# Patient Record
Sex: MALE | Race: WHITE | Employment: FULL TIME | URBAN - METROPOLITAN AREA
[De-identification: names, ages, dates, MRNs, and addresses within clinical notes are randomized per-mention and may not be internally consistent; named-entity substitution may affect disease eponyms.]

---

## 2017-01-19 ENCOUNTER — TELEPHONE (OUTPATIENT)
Dept: ENDOCRINOLOGY | Facility: CLINIC | Age: 52
End: 2017-01-19

## 2017-01-19 DIAGNOSIS — E11.9 TYPE 2 DIABETES MELLITUS WITHOUT COMPLICATION, WITH LONG-TERM CURRENT USE OF INSULIN (H): Primary | ICD-10-CM

## 2017-01-19 DIAGNOSIS — E66.09 NON MORBID OBESITY DUE TO EXCESS CALORIES: ICD-10-CM

## 2017-01-19 DIAGNOSIS — Z79.4 TYPE 2 DIABETES MELLITUS WITHOUT COMPLICATION, WITH LONG-TERM CURRENT USE OF INSULIN (H): Primary | ICD-10-CM

## 2017-01-19 NOTE — TELEPHONE ENCOUNTER
[facility] Call Center    Phone Message    Name of Caller: [facility]    Phone Number: Other phone number:  [phone]    Best time to return call: soon    May a detailed message be left on voicemail: no    Reason for Call: Medication Question or concern regarding medication   Prescription Clarification: yes  Name of Medication: liraglutide (VICTOZA PEN) 18 MG/3ML soln  Prescribing Provider: Dr. Potter    [facility]   What on the order needs clarification? Rep states he needed clarification     Is patient symptomatic? No. Describe question or concern: n/a       Action Taken: Message routed to:  Adult Clinics: Endocrinology p [phone]

## 2017-01-20 NOTE — TELEPHONE ENCOUNTER
Spoke to pharmacy who says they can't dispense Victoza 3.0 mg; dose only goes up to 1.8mg daily.   Reference #[id number]    Maddie Weems RN, BSN

## 2017-01-20 NOTE — TELEPHONE ENCOUNTER
[facility]    Will forward to Maddie Weems RN.    Charlotte Barnes LPN  Adult Endocrinology  [facility]

## 2017-01-24 RX ORDER — LIRAGLUTIDE 6 MG/ML
1.8 INJECTION SUBCUTANEOUS DAILY
Qty: 9 ML | Refills: 2 | Status: SHIPPED | OUTPATIENT
Start: 2017-01-24 | End: 2017-03-27

## 2017-01-24 NOTE — TELEPHONE ENCOUNTER
OK per Dr. Sherwood to keep pt at Victoza 1.8 mg daily.  Patient notified. New Rx sent for Victoza 1.8 mg daily per [facility] nurse protocol.  Writer will contact the insurance company to see if there is anything else that can be done for pt to get Victoza 3.0 mg.  Maddie Weems RN, BSN

## 2017-02-11 ENCOUNTER — TELEPHONE (OUTPATIENT)
Dept: FAMILY MEDICINE | Facility: CLINIC | Age: 52
End: 2017-02-11

## 2017-02-11 ENCOUNTER — TRANSFERRED RECORDS (OUTPATIENT)
Dept: HEALTH INFORMATION MANAGEMENT | Facility: CLINIC | Age: 52
End: 2017-02-11

## 2017-02-11 DIAGNOSIS — E11.9 TYPE 2 DIABETES MELLITUS WITHOUT COMPLICATION, UNSPECIFIED LONG TERM INSULIN USE STATUS: Primary | ICD-10-CM

## 2017-02-11 NOTE — TELEPHONE ENCOUNTER
aspirin 81 MG EC tablet      Last Written Prescription Date: 3/28/16  Last Quantity: 90, # refills: 2  Last Office Visit with G, P or [facility] prescribing provider: 9/27/16       CREATININE   Date Value Ref Range Status   08/12/2016 0.77 0.66 - 1.25 mg/dL Final     AST        8   11/13/2015  ALT       35   11/13/2015  BP Readings from Last 3 Encounters:   10/19/16 137/88   09/27/16 138/82   08/23/16 130/80

## 2017-02-14 ENCOUNTER — TRANSFERRED RECORDS (OUTPATIENT)
Dept: HEALTH INFORMATION MANAGEMENT | Facility: CLINIC | Age: 52
End: 2017-02-14

## 2017-02-14 RX ORDER — LIRAGLUTIDE 6 MG/ML
1.2 INJECTION SUBCUTANEOUS DAILY
Qty: 6 ML | Refills: 3 | Status: SHIPPED | OUTPATIENT
Start: 2017-02-14 | End: 2017-03-27

## 2017-02-14 NOTE — TELEPHONE ENCOUNTER
Prescription approved per [facility] Refill Protocol.  Anabel Crystal RN

## 2017-02-14 NOTE — TELEPHONE ENCOUNTER
CRISTÓBAL Sherwood to add Victoza 1.2 mg daily to Victoza 1.8 mg daily for 3.0 mg total.  Patient agrees with plan. He will contact the clinic if unable to get second prescription.  Maddie Weems RN, BSN

## 2017-02-23 DIAGNOSIS — E11.9 TYPE 2 DIABETES MELLITUS WITHOUT COMPLICATION (H): ICD-10-CM

## 2017-02-23 NOTE — TELEPHONE ENCOUNTER
B-D U/F insulin pen needle      Last Written Prescription Date: 09/07/16  Last Fill Quantity: 400,  # refills: 1   Last Office Visit with G, [facility] or [facility] prescribing provider: 09/27/16

## 2017-02-27 RX ORDER — PEN NEEDLE, DIABETIC 31 GX5/16"
NEEDLE, DISPOSABLE MISCELLANEOUS
Qty: 300 EACH | Refills: 0 | Status: SHIPPED | OUTPATIENT
Start: 2017-02-27 | End: 2017-08-13

## 2017-02-27 NOTE — TELEPHONE ENCOUNTER
Prescription(s) approved per [facility] Refill Protocol.    Jeff Patel RN

## 2017-02-28 DIAGNOSIS — E11.9 TYPE 2 DIABETES MELLITUS WITHOUT COMPLICATION (H): ICD-10-CM

## 2017-02-28 NOTE — TELEPHONE ENCOUNTER
insulin lispro (HUMALOG KWIKPEN) 100 UNIT/ML injection         Last Written Prescription Date: 12/30/16  Last Fill Quantity: 45ml, # refills: 1  Last Office Visit with G, P or [facility] prescribing provider:  09/27/16        BP Readings from Last 3 Encounters:   10/19/16 137/88   09/27/16 138/82   08/23/16 130/80     Lab Results   Component Value Date    MICROL 31 11/13/2015     No results found for: MICROALBUMIN  Creatinine   Date Value Ref Range Status   08/12/2016 0.77 0.66 - 1.25 mg/dL Final   ]  GFR Estimate   Date Value Ref Range Status   08/12/2016 >90  Non  GFR Calc   >60 mL/min/1.7m2 Final   11/13/2015 >90  Non  GFR Calc   >60 mL/min/1.7m2 Final   02/02/2015 >90  Non  GFR Calc   >60 mL/min/1.7m2 Final     GFR Estimate If Black   Date Value Ref Range Status   08/12/2016 >90   GFR Calc   >60 mL/min/1.7m2 Final   11/13/2015 >90   GFR Calc   >60 mL/min/1.7m2 Final   02/02/2015 >90   GFR Calc   >60 mL/min/1.7m2 Final     Lab Results   Component Value Date    CHOL 210 08/12/2016     Lab Results   Component Value Date    HDL 38 08/12/2016     Lab Results   Component Value Date     08/12/2016     02/02/2015     Lab Results   Component Value Date    TRIG 354 08/12/2016     Lab Results   Component Value Date    CHOLHDLRATIO 4.9 11/13/2015     Lab Results   Component Value Date    AST 8 11/13/2015     Lab Results   Component Value Date    ALT 35 11/13/2015     Lab Results   Component Value Date    A1C 7.3 08/12/2016    A1C 7.9 11/13/2015    A1C 6.6 02/02/2015    A1C 6.9 08/04/2014    A1C 6.6 02/06/2014     Potassium   Date Value Ref Range Status   08/12/2016 4.2 3.4 - 5.3 mmol/L Final         [facility]

## 2017-03-03 RX ORDER — INSULIN LISPRO 100 [IU]/ML
INJECTION, SOLUTION INTRAVENOUS; SUBCUTANEOUS
Qty: 3 ML | Refills: 0 | Status: SHIPPED | OUTPATIENT
Start: 2017-03-03 | End: 2017-03-27 | Stop reason: ALTCHOICE

## 2017-03-03 NOTE — TELEPHONE ENCOUNTER
Medication is being filled for 1 time refill only due to:  Patient needs to be seen because due for office visit.     Anabel Crystal RN

## 2017-03-14 ENCOUNTER — TELEPHONE (OUTPATIENT)
Dept: FAMILY MEDICINE | Facility: CLINIC | Age: 52
End: 2017-03-14

## 2017-03-17 NOTE — TELEPHONE ENCOUNTER
Parish Langston contacted Amadou on 03/17/17 and left a message. If patient calls back please schedule appointment as soon as possible for diabetes check/ refill.  Parish Langston CMA

## 2017-03-17 NOTE — TELEPHONE ENCOUNTER
Humalog         Last Written Prescription Date: 03/03/17  Last Fill Quantity: 3 mL, # refills: 0  Last Office Visit with [facility], [facility] or [facility] prescribing provider:  09/27/16        BP Readings from Last 3 Encounters:   10/19/16 137/88   09/27/16 138/82   08/23/16 130/80     Lab Results   Component Value Date    MICROL 31 11/13/2015     No results found for: MICROALBUMIN  Creatinine   Date Value Ref Range Status   08/12/2016 0.77 0.66 - 1.25 mg/dL Final   ]  GFR Estimate   Date Value Ref Range Status   08/12/2016 >90  Non  GFR Calc   >60 mL/min/1.7m2 Final   11/13/2015 >90  Non  GFR Calc   >60 mL/min/1.7m2 Final   02/02/2015 >90  Non  GFR Calc   >60 mL/min/1.7m2 Final     GFR Estimate If Black   Date Value Ref Range Status   08/12/2016 >90   GFR Calc   >60 mL/min/1.7m2 Final   11/13/2015 >90   GFR Calc   >60 mL/min/1.7m2 Final   02/02/2015 >90   GFR Calc   >60 mL/min/1.7m2 Final     Lab Results   Component Value Date    CHOL 210 08/12/2016     Lab Results   Component Value Date    HDL 38 08/12/2016     Lab Results   Component Value Date     08/12/2016     02/02/2015     Lab Results   Component Value Date    TRIG 354 08/12/2016     Lab Results   Component Value Date    CHOLHDLRATIO 4.9 11/13/2015     Lab Results   Component Value Date    AST 8 11/13/2015     Lab Results   Component Value Date    ALT 35 11/13/2015     Lab Results   Component Value Date    A1C 7.3 08/12/2016    A1C 7.9 11/13/2015    A1C 6.6 02/02/2015    A1C 6.9 08/04/2014    A1C 6.6 02/06/2014     Potassium   Date Value Ref Range Status   08/12/2016 4.2 3.4 - 5.3 mmol/L Final     Patient is due for office visit.  Please call patient and schedule an office visit.  Then route message to provider.  Anabel Crystal RN

## 2017-03-20 NOTE — TELEPHONE ENCOUNTER
Manish Quiros contacted Amadou on 03/20/17 and left a message. If patient calls back please schedule appointment as soon as possible for a diabetes check and refills, one appt is make route the message back to the care team.  Manish Quiros,  [facility]

## 2017-03-21 RX ORDER — INSULIN LISPRO 100 [IU]/ML
INJECTION, SOLUTION INTRAVENOUS; SUBCUTANEOUS
Refills: 0 | OUTPATIENT
Start: 2017-03-21

## 2017-03-21 NOTE — TELEPHONE ENCOUNTER
Called spoke with patient informed message below. Patient informed me he is out of town now, but once he get back he will call to schedule appointment. Patient informed me that he still has a couple pens left.    JAMES Tarango MA

## 2017-03-24 DIAGNOSIS — N52.9 ERECTILE DYSFUNCTION, UNSPECIFIED ERECTILE DYSFUNCTION TYPE: ICD-10-CM

## 2017-03-24 DIAGNOSIS — F41.1 GENERALIZED ANXIETY DISORDER: ICD-10-CM

## 2017-03-24 RX ORDER — SILDENAFIL 100 MG/1
TABLET, FILM COATED ORAL
Qty: 12 TABLET | Refills: 3 | Status: SHIPPED | OUTPATIENT
Start: 2017-03-24 | End: 2017-09-15

## 2017-03-24 RX ORDER — LORAZEPAM 0.5 MG/1
0.5 TABLET ORAL EVERY 8 HOURS PRN
Qty: 45 TABLET | Refills: 0 | Status: SHIPPED | OUTPATIENT
Start: 2017-03-24 | End: 2017-05-25

## 2017-03-24 NOTE — TELEPHONE ENCOUNTER
LORazepam (ATIVAN) 0.5 MG tablet      Last Written Prescription Date:  12/23/16  Last Fill Quantity: 45,   # refills: 0  Last Office Visit with [facility], P or  Health prescribing provider: 10/19/16  Future Office visit:    Next 5 appointments (look out 90 days)     Mar 27, 2017 11:30 AM CDT   Office Visit with Makenzie Villarreal MD   [facility]    [address] [facility] [address]   [phone]                   Routing refill request to provider for review/approval because:  Drug not on the [facility], P or  Health refill protocol or controlled substance          VIAGRA 100 MG tablet      Last Written Prescription Date: 10/24/16  Last Fill Quantity: 12,  # refills: 3   Last Office Visit with [facility], [facility] or  Health prescribing provider: 10/19/16                                         Next 5 appointments (look out 90 days)     Mar 27, 2017 11:30 AM CDT   Office Visit with Makenzie Villarreal MD   [facility]    [address] [facility] [address]   [phone]

## 2017-03-27 ENCOUNTER — OFFICE VISIT (OUTPATIENT)
Dept: INTERNAL MEDICINE | Facility: CLINIC | Age: 52
End: 2017-03-27
Payer: COMMERCIAL

## 2017-03-27 VITALS
TEMPERATURE: 98.8 F | BODY MASS INDEX: 32.76 KG/M2 | OXYGEN SATURATION: 98 % | DIASTOLIC BLOOD PRESSURE: 75 MMHG | HEIGHT: 65 IN | HEART RATE: 63 BPM | SYSTOLIC BLOOD PRESSURE: 150 MMHG | WEIGHT: 196.6 LBS

## 2017-03-27 DIAGNOSIS — E66.09 NON MORBID OBESITY DUE TO EXCESS CALORIES: ICD-10-CM

## 2017-03-27 DIAGNOSIS — C43.59 MALIGNANT MELANOMA OF SKIN OF TRUNK (H): ICD-10-CM

## 2017-03-27 DIAGNOSIS — I10 HYPERTENSION GOAL BP (BLOOD PRESSURE) < 140/90: ICD-10-CM

## 2017-03-27 DIAGNOSIS — Z79.4 TYPE 2 DIABETES MELLITUS WITHOUT COMPLICATION, WITH LONG-TERM CURRENT USE OF INSULIN (H): Primary | ICD-10-CM

## 2017-03-27 DIAGNOSIS — E11.9 TYPE 2 DIABETES MELLITUS WITHOUT COMPLICATION, WITH LONG-TERM CURRENT USE OF INSULIN (H): Primary | ICD-10-CM

## 2017-03-27 DIAGNOSIS — E11.9 TYPE 2 DIABETES MELLITUS WITHOUT COMPLICATION, UNSPECIFIED LONG TERM INSULIN USE STATUS: ICD-10-CM

## 2017-03-27 DIAGNOSIS — E78.5 HYPERLIPIDEMIA LDL GOAL <100: ICD-10-CM

## 2017-03-27 LAB
ANION GAP SERPL CALCULATED.3IONS-SCNC: 7 MMOL/L (ref 3–14)
BUN SERPL-MCNC: 16 MG/DL (ref 7–30)
CALCIUM SERPL-MCNC: 9.5 MG/DL (ref 8.5–10.1)
CHLORIDE SERPL-SCNC: 105 MMOL/L (ref 94–109)
CO2 SERPL-SCNC: 26 MMOL/L (ref 20–32)
CREAT SERPL-MCNC: 0.69 MG/DL (ref 0.66–1.25)
CREAT UR-MCNC: 188 MG/DL
GFR SERPL CREATININE-BSD FRML MDRD: ABNORMAL ML/MIN/1.7M2
GLUCOSE SERPL-MCNC: 203 MG/DL (ref 70–99)
HBA1C MFR BLD: 8.2 % (ref 4.3–6)
MICROALBUMIN UR-MCNC: 65 MG/L
MICROALBUMIN/CREAT UR: 34.79 MG/G CR (ref 0–17)
POTASSIUM SERPL-SCNC: 3.5 MMOL/L (ref 3.4–5.3)
SODIUM SERPL-SCNC: 138 MMOL/L (ref 133–144)

## 2017-03-27 PROCEDURE — 82043 UR ALBUMIN QUANTITATIVE: CPT | Performed by: INTERNAL MEDICINE

## 2017-03-27 PROCEDURE — 80048 BASIC METABOLIC PNL TOTAL CA: CPT | Performed by: INTERNAL MEDICINE

## 2017-03-27 PROCEDURE — 99214 OFFICE O/P EST MOD 30 MIN: CPT | Performed by: INTERNAL MEDICINE

## 2017-03-27 PROCEDURE — 83036 HEMOGLOBIN GLYCOSYLATED A1C: CPT | Performed by: INTERNAL MEDICINE

## 2017-03-27 PROCEDURE — 36415 COLL VENOUS BLD VENIPUNCTURE: CPT | Performed by: INTERNAL MEDICINE

## 2017-03-27 RX ORDER — LISINOPRIL 20 MG/1
20 TABLET ORAL 2 TIMES DAILY
Qty: 180 TABLET | Refills: 1 | Status: SHIPPED | OUTPATIENT
Start: 2017-03-27 | End: 2017-10-09

## 2017-03-27 RX ORDER — METFORMIN HCL 500 MG
TABLET, EXTENDED RELEASE 24 HR ORAL
Qty: 360 TABLET | Refills: 1 | Status: SHIPPED | OUTPATIENT
Start: 2017-03-27 | End: 2017-09-14

## 2017-03-27 RX ORDER — LIRAGLUTIDE 6 MG/ML
1.8 INJECTION SUBCUTANEOUS DAILY
Qty: 27 ML | Refills: 1 | Status: SHIPPED | OUTPATIENT
Start: 2017-03-27 | End: 2017-10-09

## 2017-03-27 RX ORDER — SILDENAFIL CITRATE 100 MG
TABLET ORAL
Qty: 12 TABLET | Refills: 3 | OUTPATIENT
Start: 2017-03-27

## 2017-03-27 RX ORDER — LIRAGLUTIDE 6 MG/ML
1.2 INJECTION SUBCUTANEOUS DAILY
Qty: 18 ML | Refills: 1 | Status: SHIPPED | OUTPATIENT
Start: 2017-03-27 | End: 2017-09-14

## 2017-03-27 RX ORDER — LORAZEPAM 0.5 MG/1
TABLET ORAL
Qty: 45 TABLET | Refills: 0 | OUTPATIENT
Start: 2017-03-27

## 2017-03-27 RX ORDER — METOPROLOL TARTRATE 25 MG/1
12.5 TABLET, FILM COATED ORAL 2 TIMES DAILY
Qty: 90 TABLET | Refills: 1 | Status: SHIPPED | OUTPATIENT
Start: 2017-03-27 | End: 2017-09-27

## 2017-03-27 RX ORDER — ROSUVASTATIN CALCIUM 40 MG/1
40 TABLET, COATED ORAL DAILY
Qty: 90 TABLET | Refills: 1 | Status: SHIPPED | OUTPATIENT
Start: 2017-03-27 | End: 2017-12-31

## 2017-03-27 NOTE — PATIENT INSTRUCTIONS
For your blood pressure:  Please start Metoprolol 12.5mg twice a day.  Continue your Lisinopril.    For your diabetes:  Please continue your Victoza, Metformin and insulin: continue the lantus 50 units daily and Humalog, three times a day as follows:  (Humalog, three times a day):  Inject 10-12 units before each meal + correction (see below)  Pre-meal correction:    140-165 mg/dL: 1 unit  166-190: 2 units  191-215: 3 units  216-240: 4 units  241-265: 5 units  266-290: 6 units  > 291: 7 units     Bedtime:    165-190 mg/dL: 1 units  191-215: 2 units  216-240: 3 units  241-265: 4 units  266-290: 5 units  > 291: 6 units    Please return to clinic in 6 months (we may advise a visit sooner, based on today's lab/test results), with your usual PCP.    Please complete your colonoscopy soon.

## 2017-03-27 NOTE — MR AVS SNAPSHOT
After Visit Summary   3/27/2017    Amadou Bach    MRN: [id number]           Patient Information     Date Of Birth          1965        Visit Information        Provider Department      3/27/2017 2:50 PM Makenzie Villarreal MD [facility]        Today's Diagnoses     Type 2 diabetes mellitus without complication, with long-term current use of insulin (H)        Non morbid obesity due to excess calories        Hypertension goal BP (blood pressure) < 140/90        Type 2 diabetes mellitus without complication, unspecified long term insulin use status (H)        Hyperlipidemia LDL goal <100          Care Instructions    For your blood pressure:  Please start Metoprolol 12.5mg twice a day.  Continue your Lisinopril.    For your diabetes:  Please continue your Victoza, Metformin and insulin: continue the lantus 50 units daily and Humalog, three times a day as follows:  (Humalog, three times a day):  Inject 10-12 units before each meal + correction (see below)  Pre-meal correction:    140-165 mg/dL: 1 unit  166-190: 2 units  191-215: 3 units  216-240: 4 units  241-265: 5 units  266-290: 6 units  > 291: 7 units     Bedtime:    165-190 mg/dL: 1 units  191-215: 2 units  216-240: 3 units  241-265: 4 units  266-290: 5 units  > 291: 6 units    Please return to clinic in 6 months (we may advise a visit sooner, based on today's lab/test results), with your usual PCP.    Please complete your colonoscopy soon.          Follow-ups after your visit        Who to contact     If you have questions or need follow up information about today's clinic visit or your schedule please contact [facility] directly at [phone].  Normal or non-critical lab and imaging results will be communicated to you by MyChart, letter or phone within 4 business days after the clinic has received the results. If you do not hear from us within 7 days, please contact the clinic through Repp[employer]ert or  "phone. If you have a critical or abnormal lab result, we will notify you by phone as soon as possible.  Submit refill requests through [employer]ome 1699 or call your pharmacy and they will forward the refill request to us. Please allow 3 business days for your refill to be completed.          Additional Information About Your Visit        [employer] Information     vendome 1699 gives you secure access to your electronic health record. If you see a primary care provider, you can also send messages to your care team and make appointments. If you have questions, please call your primary care clinic.  If you do not have a primary care provider, please call [phone] and they will assist you.        Care EveryWhere ID     This is your Care EveryWhere ID. This could be used by other organizations to access your [facility] medical records  [id number]        Your Vitals Were     Pulse Temperature Height Pulse Oximetry BMI (Body Mass Index)       63 98.8  F (37.1  C) (Oral) 5' 5\" (1.651 m) 98% 32.72 kg/m2        Blood Pressure from Last 3 Encounters:   03/27/17 150/75   10/19/16 137/88   09/27/16 138/82    Weight from Last 3 Encounters:   03/27/17 196 lb 9.6 oz (89.2 kg)   12/02/16 205 lb 9.6 oz (93.3 kg)   10/19/16 202 lb 9.6 oz (91.9 kg)              We Performed the Following     Albumin Random Urine Quantitative     Basic metabolic panel     Hemoglobin A1c          Today's Medication Changes          These changes are accurate as of: 3/27/17  3:54 PM.  If you have any questions, ask your nurse or doctor.               Start taking these medicines.        Dose/Directions    metoprolol 25 MG tablet   Commonly known as:  LOPRESSOR   Used for:  Hypertension goal BP (blood pressure) < 140/90   Started by:  Makenzie Villarreal MD        Dose:  12.5 mg   Take 0.5 tablets (12.5 mg) by mouth 2 times daily   Quantity:  90 tablet   Refills:  1         These medicines have changed or have updated prescriptions.        Dose/Directions    " aspirin 81 MG EC tablet   This may have changed:  See the new instructions.   Used for:  Type 2 diabetes mellitus without complication, unspecified long term insulin use status (H)   Changed by:  Makenzie Villarreal MD        Dose:  81 mg   Take 1 tablet (81 mg) by mouth daily   Quantity:  90 tablet   Refills:  3       insulin glargine 100 UNIT/ML injection   Commonly known as:  LANTUS SOLOSTAR   This may have changed:  additional instructions   Used for:  Type 2 diabetes mellitus without complication, unspecified long term insulin use status (H)   Changed by:  Makenzie Villarreal MD        INJECT 50 UNITS SUBCUTANEOUSLY AT BEDTIME   Quantity:  45 mL   Refills:  1       insulin lispro 100 UNIT/ML injection   Commonly known as:  HumaLOG KWIKpen   This may have changed:    - additional instructions  - Another medication with the same name was removed. Continue taking this medication, and follow the directions you see here.   Used for:  Type 2 diabetes mellitus without complication, unspecified long term insulin use status (H)   Changed by:  Makenzie Villarreal MD        Inject 10-12u before each meal plus correction: Pre-meal and bedtime correction, as per previous sliding scale   Quantity:  54 mL   Refills:  1       lisinopril 20 MG tablet   Commonly known as:  PRINIVIL/ZESTRIL   This may have changed:  See the new instructions.   Used for:  Hypertension goal BP (blood pressure) < 140/90   Changed by:  Makenzie Villarreal MD        Dose:  20 mg   Take 1 tablet (20 mg) by mouth 2 times daily   Quantity:  180 tablet   Refills:  1            Where to get your medicines      These medications were sent to [facility]/pharmacy #[id number] - [place], [place] - [address] [place] [address] [address] [place] [id number] [place] [address], [place] [address]     Phone:  [phone]     aspirin 81 MG EC tablet    insulin glargine 100 UNIT/ML injection    insulin lispro 100 UNIT/ML injection    liraglutide 18  MG/3ML soln    liraglutide 18 MG/3ML soln    lisinopril 20 MG tablet    metFORMIN 500 MG 24 hr tablet    metoprolol 25 MG tablet    rosuvastatin 40 MG tablet                Primary Care Provider Office Phone # Fax #    Arielle Hair PA-C [phone]       [facility] [address]  [facility] [address]        Thank you!     Thank you for choosing [facility]  for your care. Our goal is always to provide you with excellent care. Hearing back from our patients is one way we can continue to improve our services. Please take a few minutes to complete the written survey that you may receive in the mail after your visit with us. Thank you!             Your Updated Medication List - Protect others around you: Learn how to safely use, store and throw away your medicines at www.disposemymeds.org.          This list is accurate as of: 3/27/17  3:54 PM.  Always use your most recent med list.                   Brand Name Dispense Instructions for use    ACCU-CHEK MARICRUZ PLUS test strip   Generic drug:  blood glucose monitoring     750 strip    TEST BLOOD SUGARS 8 TIMES DAILY AS DIRECTED       aspirin 81 MG EC tablet     90 tablet    Take 1 tablet (81 mg) by mouth daily       CO Q 10 PO      Take  by mouth. 2 per day       FISH OIL CONCENTRATE 1000 MG Caps      2 tablets daily       insulin glargine 100 UNIT/ML injection    LANTUS SOLOSTAR    45 mL    INJECT 50 UNITS SUBCUTANEOUSLY AT BEDTIME       insulin lispro 100 UNIT/ML injection    HumaLOG KWIKpen    54 mL    Inject 10-12u before each meal plus correction: Pre-meal and bedtime correction, as per previous sliding scale       * insulin pen needle 31G X 5 MM    B-D U/F    300 each    Use 4 times daily as directed.       * B-D U/F 31G X 8 MM   Generic drug:  insulin pen needle     300 each    USE 4 TIMES DAILY AS DIRECTED.       * liraglutide 18 MG/3ML soln    VICTOZA PEN    27 mL    Inject 1.8 mg Subcutaneous daily       *  liraglutide 18 MG/3ML soln    VICTOZA PEN    18 mL    Inject 1.2 mg Subcutaneous daily       lisinopril 20 MG tablet    PRINIVIL/ZESTRIL    180 tablet    Take 1 tablet (20 mg) by mouth 2 times daily       LORazepam 0.5 MG tablet    ATIVAN    45 tablet    Take 1 tablet (0.5 mg) by mouth every 8 hours as needed for anxiety       metFORMIN 500 MG 24 hr tablet    GLUCOPHAGE-XR    360 tablet    TAKE 2 TABLETS (1,000 MG) BY MOUTH 2 TIMES DAILY       metoprolol 25 MG tablet    LOPRESSOR    90 tablet    Take 0.5 tablets (12.5 mg) by mouth 2 times daily       MULTIPLE VITAMINS PO      1000mg daily       order for DME     3 Month    1 Device by * route 8 times daily TEST STRIPS  ACCU-CHEK MARICRUZ plus test strips       * order for DME     1 Device    Equipment being ordered: glucometer brand per insurance       * order for DME     400 each    LANCETS 4 x DAILY AND PRN       rosuvastatin 40 MG tablet    CRESTOR    90 tablet    Take 1 tablet (40 mg) by mouth daily Take 40 mg by mouth daily       sildenafil 100 MG cap/tab    VIAGRA    12 tablet    TAKE 1 TABLET BY MOUTH 30 MINS BEFORE INTERCOURSE       VITAMIN B-12 PO      Take  by mouth daily.       vitamin D 1000 UNITS capsule      Take 2 capsules by mouth daily.       * Notice:  This list has 6 medication(s) that are the same as other medications prescribed for you. Read the directions carefully, and ask your doctor or other care provider to review them with you.

## 2017-03-27 NOTE — TELEPHONE ENCOUNTER
Written rx for Lorazepam completed by Dr. Brothers is faxed to [facility] at fax # [phone].  Manish Quiros,  [facility]    Provider pls address the other pended medication.  Manish Quiros,  [facility]

## 2017-03-27 NOTE — NURSING NOTE
"Chief Complaint   Patient presents with     Diabetes       Initial /80 (BP Location: Right arm, Patient Position: Chair, Cuff Size: Adult Regular)  Pulse 63  Temp 98.8  F (37.1  C) (Oral)  Ht 5' 5\" (1.651 m)  Wt 196 lb 9.6 oz (89.2 kg)  SpO2 98%  BMI 32.72 kg/m2 Estimated body mass index is 32.72 kg/(m^2) as calculated from the following:    Height as of this encounter: 5' 5\" (1.651 m).    Weight as of this encounter: 196 lb 9.6 oz (89.2 kg).  Medication Reconciliation: complete    Radha Meza CMA  "

## 2017-03-27 NOTE — PROGRESS NOTES
"  SUBJECTIVE:                                                    Amadou Bach is a 51 year old male who presents to clinic today for the following health issues:        Diabetes Follow-up    Patient is checking blood sugars: up to 8 times daily.  120-140. Patient feels that his blood sugars may have been higher recently.  Blood sugar testing frequency justification: Uncontrolled diabetes    Diabetic concerns: none     Symptoms of hypoglycemia (low blood sugar): none     Paresthesias (numbness or burning in feet) or sores: No     Date of last diabetic eye exam: January 2017     Needs refills on all DM medications d/t an upcoming change in his insurance covering medications.  Note: patient is on  3mg of Victoza SQ daily for weight loss purposes and follows with Endocrinology.    Wt Readings from Last 4 Encounters:   03/27/17 196 lb 9.6 oz (89.2 kg)   12/02/16 205 lb 9.6 oz (93.3 kg)   10/19/16 202 lb 9.6 oz (91.9 kg)   09/27/16 202 lb (91.6 kg)     Hyperlipidemia Follow-Up      Rate your low fat/cholesterol diet?: not monitoring fat    Taking statin?  Yes, no muscle aches from statin    Other lipid medications/supplements?:  none     Hypertension Follow-up      Outpatient blood pressures are not being checked.    Low Salt Diet: not monitoring salt     BPs high.  Patient denies chest pain, chest pressure, shortness of breath, palpitations, headaches, visual changes, or any noted lower extremity swelling.     History of Melanoma:  Per the 1/2016 Derm note: \"History of melanoma: Left shoulder. Breslow depth 0.18 mm. s/p excision 01/22/15.\" he appears to be out of the 2 year frequent-visit window now.        Amount of exercise or physical activity: 2-3 days/week for an average of 45-60 minutes    Problems taking medications regularly: No    Medication side effects: none    Diet: regular (no restrictions) and carbohydrate counting           Problem list and histories reviewed & adjusted, as indicated.  Additional " history: as documented    Patient Active Problem List   Diagnosis     Erectile dysfunction     TYPE 2 DIABETES, HBA1C GOAL < 7%     Hyperlipidemia LDL goal <100     Obesity     Generalized anxiety disorder     ACP (advance care planning)     Hx of colonic polyp     Hypertension goal BP (blood pressure) < 140/90     Malignant melanoma of skin of trunk (H)     History of melanoma     Type 2 diabetes mellitus without complication (H)     AK (actinic keratosis)     Multiple benign nevi     Cherry hemangioma     Lentigines     Skin cancer screening     Erectile dysfunction, unspecified erectile dysfunction type     Past Surgical History:   Procedure Laterality Date     BIOPSY OF SKIN LESION       COLONOSCOPY  3/11/2014    Procedure: COMBINED COLONOSCOPY, SINGLE BIOPSY/POLYPECTOMY BY BIOPSY;;  Surgeon: Stephen Amin MD;  Location: [facility] OR     COLONOSCOPY WITH CO2 INSUFFLATION N/A 8/19/2014    Procedure: COLONOSCOPY WITH CO2 INSUFFLATION;  Surgeon: Stephen Amin MD;  Location: [facility] OR     HC TOOTH EXTRACTION W/FORCEP      wisdom teeth x4       Social History   Substance Use Topics     Smoking status: Never Smoker     Smokeless tobacco: Never Used     Alcohol use Yes      Comment: occ     Family History   Problem Relation Age of Onset     DIABETES Mother      Hypertension Mother      DIABETES Father      CANCER Father      lung     CANCER Other      aunt - skin cancer     Skin Cancer Other      Melanoma No family hx of          Current Outpatient Prescriptions   Medication Sig Dispense Refill     liraglutide (VICTOZA PEN) 18 MG/3ML soln Inject 1.8 mg Subcutaneous daily 27 mL 1     liraglutide (VICTOZA PEN) 18 MG/3ML soln Inject 1.2 mg Subcutaneous daily 18 mL 1     lisinopril (PRINIVIL/ZESTRIL) 20 MG tablet Take 1 tablet (20 mg) by mouth 2 times daily 180 tablet 1     metoprolol (LOPRESSOR) 25 MG tablet Take 0.5 tablets (12.5 mg) by mouth 2 times daily 90 tablet 1     metFORMIN (GLUCOPHAGE-XR) 500 MG 24 hr  tablet TAKE 2 TABLETS (1,000 MG) BY MOUTH 2 TIMES DAILY 360 tablet 1     aspirin 81 MG EC tablet Take 1 tablet (81 mg) by mouth daily 90 tablet 3     rosuvastatin (CRESTOR) 40 MG tablet Take 1 tablet (40 mg) by mouth daily Take 40 mg by mouth daily 90 tablet 1     insulin glargine (LANTUS SOLOSTAR) 100 UNIT/ML injection INJECT 50 UNITS SUBCUTANEOUSLY AT BEDTIME 45 mL 1     insulin lispro (HUMALOG KWIKPEN) 100 UNIT/ML injection Inject 10-12u before each meal plus correction: Pre-meal and bedtime correction, as per previous sliding scale 54 mL 1     LORazepam (ATIVAN) 0.5 MG tablet Take 1 tablet (0.5 mg) by mouth every 8 hours as needed for anxiety 45 tablet 0     sildenafil (VIAGRA) 100 MG cap/tab TAKE 1 TABLET BY MOUTH 30 MINS BEFORE INTERCOURSE 12 tablet 3     B-D U/F 31G X 8 MM insulin pen needle USE 4 TIMES DAILY AS DIRECTED. 300 each 0     ORDER FOR DME Equipment being ordered: glucometer brand per insurance 1 Device 0     ORDER FOR DME LANCETS 4 x DAILY AND  each 4     ACCU-CHEK MARICRUZ PLUS test strip TEST BLOOD SUGARS 8 TIMES DAILY AS DIRECTED 750 strip 1     insulin pen needle (B-D U/F) 31G X 5 MM Use 4 times daily as directed. 300 each 3     ORDER FOR DME 1 Device by * route 8 times daily TEST STRIPS   ACCU-CHEK MARICRUZ plus test strips 3 Month 0     Cyanocobalamin (VITAMIN B-12 PO) Take  by mouth daily.       Coenzyme Q10 (CO Q 10 PO) Take  by mouth. 2 per day       FISH OIL CONCENTRATE 1000 MG OR CAPS 2 tablets daily       VITAMIN D 1000 UNIT OR CAPS Take 2 capsules by mouth daily.       MULTIPLE VITAMINS OR 1000mg daily         Reviewed and updated as needed this visit by clinical staff  Allergies  Meds       Reviewed and updated as needed this visit by Provider         ==============================================================  ROS:  Constitutional, HEENT, cardiovascular, pulmonary, GI, , musculoskeletal, neuro, skin, endocrine and psych systems are negative, except as otherwise noted.    "    OBJECTIVE:                                                    /75  Pulse 63  Temp 98.8  F (37.1  C) (Oral)  Ht 5' 5\" (1.651 m)  Wt 196 lb 9.6 oz (89.2 kg)  SpO2 98%  BMI 32.72 kg/m2  Body mass index is 32.72 kg/(m^2).     Vitals:    03/27/17 1500 03/27/17 1526   BP: 163/80 150/75   BP Location: Right arm    Patient Position: Chair    Cuff Size: Adult Regular    Pulse: 63    Temp: 98.8  F (37.1  C)    TempSrc: Oral    SpO2: 98%    Weight: 196 lb 9.6 oz (89.2 kg)    Height: 5' 5\" (1.651 m)        GENERAL APPEARANCE: healthy, alert and in no distress  EYES: Eyes grossly normal to inspection, and conjunctivae and sclerae normal  HENT: head normocephalic and atraumatic and mouth without ulcers or lesions, oropharynx clear and oral mucous membranes moist  NECK: no noticeable adenopathy, no asymmetry, masses, or scars   RESP: lungs clear to auscultation - no rales, rhonchi or wheezes  CV: regular rate and rhythm, normal S1 S2, no S3 or S4, no murmur, click or rub, no peripheral edema and peripheral pulses strong  ABDOMEN: soft, nontender, no hepatosplenomegaly, no masses and bowel sounds normal  MS: no musculoskeletal defects are noted and gait is age appropriate without ataxia  SKIN: no suspicious lesions or rashes  NEURO: mentation intact and speech normal  PSYCH: mentation appears normal and affect normal/bright.     Results for orders placed or performed in visit on 03/27/17   Hemoglobin A1c   Result Value Ref Range    Hemoglobin A1C 8.2 (H) 4.3 - 6.0 %   Albumin Random Urine Quantitative   Result Value Ref Range    Creatinine Urine 188 mg/dL    Albumin Urine mg/L 65 mg/L    Albumin Urine mg/g Cr 34.79 (H) 0 - 17 mg/g Cr   Basic metabolic panel   Result Value Ref Range    Sodium 138 133 - 144 mmol/L    Potassium 3.5 3.4 - 5.3 mmol/L    Chloride 105 94 - 109 mmol/L    Carbon Dioxide 26 20 - 32 mmol/L    Anion Gap 7 3 - 14 mmol/L    Glucose 203 (H) 70 - 99 mg/dL    Urea Nitrogen 16 7 - 30 mg/dL    " Creatinine 0.69 0.66 - 1.25 mg/dL    GFR Estimate >90  Non  GFR Calc   >60 mL/min/1.7m2    GFR Estimate If Black >90   GFR Calc   >60 mL/min/1.7m2    Calcium 9.5 8.5 - 10.1 mg/dL      Lab Results   Component Value Date    A1C 8.2 03/27/2017    A1C 7.3 08/12/2016    A1C 7.9 11/13/2015    A1C 6.6 02/02/2015    A1C 6.9 08/04/2014     ASSESSMENT/PLAN:                                                        ICD-10-CM    1. Type 2 diabetes mellitus without complication, with long-term current use of insulin (H) E11.9 liraglutide (VICTOZA PEN) 18 MG/3ML soln    Z79.4 liraglutide (VICTOZA PEN) 18 MG/3ML soln     Hemoglobin A1c     Albumin Random Urine Quantitative   2. Malignant melanoma of skin of trunk (H) C43.59    3. Non morbid obesity due to excess calories E66.09 liraglutide (VICTOZA PEN) 18 MG/3ML soln   4. Hypertension goal BP (blood pressure) < 140/90 I10 lisinopril (PRINIVIL/ZESTRIL) 20 MG tablet     metoprolol (LOPRESSOR) 25 MG tablet     Basic metabolic panel   5. Type 2 diabetes mellitus without complication, unspecified long term insulin use status (H) E11.9 metFORMIN (GLUCOPHAGE-XR) 500 MG 24 hr tablet     aspirin 81 MG EC tablet     insulin glargine (LANTUS SOLOSTAR) 100 UNIT/ML injection     insulin lispro (HUMALOG KWIKPEN) 100 UNIT/ML injection   6. Hyperlipidemia LDL goal <100 E78.5 rosuvastatin (CRESTOR) 40 MG tablet     (E11.9,  Z79.4) Type 2 diabetes mellitus without complication, with long-term current use of insulin (H)  (primary encounter diagnosis)  Lab Results   Component Value Date    A1C 8.2 03/27/2017    A1C 7.3 08/12/2016    A1C 7.9 11/13/2015    A1C 6.6 02/02/2015    A1C 6.9 08/04/2014     .  LDL Cholesterol Calculated   Date Value Ref Range Status   08/12/2016 101 (H) <100 mg/dL Final     Comment:     Above desirable:  100-129 mg/dl   Borderline High:  130-159 mg/dL   High:             160-189 mg/dL   Very high:       >189 mg/dl     ]   Comment: worsening  diabetic control  Plan:   As per orders above and patient instructions below.   See results message  liraglutide (VICTOZA PEN) 18 MG/3ML soln,         liraglutide (VICTOZA PEN) 18 MG/3ML soln,         Hemoglobin A1c, Albumin Random Urine         Quantitative            (C43.59) Malignant melanoma of skin of trunk (H)  Comment: history as per HPI  Plan: See results message    (E66.09) Non morbid obesity due to excess calories  Comment: on high dose victoza with current improvement in his weight  Plan: continue liraglutide (VICTOZA PEN) 18 MG/3ML soln     (I10) Hypertension goal BP (blood pressure) < 140/90  Comment: UNcontrolled asympt  Plan:  As per orders above and patient instructions below.    lisinopril (PRINIVIL/ZESTRIL) 20 MG tablet,         metoprolol (LOPRESSOR) 25 MG tablet, Basic         metabolic panel        (E78.5) Hyperlipidemia LDL goal <100  Comment: due for a refill:  Plan: rosuvastatin (CRESTOR) 40 MG tablet              Patient Instructions   For your blood pressure:  Please start Metoprolol 12.5mg twice a day.  Continue your Lisinopril.    For your diabetes:  Please continue your Victoza, Metformin and insulin: continue the lantus 50 units daily and Humalog, three times a day as follows:  (Humalog, three times a day):  Inject 10-12 units before each meal + correction (see below)  Pre-meal correction:    140-165 mg/dL: 1 unit  166-190: 2 units  191-215: 3 units  216-240: 4 units  241-265: 5 units  266-290: 6 units  > 291: 7 units     Bedtime:    165-190 mg/dL: 1 units  191-215: 2 units  216-240: 3 units  241-265: 4 units  266-290: 5 units  > 291: 6 units    Please return to clinic in 6 months (we may advise a visit sooner, based on today's lab/test results), with your usual PCP.    Please complete your colonoscopy soon.                     Makenzie Villarreal MD  [facility]

## 2017-03-28 NOTE — TELEPHONE ENCOUNTER
Chart review shows Dr. Brothers approved both medications on 3/24/17.  Denied pended as duplicate requests from pharmacy.    Jeff Patel RN

## 2017-03-29 NOTE — PROGRESS NOTES
Dear Amadou,     Your test results are attached.    Your hemoglobin A1C has slightly worsened and your diabetes is considered uncontrolled (due to the A1C being over 8%).  The extra protein in the urine may also be due to worsening diabetic control.  Please make sure to keep a stringent diabetic diet (see below) and I would advise you to schedule an appointment with either your Endocrinologist or PCP in the next 1-2 months, to help further optimize your diabetic control.    Please make sure to see your Dermatology at the frequency advised by them, for your history of melanoma.     Please read below regarding useful tips for keeping a diabetic diet:    Diet: Diabetes  Food is an important tool that you can use to control diabetes and stay healthy. Eating well-balanced meals in the correct amounts will help you control your blood glucose levels and prevent low blood sugar reactions. It will also help you reduce the health risks of diabetes. A registered dietitian (ARIAS) will explain the diabetes diet and help you plan meals and snacks that are healthy to eat. If you have any questions, do not hesitate to call the dietitian for advice.    Guidelines For Success:  Consult with your doctor before starting a diabetes diet or weight loss program. If you have not yet consulted a dietitian, ask your doctor for a referral.  Select foods from the six food groups. Your dietitian will advise you on food choices within each group, serving sizes and how many servings you can have at each meal.  Grains, beans and starchy vegetables  Vegetables  Fruit  Milk or yogurt  Meats  Fats, sweets and alcohol (only a small amount from this group)  Monitor your blood sugar levels as requested by your doctor. Take any medicine as prescribed by your doctor.  Learn to read nutrition labels and select appropriate portion sizes.  Eat only the amount of food in your meal plan. Eat about the same amount of food at regular times each day. Do not skip  meals. Eat meals 4 to 5 hours apart, with snacks in between.  Limit alcohol. It raises blood sugar levels. Drink water or calorie-free diet drinks that use safe sweeteners.  Eat less fat to help lower your risk of heart disease. Use non-fat or low-fat dairy products and lean meats. Avoid fried foods. Use cooking oils that are unsaturated.  Talk to your nutritionist about safe sugar substitutes.  Avoid added salt. It can contribute to high blood pressure, which can cause heart disease. People with diabetes already have a risk of high blood pressure and heart disease.  Maintain a healthy weight. If you need to lose weight, cut down on your portion sizes. But do not skip meals. Exercise is an important part of any weight management program. Talk to your doctor about an exercise program that is right for you.  For more information about the best diet plan for you, talk with a registered dietitian (RD). To obtain a referral to an RD in your area, contact:  Academy of Nutrition and Dietetics www.eatright.org  The American Diabetes Association [phone] www.diabetes.org    2256-6894 [employer] [address] [place] [address]. All rights reserved. This information is not intended as a substitute for professional medical care. Always follow your healthcare professional's instructions.    Diabetes: Meal Planning  You can help keep your blood sugar level in your target range by eating healthy foods. Your health care team can help you create a low-fat, nutritious meal plan. Take an active role in your diabetes management by following your meal plan and working with your health care team.  Make Your Meal Plan  A meal plan gives guidelines for the types and amounts of food you should eat. The goal is to balance food and insulin (or other diabetes medications). Your dietitian will help you make a flexible meal plan that includes many foods that you like.  Watch Serving Sizes  Your meal plan will group  foods by servings. To learn how much a serving is, start by measuring food portions at each meal. Soon you ll know what a serving looks like on your plate. Ask your health care provider about how to balance servings of different foods.  Eat from All the Food Groups  The basis of a healthy meal plan is variety (eating lots of different foods). Choose lean meats, fresh fruits and vegetables, whole grains, and low-fat or nonfat dairy products. Eating a wide variety of foods provides the nutrients your body needs. It can also keep you from getting bored with your meal plan.  Learn About Carbohydrates, Fats, and Protein  Carbohydrates are starches and sugars. They are found in many foods, including fruit, bread, pasta, milk, and sweets. Of all the foods you eat, carbohydrates have the most effect on your blood sugar. Your dietitian may teach you about carb counting, a way to figure out the amount of carbohydrates in a meal.  Fats have the most calories. They also have the most effect on your weight and your risk of heart disease. When you have diabetes, it s important to control your weight and protect your heart. Foods that are high in fat include whole milk, cheese, snack foods, and desserts.  Protein is important for building and repairing muscles and bones. Choose low-fat protein sources, such as fish, egg whites, and skinless chicken.  Reduce Liquid Sugars  Extra calories from sodas, sports drinks, and fruit drinks make it hard to keep blood sugar in range. Cut as many liquid sugars from your meal plan as you can.  This includes most fruit juices, which are often high in natural or added sugar. Instead, drink plenty of water and other sugar-free beverages.  Eat Less Fat  If you need to lose weight, try to reduce the amount of fat in your diet. This can also help lower your cholesterol level to keep blood vessels healthier. Cut fat by using only small amounts of liquid oil for cooking. Read food labels carefully to  avoid foods with unhealthy trans fats.    Timing Your Meals  When it comes to blood sugar control, when you eat is as important as what you eat. You may need to eat several small meals spaced evenly throughout the day to stay in your target range. So don t skip breakfast or wait until late in the day to get most of your calories. Doing so can cause your blood sugar to rise too high or fall too low.       2859-5241 [employer]. [address] [place] [address], [place] [address]. All rights reserved. This information is not intended as a substitute for professional medical care. Always follow your healthcare professional's instructions.        Healthy Meals for Diabetes  Ask your health care team to help you make a meal plan that fits your needs. Your meal plan tells you when to eat your meals and snacks, what kinds of foods to eat, and how much of each food to eat. You don t have to give up all the foods you like. But you do need to follow some guidelines.  Eat Foods Rich in Fiber  Fiber is a carbohydrate that breaks down slowly. Fiber is also healthy for your heart. Fiber-rich foods include:    Whole-grain breads and cereals  Bulgur wheat  Brown rice Whole-wheat pasta  Fruits and vegetables  Dry beans, and peas       Choose Healthy Protein Foods  Eating protein that is low in fat can help you control your weight. It also helps keep your heart healthy. Low-fat protein foods include:  Fish  Plant proteins, such as dry beans and peas, nuts, and soy products like tofu and soymilk  Lean meat with all visible fat removed  Poultry with the skin removed  Low-fat or nonfat milk, cheese, and yogurt  Limit Unhealthy Fats and Sugar  Saturated and trans fats are unhealthy for your heart. They raise LDL (bad) cholesterol. Fat is also high in calories, so it can make you gain weight. To cut down on unhealthy fats and sugar, limit these foods:    Butter or margarine  Palm and palm kernel oils and coconut  oil  Cream  Cheese  Espinal  Lunch meats Ice cream  Sweet bakery goods such as pies, muffins, and donuts  Jams and jellies  Candy bars  Regular sodas     How Much to Eat  The amount of food you eat affects your blood sugar. It also affects your weight. Your health care team will tell you how much of each type of food you should eat.  Use measuring cups and spoons and a food scale to measure serving sizes.  Learn what a correct serving size looks like on your plate. This will help when you are away from home and can t measure your servings.  Eat only the number of servings given on your meal plan for each food. Don t take seconds.  When to Eat  Your meal plan will likely include breakfast, lunch, dinner, and some snacks.  Try to eat your meals and snacks at about the same times each day.  Eat all your meals and snacks. Skipping a meal or snack can make your blood sugar drop too low. It can also cause you to eat too much at the next meal or snack. Then your blood sugar could get too high.    0255-1677 [employer]. [address] [place] [address], [place] [address]. All rights reserved. This information is not intended as a substitute for professional medical care. Always follow your healthcare professional's instructions.        Notify me via [facility] or contact the clinic at [phone] if you have any questions.    Makenzie Villarreal MD

## 2017-03-30 DIAGNOSIS — E11.9 TYPE 2 DIABETES MELLITUS WITHOUT COMPLICATION (H): ICD-10-CM

## 2017-03-30 RX ORDER — PEN NEEDLE, DIABETIC 31 GX5/16"
NEEDLE, DISPOSABLE MISCELLANEOUS
Qty: 300 EACH | Refills: 1 | Status: SHIPPED | OUTPATIENT
Start: 2017-03-30 | End: 2017-09-15

## 2017-03-30 NOTE — TELEPHONE ENCOUNTER
B-D U/F 31G X 8 MM insulin pen needle      Last Written Prescription Date: 2/27/17  Last Fill Quantity: 300,  # refills: 0   Last Office Visit with [facility], [facility] or [facility] prescribing provider: 3/27/17          Mitchell Faarax  [facility]

## 2017-04-09 DIAGNOSIS — E11.9 TYPE 2 DIABETES MELLITUS WITHOUT COMPLICATION, WITH LONG-TERM CURRENT USE OF INSULIN (H): Primary | ICD-10-CM

## 2017-04-09 DIAGNOSIS — Z79.4 TYPE 2 DIABETES MELLITUS WITHOUT COMPLICATION, WITH LONG-TERM CURRENT USE OF INSULIN (H): Primary | ICD-10-CM

## 2017-04-09 NOTE — TELEPHONE ENCOUNTER
ACCU-CHEK MARICRUZ PLUS test strip         Last Written Prescription Date: 3/18/15  Last Fill Quantity: 750, # refills: 1  Last Office Visit with G, P or [facility] prescribing provider:  3/27/17        BP Readings from Last 3 Encounters:   03/27/17 150/75   10/19/16 137/88   09/27/16 138/82     Lab Results   Component Value Date    MICROL 65 03/27/2017     Lab Results   Component Value Date    UMALCR 34.79 03/27/2017     Creatinine   Date Value Ref Range Status   03/27/2017 0.69 0.66 - 1.25 mg/dL Final   ]  GFR Estimate   Date Value Ref Range Status   03/27/2017 >90  Non  GFR Calc   >60 mL/min/1.7m2 Final   08/12/2016 >90  Non  GFR Calc   >60 mL/min/1.7m2 Final   11/13/2015 >90  Non  GFR Calc   >60 mL/min/1.7m2 Final     GFR Estimate If Black   Date Value Ref Range Status   03/27/2017 >90   GFR Calc   >60 mL/min/1.7m2 Final   08/12/2016 >90   GFR Calc   >60 mL/min/1.7m2 Final   11/13/2015 >90   GFR Calc   >60 mL/min/1.7m2 Final     Lab Results   Component Value Date    CHOL 210 08/12/2016     Lab Results   Component Value Date    HDL 38 08/12/2016     Lab Results   Component Value Date     08/12/2016     Lab Results   Component Value Date    TRIG 354 08/12/2016     Lab Results   Component Value Date    CHOLHDLRATIO 4.9 11/13/2015     Lab Results   Component Value Date    AST 8 11/13/2015     Lab Results   Component Value Date    ALT 35 11/13/2015     Lab Results   Component Value Date    A1C 8.2 03/27/2017    A1C 7.3 08/12/2016    A1C 7.9 11/13/2015    A1C 6.6 02/02/2015    A1C 6.9 08/04/2014     Potassium   Date Value Ref Range Status   03/27/2017 3.5 3.4 - 5.3 mmol/L Final

## 2017-04-11 RX ORDER — BLOOD SUGAR DIAGNOSTIC
STRIP MISCELLANEOUS
Qty: 400 STRIP | Refills: 2 | Status: SHIPPED | OUTPATIENT
Start: 2017-04-11 | End: 2017-12-06

## 2017-04-11 NOTE — TELEPHONE ENCOUNTER
Routing refill request to provider for review/approval because:  A break in medication  Anabel Crystal RN

## 2017-05-08 ENCOUNTER — TELEPHONE (OUTPATIENT)
Dept: INTERNAL MEDICINE | Facility: CLINIC | Age: 52
End: 2017-05-08

## 2017-05-08 NOTE — TELEPHONE ENCOUNTER
Panel Management Review      Patient has the following on his problem list:     Depression / Dysthymia review  No flowsheet data found.   Patient is due for:  None    Diabetes    ASA: Passed    Last A1C  Lab Results   Component Value Date    A1C 8.2 03/27/2017    A1C 7.3 08/12/2016    A1C 7.9 11/13/2015    A1C 6.6 02/02/2015    A1C 6.9 08/04/2014     A1C tested: FAILED    Last LDL:    Lab Results   Component Value Date    CHOL 210 08/12/2016     Lab Results   Component Value Date    HDL 38 08/12/2016     Lab Results   Component Value Date     08/12/2016     Lab Results   Component Value Date    TRIG 354 08/12/2016     Lab Results   Component Value Date    CHOLHDLRATIO 4.9 11/13/2015     Lab Results   Component Value Date    NHDL 172 08/12/2016       Is the patient on a Statin? YES             Is the patient on Aspirin? YES    Medications     HMG CoA Reductase Inhibitors    rosuvastatin (CRESTOR) 40 MG tablet    Salicylates    aspirin 81 MG EC tablet          Last three blood pressure readings:  BP Readings from Last 3 Encounters:   03/27/17 150/75   10/19/16 137/88   09/27/16 138/82       Date of last diabetes office visit: 3/27/2017     Tobacco History:     History   Smoking Status     Never Smoker   Smokeless Tobacco     Never Used         Hypertension   Last three blood pressure readings:  BP Readings from Last 3 Encounters:   03/27/17 150/75   10/19/16 137/88   09/27/16 138/82     Blood pressure: FAILED    HTN Guidelines:  Age 18-59 BP range:  Less than 140/90  Age 60-85 with Diabetes:  Less than 140/90  Age 60-85 without Diabetes:  less than 150/90      Composite cancer screening  Chart review shows that this patient is due/due soon for the following Colonoscopy  Summary:    Patient is due/failing the following:   A1C, BP CHECK and COLONOSCOPY    Action needed:   Patient needs office visit for HTN with Primary .    Type of outreach:    Sent letter.    Questions for provider review:    None                                                                                                                                     Radha Meza, [facility]       Chart routed to close .

## 2017-05-08 NOTE — LETTER
[facility]  [address] [facility] [address] [phone]          May 8, 2017    Amadou Bach  [address] [address] [facility] [address]          Our records indicate that you are due for a appointment with  your primary provider to recheck your blood pressure,   Monitoring and managing your preventative and chronic health conditions are very important to us.     If you have received your health care elsewhere, please provide us with that information so it can be documented in your chart.    Please call [phone] or message us through your [employer] account to schedule an appointment or provide information for your chart.     I look forward to seeing you and working with you on your health care needs.         Sincerely,       MAXX Garcia   on behalf of   Makenzie Villarreal MD

## 2017-05-22 ENCOUNTER — TELEPHONE (OUTPATIENT)
Dept: INTERNAL MEDICINE | Facility: CLINIC | Age: 52
End: 2017-05-22

## 2017-05-22 NOTE — TELEPHONE ENCOUNTER
As patient will be due for a follow-up visit (with a PCP or his Endocrinologist) soon, please call his [facility] pharmacy and cancel the refills on all of his diabetes medications (ie, the 2 prescriptions of Victoza, and the Metformin, Lantus and Humalog). This way, he should still have 3 months' worth of each of these (from his last visit 7 weeks ago), but should be seen before further refills, due to his last A1C being uncontrolled.    Thank you,  Makenzie Villarreal MD

## 2017-05-25 DIAGNOSIS — F41.1 GENERALIZED ANXIETY DISORDER: ICD-10-CM

## 2017-05-25 RX ORDER — LORAZEPAM 0.5 MG/1
TABLET ORAL
Qty: 45 TABLET | Refills: 1 | Status: SHIPPED | OUTPATIENT
Start: 2017-05-25 | End: 2017-10-04

## 2017-05-25 NOTE — TELEPHONE ENCOUNTER
Routing refill request to provider for review/approval because:  Drug not on the [facility] refill protocol   Jessica Reis RN

## 2017-05-25 NOTE — TELEPHONE ENCOUNTER
LORazepam (ATIVAN) 0.5 MG tablet      Last Written Prescription Date: 3/24/17  Last Fill Quantity:  45,  # refills: 0   Last Office Visit with [facility], [facility] or [facility] prescribing provider: 9/27/16      [facility]

## 2017-06-05 ENCOUNTER — OFFICE VISIT (OUTPATIENT)
Dept: URGENT CARE | Facility: URGENT CARE | Age: 52
End: 2017-06-05
Payer: COMMERCIAL

## 2017-06-05 ENCOUNTER — TRANSFERRED RECORDS (OUTPATIENT)
Dept: HEALTH INFORMATION MANAGEMENT | Facility: CLINIC | Age: 52
End: 2017-06-05

## 2017-06-05 VITALS
SYSTOLIC BLOOD PRESSURE: 126 MMHG | OXYGEN SATURATION: 98 % | HEART RATE: 86 BPM | DIASTOLIC BLOOD PRESSURE: 81 MMHG | TEMPERATURE: 98.6 F

## 2017-06-05 DIAGNOSIS — S39.92XA INJURY OF LOW BACK, INITIAL ENCOUNTER: Primary | ICD-10-CM

## 2017-06-05 PROCEDURE — 99214 OFFICE O/P EST MOD 30 MIN: CPT | Performed by: PHYSICIAN ASSISTANT

## 2017-06-05 RX ORDER — CYCLOBENZAPRINE HCL 10 MG
10 TABLET ORAL 3 TIMES DAILY PRN
COMMUNITY
Start: 2017-06-05 | End: 2017-06-15

## 2017-06-05 NOTE — MR AVS SNAPSHOT
After Visit Summary   6/5/2017    Amadou Bach    MRN: [id number]           Patient Information     Date Of Birth          1965        Visit Information        Provider Department      6/5/2017 7:20 PM Claudia Harrison PA-C [facility]        Today's Diagnoses     Injury of low back, initial encounter    -  1       Follow-ups after your visit        Follow-up notes from your care team     Return if symptoms worsen or fail to improve.      Who to contact     If you have questions or need follow up information about today's clinic visit or your schedule please contact [facility] directly at [phone].  Normal or non-critical lab and imaging results will be communicated to you by [employer], letter or phone within 4 business days after the clinic has received the results. If you do not hear from us within 7 days, please contact the clinic through [employer] or phone. If you have a critical or abnormal lab result, we will notify you by phone as soon as possible.  Submit refill requests through [employer] or call your pharmacy and they will forward the refill request to us. Please allow 3 business days for your refill to be completed.          Additional Information About Your Visit        MyChart Information     [employer] gives you secure access to your electronic health record. If you see a primary care provider, you can also send messages to your care team and make appointments. If you have questions, please call your primary care clinic.  If you do not have a primary care provider, please call [phone] and they will assist you.        Care EveryWhere ID     This is your Care EveryWhere ID. This could be used by other organizations to access your [place] medical records  [id number]        Your Vitals Were     Pulse Temperature Pulse Oximetry             86 98.6  F (37  C) (Tympanic) 98%          Blood Pressure from Last 3 Encounters:   06/05/17 126/81   03/27/17 150/75    10/19/16 137/88    Weight from Last 3 Encounters:   03/27/17 196 lb 9.6 oz (89.2 kg)   12/02/16 205 lb 9.6 oz (93.3 kg)   10/19/16 202 lb 9.6 oz (91.9 kg)              Today, you had the following     No orders found for display       Primary Care Provider Office Phone # Fax #    Arielle Hair PA-C [phone]       [facility] [address] [facility] [address]        Thank you!     Thank you for choosing [facility]  for your care. Our goal is always to provide you with excellent care. Hearing back from our patients is one way we can continue to improve our services. Please take a few minutes to complete the written survey that you may receive in the mail after your visit with us. Thank you!             Your Updated Medication List - Protect others around you: Learn how to safely use, store and throw away your medicines at www.disposemymeds.org.          This list is accurate as of: 6/5/17  8:14 PM.  Always use your most recent med list.                   Brand Name Dispense Instructions for use    * ACCU-CHEK MARICRUZ PLUS test strip   Generic drug:  blood glucose monitoring     750 strip    TEST BLOOD SUGARS 8 TIMES DAILY AS DIRECTED       * ONE TOUCH VERIO IQ test strip   Generic drug:  blood glucose monitoring     400 strip    TEST BLOOD SUGARS UP TO 4 TIMES DAILY OR AS DIRECTED       aspirin 81 MG EC tablet     90 tablet    Take 1 tablet (81 mg) by mouth daily       CO Q 10 PO      Take  by mouth. 2 per day       cyclobenzaprine 10 MG tablet    FLEXERIL     Take 10 mg by mouth 3 times daily as needed       FISH OIL CONCENTRATE 1000 MG Caps      2 tablets daily       insulin glargine 100 UNIT/ML injection    LANTUS SOLOSTAR    45 mL    INJECT 50 UNITS SUBCUTANEOUSLY AT BEDTIME       insulin lispro 100 UNIT/ML injection    HumaLOG KWIKpen    54 mL    Inject 10-12u before each meal plus correction: Pre-meal and bedtime correction, as per previous sliding scale        * insulin pen needle 31G X 5 MM    B-D U/F    300 each    Use 4 times daily as directed.       * B-D U/F 31G X 8 MM   Generic drug:  insulin pen needle     300 each    USE 4 TIMES DAILY AS DIRECTED.       * B-D U/F 31G X 8 MM   Generic drug:  insulin pen needle     300 each    USE 4 TIMES DAILY AS DIRECTED.       * liraglutide 18 MG/3ML soln    VICTOZA PEN    27 mL    Inject 1.8 mg Subcutaneous daily       * liraglutide 18 MG/3ML soln    VICTOZA PEN    18 mL    Inject 1.2 mg Subcutaneous daily       lisinopril 20 MG tablet    PRINIVIL/ZESTRIL    180 tablet    Take 1 tablet (20 mg) by mouth 2 times daily       LORazepam 0.5 MG tablet    ATIVAN    45 tablet    TAKE 1 TABLET BY MOUTH EVERY 8 HOURS AS NEEDED       metFORMIN 500 MG 24 hr tablet    GLUCOPHAGE-XR    360 tablet    TAKE 2 TABLETS (1,000 MG) BY MOUTH 2 TIMES DAILY       metoprolol 25 MG tablet    LOPRESSOR    90 tablet    Take 0.5 tablets (12.5 mg) by mouth 2 times daily       MULTIPLE VITAMINS PO      1000mg daily       order for DME     3 Month    1 Device by * route 8 times daily TEST STRIPS  ACCU-CHEK MARICRUZ plus test strips       * order for DME     1 Device    Equipment being ordered: glucometer brand per insurance       * order for DME     400 each    LANCETS 4 x DAILY AND PRN       rosuvastatin 40 MG tablet    CRESTOR    90 tablet    Take 1 tablet (40 mg) by mouth daily Take 40 mg by mouth daily       sildenafil 100 MG cap/tab    VIAGRA    12 tablet    TAKE 1 TABLET BY MOUTH 30 MINS BEFORE INTERCOURSE       VITAMIN B-12 PO      Take  by mouth daily.       vitamin D 1000 UNITS capsule      Take 2 capsules by mouth daily.       * Notice:  This list has 9 medication(s) that are the same as other medications prescribed for you. Read the directions carefully, and ask your doctor or other care provider to review them with you.

## 2017-06-06 ENCOUNTER — RADIANT APPOINTMENT (OUTPATIENT)
Dept: GENERAL RADIOLOGY | Facility: CLINIC | Age: 52
End: 2017-06-06
Attending: FAMILY MEDICINE
Payer: COMMERCIAL

## 2017-06-06 ENCOUNTER — OFFICE VISIT (OUTPATIENT)
Dept: FAMILY MEDICINE | Facility: CLINIC | Age: 52
End: 2017-06-06
Payer: COMMERCIAL

## 2017-06-06 VITALS
DIASTOLIC BLOOD PRESSURE: 88 MMHG | BODY MASS INDEX: 33.28 KG/M2 | HEART RATE: 102 BPM | TEMPERATURE: 98.1 F | SYSTOLIC BLOOD PRESSURE: 155 MMHG | OXYGEN SATURATION: 100 % | WEIGHT: 200 LBS

## 2017-06-06 DIAGNOSIS — S39.012D BACK STRAIN, SUBSEQUENT ENCOUNTER: Primary | ICD-10-CM

## 2017-06-06 DIAGNOSIS — S39.012D BACK STRAIN, SUBSEQUENT ENCOUNTER: ICD-10-CM

## 2017-06-06 PROCEDURE — 72100 X-RAY EXAM L-S SPINE 2/3 VWS: CPT

## 2017-06-06 PROCEDURE — 99214 OFFICE O/P EST MOD 30 MIN: CPT | Performed by: FAMILY MEDICINE

## 2017-06-06 RX ORDER — HYDROMORPHONE HYDROCHLORIDE 2 MG/1
2 TABLET ORAL EVERY 4 HOURS PRN
COMMUNITY
End: 2017-09-01

## 2017-06-06 RX ORDER — HYDROXYZINE PAMOATE 25 MG/1
25 CAPSULE ORAL 3 TIMES DAILY PRN
Qty: 40 CAPSULE | Refills: 1 | Status: SHIPPED | OUTPATIENT
Start: 2017-06-06 | End: 2017-09-01

## 2017-06-06 RX ORDER — KETOROLAC TROMETHAMINE 30 MG/ML
60 INJECTION, SOLUTION INTRAMUSCULAR; INTRAVENOUS ONCE
Qty: 2 ML | Refills: 0 | OUTPATIENT
Start: 2017-06-06 | End: 2017-06-06

## 2017-06-06 RX ORDER — KETOROLAC TROMETHAMINE 10 MG/1
10 TABLET, FILM COATED ORAL EVERY 6 HOURS PRN
Qty: 12 TABLET | Refills: 0 | Status: SHIPPED | OUTPATIENT
Start: 2017-06-06 | End: 2017-09-14

## 2017-06-06 NOTE — PROGRESS NOTES
SUBJECTIVE:                                                      HPI  Amadou Bach is a 51 year old male who presents today with worsening LBP for the past 24hours.  Also accompanied by his wife today as well.  Sustained a low back injury yesterday at approximately 3pm after slipping on wet floor and twisting his back while cleaning his garage.  He was able to catch himself and therefore, did not actually fall onto the floor. Was initially seen at [facility] this morning in which he was dxed with a lumbar strain and discharged on tylenol and flexeril without any relief.  Has noticed worsening LBP to the point where he is immobile and unable to function.  No radicular pain, numbness, tingling or weakness.  No bladder or bowel dysfunction.  No swelling, redness or fevers.  Pain is worse with movement and use and is relieved with remaining still.  Has also tried ice and heat.  Pain 10/10.      Reviewed PMH.  Patient Active Problem List   Diagnosis     Erectile dysfunction     TYPE 2 DIABETES, HBA1C GOAL < 7%     Hyperlipidemia LDL goal <100     Obesity     Generalized anxiety disorder     ACP (advance care planning)     Hx of colonic polyp     Hypertension goal BP (blood pressure) < 140/90     Malignant melanoma of skin of trunk (H)     History of melanoma     Type 2 diabetes mellitus without complication (H)     AK (actinic keratosis)     Multiple benign nevi     Cherry hemangioma     Lentigines     Skin cancer screening     Erectile dysfunction, unspecified erectile dysfunction type     Current Outpatient Prescriptions   Medication Sig Dispense Refill     cyclobenzaprine (FLEXERIL) 10 MG tablet Take 10 mg by mouth 3 times daily as needed       LORazepam (ATIVAN) 0.5 MG tablet TAKE 1 TABLET BY MOUTH EVERY 8 HOURS AS NEEDED 45 tablet 1     ONE TOUCH VERIO IQ test strip TEST BLOOD SUGARS UP TO 4 TIMES DAILY OR AS DIRECTED 400 strip 2     B-D U/F 31G X 8 MM insulin pen needle USE 4 TIMES DAILY AS DIRECTED. 300  each 1     liraglutide (VICTOZA PEN) 18 MG/3ML soln Inject 1.8 mg Subcutaneous daily 27 mL 1     liraglutide (VICTOZA PEN) 18 MG/3ML soln Inject 1.2 mg Subcutaneous daily 18 mL 1     lisinopril (PRINIVIL/ZESTRIL) 20 MG tablet Take 1 tablet (20 mg) by mouth 2 times daily 180 tablet 1     metoprolol (LOPRESSOR) 25 MG tablet Take 0.5 tablets (12.5 mg) by mouth 2 times daily 90 tablet 1     metFORMIN (GLUCOPHAGE-XR) 500 MG 24 hr tablet TAKE 2 TABLETS (1,000 MG) BY MOUTH 2 TIMES DAILY 360 tablet 1     aspirin 81 MG EC tablet Take 1 tablet (81 mg) by mouth daily 90 tablet 3     rosuvastatin (CRESTOR) 40 MG tablet Take 1 tablet (40 mg) by mouth daily Take 40 mg by mouth daily 90 tablet 1     insulin glargine (LANTUS SOLOSTAR) 100 UNIT/ML injection INJECT 50 UNITS SUBCUTANEOUSLY AT BEDTIME 45 mL 1     insulin lispro (HUMALOG KWIKPEN) 100 UNIT/ML injection Inject 10-12u before each meal plus correction: Pre-meal and bedtime correction, as per previous sliding scale 54 mL 1     sildenafil (VIAGRA) 100 MG cap/tab TAKE 1 TABLET BY MOUTH 30 MINS BEFORE INTERCOURSE 12 tablet 3     B-D U/F 31G X 8 MM insulin pen needle USE 4 TIMES DAILY AS DIRECTED. 300 each 0     ORDER FOR DME Equipment being ordered: glucometer brand per insurance 1 Device 0     ORDER FOR DME LANCETS 4 x DAILY AND  each 4     ACCU-CHEK MARICRUZ PLUS test strip TEST BLOOD SUGARS 8 TIMES DAILY AS DIRECTED 750 strip 1     insulin pen needle (B-D U/F) 31G X 5 MM Use 4 times daily as directed. 300 each 3     ORDER FOR DME 1 Device by * route 8 times daily TEST STRIPS   ACCU-CHEK MARICRUZ plus test strips 3 Month 0     Cyanocobalamin (VITAMIN B-12 PO) Take  by mouth daily.       Coenzyme Q10 (CO Q 10 PO) Take  by mouth. 2 per day       FISH OIL CONCENTRATE 1000 MG OR CAPS 2 tablets daily       VITAMIN D 1000 UNIT OR CAPS Take 2 capsules by mouth daily.       MULTIPLE VITAMINS OR 1000mg daily       Allergies   Allergen Reactions     Nkda [No Known Drug Allergies]           ROS  All other pertinent ROS are negative.    Physical Exam   Constitutional: He is oriented to person, place, and time. He appears distressed.   Wheelchair bound   Musculoskeletal:   Unable to adequately assess his back due to severe pain with movement and standing.   Neurological: He is alert and oriented to person, place, and time. He has normal sensation, normal strength and normal reflexes. Gait abnormal.   Skin: Skin is warm and intact. He is diaphoretic.   Distal pulses are 2+ and symmetric.  No peripheral edema.   Nursing note and vitals reviewed.        Assessment/Plan:  Injury of low back, initial encounter:  Worsening LBP and now immobile and wheelchair bound.  ?disk herniation vs fracture vs nerve root impingement vs lumbar strain.  No impact injury or red flags.  Recommend further evaluation and management in the ER for pain control and for possible imaging.  Patient has declined transportation via ambulance and will have wife drive him.  Malia has been notified. F/u after his ER visit.       Claudia Harrison PA-C

## 2017-06-06 NOTE — MR AVS SNAPSHOT
After Visit Summary   6/6/2017    Amadou Bach    MRN: [id number]           Patient Information     Date Of Birth          1965        Visit Information        Provider Department      6/6/2017 10:30 AM Hima Medina MD [facility]        Today's Diagnoses     Back strain, subsequent encounter    -  1       Follow-ups after your visit        Additional Services     ORTHOPEDICS ADULT REFERRAL       Your provider has referred you to: [facility]: [facility] [phone]   [email or web address]    Please be aware that coverage of these services is subject to the terms and limitations of your health insurance plan.  Call member services at your health plan with any benefit or coverage questions.      Please bring the following to your appointment:    >>   Any x-rays, CTs or MRIs which have been performed.  Contact the facility where they were done to arrange for  prior to your scheduled appointment.    >>   List of current medications   >>   This referral request   >>   Any documents/labs given to you for this referral                  Who to contact     If you have questions or need follow up information about today's clinic visit or your schedule please contact [facility] directly at [phone].  Normal or non-critical lab and imaging results will be communicated to you by MyChart, letter or phone within 4 business days after the clinic has received the results. If you do not hear from us within 7 days, please contact the clinic through MyChart or phone. If you have a critical or abnormal lab result, we will notify you by phone as soon as possible.  Submit refill requests through [employer] or call your pharmacy and they will forward the refill request to us. Please allow 3 business days for your refill to be completed.          Additional Information About Your Visit        MyChart Information      [employer] gives you secure access to your electronic health record. If you see a primary care provider, you can also send messages to your care team and make appointments. If you have questions, please call your primary care clinic.  If you do not have a primary care provider, please call [phone] and they will assist you.        Care EveryWhere ID     This is your Care EveryWhere ID. This could be used by other organizations to access your [place] medical records  [id number]        Your Vitals Were     Pulse Temperature Pulse Oximetry BMI (Body Mass Index)          102 98.1  F (36.7  C) (Oral) 100% 33.28 kg/m2         Blood Pressure from Last 3 Encounters:   06/06/17 155/88   06/05/17 126/81   03/27/17 150/75    Weight from Last 3 Encounters:   06/06/17 200 lb (90.7 kg)   03/27/17 196 lb 9.6 oz (89.2 kg)   12/02/16 205 lb 9.6 oz (93.3 kg)              We Performed the Following     ORTHOPEDICS ADULT REFERRAL          Today's Medication Changes          These changes are accurate as of: 6/6/17 11:32 AM.  If you have any questions, ask your nurse or doctor.               Start taking these medicines.        Dose/Directions    hydrOXYzine 25 MG capsule   Commonly known as:  VISTARIL   Used for:  Back strain, subsequent encounter   Started by:  Hima Medina MD        Dose:  25 mg   Take 1 capsule (25 mg) by mouth 3 times daily as needed (pain/muscle relaxor may double dosage if needed)   Quantity:  40 capsule   Refills:  1       * ketorolac 60 MG/2ML Soln injection   Commonly known as:  TORADOL   Used for:  Back strain, subsequent encounter   Started by:  Hima Medina MD        Dose:  60 mg   Inject 2 mLs (60 mg) into the muscle once for 1 dose   Quantity:  2 mL   Refills:  0       * ketorolac 10 MG tablet   Commonly known as:  TORADOL   Used for:  Back strain, subsequent encounter   Started by:  Hima Medina MD        Dose:  10 mg   Take 1 tablet (10 mg) by mouth every 6 hours as needed for  moderate pain   Quantity:  12 tablet   Refills:  0       * Notice:  This list has 2 medication(s) that are the same as other medications prescribed for you. Read the directions carefully, and ask your doctor or other care provider to review them with you.         Where to get your medicines      These medications were sent to [id number] - [place] - [address]  [address], [place] [address]     Phone:  [phone]     hydrOXYzine 25 MG capsule    ketorolac 10 MG tablet         Some of these will need a paper prescription and others can be bought over the counter.  Ask your nurse if you have questions.     You don't need a prescription for these medications     ketorolac 60 MG/2ML Soln injection                Primary Care Provider Office Phone # Fax #    Arielle Hair PA-C [phone]       [facility] [address]  [place] [address]        Thank you!     Thank you for choosing [facility]  for your care. Our goal is always to provide you with excellent care. Hearing back from our patients is one way we can continue to improve our services. Please take a few minutes to complete the written survey that you may receive in the mail after your visit with us. Thank you!             Your Updated Medication List - Protect others around you: Learn how to safely use, store and throw away your medicines at www.disposemymeds.org.          This list is accurate as of: 6/6/17 11:32 AM.  Always use your most recent med list.                   Brand Name Dispense Instructions for use    * ACCU-CHEK MARICRUZ PLUS test strip   Generic drug:  blood glucose monitoring     750 strip    TEST BLOOD SUGARS 8 TIMES DAILY AS DIRECTED       * ONE TOUCH VERIO IQ test strip   Generic drug:  blood glucose monitoring     400 strip    TEST BLOOD SUGARS UP TO 4 TIMES DAILY OR AS DIRECTED       aspirin 81 MG EC tablet     90 tablet     Take 1 tablet (81 mg) by mouth daily       CO Q 10 PO      Take  by mouth. 2 per day       cyclobenzaprine 10 MG tablet    FLEXERIL     Take 10 mg by mouth 3 times daily as needed       FISH OIL CONCENTRATE 1000 MG Caps      2 tablets daily       HYDROmorphone 2 MG tablet    DILAUDID     Take 2 mg by mouth every 4 hours as needed for moderate to severe pain       hydrOXYzine 25 MG capsule    VISTARIL    40 capsule    Take 1 capsule (25 mg) by mouth 3 times daily as needed (pain/muscle relaxor may double dosage if needed)       insulin glargine 100 UNIT/ML injection    LANTUS SOLOSTAR    45 mL    INJECT 50 UNITS SUBCUTANEOUSLY AT BEDTIME       insulin lispro 100 UNIT/ML injection    HumaLOG KWIKpen    54 mL    Inject 10-12u before each meal plus correction: Pre-meal and bedtime correction, as per previous sliding scale       * insulin pen needle 31G X 5 MM    B-D U/F    300 each    Use 4 times daily as directed.       * B-D U/F 31G X 8 MM   Generic drug:  insulin pen needle     300 each    USE 4 TIMES DAILY AS DIRECTED.       * B-D U/F 31G X 8 MM   Generic drug:  insulin pen needle     300 each    USE 4 TIMES DAILY AS DIRECTED.       * ketorolac 60 MG/2ML Soln injection    TORADOL    2 mL    Inject 2 mLs (60 mg) into the muscle once for 1 dose       * ketorolac 10 MG tablet    TORADOL    12 tablet    Take 1 tablet (10 mg) by mouth every 6 hours as needed for moderate pain       * liraglutide 18 MG/3ML soln    VICTOZA PEN    27 mL    Inject 1.8 mg Subcutaneous daily       * liraglutide 18 MG/3ML soln    VICTOZA PEN    18 mL    Inject 1.2 mg Subcutaneous daily       lisinopril 20 MG tablet    PRINIVIL/ZESTRIL    180 tablet    Take 1 tablet (20 mg) by mouth 2 times daily       LORazepam 0.5 MG tablet    ATIVAN    45 tablet    TAKE 1 TABLET BY MOUTH EVERY 8 HOURS AS NEEDED       metFORMIN 500 MG 24 hr tablet    GLUCOPHAGE-XR    360 tablet    TAKE 2 TABLETS (1,000 MG) BY MOUTH 2 TIMES DAILY       metoprolol 25 MG  tablet    LOPRESSOR    90 tablet    Take 0.5 tablets (12.5 mg) by mouth 2 times daily       MULTIPLE VITAMINS PO      1000mg daily       order for DME     3 Month    1 Device by * route 8 times daily TEST STRIPS  ACCU-CHEK MARICRUZ plus test strips       * order for DME     1 Device    Equipment being ordered: glucometer brand per insurance       * order for DME     400 each    LANCETS 4 x DAILY AND PRN       rosuvastatin 40 MG tablet    CRESTOR    90 tablet    Take 1 tablet (40 mg) by mouth daily Take 40 mg by mouth daily       sildenafil 100 MG cap/tab    VIAGRA    12 tablet    TAKE 1 TABLET BY MOUTH 30 MINS BEFORE INTERCOURSE       VITAMIN B-12 PO      Take  by mouth daily.       vitamin D 1000 UNITS capsule      Take 2 capsules by mouth daily.       * Notice:  This list has 11 medication(s) that are the same as other medications prescribed for you. Read the directions carefully, and ask your doctor or other care provider to review them with you.

## 2017-06-06 NOTE — NURSING NOTE
"Chief Complaint   Patient presents with     Back Pain     Fall       Initial /88  Pulse 102  Temp 98.1  F (36.7  C) (Oral)  Wt 200 lb (90.7 kg)  SpO2 100%  BMI 33.28 kg/m2 Estimated body mass index is 33.28 kg/(m^2) as calculated from the following:    Height as of 3/27/17: 5' 5\" (1.651 m).    Weight as of this encounter: 200 lb (90.7 kg).  Medication Reconciliation: complete   Jayde Butcher CMA    "

## 2017-06-06 NOTE — NURSING NOTE
"Chief Complaint   Patient presents with     Back Pain       Initial /81  Pulse 86  Temp 98.6  F (37  C) (Tympanic)  SpO2 98% Estimated body mass index is 32.72 kg/(m^2) as calculated from the following:    Height as of 3/27/17: 5' 5\" (1.651 m).    Weight as of 3/27/17: 196 lb 9.6 oz (89.2 kg).  Medication Reconciliation: complete       KIM Waggoner      "

## 2017-06-06 NOTE — PROGRESS NOTES
SUBJECTIVE:                                                    Amadou Bach is a 51 year old male who presents to clinic today for the following health issues:    Back pain. Slipped. History dm insulin dependent and anxiety. Patient was given shots of diluadid and toradol in ER. Patient does have a history of herniated disc in neck.   Flexeril given - not helpful. ED not did MRI. MRI in past safe. History neck fracture in past - wrestling at time/dropped on head. No herniated discs in back in past. No rashes. No fevers or chills. Some constipation from pain meds. Heat better then ice. No imaging done so far.   Seen chiropractor this AM - electrostimulation. No xray yet. Not a lot of back issues prior.   Taking dilaudid. No ativan in past month. No radiations into legs. No bm/bladder changes.   Per urgent care note from yesterday:  Also accompanied by his wife today as well.  Sustained a low back injury yesterday at approximately 3pm after slipping on wet floor and twisting his back while cleaning his garage.  He was able to catch himself and therefore, did not actually fall onto the floor. Was initially seen at [facility] ER this morning in which he was dxed with a lumbar strain and discharged on tylenol and flexeril without any relief.  Has noticed worsening LBP to the point where he is immobile and unable to function.  No radicular pain, numbness, tingling or weakness.  No bladder or bowel dysfunction.  No swelling, redness or fevers.  Pain is worse with movement and use and is relieved with remaining still.  Has also tried ice and heat.  Pain 10/10  ED/UC Followup:    Facility:  [facility]  Date of visit: 06-  Reason for visit: back pain  Current Status: slipped on Sunday        PROBLEMS TO ADD ON...    Problem list and histories reviewed & adjusted, as indicated.  Additional history: as documented    Patient Active Problem List   Diagnosis     Erectile dysfunction     TYPE 2 DIABETES, HBA1C  GOAL < 7%     Hyperlipidemia LDL goal <100     Obesity     Generalized anxiety disorder     ACP (advance care planning)     Hx of colonic polyp     Hypertension goal BP (blood pressure) < 140/90     Malignant melanoma of skin of trunk (H)     History of melanoma     Type 2 diabetes mellitus without complication (H)     AK (actinic keratosis)     Multiple benign nevi     Cherry hemangioma     Lentigines     Skin cancer screening     Erectile dysfunction, unspecified erectile dysfunction type     Past Surgical History:   Procedure Laterality Date     BIOPSY OF SKIN LESION       COLONOSCOPY  3/11/2014    Procedure: COMBINED COLONOSCOPY, SINGLE BIOPSY/POLYPECTOMY BY BIOPSY;;  Surgeon: Stephen Amin MD;  Location: [facility] OR     COLONOSCOPY WITH CO2 INSUFFLATION N/A 8/19/2014    Procedure: COLONOSCOPY WITH CO2 INSUFFLATION;  Surgeon: Stephen Amin MD;  Location: [facility] OR     HC TOOTH EXTRACTION W/FORCEP      wisdom teeth x4       Social History   Substance Use Topics     Smoking status: Never Smoker     Smokeless tobacco: Never Used     Alcohol use Yes      Comment: occ     Family History   Problem Relation Age of Onset     DIABETES Mother      Hypertension Mother      DIABETES Father      CANCER Father      lung     CANCER Other      aunt - skin cancer     Skin Cancer Other      Melanoma No family hx of            Reviewed and updated as needed this visit by clinical staff  Tobacco  Allergies       Reviewed and updated as needed this visit by Provider         ROS:      OBJECTIVE:                                                    /88  Pulse 102  Temp 98.1  F (36.7  C) (Oral)  Wt 200 lb (90.7 kg)  SpO2 100%  BMI 33.28 kg/m2  Body mass index is 33.28 kg/(m^2).  GENERAL: healthy, alert and no distress  NECK: no adenopathy, no asymmetry, masses, or scars and thyroid normal to palpation  RESP: lungs clear to auscultation - no rales, rhonchi or wheezes  CV: regular rate and rhythm, normal S1 S2, no S3 or  S4, no murmur, click or rub, no peripheral edema and peripheral pulses strong  ABDOMEN: soft, nontender, no hepatosplenomegaly, no masses and bowel sounds normal  MS: no gross musculoskeletal defects noted, no edema  SKIN: no suspicious lesions or rashes  NEURO: Normal strength and tone, mentation intact and speech normal  BACK: no CVA tenderness, + paralumbar tenderness  PSYCH: mentation appears normal, affect normal/bright  PSYCH: anxious         ASSESSMENT/PLAN:                                                    ASSESSMENT / PLAN:  (S33.857D) Back strain, subsequent encounter  (primary encounter diagnosis)  Comment: no signs of disc herniation. No leg weakness. Anxiety likely making spasms worse. toradol helpful.   Plan: XR Lumbar Spine 2/3 Views, ketorolac (TORADOL)         60 MG/2ML SOLN injection, hydrOXYzine         (VISTARIL) 25 MG capsule, ORTHOPEDICS ADULT         REFERRAL, ketorolac (TORADOL) 10 MG tablet        Reveiwed risks and side effects of medication  Can't take narcotics and ativan. Heat and deep breathing. Supportive wife here. Increase water intake. Can see back specialist if not improving. To ER if worsening pain or new symptoms like leg weakness/ fevers or chills/ rashes/etc.   miralax prn constipation but don't strain. Expected course and warning signs reviewed. Call/email with questions/concerns. Await rad report.     Hima Medina MD  [facility]

## 2017-06-07 ENCOUNTER — TELEPHONE (OUTPATIENT)
Dept: FAMILY MEDICINE | Facility: CLINIC | Age: 52
End: 2017-06-07

## 2017-06-07 ENCOUNTER — TRANSFERRED RECORDS (OUTPATIENT)
Dept: HEALTH INFORMATION MANAGEMENT | Facility: CLINIC | Age: 52
End: 2017-06-07

## 2017-06-07 NOTE — TELEPHONE ENCOUNTER
Called and wife answered. She said they were at the [facility] ER because the pain got a lot worse. They are going to do an MRI. She said they tried to call to set up an appointment with the back specialist but there was no referral so they would not make an appointment for him. They will call and let us know if they need that.Sarahi Osorio MA/GREGORY

## 2017-06-07 NOTE — TELEPHONE ENCOUNTER
Can back specialist at [facility] get in sooner. I don't really have anything stronger the dilauded but can refill tomorrow if needed.

## 2017-06-07 NOTE — TELEPHONE ENCOUNTER
Patient reports that he did  the Toradol and the Vistaril prescribed yesterday. He has not seen much improvement.   He was under the impression that he would be getting a prescription for Dilaudid as well. He did have some from his ER visit.   Patient wondering if this can be filled or if there is a different pain medication that he can try.   He does report that the pain is ok when he is not moving, but any movement increases the pain. He also reports that the pain is now going down his right leg. This is new since the visit yesterday. He denies any leg weakness. Is is also wondering if he should have an MRI?     Routing to provider to advise on medication requested and also need for MRI?     Haydee Ramirez RN   [facility]

## 2017-06-07 NOTE — TELEPHONE ENCOUNTER
Patient is calling to speak with provider about the script HYDROmorphone (DILAUDID) 2 MG tablet stated patient was under the impression that this was being sent to pharmacy but never received. Is this a medication that needs a paper script.  Please call to discuss patient stated that the pain has not any better still in a lot of pain  Thank you

## 2017-06-15 DIAGNOSIS — M62.830 BACK MUSCLE SPASM: Primary | ICD-10-CM

## 2017-06-15 RX ORDER — CYCLOBENZAPRINE HCL 10 MG
10 TABLET ORAL 3 TIMES DAILY PRN
Qty: 42 TABLET | Refills: 1 | Status: SHIPPED | OUTPATIENT
Start: 2017-06-15 | End: 2017-07-18

## 2017-06-15 NOTE — TELEPHONE ENCOUNTER
Cyclobenzaprine      Last Written Prescription Date:  06/09/17  Last Fill Quantity: 30,   # refills: 0  Last Office Visit with [facility], [facility] or  Health prescribing provider: 09/27/16  Future Office visit:       Routing refill request to provider for review/approval because:  Drug not on the [facility], [facility] or  [employer] refill protocol or controlled substance

## 2017-06-16 ENCOUNTER — OFFICE VISIT (OUTPATIENT)
Dept: FAMILY MEDICINE | Facility: CLINIC | Age: 52
End: 2017-06-16
Payer: COMMERCIAL

## 2017-06-16 VITALS
BODY MASS INDEX: 32.65 KG/M2 | OXYGEN SATURATION: 98 % | HEIGHT: 65 IN | HEART RATE: 104 BPM | DIASTOLIC BLOOD PRESSURE: 79 MMHG | WEIGHT: 196 LBS | SYSTOLIC BLOOD PRESSURE: 117 MMHG | TEMPERATURE: 97 F

## 2017-06-16 DIAGNOSIS — Z86.0101 HISTORY OF ADENOMATOUS POLYP OF COLON: ICD-10-CM

## 2017-06-16 DIAGNOSIS — M54.50 ACUTE MIDLINE LOW BACK PAIN WITHOUT SCIATICA: Primary | ICD-10-CM

## 2017-06-16 PROCEDURE — 99213 OFFICE O/P EST LOW 20 MIN: CPT | Performed by: FAMILY MEDICINE

## 2017-06-16 RX ORDER — OXYCODONE AND ACETAMINOPHEN 7.5; 325 MG/1; MG/1
1 TABLET ORAL EVERY 8 HOURS PRN
Qty: 18 TABLET | Refills: 0 | Status: SHIPPED | OUTPATIENT
Start: 2017-06-16 | End: 2017-06-23 | Stop reason: ALTCHOICE

## 2017-06-16 RX ORDER — DIAZEPAM 5 MG
2.5-5 TABLET ORAL 2 TIMES DAILY PRN
Qty: 40 TABLET | Refills: 0 | Status: SHIPPED | OUTPATIENT
Start: 2017-06-16 | End: 2017-09-14

## 2017-06-16 NOTE — PATIENT INSTRUCTIONS
This summary includes the important diagnoses, test, medications and other important parts of your medical history.  Below are a few good we sites you can use to learn more about these.     Www.[facility].org : Up to date and easily searchable information on multiple topics.  Www.[facility].org/Pharmacy/c_539084.asp : [facility] $4.99 medications  Www.medlineplus.gov : medication info, interactive tutorials, watch real surgeries online  Www.familydoctor.org : good info from the Academy of Family Physicians  Www.[facility].com : good info from the [facility]  Www.cdc.gov : public health info, travel advisories, epidemics (H1N1)  Www.aap.org : children's health info, normal development, vaccinations  Www.health.[facility].[place].us : [place] dept of heat, public health issues in [place], N1N1    Based on your medical history and these are the current health maintenance or preventive care services that you are due for (some may have been done at this visit:)  Health Maintenance Due   Topic Date Due     COLONOSCOPY Q2 YR  08/19/2016     =================================================================================  Normal Values   Blood pressure  <140/90 for most adults    <130/80 for some chronic diseases (ask your care team about yours)    BMI (body mass index)  18.5-25 kg/m2 (based on height and weight)     Thank you for visiting [facility]    Normal or non-critical lab and imaging results will be communicated to you by MyChart, letter or phone within 7 days.  If you do not hear from us within 10 days, please call the clinic. If you have a critical or abnormal lab result, we will notify you by phone as soon as possible.     If you have any questions regarding your visit please contact:     Team Comfort:   Clinic Hours Telephone Number   Dr. Jeremy Christianson   7am-5pm  Monday - Friday [phone]  Jeff [facility]   Pharmacy 8am-8pm  Monday-Thursday      8am-6pm Friday  9am-5pm Saturday-Sunday [phone]   Urgent Care 11am-8pm Monday-Friday        9am-5pm Saturday-Sunday [phone]     After hours, weekend or if you need to make an appointment with your primary provider please call [phone].   After Hours nurse advise: call [facility]: [phone]    Medication Refills:  Call your pharmacy and they will forward the refill to us. Please allow 3 business days for your refills to be completed.    Use combionic (secure email communication and access to your chart) to send your primary care provider a message or make an appointment. Ask someone on your Team how to sign up for combionic. To log on to DIY or for more information in [employer] please visit the website at [email or web address].  As of October 8, 2013, all password changes, disabled accounts, or ID changes in combionic/MyHealth will be done by our Access Services Department.   If you need help with your account or password, call: [phone]. Clinic staff no longer has the ability to change passwords.

## 2017-06-16 NOTE — PROGRESS NOTES
SUBJECTIVE:                                                    Amadou Bach is a 51 year old male who presents to clinic today for the following health issues:  He is accompanied by his wife.       Hospital Follow-up Visit:    Hospital/Nursing Home/IP Rehab Facility: [facility]  Date of Admission: 6-7-17  Date of Discharge: 6-9-17  Reason(s) for Admission: fell/ back injury. The patient slipped while mopping the floor 6/4/17, seen in [facility] and  on 6/5/17, saw Dr. Medina on 6/6/17, and was admitted to [facility] 6/7/17            Problems taking medications regularly:  None       Medication changes since discharge: None       Problems adhering to non-medication therapy:  None    Summary of hospitalization:  CareEverywhere information obtained and reviewed  Diagnostic Tests/Treatments reviewed.  Follow up needed: none  Other Healthcare Providers Involved in Patient s Care:         None  Update since discharge:  He notes that he feels about the same since his discharge. He will have some flares of pain, and he needs to stay on top of his medication otherwise it is difficult for him to perform his ADLs. He has seen acupuncture and chiropractic. His wife adds that they have been doing massage at home and has gone to [employer]. She adds that she can feel a knot of some sort as well in his low back that she assumes is muscular.   Meds: the patient notes that the valium does help his symptoms and he has been taking 2.5 mg. He knows not to take his lorazepam at the same time. He did try the hydromorphone once, but hasn't been using it. He has Toradol at home. He is done with his methylprednisolone dose lazaro.    Post Discharge Medication Reconciliation: discharge medications reconciled and changed, per note/orders (see AVS).  Plan of care communicated with patient and family     Coding guidelines for this visit:  Type of Medical   Decision Making Face-to-Face Visit       within 7 Days of  "discharge Face-to-Face Visit        within 14 days of discharge   Moderate Complexity [id number]   High Complexity [id number]          Blood sugars: he feels that his BG have been better because his appetite has been lower.     Past medical, family, and social histories, medications, and allergies are reviewed and updated in Epic.     ROS:  C: NEGATIVE for fever, chills, change in weight  E/M: NEGATIVE for ear, mouth and throat problems  R: NEGATIVE for significant cough or SOB  CV: NEGATIVE for chest pain, palpitations or peripheral edema  MUSCULOSKELETAL: POSITIVE For low back pain  ROS otherwise negative    Any history above obtained by the Medical Assistant was reviewed by Dr. Jeremy Mobley MD, and edited when necessary.    This document serves as a record of the services and decisions personally performed and made by Dr. Mobley. It was created on his behalf by Arielle Choi, a trained medical scribe. The creation of this document is based the provider's statements to the medical scribe.  Arielle Choi June 16, 2017 10:01 AM     OBJECTIVE:                                                    /79 (BP Location: Left arm, Patient Position: Chair, Cuff Size: Adult Regular)  Pulse 104  Temp 97  F (36.1  C) (Oral)  Ht 5' 5\" (1.651 m)  Wt 196 lb (88.9 kg)  SpO2 98%  BMI 32.62 kg/m2   Body mass index is 32.62 kg/(m^2).   GENERAL: healthy, alert and no distress  EYES: Eyes grossly normal to inspection, PERRL, EOMI, sclerae white and conjunctivae normal  MS: no gross musculoskeletal defects noted, no edema  SKIN: no suspicious lesions or rashes  NEURO: Normal strength and tone, sensory exam grossly normal, mentation intact, oriented times 3 and cranial nerves 2-12 intact  PSYCH: mentation appears normal, affect normal/bright      ASSESSMENT/PLAN:                                                      (M54.5) Acute midline low back pain without sciatica  (primary encounter diagnosis)  Comment: MRI shows no " evidence for significant disc disease, but his facet arthropathy is probably causing muscle spasm. Patient voices his understanding that this is a 1 time fill of oxycodone.   Plan: [facility], [facility] CHIROPRACTIC REFERRAL, diazepam (VALIUM) 5 MG         tablet, oxyCODONE-acetaminophen (PERCOCET)         7.5-325 MG per tablet        He can choose diazepam or cyclobenzaprine as his muscle relaxant.     (Z86.010) History of adenomatous polyp of colon  Comment:   Plan: GASTROENTEROLOGY ADULT REF PROCEDURE ONLY            The information in this document, created by the medical scribe for me, accurately reflects the services I personally performed and the decisions made by me. I have reviewed and approved this document for accuracy prior to leaving the patient care area. June 16, 2017 10:01 AM     Jeremy Mobley MD

## 2017-06-16 NOTE — NURSING NOTE
"Chief Complaint   Patient presents with     Hospital F/U     Establish Care       Initial /79 (BP Location: Left arm, Patient Position: Chair, Cuff Size: Adult Regular)  Pulse 104  Temp 97  F (36.1  C) (Oral)  Ht 5' 5\" (1.651 m)  Wt 196 lb (88.9 kg)  SpO2 98%  BMI 32.62 kg/m2 Estimated body mass index is 32.62 kg/(m^2) as calculated from the following:    Height as of this encounter: 5' 5\" (1.651 m).    Weight as of this encounter: 196 lb (88.9 kg).  Medication Reconciliation: complete     Hi Lugo MA      "

## 2017-06-16 NOTE — MR AVS SNAPSHOT
After Visit Summary   6/16/2017    Amadou Bach    MRN: [id number]           Patient Information     Date Of Birth          1965        Visit Information        Provider Department      6/16/2017 9:20 AM Jeremy Mobley MD [facility]        Today's Diagnoses     Acute midline low back pain without sciatica    -  1    History of adenomatous polyp of colon          Care Instructions    This summary includes the important diagnoses, test, medications and other important parts of your medical history.  Below are a few good we sites you can use to learn more about these.     Www.Plumzi.org : Up to date and easily searchable information on multiple topics.  Www.[facility]rabolt/Pharmacy/c_539084.asp : [facility] $4.99 medications  Www.medlineSpogo Inc..gov : medication info, interactive tutorials, watch real surgeries online  Www.familydoctor.org : good info from the Academy of Family Physicians  Www.[facility].Fresvii : good info from the [facility]  Www.cdc.gov : public health info, travel advisories, epidemics (H1N1)  Www.aap.org : children's health info, normal development, vaccinations  Www.health.state.[place].us : [place] dept of heatlh, public health issues in [place], N1N1    Based on your medical history and these are the current health maintenance or preventive care services that you are due for (some may have been done at this visit:)  Health Maintenance Due   Topic Date Due     COLONOSCOPY Q2 YR  08/19/2016     =================================================================================  Normal Values   Blood pressure  <140/90 for most adults    <130/80 for some chronic diseases (ask your care team about yours)    BMI (body mass index)  18.5-25 kg/m2 (based on height and weight)     Thank you for visiting [facility]    Normal or non-critical lab and imaging results will be communicated to you by MyChart, letter or phone within 7 days.  If you do not  hear from us within 10 days, please call the clinic. If you have a critical or abnormal lab result, we will notify you by phone as soon as possible.     If you have any questions regarding your visit please contact:     Team Comfort:   Clinic Hours Telephone Number   Dr. Jeremy Hair  Kiersten Christianson   7am-5pm  Monday - Friday [phone]  [facility]   Pharmacy 8am-8pm Monday-Thursday      8am-6pm Friday  9am-5pm Saturday-Sunday [phone]   Urgent Care 11am-8pm Monday-Friday        9am-5pm Saturday-Sunday [phone]     After hours, weekend or if you need to make an appointment with your primary provider please call [phone].   After Hours nurse advise: call [facility]: [phone]    Medication Refills:  Call your pharmacy and they will forward the refill to us. Please allow 3 business days for your refills to be completed.    Use Solio (secure email communication and access to your chart) to send your primary care provider a message or make an appointment. Ask someone on your Team how to sign up for Solio. To log on to Gigwell or for more information in [employer] please visit the website at [email or web address].  As of October 8, 2013, all password changes, disabled accounts, or ID changes in Solio/MyHealth will be done by our Access Services Department.   If you need help with your account or password, call: [phone]. Clinic staff no longer has the ability to change passwords.             Follow-ups after your visit        Additional Services     GASTROENTEROLOGY ADULT REF PROCEDURE ONLY       Last Lab Result: Creatinine (mg/dL)       Date                     Value                 03/27/2017               0.69             ----------  Body mass index is 32.62 kg/(m^2).     Needed:  No  Language:  English    Patient will be contacted to schedule procedure.     Please be aware that coverage of these  services is subject to the terms and limitations of your health insurance plan.  Call member services at your health plan with any benefit or coverage questions.  Any procedures must be performed at a [facility] facility OR coordinated by your clinic's referral office.    Please bring the following with you to your appointment:    (1) Any X-Rays, CTs or MRIs which have been performed.  Contact the facility where they were done to arrange for  prior to your scheduled appointment.    (2) List of current medications   (3) This referral request   (4) Any documents/labs given to you for this referral            [facility] PT, HAND, AND CHIROPRACTIC REFERRAL       **This order will print in the [facility] Scheduling Office**    Physical Therapy, Hand Therapy and Chiropractic Care are available through:    *[facility]  *[facility]  *[facility]    Call one number to schedule at any of the above locations: [phone].    Your provider has referred you to: Physical Therapy at [facility] or [facility]    Indication/Reason for Referral: Low Back Pain  Onset of Illness: 6/4/17  Therapy Orders: Evaluate and Treat  Special Programs: None  Special Request: Equipment: As Indicated  Modalities: As Indicated    Chinmay Moy      Additional Comments for the Therapist or Chiropractor: seen in [facility] 6/7/17    Please be aware that coverage of these services is subject to the terms and limitations of your health insurance plan.  Call member services at your health plan with any benefit or coverage questions.      Please bring the following to your appointment:    *Your personal calendar for scheduling future appointments  *Comfortable clothing            ORTHO  REFERRAL       [facility] is referring you to the Orthopedic  Services at [facility].       The  Representative will assist you in the coordination of your  Orthopedic and Musculoskeletal Care as prescribed by your physician.    The  Representative will call you within 1 business day to help schedule your appointment, or you may contact the  Representative at:    All areas ~ [phone]     Type of Referral : Spine: Lumbar  **Choose Medical Spine Specialist (unless patient was seen by a Medical Spine Specialist within the past 6 months).**  Surgical Evaluation is advised if the patient presents with one or more of the following red flags: Evidence of Spinal Tumor, Infection or Fracture, Cauda Equina Syndrome, Sudden or Progressive Weakness, Loss of Bowel or Bladder Control, or any other documented emergent neurological condition resulting from a Lumbar Spinal Condition. Medical Spine Specialist        Timeframe requested: 3 - 5 days    Coverage of these services is subject to the terms and limitations of your health insurance plan.  Please call member services at your health plan with any benefit or coverage questions.      If X-rays, CT or MRI's have been performed, please contact the facility where they were done to arrange for , prior to your scheduled appointment.  Please bring this referral request to your appointment and present it to your specialist.                  Your next 10 appointments already scheduled     Jun 23, 2017 11:00 AM CDT   [facility]    [id number] [place] [id number] [phone]              Who to contact     If you have questions or need follow up information about today's clinic visit or your schedule please contact [facility] directly at [phone].  Normal or non-critical lab and imaging results will be communicated to you by MyChart, letter or phone within 4 business days after the clinic has received the results. If you do not hear from us within 7 days, please contact the clinic  "through Skaffl or phone. If you have a critical or abnormal lab result, we will notify you by phone as soon as possible.  Submit refill requests through Skaffl or call your pharmacy and they will forward the refill request to us. Please allow 3 business days for your refill to be completed.          Additional Information About Your Visit        Location Based Technologies[employer]Wormhole Information     Skaffl gives you secure access to your electronic health record. If you see a primary care provider, you can also send messages to your care team and make appointments. If you have questions, please call your primary care clinic.  If you do not have a primary care provider, please call [phone] and they will assist you.        Care EveryWhere ID     This is your Care EveryWhere ID. This could be used by other organizations to access your [facility] medical records  [id number]        Your Vitals Were     Pulse Temperature Height Pulse Oximetry BMI (Body Mass Index)       104 97  F (36.1  C) (Oral) 5' 5\" (1.651 m) 98% 32.62 kg/m2        Blood Pressure from Last 3 Encounters:   06/16/17 117/79   06/06/17 155/88   06/05/17 126/81    Weight from Last 3 Encounters:   06/16/17 196 lb (88.9 kg)   06/06/17 200 lb (90.7 kg)   03/27/17 196 lb 9.6 oz (89.2 kg)              We Performed the Following     GASTROENTEROLOGY ADULT REF PROCEDURE ONLY     [facility] PT, HAND, AND CHIROPRACTIC REFERRAL     ORTHO  REFERRAL          Today's Medication Changes          These changes are accurate as of: 6/16/17 10:20 AM.  If you have any questions, ask your nurse or doctor.               Start taking these medicines.        Dose/Directions    diazepam 5 MG tablet   Commonly known as:  VALIUM   Used for:  Acute midline low back pain without sciatica   Started by:  Jeremy Mobley MD        Dose:  2.5-5 mg   Take 0.5-1 tablets (2.5-5 mg) by mouth 2 times daily as needed for muscle spasms   Quantity:  40 tablet   Refills:  0       oxyCODONE-acetaminophen 7.5-325 MG " per tablet   Commonly known as:  PERCOCET   Used for:  Acute midline low back pain without sciatica   Started by:  Jeremy Mobley MD        Dose:  1 tablet   Take 1 tablet by mouth every 8 hours as needed for moderate to severe pain maximum 3 tablet(s) per day   Quantity:  18 tablet   Refills:  0            Where to get your medicines      Some of these will need a paper prescription and others can be bought over the counter.  Ask your nurse if you have questions.     Bring a paper prescription for each of these medications     diazepam 5 MG tablet    oxyCODONE-acetaminophen 7.5-325 MG per tablet                Primary Care Provider Office Phone # Fax #    Aysha Saida Betancourt [phone]       [facility] [address] [place]  [facility] [address]        Thank you!     Thank you for choosing [facility]  for your care. Our goal is always to provide you with excellent care. Hearing back from our patients is one way we can continue to improve our services. Please take a few minutes to complete the written survey that you may receive in the mail after your visit with us. Thank you!             Your Updated Medication List - Protect others around you: Learn how to safely use, store and throw away your medicines at www.disposemymeds.org.          This list is accurate as of: 6/16/17 10:20 AM.  Always use your most recent med list.                   Brand Name Dispense Instructions for use    * ACCU-CHEK MARICRUZ PLUS test strip   Generic drug:  blood glucose monitoring     750 strip    TEST BLOOD SUGARS 8 TIMES DAILY AS DIRECTED       * ONE TOUCH VERIO IQ test strip   Generic drug:  blood glucose monitoring     400 strip    TEST BLOOD SUGARS UP TO 4 TIMES DAILY OR AS DIRECTED       aspirin 81 MG EC tablet     90 tablet    Take 1 tablet (81 mg) by mouth daily       CO Q 10 PO      Take  by mouth. 2 per day       cyclobenzaprine 10 MG tablet    FLEXERIL    42 tablet    Take 1  tablet (10 mg) by mouth 3 times daily as needed       diazepam 5 MG tablet    VALIUM    40 tablet    Take 0.5-1 tablets (2.5-5 mg) by mouth 2 times daily as needed for muscle spasms       FISH OIL CONCENTRATE 1000 MG Caps      2 tablets daily       HYDROmorphone 2 MG tablet    DILAUDID     Take 2 mg by mouth every 4 hours as needed for moderate to severe pain       hydrOXYzine 25 MG capsule    VISTARIL    40 capsule    Take 1 capsule (25 mg) by mouth 3 times daily as needed (pain/muscle relaxor may double dosage if needed)       insulin glargine 100 UNIT/ML injection    LANTUS SOLOSTAR    45 mL    INJECT 50 UNITS SUBCUTANEOUSLY AT BEDTIME       insulin lispro 100 UNIT/ML injection    HumaLOG KWIKpen    54 mL    Inject 10-12u before each meal plus correction: Pre-meal and bedtime correction, as per previous sliding scale       * insulin pen needle 31G X 5 MM    B-D U/F    300 each    Use 4 times daily as directed.       * B-D U/F 31G X 8 MM   Generic drug:  insulin pen needle     300 each    USE 4 TIMES DAILY AS DIRECTED.       * B-D U/F 31G X 8 MM   Generic drug:  insulin pen needle     300 each    USE 4 TIMES DAILY AS DIRECTED.       ketorolac 10 MG tablet    TORADOL    12 tablet    Take 1 tablet (10 mg) by mouth every 6 hours as needed for moderate pain       * liraglutide 18 MG/3ML soln    VICTOZA PEN    27 mL    Inject 1.8 mg Subcutaneous daily       * liraglutide 18 MG/3ML soln    VICTOZA PEN    18 mL    Inject 1.2 mg Subcutaneous daily       lisinopril 20 MG tablet    PRINIVIL/ZESTRIL    180 tablet    Take 1 tablet (20 mg) by mouth 2 times daily       LORazepam 0.5 MG tablet    ATIVAN    45 tablet    TAKE 1 TABLET BY MOUTH EVERY 8 HOURS AS NEEDED       metFORMIN 500 MG 24 hr tablet    GLUCOPHAGE-XR    360 tablet    TAKE 2 TABLETS (1,000 MG) BY MOUTH 2 TIMES DAILY       metoprolol 25 MG tablet    LOPRESSOR    90 tablet    Take 0.5 tablets (12.5 mg) by mouth 2 times daily       MULTIPLE VITAMINS PO      1000mg  daily       order for DME     3 Month    1 Device by * route 8 times daily TEST STRIPS  ACCU-CHEK MARICRUZ plus test strips       * order for DME     1 Device    Equipment being ordered: glucometer brand per insurance       * order for DME     400 each    LANCETS 4 x DAILY AND PRN       oxyCODONE-acetaminophen 7.5-325 MG per tablet    PERCOCET    18 tablet    Take 1 tablet by mouth every 8 hours as needed for moderate to severe pain maximum 3 tablet(s) per day       rosuvastatin 40 MG tablet    CRESTOR    90 tablet    Take 1 tablet (40 mg) by mouth daily Take 40 mg by mouth daily       sildenafil 100 MG cap/tab    VIAGRA    12 tablet    TAKE 1 TABLET BY MOUTH 30 MINS BEFORE INTERCOURSE       VITAMIN B-12 PO      Take  by mouth daily.       vitamin D 1000 UNITS capsule      Take 2 capsules by mouth daily.       * Notice:  This list has 9 medication(s) that are the same as other medications prescribed for you. Read the directions carefully, and ask your doctor or other care provider to review them with you.

## 2017-06-20 ENCOUNTER — THERAPY VISIT (OUTPATIENT)
Dept: PHYSICAL THERAPY | Facility: CLINIC | Age: 52
End: 2017-06-20
Payer: COMMERCIAL

## 2017-06-20 DIAGNOSIS — M54.50 ACUTE BILATERAL LOW BACK PAIN WITHOUT SCIATICA: Primary | ICD-10-CM

## 2017-06-20 PROCEDURE — 97110 THERAPEUTIC EXERCISES: CPT | Mod: GP | Performed by: PHYSICAL THERAPIST

## 2017-06-20 PROCEDURE — 97112 NEUROMUSCULAR REEDUCATION: CPT | Mod: GP | Performed by: PHYSICAL THERAPIST

## 2017-06-20 PROCEDURE — 97161 PT EVAL LOW COMPLEX 20 MIN: CPT | Mod: GP | Performed by: PHYSICAL THERAPIST

## 2017-06-20 NOTE — MR AVS SNAPSHOT
After Visit Summary   6/20/2017    Amadou Bach    MRN: [id number]           Patient Information     Date Of Birth          1965        Visit Information        Provider Department      6/20/2017 12:00 PM Geo Carlos, PT [facility] Physical Therapy        Today's Diagnoses     Acute bilateral low back pain without sciatica    -  1       Follow-ups after your visit        Your next 10 appointments already scheduled     Jun 23, 2017 11:00 AM CDT   MEDSPINE NEW with Amadou Henderson DO   [facility]    [id number] [place] [id number] [phone]            Jun 23, 2017  2:10 PM CDT   [facility] Spine with Geo Carlos PT   [facility] Physical Therapy ([facility]  )    [address]  [facility] [id number] [phone]              Who to contact     If you have questions or need follow up information about today's clinic visit or your schedule please contact [facility] PHYSICAL THERAPY directly at [phone].  Normal or non-critical lab and imaging results will be communicated to you by MyChart, letter or phone within 4 business days after the clinic has received the results. If you do not hear from us within 7 days, please contact the clinic through Modest Inchart or phone. If you have a critical or abnormal lab result, we will notify you by phone as soon as possible.  Submit refill requests through [employer] or call your pharmacy and they will forward the refill request to us. Please allow 3 business days for your refill to be completed.          Additional Information About Your Visit        Modest Inchart Information     [employer] gives you secure access to your electronic health record. If you see a primary care provider, you can also send messages to your care team and make appointments. If you have  questions, please call your primary care clinic.  If you do not have a primary care provider, please call [phone] and they will assist you.        Care EveryWhere ID     This is your Care EveryWhere ID. This could be used by other organizations to access your [facility] medical records  [id number]         Blood Pressure from Last 3 Encounters:   06/16/17 117/79   06/06/17 155/88   06/05/17 126/81    Weight from Last 3 Encounters:   06/16/17 88.9 kg (196 lb)   06/06/17 90.7 kg (200 lb)   03/27/17 89.2 kg (196 lb 9.6 oz)              We Performed the Following     HC PT EVAL, LOW COMPLEXITY     [facility] INITIAL EVAL REPORT     NEUROMUSCULAR RE-EDUCATION     THERAPEUTIC EXERCISES        Primary Care Provider Office Phone # Fax #    Aysha Saida Betancourt [phone]       [facility] [address] [facility] [address]        Thank you!     Thank you for choosing [facility] PHYSICAL THERAP[facility]  for your care. Our goal is always to provide you with excellent care. Hearing back from our patients is one way we can continue to improve our services. Please take a few minutes to complete the written survey that you may receive in the mail after your visit with us. Thank you!             Your Updated Medication List - Protect others around you: Learn how to safely use, store and throw away your medicines at www.disposemymeds.org.          This list is accurate as of: 6/20/17  5:40 PM.  Always use your most recent med list.                   Brand Name Dispense Instructions for use    * ACCU-CHEK MARICRUZ PLUS test strip   Generic drug:  blood glucose monitoring     750 strip    TEST BLOOD SUGARS 8 TIMES DAILY AS DIRECTED       * ONE TOUCH VERIO IQ test strip   Generic drug:  blood glucose monitoring     400 strip    TEST BLOOD SUGARS UP TO 4 TIMES DAILY OR AS DIRECTED       aspirin 81 MG EC tablet     90 tablet    Take 1 tablet (81 mg) by mouth daily       CO Q 10  PO      Take  by mouth. 2 per day       cyclobenzaprine 10 MG tablet    FLEXERIL    42 tablet    Take 1 tablet (10 mg) by mouth 3 times daily as needed       diazepam 5 MG tablet    VALIUM    40 tablet    Take 0.5-1 tablets (2.5-5 mg) by mouth 2 times daily as needed for muscle spasms       FISH OIL CONCENTRATE 1000 MG Caps      2 tablets daily       HYDROmorphone 2 MG tablet    DILAUDID     Take 2 mg by mouth every 4 hours as needed for moderate to severe pain       hydrOXYzine 25 MG capsule    VISTARIL    40 capsule    Take 1 capsule (25 mg) by mouth 3 times daily as needed (pain/muscle relaxor may double dosage if needed)       insulin glargine 100 UNIT/ML injection    LANTUS SOLOSTAR    45 mL    INJECT 50 UNITS SUBCUTANEOUSLY AT BEDTIME       insulin lispro 100 UNIT/ML injection    HumaLOG KWIKpen    54 mL    Inject 10-12u before each meal plus correction: Pre-meal and bedtime correction, as per previous sliding scale       * insulin pen needle 31G X 5 MM    B-D U/F    300 each    Use 4 times daily as directed.       * B-D U/F 31G X 8 MM   Generic drug:  insulin pen needle     300 each    USE 4 TIMES DAILY AS DIRECTED.       * B-D U/F 31G X 8 MM   Generic drug:  insulin pen needle     300 each    USE 4 TIMES DAILY AS DIRECTED.       ketorolac 10 MG tablet    TORADOL    12 tablet    Take 1 tablet (10 mg) by mouth every 6 hours as needed for moderate pain       * liraglutide 18 MG/3ML soln    VICTOZA PEN    27 mL    Inject 1.8 mg Subcutaneous daily       * liraglutide 18 MG/3ML soln    VICTOZA PEN    18 mL    Inject 1.2 mg Subcutaneous daily       lisinopril 20 MG tablet    PRINIVIL/ZESTRIL    180 tablet    Take 1 tablet (20 mg) by mouth 2 times daily       LORazepam 0.5 MG tablet    ATIVAN    45 tablet    TAKE 1 TABLET BY MOUTH EVERY 8 HOURS AS NEEDED       metFORMIN 500 MG 24 hr tablet    GLUCOPHAGE-XR    360 tablet    TAKE 2 TABLETS (1,000 MG) BY MOUTH 2 TIMES DAILY       metoprolol 25 MG tablet    LOPRESSOR     90 tablet    Take 0.5 tablets (12.5 mg) by mouth 2 times daily       MULTIPLE VITAMINS PO      1000mg daily       order for DME     3 Month    1 Device by * route 8 times daily TEST STRIPS  ACCU-CHEK MARICRUZ plus test strips       * order for DME     1 Device    Equipment being ordered: glucometer brand per insurance       * order for DME     400 each    LANCETS 4 x DAILY AND PRN       oxyCODONE-acetaminophen 7.5-325 MG per tablet    PERCOCET    18 tablet    Take 1 tablet by mouth every 8 hours as needed for moderate to severe pain maximum 3 tablet(s) per day       rosuvastatin 40 MG tablet    CRESTOR    90 tablet    Take 1 tablet (40 mg) by mouth daily Take 40 mg by mouth daily       sildenafil 100 MG cap/tab    VIAGRA    12 tablet    TAKE 1 TABLET BY MOUTH 30 MINS BEFORE INTERCOURSE       VITAMIN B-12 PO      Take  by mouth daily.       vitamin D 1000 UNITS capsule      Take 2 capsules by mouth daily.       * Notice:  This list has 9 medication(s) that are the same as other medications prescribed for you. Read the directions carefully, and ask your doctor or other care provider to review them with you.

## 2017-06-20 NOTE — LETTER
[facility]TH[facility]IC [facility] PHYSICAL THERAPY  [address]  [facility] [address]  [phone]    2017    Re: Amadou Bach   :   1965  MRN:  [id number]   REFERRING PHYSICIAN:   Jeremy Mobley    [facility] PHYSICAL THERAPY    Date of Initial Evaluation:  2017  Visits:  Rxs Used: 1  Reason for Referral:  Acute bilateral low back pain without sciatica    EVALUATION SUMMARY    Subjective:    Patient is a 51 year old male presenting with rehab back hpi.   Amadou Bach is a 51 year old male with a lumbar condition.  Condition occurred with:  A fall/slip.  Condition occurred: at home.  This is a new condition  Pt presents to PT with complaints of bilateral low back pain. He reports injuring his back .  He was mopping and slipped on a wet floor.  He notes that he did not fall but twisted his back.  He began having significant pain later that evening and went to the ED.  He sought further care due to persistent pain through the ED and UC.  He was admitted to [facility] for 2 night for pain control.  Pt reports that he was prescribed pain medication, anti-inflammatories and muscle relaxers.  He notes that these are helpful and he is attempting to reduce his dosage to limit side effects.  He also reports that heat and massage are helpful.  He also has attempted acupuncture and chiropractic care but did not see much change.  He denies any previous low back injuries in the past.  .    Patient reports pain:  Lumbar spine right and lumbar spine left.  Radiates to:  Gluteals right and gluteals left.  Pain is described as aching and other (throbbing) and is constant and reported as 6/10.  Associated symptoms:  Loss of motion/stiffness and loss of strength. Pain is the same all the time.  Symptoms are exacerbated by sitting and bending and relieved by heat, muscle relaxants, analgesics and NSAID's.   Since onset symptoms are gradually improving.  Special tests:  X-ray and MRI.  Previous treatment includes chiropractic.  There was no improvement following previous treatment.  General health as reported by patient is good.  Pertinent medical history includes:  High blood pressure, diabetes, overweight and sleep disorder/apnea.  Medical allergies: no.  Other surgeries include:  Other (oral surgery).  Current medications:  Pain medication, anti-inflammatory and muscle relaxants.  Current occupation is Sales.      Barriers include:  None as reported by the patient.  Red flags:  None as reported by the patient.                        Lumbar/SI Evaluation  ROM:    AROM Lumbar:   Flexion:          25% (+)  Ext:                    50% (+)   Side Bend:        Left:  50% (+L)    Right:  50% (+L)  Rotation:           Left:     Right:   Side Glide:        Left:     Right:   Lumbar Myotomes:  normal  Lumbar DTR's:  not assessed  Cord Signs:  not assessed  Lumbar Dermtomes:  normal  Lumbar Palpation:    Tenderness present at Left:    Erector Spinae and PSIS  Tenderness present at Right: Erector Spinae and PSIS  Spinal Segmental Conclusions: Pt reported decreased pain during prone lying and prone on elbows.  No significant change in ROM following exercises.    Assessment/Plan:    Patient is a 51 year old male with lumbar complaints.    Patient has the following significant findings with corresponding treatment plan.                Diagnosis 1:  Mechanical low back pain  Pain -  hot/cold therapy, electric stimulation, manual therapy, self management, education, directional preference exercise and home program  Decreased ROM/flexibility - manual therapy and therapeutic exercise  Decreased joint mobility - manual therapy and therapeutic exercise  Decreased strength - therapeutic exercise and therapeutic activities  Decreased function - therapeutic activities  Therapy Evaluation Codes:   1) History comprised of:   Personal factors  that impact the plan of care:      None.    Comorbidity factors that impact the plan of care are:      None.     Medications impacting care: None.  2) Examination of Body Systems comprised of:   Body structures and functions that impact the plan of care:      Lumbar spine.   Activity limitations that impact the plan of care are:      Bathing, Bending, Dressing, Lifting, Sitting, Standing, Walking and Sleeping.  3) Clinical presentation characteristics are:   Stable/Uncomplicated.  4) Decision-Making    Moderate complexity using standardized patient assessment instrument and/or measureable assessment of functional outcome.  Cumulative Therapy Evaluation is: Low complexity.    Previous and current functional limitations:  (See Goal Flow Sheet for this information)    Short term and Long term goals: (See Goal Flow Sheet for this information)     Communication ability:  Patient appears to be able to clearly communicate and understand verbal and written communication and follow directions correctly.  Treatment Explanation - The following has been discussed with the patient:   RX ordered/plan of care  Anticipated outcomes  Possible risks and side effects  This patient would benefit from PT intervention to resume normal activities.   Rehab potential is good.    Frequency:  1 X week, once daily  Duration:  for 6 weeks  Discharge Plan:  Achieve all LTG.  Independent in home treatment program.  Reach maximal therapeutic benefit.            Thank you for your referral.    INQUIRIES  Therapist: Geo Carlos, [facility]  [facility] PHYSICAL THERAPY  [address]  [facility] [place] [address]  Phone: [phone]  Fax: [phone]

## 2017-06-20 NOTE — PROGRESS NOTES
Subjective:    Patient is a 51 year old male presenting with rehab back hpi.   Amadou Bach is a 51 year old male with a lumbar condition.  Condition occurred with:  A fall/slip.  Condition occurred: at home.  This is a new condition  Pt presents to PT with complaints of bilateral low back pain. He reports injuring his back 6-4-207.  He was mopping and slipped on a wet floor.  He notes that he did not fall but twisted his back.  He began having significant pain later that evening and went to the ED.  He sought further care due to persistent pain through the ED and UC.  He was admitted to [facility] for 2 night for pain control.  Pt reports that he was prescribed pain medication, anti-inflammatories and muscle relaxers.  He notes that these are helpful and he is attempting to reduce his dosage to limit side effects.  He also reports that heat and massage are helpful.  He also has attempted acupuncture and chiropractic care but did not see much change.  He denies any previous low back injuries in the past.  .    Patient reports pain:  Lumbar spine right and lumbar spine left.  Radiates to:  Gluteals right and gluteals left.  Pain is described as aching and other (throbbing) and is constant and reported as 6/10.  Associated symptoms:  Loss of motion/stiffness and loss of strength. Pain is the same all the time.  Symptoms are exacerbated by sitting and bending and relieved by heat, muscle relaxants, analgesics and NSAID's.  Since onset symptoms are gradually improving.  Special tests:  X-ray and MRI.  Previous treatment includes chiropractic.  There was no improvement following previous treatment.  General health as reported by patient is good.  Pertinent medical history includes:  High blood pressure, diabetes, overweight and sleep disorder/apnea.  Medical allergies: no.  Other surgeries include:  Other (oral surgery).  Current medications:  Pain medication, anti-inflammatory and muscle relaxants.   Current occupation is Sales.        Barriers include:  None as reported by the patient.    Red flags:  None as reported by the patient.                        Objective:    System         Lumbar/SI Evaluation  ROM:    AROM Lumbar:   Flexion:          25% (+)  Ext:                    50% (+)   Side Bend:        Left:  50% (+L)    Right:  50% (+L)  Rotation:           Left:     Right:   Side Glide:        Left:     Right:           Lumbar Myotomes:  normal            Lumbar DTR's:  not assessed      Cord Signs:  not assessed    Lumbar Dermtomes:  normal                  Lumbar Palpation:    Tenderness present at Left:    Erector Spinae and PSIS  Tenderness present at Right: Erector Spinae and PSIS      Spinal Segmental Conclusions: Pt reported decreased pain during prone lying and prone on elbows.  No significant change in ROM following exercises.                                                       General     ROS    Assessment/Plan:      Patient is a 51 year old male with lumbar complaints.    Patient has the following significant findings with corresponding treatment plan.                Diagnosis 1:  Mechanical low back pain    Pain -  hot/cold therapy, electric stimulation, manual therapy, self management, education, directional preference exercise and home program  Decreased ROM/flexibility - manual therapy and therapeutic exercise  Decreased joint mobility - manual therapy and therapeutic exercise  Decreased strength - therapeutic exercise and therapeutic activities  Decreased function - therapeutic activities    Therapy Evaluation Codes:   1) History comprised of:   Personal factors that impact the plan of care:      None.    Comorbidity factors that impact the plan of care are:      None.     Medications impacting care: None.  2) Examination of Body Systems comprised of:   Body structures and functions that impact the plan of care:      Lumbar spine.   Activity limitations that impact the plan of care are:       Bathing, Bending, Dressing, Lifting, Sitting, Standing, Walking and Sleeping.  3) Clinical presentation characteristics are:   Stable/Uncomplicated.  4) Decision-Making    Moderate complexity using standardized patient assessment instrument and/or measureable assessment of functional outcome.  Cumulative Therapy Evaluation is: Low complexity.    Previous and current functional limitations:  (See Goal Flow Sheet for this information)    Short term and Long term goals: (See Goal Flow Sheet for this information)     Communication ability:  Patient appears to be able to clearly communicate and understand verbal and written communication and follow directions correctly.  Treatment Explanation - The following has been discussed with the patient:   RX ordered/plan of care  Anticipated outcomes  Possible risks and side effects  This patient would benefit from PT intervention to resume normal activities.   Rehab potential is good.    Frequency:  1 X week, once daily  Duration:  for 6 weeks  Discharge Plan:  Achieve all LTG.  Independent in home treatment program.  Reach maximal therapeutic benefit.    Please refer to the daily flowsheet for treatment today, total treatment time and time spent performing 1:1 timed codes.

## 2017-06-23 ENCOUNTER — THERAPY VISIT (OUTPATIENT)
Dept: PHYSICAL THERAPY | Facility: CLINIC | Age: 52
End: 2017-06-23
Payer: COMMERCIAL

## 2017-06-23 ENCOUNTER — OFFICE VISIT (OUTPATIENT)
Dept: ORTHOPEDICS | Facility: CLINIC | Age: 52
End: 2017-06-23
Attending: FAMILY MEDICINE
Payer: COMMERCIAL

## 2017-06-23 ENCOUNTER — OFFICE VISIT (OUTPATIENT)
Dept: FAMILY MEDICINE | Facility: CLINIC | Age: 52
End: 2017-06-23
Payer: COMMERCIAL

## 2017-06-23 VITALS — OXYGEN SATURATION: 98 % | HEART RATE: 90 BPM | SYSTOLIC BLOOD PRESSURE: 144 MMHG | DIASTOLIC BLOOD PRESSURE: 80 MMHG

## 2017-06-23 VITALS
DIASTOLIC BLOOD PRESSURE: 84 MMHG | TEMPERATURE: 98.4 F | BODY MASS INDEX: 32.12 KG/M2 | OXYGEN SATURATION: 98 % | WEIGHT: 193 LBS | SYSTOLIC BLOOD PRESSURE: 140 MMHG | HEART RATE: 110 BPM

## 2017-06-23 DIAGNOSIS — M54.50 ACUTE MIDLINE LOW BACK PAIN WITHOUT SCIATICA: ICD-10-CM

## 2017-06-23 DIAGNOSIS — M54.50 ACUTE BILATERAL LOW BACK PAIN WITHOUT SCIATICA: ICD-10-CM

## 2017-06-23 DIAGNOSIS — M54.16 LUMBAR RADICULOPATHY: Primary | ICD-10-CM

## 2017-06-23 PROCEDURE — 97112 NEUROMUSCULAR REEDUCATION: CPT | Mod: GP | Performed by: PHYSICAL THERAPIST

## 2017-06-23 PROCEDURE — 99213 OFFICE O/P EST LOW 20 MIN: CPT | Mod: 25 | Performed by: FAMILY MEDICINE

## 2017-06-23 PROCEDURE — 97010 HOT OR COLD PACKS THERAPY: CPT | Mod: GP | Performed by: PHYSICAL THERAPIST

## 2017-06-23 PROCEDURE — 97110 THERAPEUTIC EXERCISES: CPT | Mod: GP | Performed by: PHYSICAL THERAPIST

## 2017-06-23 PROCEDURE — 99213 OFFICE O/P EST LOW 20 MIN: CPT | Performed by: FAMILY MEDICINE

## 2017-06-23 PROCEDURE — 97014 ELECTRIC STIMULATION THERAPY: CPT | Mod: GP | Performed by: PHYSICAL THERAPIST

## 2017-06-23 RX ORDER — HYDROCODONE BITARTRATE AND ACETAMINOPHEN 7.5; 325 MG/1; MG/1
1-2 TABLET ORAL 3 TIMES DAILY PRN
Qty: 16 TABLET | Refills: 0 | Status: SHIPPED | OUTPATIENT
Start: 2017-06-23 | End: 2017-07-18

## 2017-06-23 ASSESSMENT — PAIN SCALES - GENERAL: PAINLEVEL: SEVERE PAIN (6)

## 2017-06-23 NOTE — NURSING NOTE
"Chief Complaint   Patient presents with     Back Pain     Follow up.       Initial /84 (BP Location: Left arm, Patient Position: Chair, Cuff Size: Adult Regular)  Pulse 110  Temp 98.4  F (36.9  C) (Oral)  Wt 193 lb (87.5 kg)  SpO2 98%  BMI 32.12 kg/m2 Estimated body mass index is 32.12 kg/(m^2) as calculated from the following:    Height as of 6/16/17: 5' 5\" (1.651 m).    Weight as of this encounter: 193 lb (87.5 kg).  Medication Reconciliation: complete   An,CMA (AMAA)      "

## 2017-06-23 NOTE — PATIENT INSTRUCTIONS
This summary includes the important diagnoses, test, medications and other important parts of your medical history.  Below are a few good we sites you can use to learn more about these.     Www.[email or web address] : Up to date and easily searchable information on multiple topics.  Www.[email or web address]/Pharmacy/c_539084.asp : [facility] $4.99 medications  Www.medlineplus.gov : medication info, interactive tutorials, watch real surgeries online  Www.familydoctor.org : good info from the Academy of Family Physicians  Www.[facility].com : good info from the [facility]  Www.cdc.gov : public health info, travel advisories, epidemics (H1N1)  Www.aap.org : children's health info, normal development, vaccinations  Www.health.[facility].[place].us : [place] dept of heat, public health issues in [place], N1N1    Based on your medical history and these are the current health maintenance or preventive care services that you are due for (some may have been done at this visit:)  Health Maintenance Due   Topic Date Due     COLONOSCOPY Q2 YR  08/19/2016     =================================================================================  Normal Values   Blood pressure  <140/90 for most adults    <130/80 for some chronic diseases (ask your care team about yours)    BMI (body mass index)  18.5-25 kg/m2 (based on height and weight)     Thank you for visiting [facility]    Normal or non-critical lab and imaging results will be communicated to you by MyChart, letter or phone within 7 days.  If you do not hear from us within 10 days, please call the clinic. If you have a critical or abnormal lab result, we will notify you by phone as soon as possible.     If you have any questions regarding your visit please contact:     Team Comfort:   Clinic Hours Telephone Number   Dr. Jeremy Brothers   7am-5pm  Monday - Friday [phone]     Pharmacy 8:30am-9pm Monday-Friday     9am-5pm Saturday-Sunday [phone]   Urgent Care 11am-9pm Monday-Friday        9am-5pm Saturday-Sunday [phone]     After hours, weekend or if you need to make an appointment with your primary provider please call [phone].   After Hours nurse advise: call [facility]: [phone]    Medication Refills:  Call your pharmacy and they will forward the refill to us. Please allow 3 business days for your refills to be completed.    Use [employer] (secure email communication and access to your chart) to send your primary care provider a message or make an appointment. Ask someone on your Team how to sign up for [employer]. To log on to [facility] or for more information in [employer] please visit the website at [email or web address].  As of October 8, 2013, all password changes, disabled accounts, or ID changes in [email or web address] will be done by our Access Services Department.   If you need help with your account or password, call: [phone]. Clinic staff no longer has the ability to change passwords.

## 2017-06-23 NOTE — PROGRESS NOTES
HISTORY OF PRESENT ILLNESS  Mr. Bach is a pleasant 51 year old year old male who presents to clinic today with low back pain. Amadou sustained a low back injury on June 4 after slipping on a wet floor and twisting his back while cleaning his garage. She didn't fall to the floor, although did feel an immediate pain in his back. He was seen at the [facility] emergency room the next morning and was discharged on Tylenol and Flexeril. He actually returned to urgent care later that evening after no relief of his pain.  Ultimately he did end up at [facility] and was admitted for a couple of days for pain control.  Amadou was then seen in follow-up by his primary care physician. At that visit he had an x-ray and was given a shot of Toradol. He got some relief from this temporarily.  Sky explains that his outpatient regimen is helping him somewhat. His low back is still bothering him quite a bit, getting slightly better over the past week.  Worse with prolonged sitting, better with movement but worse with too much.  Pain was radiating down his right leg, lateral leg was numb and burning.  Sky did have an MR done at [facility] last week.  He had a similar episode to this in the past and had a lumbar TALIA at [facility], maybe not quite 10 years ago.  Timing: occurs intermittently  Context: occurs while exercising and lifting  Associated signs & symptoms: none  Previous similar pain: yes  Additional history: as documented    MEDICAL HISTORY  Patient Active Problem List   Diagnosis     Erectile dysfunction     Type 2 diabetes mellitus without complication, without long-term current use of insulin (H)     Hyperlipidemia LDL goal <100     Obesity, Class I, BMI 30-34.9     Generalized anxiety disorder     ACP (advance care planning)     History of adenomatous polyp of colon     Essential hypertension with goal blood pressure less than 140/90     Malignant melanoma of skin of trunk (H)     History of melanoma     AK (actinic keratosis)     Multiple benign  nevi     Cherry hemangioma     Lentigines     Acute bilateral low back pain without sciatica       Current Outpatient Prescriptions   Medication Sig Dispense Refill     diazepam (VALIUM) 5 MG tablet Take 0.5-1 tablets (2.5-5 mg) by mouth 2 times daily as needed for muscle spasms 40 tablet 0     oxyCODONE-acetaminophen (PERCOCET) 7.5-325 MG per tablet Take 1 tablet by mouth every 8 hours as needed for moderate to severe pain maximum 3 tablet(s) per day 18 tablet 0     cyclobenzaprine (FLEXERIL) 10 MG tablet Take 1 tablet (10 mg) by mouth 3 times daily as needed 42 tablet 1     HYDROmorphone (DILAUDID) 2 MG tablet Take 2 mg by mouth every 4 hours as needed for moderate to severe pain       hydrOXYzine (VISTARIL) 25 MG capsule Take 1 capsule (25 mg) by mouth 3 times daily as needed (pain/muscle relaxor may double dosage if needed) 40 capsule 1     ketorolac (TORADOL) 10 MG tablet Take 1 tablet (10 mg) by mouth every 6 hours as needed for moderate pain 12 tablet 0     LORazepam (ATIVAN) 0.5 MG tablet TAKE 1 TABLET BY MOUTH EVERY 8 HOURS AS NEEDED 45 tablet 1     ONE TOUCH VERIO IQ test strip TEST BLOOD SUGARS UP TO 4 TIMES DAILY OR AS DIRECTED 400 strip 2     B-D U/F 31G X 8 MM insulin pen needle USE 4 TIMES DAILY AS DIRECTED. 300 each 1     liraglutide (VICTOZA PEN) 18 MG/3ML soln Inject 1.8 mg Subcutaneous daily 27 mL 1     liraglutide (VICTOZA PEN) 18 MG/3ML soln Inject 1.2 mg Subcutaneous daily 18 mL 1     lisinopril (PRINIVIL/ZESTRIL) 20 MG tablet Take 1 tablet (20 mg) by mouth 2 times daily 180 tablet 1     metoprolol (LOPRESSOR) 25 MG tablet Take 0.5 tablets (12.5 mg) by mouth 2 times daily 90 tablet 1     metFORMIN (GLUCOPHAGE-XR) 500 MG 24 hr tablet TAKE 2 TABLETS (1,000 MG) BY MOUTH 2 TIMES DAILY 360 tablet 1     aspirin 81 MG EC tablet Take 1 tablet (81 mg) by mouth daily 90 tablet 3     rosuvastatin (CRESTOR) 40 MG tablet Take 1 tablet (40 mg) by mouth daily Take 40 mg by mouth daily 90 tablet 1     insulin  glargine (LANTUS SOLOSTAR) 100 UNIT/ML injection INJECT 50 UNITS SUBCUTANEOUSLY AT BEDTIME 45 mL 1     insulin lispro (HUMALOG KWIKPEN) 100 UNIT/ML injection Inject 10-12u before each meal plus correction: Pre-meal and bedtime correction, as per previous sliding scale 54 mL 1     sildenafil (VIAGRA) 100 MG cap/tab TAKE 1 TABLET BY MOUTH 30 MINS BEFORE INTERCOURSE 12 tablet 3     B-D U/F 31G X 8 MM insulin pen needle USE 4 TIMES DAILY AS DIRECTED. 300 each 0     ORDER FOR DME Equipment being ordered: glucometer brand per insurance 1 Device 0     ORDER FOR DME LANCETS 4 x DAILY AND  each 4     ACCU-CHEK MARICRUZ PLUS test strip TEST BLOOD SUGARS 8 TIMES DAILY AS DIRECTED 750 strip 1     insulin pen needle (B-D U/F) 31G X 5 MM Use 4 times daily as directed. 300 each 3     ORDER FOR DME 1 Device by * route 8 times daily TEST STRIPS   ACCU-CHEK MARICRUZ plus test strips 3 Month 0     Cyanocobalamin (VITAMIN B-12 PO) Take  by mouth daily.       Coenzyme Q10 (CO Q 10 PO) Take  by mouth. 2 per day       FISH OIL CONCENTRATE 1000 MG OR CAPS 2 tablets daily       VITAMIN D 1000 UNIT OR CAPS Take 2 capsules by mouth daily.       MULTIPLE VITAMINS OR 1000mg daily         No Known Allergies    Family History   Problem Relation Age of Onset     DIABETES Mother      Hypertension Mother      DIABETES Father      CANCER Father      lung     CANCER Other      aunt - skin cancer     Skin Cancer Other      Melanoma No family hx of        Additional medical/Social/Surgical histories reviewed in [facility] and updated as appropriate.     REVIEW OF SYSTEMS (6/23/2017)  10 point ROS of systems including Constitutional, Eyes, Respiratory, Cardiovascular, Gastroenterology, Genitourinary, Integumentary, Musculoskeletal, Psychiatric were all negative except for pertinent positives noted in my HPI.     PHYSICAL EXAM  Vitals:    06/23/17 1100   BP: 144/80   BP Location: Right arm   Patient Position: Chair   Cuff Size: Adult Regular   Pulse: 90    SpO2: 98%     General  - normal appearance, in no obvious distress  CV  - normal peripheral perfusion  Pulm  - normal respiratory pattern, non-labored  Musculoskeletal - lumbar spine  - stance: slow to rise and sit  - inspection: normal bone and joint alignment, no obvious scoliosis  - palpation: bilateral lumbar paravertebral spasm  - ROM: pain with bilateral rotation, flexion past 30 deg, extension  - strength: lower extremities 5/5 in all planes  - special tests:  (-) slump test  Neuro  - patellar and Achilles DTRs 2+ bilaterally, no sensory or motor deficit, grossly normal coordination, normal muscle tone  Skin  - no ecchymosis, erythema, warmth, or induration, no obvious rash  Psych  - interactive, appropriate, normal mood and affect        ASSESSMENT & PLAN  Mr. Bach is a 51 year old year old male who is in the office today with acute on chronic low back pain.    I reviewed his records from his ER and urgent care visits.    Amadou does have what appears to be musculoligamentous low back pain, although he does have a 10 mm cyst at the level of L3 4.    Given his marked pain I do think a trial of an epidural steroid injection is warranted.  This is supported by the fact that he did get some relief with this in the past.  I'm referring Amadou have this done at his earliest convenience.  He is going to follow up 2 weeks after he gets this done.    It was a pleasure taking care of Sky.        Amadou Henderson DO, CAM

## 2017-06-23 NOTE — PATIENT INSTRUCTIONS
Thanks for coming today.  Ortho/Sports Medicine Clinic  [address] [place] [address]    To schedule future appointments in Ortho Clinic, you may call [phone].    To schedule ordered imaging by your provider:   Call Central Imaging [phone]    To schedule an injection ordered by your provider:  Call Central Imaging Injection scheduling line: [phone]  Vetteryhart available online at:  [email or web address]    Please call if any further questions or concerns ([phone]).  Clinic hours 8 am to 5 pm.    Return to clinic (call) if symptoms worsen or fail to improve.

## 2017-06-23 NOTE — MR AVS SNAPSHOT
After Visit Summary   2017    Amadou Bach    MRN: [id number]           Patient Information     Date Of Birth          1965        Visit Information        Provider Department      2017 11:00 AM Amadou Henderson, DO [facility]        Today's Diagnoses     Lumbar radiculopathy    -  1      Care Instructions    Thanks for coming today.  [facility]  [address] [place] [address]    To schedule future appointments in Ortho Clinic, you may call [phone].    To schedule ordered imaging by your provider:   Call Central Imaging [phone]    To schedule an injection ordered by your provider:  Call Central Imaging Injection scheduling line: [phone]  Brickflow available online at:  [email or web address]    Please call if any further questions or concerns ([phone]).  Clinic hours 8 am to 5 pm.    Return to clinic (call) if symptoms worsen or fail to improve.            Follow-ups after your visit        Who to contact     If you have questions or need follow up information about today's clinic visit or your schedule please contact [facility] directly at [phone].  Normal or non-critical lab and imaging results will be communicated to you by Real Time Tomographyhart, letter or phone within 4 business days after the clinic has received the results. If you do not hear from us within 7 days, please contact the clinic through Real Time Tomographyhart or phone. If you have a critical or abnormal lab result, we will notify you by phone as soon as possible.  Submit refill requests through Brickflow or call your pharmacy and they will forward the refill request to us. Please allow 3 business days for your refill to be completed.          Additional Information About Your Visit        MyChart Information     Brickflow gives you secure access to your electronic health record. If you see a primary care provider, you can also send messages to  your care team and make appointments. If you have questions, please call your primary care clinic.  If you do not have a primary care provider, please call [phone] and they will assist you.      Tapastreet is an electronic gateway that provides easy, online access to your medical records. With Tapastreet, you can request a clinic appointment, read your test results, renew a prescription or communicate with your care team.     To access your existing account, please contact your [facility] or call [phone] for assistance.        Care EveryWhere ID     This is your Care EveryWhere ID. This could be used by other organizations to access your [place] medical records  [id number]        Your Vitals Were     Pulse Pulse Oximetry                90 98%           Blood Pressure from Last 3 Encounters:   06/23/17 140/84   06/23/17 144/80   06/16/17 117/79    Weight from Last 3 Encounters:   06/23/17 87.5 kg (193 lb)   06/16/17 88.9 kg (196 lb)   06/06/17 90.7 kg (200 lb)                 Today's Medication Changes          These changes are accurate as of: 6/23/17 11:59 PM.  If you have any questions, ask your nurse or doctor.               Start taking these medicines.        Dose/Directions    HYDROcodone-acetaminophen 7.5-325 MG per tablet   Commonly known as:  NORCO   Used for:  Acute midline low back pain without sciatica   Started by:  Jeremy Mobley MD        Dose:  1-2 tablet   Take 1-2 tablets by mouth 3 times daily as needed for moderate to severe pain maximum 3 tablet(s) per day   Quantity:  16 tablet   Refills:  0         Stop taking these medicines if you haven't already. Please contact your care team if you have questions.     oxyCODONE-acetaminophen 7.5-325 MG per tablet   Commonly known as:  PERCOCET   Stopped by:  Jeremy Mobley MD                Where to get your medicines      Some of these will need a paper prescription and others can be bought over the counter.   Ask your nurse if you have questions.     Bring a paper prescription for each of these medications     HYDROcodone-acetaminophen 7.5-325 MG per tablet                Primary Care Provider Office Phone # Fax #    Jeremy Mobley [phone]       [facility] [address]  [facility] [address]        Equal Access to Services     [facility]: Hadii aad ku hadasho Soomaali, waaxda luqadaha, qaybta kaalmada adeegyada, waxay idiin hayaan adeeg kharash la'aan . So [facility] [phone].    ATENCIÓN: Si habla español, tiene a chairez disposición servicios gratuitos de asistencia lingüística. Llame al [phone].    We comply with applicable federal civil rights laws and [place] laws. We do not discriminate on the basis of race, color, national origin, age, disability sex, sexual orientation or gender identity.            Thank you!     Thank you for choosing [facility]  for your care. Our goal is always to provide you with excellent care. Hearing back from our patients is one way we can continue to improve our services. Please take a few minutes to complete the written survey that you may receive in the mail after your visit with us. Thank you!             Your Updated Medication List - Protect others around you: Learn how to safely use, store and throw away your medicines at www.disposemymeds.org.          This list is accurate as of: 6/23/17 11:59 PM.  Always use your most recent med list.                   Brand Name Dispense Instructions for use Diagnosis    * ACCU-CHEK MARICRUZ PLUS test strip   Generic drug:  blood glucose monitoring     750 strip    TEST BLOOD SUGARS 8 TIMES DAILY AS DIRECTED    Type 2 diabetes, HbA1c goal < 7% (H)       * ONE TOUCH VERIO IQ test strip   Generic drug:  blood glucose monitoring     400 strip    TEST BLOOD SUGARS UP TO 4 TIMES DAILY OR AS DIRECTED    Type 2 diabetes mellitus without complication, with long-term current use of insulin (H)        aspirin 81 MG EC tablet     90 tablet    Take 1 tablet (81 mg) by mouth daily    Type 2 diabetes mellitus without complication, unspecified long term insulin use status (H)       CO Q 10 PO      Take  by mouth. 2 per day    Type 2 diabetes, HbA1c goal < 7% (H)       cyclobenzaprine 10 MG tablet    FLEXERIL    42 tablet    Take 1 tablet (10 mg) by mouth 3 times daily as needed    Back muscle spasm       diazepam 5 MG tablet    VALIUM    40 tablet    Take 0.5-1 tablets (2.5-5 mg) by mouth 2 times daily as needed for muscle spasms    Acute midline low back pain without sciatica       FISH OIL CONCENTRATE 1000 MG Caps      2 tablets daily        HYDROcodone-acetaminophen 7.5-325 MG per tablet    NORCO    16 tablet    Take 1-2 tablets by mouth 3 times daily as needed for moderate to severe pain maximum 3 tablet(s) per day    Acute midline low back pain without sciatica       HYDROmorphone 2 MG tablet    DILAUDID     Take 2 mg by mouth every 4 hours as needed for moderate to severe pain        hydrOXYzine 25 MG capsule    VISTARIL    40 capsule    Take 1 capsule (25 mg) by mouth 3 times daily as needed (pain/muscle relaxor may double dosage if needed)    Back strain, subsequent encounter       insulin glargine 100 UNIT/ML injection    LANTUS SOLOSTAR    45 mL    INJECT 50 UNITS SUBCUTANEOUSLY AT BEDTIME    Type 2 diabetes mellitus without complication, unspecified long term insulin use status (H)       insulin lispro 100 UNIT/ML injection    HumaLOG KWIKpen    54 mL    Inject 10-12u before each meal plus correction: Pre-meal and bedtime correction, as per previous sliding scale    Type 2 diabetes mellitus without complication, unspecified long term insulin use status (H)       * insulin pen needle 31G X 5 MM    B-D U/F    300 each    Use 4 times daily as directed.    Type 2 diabetes, HbA1c goal < 7% (H)       * B-D U/F 31G X 8 MM   Generic drug:  insulin pen needle     300 each    USE 4 TIMES DAILY AS DIRECTED.     Type 2 diabetes mellitus without complication (H)       * B-D U/F 31G X 8 MM   Generic drug:  insulin pen needle     300 each    USE 4 TIMES DAILY AS DIRECTED.    Type 2 diabetes mellitus without complication (H)       ketorolac 10 MG tablet    TORADOL    12 tablet    Take 1 tablet (10 mg) by mouth every 6 hours as needed for moderate pain    Back strain, subsequent encounter       * liraglutide 18 MG/3ML soln    VICTOZA PEN    27 mL    Inject 1.8 mg Subcutaneous daily    Type 2 diabetes mellitus without complication, with long-term current use of insulin (H), Non morbid obesity due to excess calories       * liraglutide 18 MG/3ML soln    VICTOZA PEN    18 mL    Inject 1.2 mg Subcutaneous daily    Type 2 diabetes mellitus without complication, with long-term current use of insulin (H)       lisinopril 20 MG tablet    PRINIVIL/ZESTRIL    180 tablet    Take 1 tablet (20 mg) by mouth 2 times daily    Hypertension goal BP (blood pressure) < 140/90       LORazepam 0.5 MG tablet    ATIVAN    45 tablet    TAKE 1 TABLET BY MOUTH EVERY 8 HOURS AS NEEDED    Generalized anxiety disorder       metFORMIN 500 MG 24 hr tablet    GLUCOPHAGE-XR    360 tablet    TAKE 2 TABLETS (1,000 MG) BY MOUTH 2 TIMES DAILY    Type 2 diabetes mellitus without complication, unspecified long term insulin use status (H)       metoprolol 25 MG tablet    LOPRESSOR    90 tablet    Take 0.5 tablets (12.5 mg) by mouth 2 times daily    Hypertension goal BP (blood pressure) < 140/90       MULTIPLE VITAMINS PO      1000mg daily        order for DME     3 Month    1 Device by * route 8 times daily TEST STRIPS  ACCU-CHEK MARICRUZ plus test strips    Type 2 diabetes, HbA1c goal < 7% (H)       * order for DME     1 Device    Equipment being ordered: glucometer brand per insurance    Type 2 diabetes, HbA1c goal < 7% (H)       * order for DME     400 each    LANCETS 4 x DAILY AND PRN    Type 2 diabetes, HbA1c goal < 7% (H)       rosuvastatin 40 MG tablet    CRESTOR     90 tablet    Take 1 tablet (40 mg) by mouth daily Take 40 mg by mouth daily    Hyperlipidemia LDL goal <100       sildenafil 100 MG cap/tab    VIAGRA    12 tablet    TAKE 1 TABLET BY MOUTH 30 MINS BEFORE INTERCOURSE    Erectile dysfunction, unspecified erectile dysfunction type       VITAMIN B-12 PO      Take  by mouth daily.        vitamin D 1000 UNITS capsule      Take 2 capsules by mouth daily.        * Notice:  This list has 9 medication(s) that are the same as other medications prescribed for you. Read the directions carefully, and ask your doctor or other care provider to review them with you.

## 2017-06-23 NOTE — PROGRESS NOTES
SUBJECTIVE:                                                    Amadou Bach is a 51 year old male who presents to clinic today for the following health issues:    Back Pain       Duration: Follow up        Specific cause: none    Description:   Location of pain: low back both  Character of pain: dull ache, stabbing and constant  Pain radiation:radiates into the left leg  New numbness or weakness in legs, not attributed to pain:  no     Intensity: Currently 7/10, At its worst 10/10, severe    History:   Pain interferes with job: YES  History of back problems: recurrent self limited episodes of low back pain in the past  Any previous MRI or X-rays: None  Sees a specialist for back pain:  No  Therapies tried without relief: na    Alleviating factors:   Improved by: na      Precipitating factors:  Worsened by: Lifting, Bending, Standing, Sitting, Lying Flat, Walking and Coughing    Functional and Psychosocial Screen (Chinmay STarT Back):      Not performed today    Accompanying Signs & Symptoms:  Risk of Fracture:  None  Risk of Cauda Equina:  None  Risk of Infection:  None  Risk of Cancer:  None  Risk of Ankylosing Spondylitis:  Onset at age <35, male, AND morning back stiffness. no     Patient comes in today requesting more pain medication to last him before an upcoming injection procedure, realizing that he would not be getting a refill of the oxycodone he received at his last appointment with me. He states that the oxycodone is more effective than the others that I prescribed. He has one pill left.     Past medical, family, and social histories, medications, and allergies are reviewed and updated in Epic.     ROS:  C: NEGATIVE for fever, chills, change in weight  E/M: NEGATIVE for ear, mouth and throat problems  R: NEGATIVE for significant cough or SOB  CV: NEGATIVE for chest pain, palpitations or peripheral edema  ROS otherwise negative    Any history above obtained by the Medical Assistant was reviewed by   Jeremy Mobley MD, and edited when necessary.    This document serves as a record of the services and decisions personally performed and made by Dr. Mobley. It was created on his behalf by Louann Fernandes, a trained medical scribe. The creation of this document is based the provider's statements to the medical scribe.  Louann Fernandes June 23, 2017 5:08 PM     OBJECTIVE:                                                    /84 (BP Location: Left arm, Patient Position: Chair, Cuff Size: Adult Regular)  Pulse 110  Temp 98.4  F (36.9  C) (Oral)  Wt 87.5 kg (193 lb)  SpO2 98%  BMI 32.12 kg/m2   Body mass index is 32.12 kg/(m^2).     EXAM:  GENERAL: healthy, alert and no distress  EYES: Eyes grossly normal to inspection, PERRL, EOMI, sclerae white and conjunctivae normal  MS: no gross musculoskeletal defects noted, no edema  SKIN: no suspicious lesions or rashes  NEURO: Normal strength and tone, sensory exam grossly normal, mentation intact, oriented times 3 and cranial nerves 2-12 intact  PSYCH: mentation appears normal, affect normal/bright     Diagnostic Test Results:  none      ASSESSMENT/PLAN:                                                      (M54.5) Acute midline low back pain without sciatica  Comment:   Plan: HYDROcodone-acetaminophen (NORCO) 7.5-325 MG         per tablet        Steroid injection for CDI in [place] on 6/28/17.      The information in this document, created by the medical scribe for me, accurately reflects the services I personally performed and the decisions made by me. I have reviewed and approved this document for accuracy prior to leaving the patient care area. June 23, 2017 5:08 PM   Jeremy Mobley MD

## 2017-06-23 NOTE — MR AVS SNAPSHOT
After Visit Summary   6/23/2017    Amadou Bach    MRN: [id number]           Patient Information     Date Of Birth          1965        Visit Information        Provider Department      6/23/2017 4:40 PM Jeremy Mobley MD [facility]        Today's Diagnoses     Acute midline low back pain without sciatica          Care Instructions    This summary includes the important diagnoses, test, medications and other important parts of your medical history.  Below are a few good we sites you can use to learn more about these.     Www.Qwiki.Ring : Up to date and easily searchable information on multiple topics.  Www.[facility].com.Ring/[email or web address]/[email or web address] : [facility] $4.99 medications  Www.[employer].gov : medication info, interactive tutorials, watch real surgeries online  Www.familydoctor.org : good info from the Academy of Family Physicians  Www.[employer].GetApp : good info from the [facility]  Www.cdc.gov : public health info, travel advisories, epidemics (H1N1)  Www.aap.org : children's health info, normal development, vaccinations  Www.health.[facility].[place].us : [place] dept of heatlh, public health issues in [place], N1N1    Based on your medical history and these are the current health maintenance or preventive care services that you are due for (some may have been done at this visit:)  Health Maintenance Due   Topic Date Due     COLONOSCOPY Q2 YR  08/19/2016     =================================================================================  Normal Values   Blood pressure  <140/90 for most adults    <130/80 for some chronic diseases (ask your care team about yours)    BMI (body mass index)  18.5-25 kg/m2 (based on height and weight)     Thank you for visiting [facility]    Normal or non-critical lab and imaging results will be communicated to you by MyChart, letter or phone within 7 days.  If you do not hear from us within 10 days, please call the clinic.  If you have a critical or abnormal lab result, we will notify you by phone as soon as possible.     If you have any questions regarding your visit please contact:     Team Comfort:   Clinic Hours Telephone Number   Dr. Jeremy Brothers   7am-5pm  Monday - Friday [phone]     Pharmacy 8:30am-9pm Monday-Friday    9am-5pm Saturday-Sunday [phone]   Urgent Care 11am-9pm Monday-Friday        9am-5pm Saturday-Sunday [phone]     After hours, weekend or if you need to make an appointment with your primary provider please call [phone].   After Hours nurse advise: call [place] [facility]: [phone]    Medication Refills:  Call your pharmacy and they will forward the refill to us. Please allow 3 business days for your refills to be completed.    Use [employer] (secure email communication and access to your chart) to send your primary care provider a message or make an appointment. Ask someone on your Team how to sign up for [employer]. To log on to [employer] or for more information in [employer] please visit the website at [email or web address].  As of October 8, 2013, all password changes, disabled accounts, or ID changes in [employer]/MyHealth will be done by our Access Services Department.   If you need help with your account or password, call: [phone]. Clinic staff no longer has the ability to change passwords.             Follow-ups after your visit        Future tests that were ordered for you today     Open Future Orders        Priority Expected Expires Ordered    XR Lumbar Epidural Injection Incl Imaging Routine 6/23/2017 6/23/2018 6/23/2017            Who to contact     If you have questions or need follow up information about today's clinic visit or your schedule please contact [facility] NIDHI MIRANDA directly at [phone].  Normal or non-critical lab and imaging results will be communicated to you by MyChart, letter  or phone within 4 business days after the clinic has received the results. If you do not hear from us within 7 days, please contact the clinic through [employer] or phone. If you have a critical or abnormal lab result, we will notify you by phone as soon as possible.  Submit refill requests through [employer] or call your pharmacy and they will forward the refill request to us. Please allow 3 business days for your refill to be completed.          Additional Information About Your Visit        [employer] Information     [employer] gives you secure access to your electronic health record. If you see a primary care provider, you can also send messages to your care team and make appointments. If you have questions, please call your primary care clinic.  If you do not have a primary care provider, please call [phone] and they will assist you.        Care EveryWhere ID     This is your Care EveryWhere ID. This could be used by other organizations to access your [place] medical records  [id number]        Your Vitals Were     Pulse Temperature Pulse Oximetry BMI (Body Mass Index)          110 98.4  F (36.9  C) (Oral) 98% 32.12 kg/m2         Blood Pressure from Last 3 Encounters:   06/23/17 140/84   06/23/17 144/80   06/16/17 117/79    Weight from Last 3 Encounters:   06/23/17 193 lb (87.5 kg)   06/16/17 196 lb (88.9 kg)   06/06/17 200 lb (90.7 kg)              Today, you had the following     No orders found for display         Today's Medication Changes          These changes are accurate as of: 6/23/17  5:13 PM.  If you have any questions, ask your nurse or doctor.               Start taking these medicines.        Dose/Directions    HYDROcodone-acetaminophen 7.5-325 MG per tablet   Commonly known as:  NORCO   Used for:  Acute midline low back pain without sciatica   Started by:  Jeremy Mobley MD        Dose:  1-2 tablet   Take 1-2 tablets by mouth 3 times daily as needed for moderate to severe pain maximum 3 tablet(s)  per day   Quantity:  16 tablet   Refills:  0         Stop taking these medicines if you haven't already. Please contact your care team if you have questions.     oxyCODONE-acetaminophen 7.5-325 MG per tablet   Commonly known as:  PERCOCET   Stopped by:  Jeremy Mobley MD                Where to get your medicines      Some of these will need a paper prescription and others can be bought over the counter.  Ask your nurse if you have questions.     Bring a paper prescription for each of these medications     HYDROcodone-acetaminophen 7.5-325 MG per tablet                Primary Care Provider Office Phone # Fax #    Jeremy Mobley [phone]       [facility] [address]  [facility] [address]        Equal Access to Services     [facility]: Hadii aad ku hadasho Soomaali, waaxda luqadaha, qaybta kaalmada adeegyada, waxay idiin hayaan tremayne connolly . So [facility] [phone].    ATENCIÓN: Si habla español, tiene a chairez disposición servicios gratuitos de asistencia lingüística. [facility] [phone].    We comply with applicable federal civil rights laws and [place] laws. We do not discriminate on the basis of race, color, national origin, age, disability sex, sexual orientation or gender identity.            Thank you!     Thank you for choosing [facility]  for your care. Our goal is always to provide you with excellent care. Hearing back from our patients is one way we can continue to improve our services. Please take a few minutes to complete the written survey that you may receive in the mail after your visit with us. Thank you!             Your Updated Medication List - Protect others around you: Learn how to safely use, store and throw away your medicines at www.disposemymeds.org.          This list is accurate as of: 6/23/17  5:13 PM.  Always use your most recent med list.                   Brand Name Dispense Instructions for use Diagnosis    *  ACCU-CHEK MARICRUZ PLUS test strip   Generic drug:  blood glucose monitoring     750 strip    TEST BLOOD SUGARS 8 TIMES DAILY AS DIRECTED    Type 2 diabetes, HbA1c goal < 7% (H)       * ONE TOUCH VERIO IQ test strip   Generic drug:  blood glucose monitoring     400 strip    TEST BLOOD SUGARS UP TO 4 TIMES DAILY OR AS DIRECTED    Type 2 diabetes mellitus without complication, with long-term current use of insulin (H)       aspirin 81 MG EC tablet     90 tablet    Take 1 tablet (81 mg) by mouth daily    Type 2 diabetes mellitus without complication, unspecified long term insulin use status (H)       CO Q 10 PO      Take  by mouth. 2 per day    Type 2 diabetes, HbA1c goal < 7% (H)       cyclobenzaprine 10 MG tablet    FLEXERIL    42 tablet    Take 1 tablet (10 mg) by mouth 3 times daily as needed    Back muscle spasm       diazepam 5 MG tablet    VALIUM    40 tablet    Take 0.5-1 tablets (2.5-5 mg) by mouth 2 times daily as needed for muscle spasms    Acute midline low back pain without sciatica       FISH OIL CONCENTRATE 1000 MG Caps      2 tablets daily        HYDROcodone-acetaminophen 7.5-325 MG per tablet    NORCO    16 tablet    Take 1-2 tablets by mouth 3 times daily as needed for moderate to severe pain maximum 3 tablet(s) per day    Acute midline low back pain without sciatica       HYDROmorphone 2 MG tablet    DILAUDID     Take 2 mg by mouth every 4 hours as needed for moderate to severe pain        hydrOXYzine 25 MG capsule    VISTARIL    40 capsule    Take 1 capsule (25 mg) by mouth 3 times daily as needed (pain/muscle relaxor may double dosage if needed)    Back strain, subsequent encounter       insulin glargine 100 UNIT/ML injection    LANTUS SOLOSTAR    45 mL    INJECT 50 UNITS SUBCUTANEOUSLY AT BEDTIME    Type 2 diabetes mellitus without complication, unspecified long term insulin use status (H)       insulin lispro 100 UNIT/ML injection    HumaLOG KWIKpen    54 mL    Inject 10-12u before each meal plus  correction: Pre-meal and bedtime correction, as per previous sliding scale    Type 2 diabetes mellitus without complication, unspecified long term insulin use status (H)       * insulin pen needle 31G X 5 MM    B-D U/F    300 each    Use 4 times daily as directed.    Type 2 diabetes, HbA1c goal < 7% (H)       * B-D U/F 31G X 8 MM   Generic drug:  insulin pen needle     300 each    USE 4 TIMES DAILY AS DIRECTED.    Type 2 diabetes mellitus without complication (H)       * B-D U/F 31G X 8 MM   Generic drug:  insulin pen needle     300 each    USE 4 TIMES DAILY AS DIRECTED.    Type 2 diabetes mellitus without complication (H)       ketorolac 10 MG tablet    TORADOL    12 tablet    Take 1 tablet (10 mg) by mouth every 6 hours as needed for moderate pain    Back strain, subsequent encounter       * liraglutide 18 MG/3ML soln    VICTOZA PEN    27 mL    Inject 1.8 mg Subcutaneous daily    Type 2 diabetes mellitus without complication, with long-term current use of insulin (H), Non morbid obesity due to excess calories       * liraglutide 18 MG/3ML soln    VICTOZA PEN    18 mL    Inject 1.2 mg Subcutaneous daily    Type 2 diabetes mellitus without complication, with long-term current use of insulin (H)       lisinopril 20 MG tablet    PRINIVIL/ZESTRIL    180 tablet    Take 1 tablet (20 mg) by mouth 2 times daily    Hypertension goal BP (blood pressure) < 140/90       LORazepam 0.5 MG tablet    ATIVAN    45 tablet    TAKE 1 TABLET BY MOUTH EVERY 8 HOURS AS NEEDED    Generalized anxiety disorder       metFORMIN 500 MG 24 hr tablet    GLUCOPHAGE-XR    360 tablet    TAKE 2 TABLETS (1,000 MG) BY MOUTH 2 TIMES DAILY    Type 2 diabetes mellitus without complication, unspecified long term insulin use status (H)       metoprolol 25 MG tablet    LOPRESSOR    90 tablet    Take 0.5 tablets (12.5 mg) by mouth 2 times daily    Hypertension goal BP (blood pressure) < 140/90       MULTIPLE VITAMINS PO      1000mg daily        order for DME      3 Month    1 Device by * route 8 times daily TEST STRIPS  ACCU-CHEK MARICRUZ plus test strips    Type 2 diabetes, HbA1c goal < 7% (H)       * order for DME     1 Device    Equipment being ordered: glucometer brand per insurance    Type 2 diabetes, HbA1c goal < 7% (H)       * order for DME     400 each    LANCETS 4 x DAILY AND PRN    Type 2 diabetes, HbA1c goal < 7% (H)       rosuvastatin 40 MG tablet    CRESTOR    90 tablet    Take 1 tablet (40 mg) by mouth daily Take 40 mg by mouth daily    Hyperlipidemia LDL goal <100       sildenafil 100 MG cap/tab    VIAGRA    12 tablet    TAKE 1 TABLET BY MOUTH 30 MINS BEFORE INTERCOURSE    Erectile dysfunction, unspecified erectile dysfunction type       VITAMIN B-12 PO      Take  by mouth daily.        vitamin D 1000 UNITS capsule      Take 2 capsules by mouth daily.        * Notice:  This list has 9 medication(s) that are the same as other medications prescribed for you. Read the directions carefully, and ask your doctor or other care provider to review them with you.

## 2017-06-23 NOTE — NURSING NOTE
"Amadou Bach's goals for this visit include: Evaluate and treat lumbar pain.   He requests these members of his care team be copied on today's visit information: yes    PCP: Aysha Betancourt    Referring Provider:  Hima Medina MD  [facility]  [address] [place] [address]    Chief Complaint   Patient presents with     Consult     Lumbar/SI     Patient slipped on 06/04/2017. Didnt hit the floor but thinks he tweaked it trying to prevent himself from falling.        Initial /80 (BP Location: Right arm, Patient Position: Chair, Cuff Size: Adult Regular)  Pulse 90  SpO2 98% Estimated body mass index is 32.62 kg/(m^2) as calculated from the following:    Height as of 6/16/17: 1.651 m (5' 5\").    Weight as of 6/16/17: 88.9 kg (196 lb).  Medication Reconciliation: complete    "

## 2017-06-28 ENCOUNTER — TRANSFERRED RECORDS (OUTPATIENT)
Dept: HEALTH INFORMATION MANAGEMENT | Facility: CLINIC | Age: 52
End: 2017-06-28

## 2017-06-29 ENCOUNTER — TELEPHONE (OUTPATIENT)
Dept: INTERNAL MEDICINE | Facility: CLINIC | Age: 52
End: 2017-06-29

## 2017-06-29 NOTE — TELEPHONE ENCOUNTER
"\"Dear Amadou, we have reviewed your chart and your diabetes appears not well-controlled. In order to lower your risk of developing serious heart, kidney and vision problems, Dr Villarreal advises you to schedule an apt to see her within the month.\"     Left message to return call.    Radha Meza, CMA    "

## 2017-07-01 ENCOUNTER — HEALTH MAINTENANCE LETTER (OUTPATIENT)
Age: 52
End: 2017-07-01

## 2017-07-10 NOTE — TELEPHONE ENCOUNTER
Patient called and reminded that he needs a diabetic recheck. He states he will make a follow up appointment with the endocrinologist at the [facility] he has seen in the past.    Radha Meza, CMA

## 2017-07-14 DIAGNOSIS — N52.9 ERECTILE DYSFUNCTION, UNSPECIFIED ERECTILE DYSFUNCTION TYPE: ICD-10-CM

## 2017-07-14 NOTE — TELEPHONE ENCOUNTER
sildenafil (VIAGRA) 100 MG cap/tab      Last Written Prescription Date: 3/24/17  Last Fill Quantity: 12,  # refills: 3   Last Office Visit with [facility], [facility] or [facility] prescribing provider: 6/23/17      [facility]

## 2017-07-17 RX ORDER — SILDENAFIL CITRATE 100 MG
TABLET ORAL
Qty: 12 TABLET | Refills: 3 | Status: SHIPPED | OUTPATIENT
Start: 2017-07-17 | End: 2018-02-07

## 2017-07-17 NOTE — TELEPHONE ENCOUNTER
Prescription approved per [facility] Refill Protocol.  Anabel Crystal RN

## 2017-07-18 ENCOUNTER — OFFICE VISIT (OUTPATIENT)
Dept: ORTHOPEDICS | Facility: CLINIC | Age: 52
End: 2017-07-18
Payer: COMMERCIAL

## 2017-07-18 VITALS
DIASTOLIC BLOOD PRESSURE: 86 MMHG | BODY MASS INDEX: 30.7 KG/M2 | WEIGHT: 191 LBS | SYSTOLIC BLOOD PRESSURE: 158 MMHG | HEIGHT: 66 IN

## 2017-07-18 DIAGNOSIS — M54.41 ACUTE BILATERAL LOW BACK PAIN WITH RIGHT-SIDED SCIATICA: Primary | ICD-10-CM

## 2017-07-18 PROCEDURE — 99204 OFFICE O/P NEW MOD 45 MIN: CPT | Performed by: PEDIATRICS

## 2017-07-18 RX ORDER — HYDROCODONE BITARTRATE AND ACETAMINOPHEN 7.5; 325 MG/1; MG/1
1-2 TABLET ORAL 3 TIMES DAILY PRN
Qty: 20 TABLET | Refills: 0 | Status: SHIPPED | OUTPATIENT
Start: 2017-07-18 | End: 2017-08-19

## 2017-07-18 RX ORDER — CYCLOBENZAPRINE HCL 10 MG
10 TABLET ORAL 3 TIMES DAILY PRN
Qty: 42 TABLET | Refills: 1 | Status: SHIPPED | OUTPATIENT
Start: 2017-07-18 | End: 2017-08-19

## 2017-07-18 NOTE — NURSING NOTE
"Chief Complaint   Patient presents with     Musculoskeletal Problem     low back pain       Initial /86  Ht 5' 5.75\" (1.67 m)  Wt 191 lb (86.6 kg)  BMI 31.06 kg/m2 Estimated body mass index is 31.06 kg/(m^2) as calculated from the following:    Height as of this encounter: 5' 5.75\" (1.67 m).    Weight as of this encounter: 191 lb (86.6 kg).  Medication Reconciliation: complete  "

## 2017-07-18 NOTE — PROGRESS NOTES
Sports Medicine Clinic Visit    PCP: Jeremy Mobley Brandon Bach is a 51 year old male who is seen  as a self referral AIC presenting with low back pain.  Patient slipped on a wet floor about 6 weeks ago.  He caught himself in preventing a fall.  He had woken up in the middle of the night and was taken to the ER ([facility]).  He was unhappy with the treatment at [facility], so his next ER visit was to the [facility].  He was admitted for pain management.   Primary care had given him oral pain medication and referred him to Dr. Henderson.  Dr. Henderson referred him to [facility] for an injection.  Injection was done on 6/28/17.    He states he did have improvement following his injection initially.  He states he was given a survery to f/u with Dr. Henderson, but at that point he was having improvement so he did not f/u.  He was down stairs with primary care and asked to schedule with Dr. Henderson, but they told him they could not do this and he should just come here for the AIC since we also have pain management.  He is interested in short term pain management; i.e. pain medication, and possible repeat injection.  Numbness and tingling down right leg to his knees.    Did PT, but was told by chiropractor his pain was too great to continue with PT.    Patient is confused as to what doctor did what exactly since he has seen so many physicians in the past 6 weeks.    Timeline per chart review:  ([place]) 6/5/17, [facility] 6/5 x2, [place] ER admitted 6/7/17, Primary care ([place]) 6/16/17 [facility] MRI 6/7/17, PT began 6/20/17, Sports Med visit 6/23/17, primary care visit 6/23/17, injection 6/28/17    **  May have tweaked something when catching himself while falling.  Patient's chiropractor told him that this incident just agitated his bad back.  Uses pain medication at night for sleep.  Has been using OTC meds over past few weeks, was able to manage until last night.  Pain does radiate from low back down into  the thigh on the right side for past 3 days. Pain accompanied by numbness and tingling and weakness. Muscle Relaxant helps.  Position changes seem to help relieve pain. Patient has been receiving massage therapy as well with results depending on the therapist.     Injury: twisting injury     Location of Pain: bilateral low back and right leg   Duration of Pain: 6 week(s)  Rating of Pain at worst: 12/10  Rating of Pain Currently: 9/10  Symptoms are better with: Nothing currently.  Did have some relief from injection   Symptoms are worse with: walking, stairs, bending   Additional Features:   Positive: paresthesias, numbness and weakness   Negative: swelling, bruising, popping, grinding, catching, locking, instability, pain in other joints and systemic symptoms  Other evaluation and/or treatments so far consists of: MRI, Xray, PT, ER x2, Hopsital for pain control, Pain medication, TALIA   Prior History of related problems: denies     Social History: darshan Tobar was asked to complete the Oswestry Low Back Disability Index   screening tool.  today in the office.  Disability score: 64%.       Review of Systems  Musculoskeletal: as above  Remainder of review of systems is negative including constitutional, CV, pulmonary, GI, Skin and Neurologic except as noted in HPI or medical history.    This document serves as a record of the services and decisions personally performed and made by Efra Meeks DO, CAQ. It was created on his behalf by Jeremy Ramirez, a trained medical scribe. The creation of this document is based the provider's statements to the medical scribe.  Jeremy Ramirez July 18, 2017 11:22 AM       Past Medical History:   Diagnosis Date     gerd     occas.     Hayfever      hx of asthma     in childhood     Other and unspecified hyperlipidemia      Type II or unspecified type diabetes mellitus without mention of complication, not stated as uncontrolled      Unspecified essential hypertension       "Vitamin D deficiency      Past Surgical History:   Procedure Laterality Date     BIOPSY OF SKIN LESION       COLONOSCOPY  3/11/2014    Procedure: COMBINED COLONOSCOPY, SINGLE BIOPSY/POLYPECTOMY BY BIOPSY;;  Surgeon: Stephen Amin MD;  Location: [facility] OR     COLONOSCOPY WITH CO2 INSUFFLATION N/A 8/19/2014    Procedure: COLONOSCOPY WITH CO2 INSUFFLATION;  Surgeon: Stephen Amin MD;  Location: [facility] OR     HC TOOTH EXTRACTION W/FORCEP      wisdom teeth x4     Family History   Problem Relation Age of Onset     DIABETES Mother      Hypertension Mother      DIABETES Father      CANCER Father      lung     CANCER Other      aunt - skin cancer     Skin Cancer Other      Melanoma No family hx of      Social History     Social History     Marital status:      Spouse name: N/A     Number of children: N/A     Years of education: N/A     Occupational History     Not on file.     Social History Main Topics     Smoking status: Never Smoker     Smokeless tobacco: Never Used     Alcohol use Yes      Comment: occ     Drug use: No     Sexual activity: Yes     Partners: Female      Comment:      Other Topics Concern     Not on file     Social History Narrative       Objective  /86  Ht 5' 5.75\" (1.67 m)  Wt 191 lb (86.6 kg)  BMI 31.06 kg/m2    GENERAL APPEARANCE: healthy, alert and mild distress, overweight   GAIT: antalgic  SKIN: no suspicious lesions or rashes  NEURO: Normal strength and tone, mentation intact and speech normal  PSYCH:  mentation appears normal and affect normal/bright  HEENT: no scleral icterus  CV: no extremity edema   RESP: nonlabored breathing    Low back exam:    Inspection:       no visible deformity in the low back       normal skin       normal vascular       normal lymphatic    ROM:       limited extension due to pain, reproduces patient's pain        Limited flexion due to pain bilaterally, tightness and stretching in the low back     Tender:       LS junction        Right " Paraspinals        SI joint    Non Tender:       remainder of lumbar spine       midline    Strength:       hip flexion 3/5 on the right with increased pain 5/5 on the left         knee extension 4+/5 on the right, increased pain in the back 5/5 on the left        ankle dorsiflexion 5/5, increased pain on the right       ankle plantarflexion 5/5       Knee flexion 5/5    Reflexes:       patellar (L3, L4) symmetric diminished       achilles tendons (S1) symmetric normal    Sensation:      grossly intact throughout lower extremities    Skin:       well perfused       capillary refill brisk    Special tests:       straight leg raise left negative         straight leg raise right positive  right    **  Knee extension limited on the right,   Hip motion symmetric     Radiology:  Visualized MRI of the lumbar spine [facility], and reviewed images and report with patient.  MRI demonstrates facet arthrosis. No significant stenosis.  Report reviewed:      MRI SPINE LUMBAR W/O CON6/7/2017  [facility]  Result Impression   IMPRESSION:    1.  No acute injury identified.  2.  Type I Modic changes at L5 superior endplate.  3.  No canal stenosis or significant neural foramen narrowing seen.  4.  Multilevel moderate facet arthropathy. Multilevel early degenerative disc disease.  5.  Diffusely heterogenous marrow signal is nonspecific and can be seen with anemia or infiltrative process.   Result Narrative   EXAM: MRI SPINE LUMBAR W/O CON    EXAM DATE: 6/7/2017 4:28 PM    CLINICAL HISTORY: RLE L5 sensory deficit and weakness along EHL . Patient slipped and twisted back this past Sunday. Having severe back pain, without relief with pain medication.    COMPARISON: None    TECHNIQUE: Multiplanar multisequential MR images of the lumbar spine are done without contrast administration.     FINDINGS:     5 nonrib-bearing lumbar vertebral bodies are identified.    Lumbar spine demonstrates normal alignment. Vertebral body  heights are maintained. Bone marrow signal is diffusely heterogenous. No focal lumbar spine lesion is identified. No spinal canal hematoma or ligamentous injury is identified.    Multilevel disc desiccation is seen without significant disc height loss. There is mildly increased T2 signal along the anterior superior aspect of the L5 vertebral body with adjacent increase at this signal. Facet joint effusions are seen at all lumbar levels, most significant at the right L2-L3 level.    The conus terminates at the superior L2 vertebral body. Cord signal is normal.    L1-L2: No canal stenosis and neural foramen narrowing. Mild bilateral facet arthropathy with ligamentum flavum hypertrophy.    L2-L3: No canal stenosis or neural foramen narrowing. Moderate bilateral facet arthropathy with ligamentum flavum hypertrophy.    L3-L4:. No canal stenosis or neural foramen narrowing. Moderate bilateral facet arthropathy with ligamentum flavum hypertrophy. 10 mm posterior periarticular cyst is seen at the right L3-L4 level. This does not have mass effect on the nerve roots.    L4-L5: No canal stenosis or neural foramen narrowing. Moderate bilateral facet arthropathy with ligamentum flavum hypertrophy.    L5-S1: No canal stenosis. Mild bilateral neural foramen narrowing without mass effect on the exiting nerve root. Moderate bilateral facet arthropathy with ligamentum flavum hypertrophy.    Imaged portions of retroperitoneum demonstrate no obvious abnormality. Paraspinal muscles are within normal limits. Diffuse subcutaneous cutaneous fat edema of the lower back is noted.   Status Results Details            XR LUMBAR SPINE 2-3 VIEWS 6/6/2017 11:18 AM     HISTORY: Muscle strain.     COMPARISON: None.         IMPRESSION: No evidence of acute fracture or malalignment.     GEOVANI VELA MD      [facility] [address]  [facility] [address]  Phone: [phone]  Fax: [phone]    Referring Physician  Information:  Amadou Henderson,   [address]   [facility] [address]  Phone: [phone]  Fax: [phone]  Patient: Amadou Bach  DEVAUGHNB: 1965  Sex: Male  Phone: [phone]    HUNTER/Luis MRN: [id number]  Exam Date: 06/28/2017  EXAM: INTERLAMINAR LUMBAR EPIDUROGRAPHY WITH THERAPEUTIC EPIDURAL CORTICOSTEROID AND ANESTHETIC ADMINISTRATION  CLINICAL INFORMATION: Male, age 51 years, with 3 weeks of midline lumbar pain. No spine surgery or known injury. 3 weeks chiropractic, physical, and oral prescription medication therapy.   COMPARISON: A comparison lumbar spine MRI study is available from [facility] dated June 7, 2017 demonstrating mild multilevel disc degeneration and bulging  DIAGNOSTIC INTERLAMINAR EPIDUROGRAPHY:  TECHNICAL INFORMATION: With the patient prone and the lower back sterilely prepared, intermittent fluoroscopic guidance was used to carefully advance a 3 -inch, 22-gauge spinal needle from a left posterior paramedian approach until its tip was through the left L4-L5 interlaminar space and ligamentum flavum, close to midline. After negative aspiration, under direct live fluoroscopic observation 5 mL of 240 mg/mL iohexol were injected and spot films obtained.   INTERPRETATION: Injected contrast dispersed normally in the epidural space from L2 through S1 levels.   CONCLUSION:  1. Normal dispersion of contrast in the lower lumbar epidural space.  2. Based upon the epidurogram it appeared to be safe/reasonable to proceed with therapeutic epidural steroid injection at this site.  THERAPEUTIC INTERLAMINAR EPIDURAL STEROID INJECTION:   TECHNICAL INFORMATION: Following careful review of the epidurogram, 3 mL of 1% lidocaine and 2.0 mL of Celestone Soluspan were injected, the needle removed, and additional spot films obtained. Total fluoroscopy time for the procedure was 6 sec. Total number of fluoroscopic images: 6.  INTERPRETATION: Injected steroid dispersed contrast  normally.  CONCLUSION:  1. Successful therapeutic interlaminar epidural steroid injection performed without complication.  2. The patient reported minimal (25%) decrease in typical pain symptoms immediately after the procedure (R1).  MARINO      Electronically signed on 6/28/2017 2:58:00 PM by Sreedhar Calzada M.D.       Assessment:  1. Acute bilateral low back pain with right-sided sciatica        Plan:  Discussed the assessment with the patient.    Pertinent imaging of the area reviewed with the patient.    Options:  *Imaging - due to change in symptoms   *Rehab - Patient feels he is in too much pain currently to continue PT   *Injection    *Medication - Muscle relaxant and NORCO   **Electro Diagnostic testing (EMG) - radicular symptoms in the acute    Topical Treatments: Ice or Heat as tolerated   Over the counter medication: Patient's preferred OTC medication as directed on packaging.  Prescription Medication: cyclobenzaprine (FLEXERIL) 10 MG tablet Take 1 tablet (10 mg) by mouth 3 times daily as needed, HYDROcodone-acetaminophen (NORCO) 7.5-325 MG per tablet Take 1-2 tablets by mouth 3 times daily as needed for moderate to severe pain maximum 3 tablet(s) per day   Discussed with the patient that this route of treatment is acute fix, he indicated that he understood.    Discussed MRI of the Lumbar Spine; possible next step, call in a week with update  Activity Modification: as discussed   Continue Rehab: Home Exercise Program; next step   Follow up: 1 week, call with update;   Questions answered. The patient indicates understanding of these issues and agrees with the plan.     Efra Meeks, DO, CAQ        Disclaimer: This note consists of symbols derived from keyboarding, dictation and/or voice recognition software. As a result, there may be errors in the script that have gone undetected. Please consider this when interpreting information found in this chart.    The information in this document, created by the  medical scribe for me, accurately reflects the services I personally performed and the decisions made by me. I have reviewed and approved this document for accuracy.   Efra Meeks DO, CAQ

## 2017-07-18 NOTE — PATIENT INSTRUCTIONS
You are safe to try heat or ice, as tolerated     Begin Muscle Relaxant     Use NORCO as directed     Call with an update in 1 week

## 2017-07-18 NOTE — MR AVS SNAPSHOT
After Visit Summary   7/18/2017    Amadou Bach    MRN: [id number]           Patient Information     Date Of Birth          1965        Visit Information        Provider Department      7/18/2017 10:40 AM Efra Meeks DO [facility] [place]        Today's Diagnoses     Acute bilateral low back pain with right-sided sciatica    -  1      Care Instructions    You are safe to try heat or ice, as tolerated     Begin Muscle Relaxant     Use NORCO as directed     Call with an update in 1 week                Follow-ups after your visit        Follow-up notes from your care team     Return in about 1 week (around 7/25/2017).      Your next 10 appointments already scheduled     Aug 19, 2017  9:00 AM CDT   [facility] with DO [facility] [place] ([facility] [place])    [address]  [place] [address]   [phone]            Aug 21, 2017  2:00 PM CDT   Return Visit with DO [facility] [place] ([facility] [place])    [address]  [place] [address]   [phone]            Aug 28, 2017 10:40 AM CDT   [facility] Sp[facility] with Maren Bearden, PT   [facility] [place] [facility] ([facility] [place])    [address]  [address]  [place] [address]   [phone]            Sep 05, 2017 11:30 AM CDT   [facility] Spine with Maren Bearden PT   [facility] [place] [facility] ([facility] [place])    [address]  [address]  [place] [address]   [phone]            Sep 11, 2017   Procedure with Shan Jain MD   [facility] (--)    [address] [place] [address] [phone]              Who to contact     If you have questions or need follow up information about today's clinic visit or your schedule please contact [facility] [place] directly at [phone].  Normal or non-critical lab and imaging results will be communicated to you by MyChart, letter or phone within 4 business days after the clinic has received the results. If you do not hear from us within 7 days, please contact the clinic through [employer] or phone. If you have a critical or abnormal lab result, we will notify you by phone as soon as possible.  Submit refill requests through [employer] or call your pharmacy and they will forward the refill request to us. Please allow 3 business days for your refill to be completed.          Additional Information About Your Visit        [facility] Information     [employer] gives you secure access to your electronic health record. If you see a primary care provider, you can also send messages to your care team and make appointments. If you have questions, please call your primary care clinic.  If you do not have a primary care provider, please call [phone] and they will assist you.        Care EveryWhere ID     This is your Care EveryWhere ID. This could be used by other organizations to access your [facility] medical records  [id number]        Your Vitals Were     Height BMI (Body Mass Index)                5' 5.75\" (1.67 m) 31.06 kg/m2           Blood Pressure from Last 3 Encounters:   07/18/17 158/86   06/23/17 140/84   06/23/17 144/80    Weight from Last 3 Encounters:   07/18/17 191 lb (86.6 kg)   06/23/17 193 lb (87.5 kg)   06/16/17 196 lb (88.9 kg)              Today, you had the following     No orders found for display         Where to get your medicines      These medications were sent to [facility]pharm[facility] #[id number] - [place] - [address]  [address] [place] [address]     Phone:  [phone]     cyclobenzaprine 10 MG tablet         Some of these will need a paper prescription and others can be bought over the counter.  Ask your nurse if you have questions.     Bring a " paper prescription for each of these medications     HYDROcodone-acetaminophen 7.5-325 MG per tablet          Primary Care Provider Office Phone # Fax #    Jeremy Mobley [phone]       [address]  [place] [facility] [address]        Equal Access to Services     [facility] : Hadii aad ku hadasho Soomaali, waaxda luqadaha, qaybta kaalmada adeegyada, waxay idiin hayaan adeeg kharash lajeffyn . So [facility] [phone].    ATENCIÓN: Si habla español, tiene a chairez disposición servicios gratuitos de asistencia lingüística. [facility] [phone].    We comply with applicable federal civil rights laws and [place] laws. We do not discriminate on the basis of race, color, national origin, age, disability sex, sexual orientation or gender identity.            Thank you!     Thank you for choosing [facility]  for your care. Our goal is always to provide you with excellent care. Hearing back from our patients is one way we can continue to improve our services. Please take a few minutes to complete the written survey that you may receive in the mail after your visit with us. Thank you!             Your Updated Medication List - Protect others around you: Learn how to safely use, store and throw away your medicines at www.disposemymeds.org.          This list is accurate as of: 7/18/17 11:59 PM.  Always use your most recent med list.                   Brand Name Dispense Instructions for use Diagnosis    * ACCU-CHEK MARICRUZ PLUS test strip   Generic drug:  blood glucose monitoring     750 strip    TEST BLOOD SUGARS 8 TIMES DAILY AS DIRECTED    Type 2 diabetes, HbA1c goal < 7% (H)       * ONE TOUCH VERIO IQ test strip   Generic drug:  blood glucose monitoring     400 strip    TEST BLOOD SUGARS UP TO 4 TIMES DAILY OR AS DIRECTED    Type 2 diabetes mellitus without complication, with long-term current use of insulin (H)       aspirin 81 MG EC tablet     90 tablet    Take 1 tablet (81  mg) by mouth daily    Type 2 diabetes mellitus without complication, unspecified long term insulin use status (H)       CO Q 10 PO      Take  by mouth. 2 per day    Type 2 diabetes, HbA1c goal < 7% (H)       cyclobenzaprine 10 MG tablet    FLEXERIL    42 tablet    Take 1 tablet (10 mg) by mouth 3 times daily as needed    Acute bilateral low back pain with right-sided sciatica       diazepam 5 MG tablet    VALIUM    40 tablet    Take 0.5-1 tablets (2.5-5 mg) by mouth 2 times daily as needed for muscle spasms    Acute midline low back pain without sciatica       FISH OIL CONCENTRATE 1000 MG Caps      2 tablets daily        HYDROcodone-acetaminophen 7.5-325 MG per tablet    NORCO    20 tablet    Take 1-2 tablets by mouth 3 times daily as needed for moderate to severe pain maximum 3 tablet(s) per day    Acute bilateral low back pain with right-sided sciatica       HYDROmorphone 2 MG tablet    DILAUDID     Take 2 mg by mouth every 4 hours as needed for moderate to severe pain        hydrOXYzine 25 MG capsule    VISTARIL    40 capsule    Take 1 capsule (25 mg) by mouth 3 times daily as needed (pain/muscle relaxor may double dosage if needed)    Back strain, subsequent encounter       insulin glargine 100 UNIT/ML injection    LANTUS SOLOSTAR    45 mL    INJECT 50 UNITS SUBCUTANEOUSLY AT BEDTIME    Type 2 diabetes mellitus without complication, unspecified long term insulin use status (H)       insulin lispro 100 UNIT/ML injection    HumaLOG KWIKpen    54 mL    Inject 10-12u before each meal plus correction: Pre-meal and bedtime correction, as per previous sliding scale    Type 2 diabetes mellitus without complication, unspecified long term insulin use status (H)       * insulin pen needle 31G X 5 MM    B-D U/F    300 each    Use 4 times daily as directed.    Type 2 diabetes, HbA1c goal < 7% (H)       * B-D U/F 31G X 8 MM   Generic drug:  insulin pen needle     300 each    USE 4 TIMES DAILY AS DIRECTED.    Type 2 diabetes  mellitus without complication (H)       ketorolac 10 MG tablet    TORADOL    12 tablet    Take 1 tablet (10 mg) by mouth every 6 hours as needed for moderate pain    Back strain, subsequent encounter       * liraglutide 18 MG/3ML soln    VICTOZA PEN    27 mL    Inject 1.8 mg Subcutaneous daily    Type 2 diabetes mellitus without complication, with long-term current use of insulin (H), Non morbid obesity due to excess calories       * liraglutide 18 MG/3ML soln    VICTOZA PEN    18 mL    Inject 1.2 mg Subcutaneous daily    Type 2 diabetes mellitus without complication, with long-term current use of insulin (H)       lisinopril 20 MG tablet    PRINIVIL/ZESTRIL    180 tablet    Take 1 tablet (20 mg) by mouth 2 times daily    Hypertension goal BP (blood pressure) < 140/90       LORazepam 0.5 MG tablet    ATIVAN    45 tablet    TAKE 1 TABLET BY MOUTH EVERY 8 HOURS AS NEEDED    Generalized anxiety disorder       metFORMIN 500 MG 24 hr tablet    GLUCOPHAGE-XR    360 tablet    TAKE 2 TABLETS (1,000 MG) BY MOUTH 2 TIMES DAILY    Type 2 diabetes mellitus without complication, unspecified long term insulin use status (H)       metoprolol 25 MG tablet    LOPRESSOR    90 tablet    Take 0.5 tablets (12.5 mg) by mouth 2 times daily    Hypertension goal BP (blood pressure) < 140/90       MULTIPLE VITAMINS PO      1000mg daily        order for DME     3 Month    1 Device by * route 8 times daily TEST STRIPS  ACCU-CHEK MARICRUZ plus test strips    Type 2 diabetes, HbA1c goal < 7% (H)       * order for DME     1 Device    Equipment being ordered: glucometer brand per insurance    Type 2 diabetes, HbA1c goal < 7% (H)       * order for DME     400 each    LANCETS 4 x DAILY AND PRN    Type 2 diabetes, HbA1c goal < 7% (H)       rosuvastatin 40 MG tablet    CRESTOR    90 tablet    Take 1 tablet (40 mg) by mouth daily Take 40 mg by mouth daily    Hyperlipidemia LDL goal <100       * sildenafil 100 MG cap/tab    VIAGRA    12 tablet    TAKE 1  TABLET BY MOUTH 30 MINS BEFORE INTERCOURSE    Erectile dysfunction, unspecified erectile dysfunction type       * VIAGRA 100 MG cap/tab   Generic drug:  sildenafil     12 tablet    TAKE 1 TABLET BY MOUTH 30 MINS BEFORE INTERCOURSE    Erectile dysfunction, unspecified erectile dysfunction type       VITAMIN B-12 PO      Take  by mouth daily.        vitamin D 1000 UNITS capsule      Take 2 capsules by mouth daily.        * Notice:  This list has 10 medication(s) that are the same as other medications prescribed for you. Read the directions carefully, and ask your doctor or other care provider to review them with you.

## 2017-08-01 ENCOUNTER — TELEPHONE (OUTPATIENT)
Dept: ORTHOPEDICS | Facility: CLINIC | Age: 52
End: 2017-08-01

## 2017-08-01 NOTE — TELEPHONE ENCOUNTER
Called and spoke with patient.  He did not remember who he saw, but he needed to call back with an update of his back pain from his AIC appointment.  He was improving, but feels the pain returned this weekend and he may have over done it.  Pain is in the right hip and down the right leg.  He would be interested in a repeat TALIA, previously done thru [facility].  [facility] due to wanting this injection ASAP.    Please advise on repeat injection  Nisha Montez MS ATC

## 2017-08-02 NOTE — TELEPHONE ENCOUNTER
Order placed thru [facility].    Spoke with patient and gave him number to call and schedule with [facility].  Nisha Montez MS ATC

## 2017-08-04 ENCOUNTER — TRANSFERRED RECORDS (OUTPATIENT)
Dept: HEALTH INFORMATION MANAGEMENT | Facility: CLINIC | Age: 52
End: 2017-08-04

## 2017-08-13 DIAGNOSIS — E11.9 TYPE 2 DIABETES MELLITUS WITHOUT COMPLICATION (H): ICD-10-CM

## 2017-08-13 NOTE — TELEPHONE ENCOUNTER
B-D U/F 31G X 8 MM insulin pen needle         Last Written Prescription Date: 3/30/17  Last Fill Quantity: 300, # refills: 1  Last Office Visit with G, P or [facility] prescribing provider:  6/23/17        BP Readings from Last 3 Encounters:   07/18/17 158/86   06/23/17 140/84   06/23/17 144/80     Lab Results   Component Value Date    MICROL 65 03/27/2017     Lab Results   Component Value Date    UMALCR 34.79 03/27/2017     Creatinine   Date Value Ref Range Status   03/27/2017 0.69 0.66 - 1.25 mg/dL Final   ]  GFR Estimate   Date Value Ref Range Status   03/27/2017 >90  Non  GFR Calc   >60 mL/min/1.7m2 Final   08/12/2016 >90  Non  GFR Calc   >60 mL/min/1.7m2 Final   11/13/2015 >90  Non  GFR Calc   >60 mL/min/1.7m2 Final     GFR Estimate If Black   Date Value Ref Range Status   03/27/2017 >90   GFR Calc   >60 mL/min/1.7m2 Final   08/12/2016 >90   GFR Calc   >60 mL/min/1.7m2 Final   11/13/2015 >90   GFR Calc   >60 mL/min/1.7m2 Final     Lab Results   Component Value Date    CHOL 210 08/12/2016     Lab Results   Component Value Date    HDL 38 08/12/2016     Lab Results   Component Value Date     08/12/2016     Lab Results   Component Value Date    TRIG 354 08/12/2016     Lab Results   Component Value Date    CHOLHDLRATIO 4.9 11/13/2015     Lab Results   Component Value Date    AST 8 11/13/2015     Lab Results   Component Value Date    ALT 35 11/13/2015     Lab Results   Component Value Date    A1C 8.2 03/27/2017    A1C 7.3 08/12/2016    A1C 7.9 11/13/2015    A1C 6.6 02/02/2015    A1C 6.9 08/04/2014     Potassium   Date Value Ref Range Status   03/27/2017 3.5 3.4 - 5.3 mmol/L Final         [facility]

## 2017-08-16 RX ORDER — PEN NEEDLE, DIABETIC 31 GX5/16"
NEEDLE, DISPOSABLE MISCELLANEOUS
Qty: 300 EACH | Refills: 0 | Status: SHIPPED | OUTPATIENT
Start: 2017-08-16 | End: 2018-01-17

## 2017-08-19 ENCOUNTER — RADIANT APPOINTMENT (OUTPATIENT)
Dept: GENERAL RADIOLOGY | Facility: CLINIC | Age: 52
End: 2017-08-19
Attending: PEDIATRICS
Payer: COMMERCIAL

## 2017-08-19 ENCOUNTER — OFFICE VISIT (OUTPATIENT)
Dept: ORTHOPEDICS | Facility: CLINIC | Age: 52
End: 2017-08-19
Payer: COMMERCIAL

## 2017-08-19 VITALS
DIASTOLIC BLOOD PRESSURE: 78 MMHG | SYSTOLIC BLOOD PRESSURE: 118 MMHG | HEIGHT: 66 IN | BODY MASS INDEX: 30.7 KG/M2 | WEIGHT: 191 LBS

## 2017-08-19 DIAGNOSIS — M25.551 RIGHT HIP PAIN: ICD-10-CM

## 2017-08-19 DIAGNOSIS — M54.41 ACUTE BILATERAL LOW BACK PAIN WITH RIGHT-SIDED SCIATICA: ICD-10-CM

## 2017-08-19 DIAGNOSIS — M47.816 FACET ARTHRITIS OF LUMBAR REGION: ICD-10-CM

## 2017-08-19 DIAGNOSIS — M54.41 ACUTE BILATERAL LOW BACK PAIN WITH RIGHT-SIDED SCIATICA: Primary | ICD-10-CM

## 2017-08-19 PROCEDURE — 73522 X-RAY EXAM HIPS BI 3-4 VIEWS: CPT

## 2017-08-19 PROCEDURE — 99214 OFFICE O/P EST MOD 30 MIN: CPT | Performed by: PEDIATRICS

## 2017-08-19 RX ORDER — OXYCODONE AND ACETAMINOPHEN 5; 325 MG/1; MG/1
1-2 TABLET ORAL EVERY 8 HOURS PRN
Qty: 20 TABLET | Refills: 0 | Status: SHIPPED | OUTPATIENT
Start: 2017-08-19 | End: 2017-09-14 | Stop reason: ALTCHOICE

## 2017-08-19 RX ORDER — CYCLOBENZAPRINE HCL 10 MG
10 TABLET ORAL 3 TIMES DAILY PRN
Qty: 42 TABLET | Refills: 1 | Status: SHIPPED | OUTPATIENT
Start: 2017-08-19 | End: 2017-09-27

## 2017-08-19 NOTE — PATIENT INSTRUCTIONS
Advanced imaging is done by appointment. Some insurance require a prior authorization to be completed which may delay the time until you are able to schedule your appointment. You should be receiving a call from the scheduling department, if you have not heard from them in 24-48 hours.   Please call [place] [place] and [place]: [phone] to schedule your pelvis MRI.  Depending on your availability you can usually schedule within the next 1-2 days.    The clinic will call you with results, if you have not heard from the clinic within 3-4 days following your MRI please contact us at the number listed below.     Update pain medication rx provided today.

## 2017-08-19 NOTE — MR AVS SNAPSHOT
After Visit Summary   8/19/2017    Amadou Bach    MRN: [id number]           Patient Information     Date Of Birth          1965        Visit Information        Provider Department      8/19/2017 9:00 AM Efra Meeks DO [facility] [place]        Today's Diagnoses     Acute bilateral low back pain with right-sided sciatica    -  1    Right hip pain          Care Instructions     Advanced imaging is done by appointment. Some insurance require a prior authorization to be completed which may delay the time until you are able to schedule your appointment. You should be receiving a call from the scheduling department, if you have not heard from them in 24-48 hours.   Please call [facility] [place] [facility] [place]: [phone] to schedule your pelvis MRI.  Depending on your availability you can usually schedule within the next 1-2 days.    The clinic will call you with results, if you have not heard from the clinic within 3-4 days following your MRI please contact us at the number listed below.     Update pain medication rx provided today.                Follow-ups after your visit        Your next 10 appointments already scheduled     Aug 21, 2017  2:00 PM CDT   Return Visit with DO [facility] [place] ([facility] [place])    [address]  [place] [address]   [phone]            Aug 28, 2017 10:40 AM CDT   [facility] Spine with Maren Bearden, PT   [facility] [place] [facility] ([facility] [place])    [address]  [address]  [place] [address]   [phone]            Sep 05, 2017 11:30 AM CDT   [facility] Spine with Maren Bearden PT   [facility] [place] [facility] ([facility] [place])    [address]  [address]  [place] [address]   [phone]            Sep 11, 2017   Procedure with Shan Jain MD   [facility]  "[place] (--)    [address] [place] [id number] [phone]              Future tests that were ordered for you today     Open Future Orders        Priority Expected Expires Ordered    MR Pelvis w/o Contrast Routine  8/19/2018 8/19/2017            Who to contact     If you have questions or need follow up information about today's clinic visit or your schedule please contact [facility] [place] directly at [phone].  Normal or non-critical lab and imaging results will be communicated to you by prettysecretshart, letter or phone within 4 business days after the clinic has received the results. If you do not hear from us within 7 days, please contact the clinic through mcTEL or phone. If you have a critical or abnormal lab result, we will notify you by phone as soon as possible.  Submit refill requests through mcTEL or call your pharmacy and they will forward the refill request to us. Please allow 3 business days for your refill to be completed.          Additional Information About Your Visit        mcTEL Information     mcTEL gives you secure access to your electronic health record. If you see a primary care provider, you can also send messages to your care team and make appointments. If you have questions, please call your primary care clinic.  If you do not have a primary care provider, please call [phone] and they will assist you.        Care EveryWhere ID     This is your Care EveryWhere ID. This could be used by other organizations to access your [facility] medical records  [id number]        Your Vitals Were     Height BMI (Body Mass Index)                5' 5.75\" (1.67 m) 31.06 kg/m2           Blood Pressure from Last 3 Encounters:   08/19/17 118/78   07/18/17 158/86   06/23/17 140/84    Weight from Last 3 Encounters:   08/19/17 191 lb (86.6 kg)   07/18/17 191 lb (86.6 kg)   06/23/17 193 lb (87.5 kg)                 Today's Medication Changes          These " changes are accurate as of: 8/19/17 10:10 AM.  If you have any questions, ask your nurse or doctor.               Start taking these medicines.        Dose/Directions    oxyCODONE-acetaminophen 5-325 MG per tablet   Commonly known as:  PERCOCET   Used for:  Acute bilateral low back pain with right-sided sciatica, Right hip pain   Started by:  Efra Meeks,         Dose:  1-2 tablet   Take 1-2 tablets by mouth every 8 hours as needed for pain   Quantity:  20 tablet   Refills:  0            Where to get your medicines      These medications were sent to [facility]/pharmacy #[id number] - [place], [place] - [address]  [address], [place] [address]     Phone:  [phone]     cyclobenzaprine 10 MG tablet         Some of these will need a paper prescription and others can be bought over the counter.  Ask your nurse if you have questions.     Bring a paper prescription for each of these medications     oxyCODONE-acetaminophen 5-325 MG per tablet                Primary Care Provider Office Phone # Fax #    Jeremy Mobley [phone]       [address] [facility] [address]        Equal Access to Services     [facility] AH: Hadii aad ku hadasho Soomaali, waaxda luqadaha, qaybta kaalmada adeegyada, waxay melissa melendez. So [facility] [phone].    ATENCIÓN: Si habla español, tiene a chairez disposición servicios gratuitos de asistencia lingüística. [facility] [phone].    We comply with applicable federal civil rights laws and [place] laws. We do not discriminate on the basis of race, color, national origin, age, disability sex, sexual orientation or gender identity.            Thank you!     Thank you for choosing [facility] [place]  for your care. Our goal is always to provide you with excellent care. Hearing back from our patients is one way we can continue to improve our services. Please take a few minutes to  complete the written survey that you may receive in the mail after your visit with us. Thank you!             Your Updated Medication List - Protect others around you: Learn how to safely use, store and throw away your medicines at www.disposemymeds.org.          This list is accurate as of: 8/19/17 10:10 AM.  Always use your most recent med list.                   Brand Name Dispense Instructions for use Diagnosis    * ACCU-CHEK MARICRUZ PLUS test strip   Generic drug:  blood glucose monitoring     750 strip    TEST BLOOD SUGARS 8 TIMES DAILY AS DIRECTED    Type 2 diabetes, HbA1c goal < 7% (H)       * ONE TOUCH VERIO IQ test strip   Generic drug:  blood glucose monitoring     400 strip    TEST BLOOD SUGARS UP TO 4 TIMES DAILY OR AS DIRECTED    Type 2 diabetes mellitus without complication, with long-term current use of insulin (H)       aspirin 81 MG EC tablet     90 tablet    Take 1 tablet (81 mg) by mouth daily    Type 2 diabetes mellitus without complication, unspecified long term insulin use status (H)       CO Q 10 PO      Take  by mouth. 2 per day    Type 2 diabetes, HbA1c goal < 7% (H)       cyclobenzaprine 10 MG tablet    FLEXERIL    42 tablet    Take 1 tablet (10 mg) by mouth 3 times daily as needed    Acute bilateral low back pain with right-sided sciatica       diazepam 5 MG tablet    VALIUM    40 tablet    Take 0.5-1 tablets (2.5-5 mg) by mouth 2 times daily as needed for muscle spasms    Acute midline low back pain without sciatica       FISH OIL CONCENTRATE 1000 MG Caps      2 tablets daily        HYDROmorphone 2 MG tablet    DILAUDID     Take 2 mg by mouth every 4 hours as needed for moderate to severe pain        hydrOXYzine 25 MG capsule    VISTARIL    40 capsule    Take 1 capsule (25 mg) by mouth 3 times daily as needed (pain/muscle relaxor may double dosage if needed)    Back strain, subsequent encounter       insulin glargine 100 UNIT/ML injection    LANTUS SOLOSTAR    45 mL    INJECT 50 UNITS  SUBCUTANEOUSLY AT BEDTIME    Type 2 diabetes mellitus without complication, unspecified long term insulin use status (H)       insulin lispro 100 UNIT/ML injection    HumaLOG KWIKpen    54 mL    Inject 10-12u before each meal plus correction: Pre-meal and bedtime correction, as per previous sliding scale    Type 2 diabetes mellitus without complication, unspecified long term insulin use status (H)       * insulin pen needle 31G X 5 MM    B-D U/F    300 each    Use 4 times daily as directed.    Type 2 diabetes, HbA1c goal < 7% (H)       * B-D U/F 31G X 8 MM   Generic drug:  insulin pen needle     300 each    USE 4 TIMES DAILY AS DIRECTED.    Type 2 diabetes mellitus without complication (H)       * B-D U/F 31G X 8 MM   Generic drug:  insulin pen needle     300 each    USE 4 TIMES DAILY AS DIRECTED.    Type 2 diabetes mellitus without complication (H)       ketorolac 10 MG tablet    TORADOL    12 tablet    Take 1 tablet (10 mg) by mouth every 6 hours as needed for moderate pain    Back strain, subsequent encounter       * liraglutide 18 MG/3ML soln    VICTOZA PEN    27 mL    Inject 1.8 mg Subcutaneous daily    Type 2 diabetes mellitus without complication, with long-term current use of insulin (H), Non morbid obesity due to excess calories       * liraglutide 18 MG/3ML soln    VICTOZA PEN    18 mL    Inject 1.2 mg Subcutaneous daily    Type 2 diabetes mellitus without complication, with long-term current use of insulin (H)       lisinopril 20 MG tablet    PRINIVIL/ZESTRIL    180 tablet    Take 1 tablet (20 mg) by mouth 2 times daily    Hypertension goal BP (blood pressure) < 140/90       LORazepam 0.5 MG tablet    ATIVAN    45 tablet    TAKE 1 TABLET BY MOUTH EVERY 8 HOURS AS NEEDED    Generalized anxiety disorder       metFORMIN 500 MG 24 hr tablet    GLUCOPHAGE-XR    360 tablet    TAKE 2 TABLETS (1,000 MG) BY MOUTH 2 TIMES DAILY    Type 2 diabetes mellitus without complication, unspecified long term insulin use  status (H)       metoprolol 25 MG tablet    LOPRESSOR    90 tablet    Take 0.5 tablets (12.5 mg) by mouth 2 times daily    Hypertension goal BP (blood pressure) < 140/90       MULTIPLE VITAMINS PO      1000mg daily        order for DME     3 Month    1 Device by * route 8 times daily TEST STRIPS  ACCU-CHEK MARICRUZ plus test strips    Type 2 diabetes, HbA1c goal < 7% (H)       * order for DME     1 Device    Equipment being ordered: glucometer brand per insurance    Type 2 diabetes, HbA1c goal < 7% (H)       * order for DME     400 each    LANCETS 4 x DAILY AND PRN    Type 2 diabetes, HbA1c goal < 7% (H)       oxyCODONE-acetaminophen 5-325 MG per tablet    PERCOCET    20 tablet    Take 1-2 tablets by mouth every 8 hours as needed for pain    Acute bilateral low back pain with right-sided sciatica, Right hip pain       rosuvastatin 40 MG tablet    CRESTOR    90 tablet    Take 1 tablet (40 mg) by mouth daily Take 40 mg by mouth daily    Hyperlipidemia LDL goal <100       * sildenafil 100 MG cap/tab    VIAGRA    12 tablet    TAKE 1 TABLET BY MOUTH 30 MINS BEFORE INTERCOURSE    Erectile dysfunction, unspecified erectile dysfunction type       * VIAGRA 100 MG cap/tab   Generic drug:  sildenafil     12 tablet    TAKE 1 TABLET BY MOUTH 30 MINS BEFORE INTERCOURSE    Erectile dysfunction, unspecified erectile dysfunction type       VITAMIN B-12 PO      Take  by mouth daily.        vitamin D 1000 UNITS capsule      Take 2 capsules by mouth daily.        * Notice:  This list has 11 medication(s) that are the same as other medications prescribed for you. Read the directions carefully, and ask your doctor or other care provider to review them with you.

## 2017-08-19 NOTE — NURSING NOTE
"Chief Complaint   Patient presents with     Back Pain     f/u radiating low back pain       Initial /78  Ht 5' 5.75\" (1.67 m)  Wt 191 lb (86.6 kg)  BMI 31.06 kg/m2 Estimated body mass index is 31.06 kg/(m^2) as calculated from the following:    Height as of this encounter: 5' 5.75\" (1.67 m).    Weight as of this encounter: 191 lb (86.6 kg).  Medication Reconciliation: complete     Dheeraj Stein, ATC  "

## 2017-08-19 NOTE — PROGRESS NOTES
Sports Medicine Clinic Visit    PCP: Jeremy Mobley Brandon Bach is a 51 year old male who is seen in f/u up for Data Unavailable. Since last visit on 8/1/2017 patient has moderate-severe radiating low back pain.  Patient reports that right L4-L5 TFESI completed at [facility] on 8/4 provided moderate relief. Notes pain increased over last 1 week after completing brake job work on his truck.  Pain now radiating to bilateral deep anterior groin, right>>>left with radiation to right anterior thigh.     **  Patient is still having low back pain, but the pain in the pelvis, hips and groin, including the right testicle and bilateral lower extremities.  Pain is worse at night. Concerned for a hernia.    Injections were not beneficial.    Patient is here for pain management.    Flexeril gave some benefit.    Has questions about piriformis syndrome.    Review of Systems  All other systems reviewed and are negative unless noted above.    This document serves as a record of the services and decisions personally performed and made by Efra Meeks DO, CAQ. It was created on his behalf by Jeremy Ramirez, a trained medical scribe. The creation of this document is based the provider's statements to the medical scribe.  Jeremy Ramirez August 19, 2017 9:00 AM       Past Medical History:   Diagnosis Date     gerd     occas.     Hayfever      hx of asthma     in childhood     Other and unspecified hyperlipidemia      Type II or unspecified type diabetes mellitus without mention of complication, not stated as uncontrolled      Unspecified essential hypertension      Vitamin D deficiency      Past Surgical History:   Procedure Laterality Date     BIOPSY OF SKIN LESION       COLONOSCOPY  3/11/2014    Procedure: COMBINED COLONOSCOPY, SINGLE BIOPSY/POLYPECTOMY BY BIOPSY;;  Surgeon: Stepehn Amin MD;  Location: [facility] OR     COLONOSCOPY WITH CO2 INSUFFLATION N/A 8/19/2014    Procedure: COLONOSCOPY WITH CO2 INSUFFLATION;   "Surgeon: Stephen Amin MD;  Location: [facility] OR     HC TOOTH EXTRACTION W/FORCEP      wisdom teeth x4       Objective  /78  Ht 5' 5.75\" (1.67 m)  Wt 191 lb (86.6 kg)  BMI 31.06 kg/m2    GENERAL APPEARANCE: healthy, alert, no distress and over weight   GAIT: NORMAL  SKIN: no suspicious lesions or rashes  NEURO: Normal strength and tone, mentation intact and speech normal  PSYCH:  mentation appears normal and affect normal/bright  HEENT: no scleral icterus  CV: no extremity edema   RESP: nonlabored breathing        Exam  Bilateral hip exam    Inspection:      no edema or ecchymosis in hip area    ROM:      Flexion limited due to pain  RIGHT      internal rotation ~10 bilaterally pain RIGHT      external rotation 60-70 bilaterally     Sensation:      grossly intact in hip and thigh    Special Tests:      neg (-) JAMES, but tightness in the low back at the waistline RIGHT       neg (-) FADIR on the right       Low back exam:    Inspection:     no visible deformity in the low back       normal skin       normal vascular       normal lymphatic    ROM:       Limited flexion due to tightness and pain        full extension, discomfort, tightness ; pain reproduced       Lateral flexion tightness , mild pain right lateral bending    Strength:     hip flexion 4+/5 RIGHT; full left; no change in pain but tightness RIGHT       knee extension 5/5       ankle dorsiflexion 5/5       ankle plantarflexion 5/5       dorsiflexion of the great toe 5/5       Knee flexion 5/5, pain in the back    Reflexes:     patellar (L3, L4) symmetric diminished       achilles tendons (S1) symmetric diminished    Sensation:    grossly intact throughout lower extremities    Special tests:     straight leg raise left pain in the low back, but no radicular pain        straight leg raise right no radicular pain, but pain in the low back        Log roll on the right mild pain        Radiology  Visualized radiographs of pelvis and bilateral hip " obtained today, and reviewed the images with the patient.  Impression: no acute findings. Maybe minimal right hip joint space narrowing.  Report reviewed:   XR Pelvis and Hip Bilateral 1 View    Narrative    PELVIS AND HIP BILATERAL ONE VIEW   8/19/2017 9:18 AM     HISTORY: Lumbago with sciatica, right side    COMPARISON: None.      Impression    IMPRESSION: Two views of the pelvis with the hips in neutral and in  external rotation show subtle narrowing of the superior margin of the  right hip joint compared with the left that may indicate early  osteoarthritis.    SHERRI ESCOBEDO MD       Reviewed prior MRI of the Lumbar Spine 7/7/17, and reviewed images and report with patient.    MRI SPINE LUMBAR W/O CON6/7/2017  [facility]  Result Impression   IMPRESSION:    1.  No acute injury identified.  2.  Type I Modic changes at L5 superior endplate.  3.  No canal stenosis or significant neural foramen narrowing seen.  4.  Multilevel moderate facet arthropathy. Multilevel early degenerative disc disease.  5.  Diffusely heterogenous marrow signal is nonspecific and can be seen with anemia or infiltrative process.   Result Narrative   EXAM: MRI SPINE LUMBAR W/O CON    EXAM DATE: 6/7/2017 4:28 PM    CLINICAL HISTORY: RLE L5 sensory deficit and weakness along EHL . Patient slipped and twisted back this past Sunday. Having severe back pain, without relief with pain medication.    COMPARISON: None    TECHNIQUE: Multiplanar multisequential MR images of the lumbar spine are done without contrast administration.     FINDINGS:     5 nonrib-bearing lumbar vertebral bodies are identified.    Lumbar spine demonstrates normal alignment. Vertebral body heights are maintained. Bone marrow signal is diffusely heterogenous. No focal lumbar spine lesion is identified. No spinal canal hematoma or ligamentous injury is identified.    Multilevel disc desiccation is seen without significant disc height loss. There is mildly  increased T2 signal along the anterior superior aspect of the L5 vertebral body with adjacent increase at this signal. Facet joint effusions are seen at all lumbar levels, most significant at the right L2-L3 level.    The conus terminates at the superior L2 vertebral body. Cord signal is normal.    L1-L2: No canal stenosis and neural foramen narrowing. Mild bilateral facet arthropathy with ligamentum flavum hypertrophy.    L2-L3: No canal stenosis or neural foramen narrowing. Moderate bilateral facet arthropathy with ligamentum flavum hypertrophy.    L3-L4:. No canal stenosis or neural foramen narrowing. Moderate bilateral facet arthropathy with ligamentum flavum hypertrophy. 10 mm posterior periarticular cyst is seen at the right L3-L4 level. This does not have mass effect on the nerve roots.    L4-L5: No canal stenosis or neural foramen narrowing. Moderate bilateral facet arthropathy with ligamentum flavum hypertrophy.    L5-S1: No canal stenosis. Mild bilateral neural foramen narrowing without mass effect on the exiting nerve root. Moderate bilateral facet arthropathy with ligamentum flavum hypertrophy.    Imaged portions of retroperitoneum demonstrate no obvious abnormality. Paraspinal muscles are within normal limits. Diffuse subcutaneous cutaneous fat edema of the lower back is noted.   Status Results Details            Assessment:  1. Acute bilateral low back pain with right-sided sciatica    2. Right hip pain    3. Facet arthritis of lumbar region (H)    may have some right hip degenerative change as well. Now with more focal low back pain, suspect related to facet arthrosis.    Plan:  Discussed the assessment with the patient. Less likely radicular given 2nd TALIA no benefit.  Not clearly piriformis syndrome as his pain is focal in low back, above piriformis.    Pertinent imaging of the area reviewed with the patient.    Options:  *Rehab  *Injection - Target Facet joints   *Imaging - MRI of the pelvis vs CT  of Abdomen and pelvis (he had questions about abdominal pathology causing pain)  *Electrodiagnostic testing --related to right LE reported numbness    I think this is more musculoskeletal, rather than related to organ pathology.  Topical Treatments: Ice, Heat or Topical Analgesics prn   Over the counter medication: Patient's preferred OTC medication as directed on packaging.  Prescription Medication: cyclobenzaprine (FLEXERIL) 10 MG Take 1 tablet (10 mg) by mouth 3 times daily as needed,    oxyCODONE-acetaminophen (PERCOCET) 5-325 MG per tablet Take 1-2 tablets by mouth every 8 hours as needed for pain   Provided one additional pain medication rx. OTC medication is first line. Do not anticipate additional pain medication ongoing. He desired additional muscle relaxant as well.   Pertinent potential adverse effect profile of prescribed medication(s) was discussed with the patient, who expressed understanding.  MRI of the Pelvis next. He is concerned about pelvis pain. Will obtain imaging.  If no clear result/abnormality from MRI, consider injection of the facet joints or EMG as next step.  Activity Modification: as discussed   Rehab: Physical Therapy: [facility] - [phone], plan to resume (had started, then stopped due to pain)  Follow up: call with results of MRI . Suspect continue PT and possibly facet injection(s) next step.  Questions answered. The patient indicates understanding of these issues and agrees with the plan.     Efra Meeks, , CAQ      Note: Time spent in one-on-one evaluation and discussion with patient regarding nature of problem, course, prior treatments, and therapeutic options, along with review of medical record, at least 50% of which was spent in counseling and coordination of care: over 25 minutes.        Disclaimer: This note consists of symbols derived from keyboarding, dictation and/or voice recognition software. As a result, there may be errors in the  script that have gone undetected. Please consider this when interpreting information found in this chart.    The information in this document, created by the medical scribe for me, accurately reflects the services I personally performed and the decisions made by me. I have reviewed and approved this document for accuracy.   Efra Meeks DO, CAQ

## 2017-08-19 NOTE — Clinical Note
Amadou RAZO was seen in [facility] clinic for back and hip issues, ongoing.  Please see copy of the chart note for additional details. Thanks.

## 2017-08-21 ENCOUNTER — TRANSFERRED RECORDS (OUTPATIENT)
Dept: HEALTH INFORMATION MANAGEMENT | Facility: CLINIC | Age: 52
End: 2017-08-21

## 2017-08-21 ENCOUNTER — TELEPHONE (OUTPATIENT)
Dept: ORTHOPEDICS | Facility: CLINIC | Age: 52
End: 2017-08-21

## 2017-08-21 ENCOUNTER — RADIANT APPOINTMENT (OUTPATIENT)
Dept: MRI IMAGING | Facility: CLINIC | Age: 52
End: 2017-08-21
Attending: PEDIATRICS
Payer: COMMERCIAL

## 2017-08-21 DIAGNOSIS — M54.41 ACUTE BILATERAL LOW BACK PAIN WITH RIGHT-SIDED SCIATICA: ICD-10-CM

## 2017-08-21 DIAGNOSIS — M25.551 RIGHT HIP PAIN: ICD-10-CM

## 2017-08-21 PROCEDURE — 72195 MRI PELVIS W/O DYE: CPT | Mod: TC

## 2017-08-21 NOTE — TELEPHONE ENCOUNTER
Patient LVM. He purchased a [employer] Inversion Table. Wondering if it is ok for him to be using this. Would like a call back.

## 2017-08-21 NOTE — TELEPHONE ENCOUNTER
Spoke with patient.  Ok to use cautiously.  If any issues he would need to stop.  If increase in BP, dizziness or lightheadedness he would need to d/c and speak with primary care.      He is wanting a handicapped pass, please advise on filling out handicapped pass  Nisha Montez MS ATC

## 2017-08-22 ENCOUNTER — TELEPHONE (OUTPATIENT)
Dept: ORTHOPEDICS | Facility: CLINIC | Age: 52
End: 2017-08-22

## 2017-08-22 DIAGNOSIS — M46.46 DISCITIS OF LUMBAR REGION: Primary | ICD-10-CM

## 2017-08-22 DIAGNOSIS — M54.5 BILATERAL LOW BACK PAIN, UNSPECIFIED CHRONICITY, WITH SCIATICA PRESENCE UNSPECIFIED: ICD-10-CM

## 2017-08-22 LAB — RADIOLOGIST FLAGS: ABNORMAL

## 2017-08-22 NOTE — TELEPHONE ENCOUNTER
Per Dr. Meeks, abnormal finding, not seen in original MRI.  Inflammatory process, possible discitis as read by radiologist.  Recommend referral to spine surgeon.      Called and spoke with patient.  He is agreeable to referral.  Referral placed.  He did ask about handicapped parking sticker.  Deferred to spine surgeon.  Nihsa Montez MS ATC

## 2017-08-22 NOTE — TELEPHONE ENCOUNTER
Results for orders placed or performed in visit on 08/21/17   MR Pelvis w/o Contrast     Value    Radiologist flags Possible L4-L5 discitis. (Urgent)    Narrative    MR PELVIS WITHOUT CONTRAST August 21, 2017 8:03 PM    HISTORY: Pain in right hip. Lumbago with sciatica, right side.    TECHNIQUE: Coronal T1 and inversion recovery, sagittal T1, and  transverse proton density and fat suppressed T2 weighted images.    COMPARISON: 6/7/2017.    FINDINGS:   Osseous and Cartilaginous Structures: There is a prominent abnormality  in the lower lumbar spine on the edge of the scan. There is marked  ill-defined marrow edema filling the L4 and L5 vertebral bodies with  central endplate irregularity/erosion about the L4-L5 disc. There is  also irregularity of the anterosuperior corner of the L5 vertebral  body. Adjacent ill-defined paraspinous soft tissue edema is noted. No  discrete paraspinous soft tissue fluid collection is visible. There is  no indentation upon the thecal sac on the sagittal images. Marrow  signal within the sacrum and remainder of the osseous pelvis/proximal  femurs appears within normal limits.    Acetabular Labrum: No juxta-acetabular cyst. No obvious labral tear is  appreciated, allowing for the large FOV technique. If indicated  clinically, MR arthrography would be considered the study of choice in  this regard.    Common Hamstring Tendon: Intact.    Gluteal Tendon Greater Trochanteric Attachments: The gluteus medius  and minimus tendons appear within normal limits and symmetrical.    Trochanteric and Iliopsoas Bursae: No fluid collection in the  trochanteric and iliopsoas bursae. However, there is a lentiform 5.0 x  5.5 x 1.0 cm fluid collection with a low signal thick wall within the  deep subcutaneous fat along the lateral margin of the right gluteus  aaron muscle, lateral to the greater trochanter. No adjacent soft  tissue edema is demonstrated.    Joint space: No joint effusion.       Impression     IMPRESSION:   1. Suspicion of L4-L5 discitis. There is marked signal abnormality  about the L4-L5 disc with central endplate irregularity/erosion,  marked ill-defined marrow edema filling the L4 and L5 vertebral  bodies, and adjacent paraspinous soft tissue edema. Although the disc  height is relatively well preserved, this has progressed dramatically  since 6/17/2017. Infectious discitis cannot be excluded.  2. Lentiform low signal/thick-walled 5.5 x 5.0 x 1.0 cm fluid signal  intensity within the deep subcutaneous fat abutting the lateral margin  of the right gluteus aaron muscle. This is nonspecific but most  likely represents a resolving subcutaneous hematoma. This is likely  chronic. No adjacent soft tissue edema is noted.    [Access Center: Possible L4-L5 discitis.]    This report will be copied to the [facility] to ensure a  provider acknowledges the finding. [facility] Center is available Monday  through Friday 8am-3:30 pm.     ARUNA CASTILLO MD

## 2017-08-22 NOTE — TELEPHONE ENCOUNTER
Patient LVM returning call regarding the abnormal findings of his MRI. He states he is feeling feverish and nauseated and is worried that it has to do with an injection. Would like a call back.

## 2017-08-22 NOTE — TELEPHONE ENCOUNTER
Spoke with patient.  He is feeling ill.  Unsure if it is an illness or if it is anxiety from knowing something is wrong on his MRI.  Recommended he contact his primary care, which he states he does not have.  Recommended being seen in the ER due to possible discitis.  He was agreeable and did want to be seen in a [place] ER due to access to the MRI.  Nisha Montez MS ATC

## 2017-08-23 ENCOUNTER — TRANSFERRED RECORDS (OUTPATIENT)
Dept: HEALTH INFORMATION MANAGEMENT | Facility: CLINIC | Age: 52
End: 2017-08-23

## 2017-08-23 NOTE — TELEPHONE ENCOUNTER
LVM for patient.  If he has any concerns he should be seen in the ER.  Treatment plan started as he has been referred to a spine surgeon  Nisha Montez MS ATC

## 2017-08-23 NOTE — TELEPHONE ENCOUNTER
Patient LVM requesting a call back. He is concerned about his MRI findings with the possibility of an infection. He would like a call back with the treatment plan so that it can be started ASAP.

## 2017-08-29 ENCOUNTER — TELEPHONE (OUTPATIENT)
Dept: ORTHOPEDICS | Facility: CLINIC | Age: 52
End: 2017-08-29

## 2017-08-29 NOTE — TELEPHONE ENCOUNTER
Called and spoke with patient.  He is scheduled to see Dr. Cruz and associates at [place].  Explained that he does not need to f/u with Dr. Meeks.  Also explained to patient that handicapped parking sticker should be discussed at that appointment.  He verbalized understanding.  Nisha Montez MS ATC

## 2017-08-29 NOTE — TELEPHONE ENCOUNTER
Please advise if you need patient to follow up on 9/5/17.  Had previously advised patient that he should get parking permit from spine surgeon  Nisha Montez MS ATC

## 2017-08-29 NOTE — TELEPHONE ENCOUNTER
Patient LVM requesting the status of his temporary handicap parking pass. He states he was in the hospital for 5 days due to the spinal infection and is currently on IV antibiotics. He states he will be seeing a spine specialist on 8/31 and a f/u with Dr Meeks on 9/5. Would like a call back.

## 2017-08-31 ENCOUNTER — OFFICE VISIT (OUTPATIENT)
Dept: NEUROSURGERY | Facility: OTHER | Age: 52
End: 2017-08-31
Payer: COMMERCIAL

## 2017-08-31 ENCOUNTER — TELEPHONE (OUTPATIENT)
Dept: INTERNAL MEDICINE | Facility: CLINIC | Age: 52
End: 2017-08-31

## 2017-08-31 VITALS — WEIGHT: 180 LBS | HEIGHT: 66 IN | TEMPERATURE: 98.4 F | BODY MASS INDEX: 28.93 KG/M2

## 2017-08-31 DIAGNOSIS — M46.46 DISCITIS OF LUMBAR REGION: Primary | ICD-10-CM

## 2017-08-31 PROCEDURE — 99203 OFFICE O/P NEW LOW 30 MIN: CPT | Performed by: PHYSICIAN ASSISTANT

## 2017-08-31 NOTE — TELEPHONE ENCOUNTER
[facility] pharmacy [address] [place] [phone]  [facility] rejected Victoza 3 pk 18 mg/ 3ml Pen 30 day supply quanity 9  reason product not covered. Preferred are bydureon, trulicity and byetta.  BIN [id number]  [facility] [id number]  Card pope ID [id number]  Group [id number]  Relationship H  Third party code [id number]  Help desk number [phone]    Radha Meza CMA

## 2017-08-31 NOTE — PROGRESS NOTES
Dr. Christophe Cruz  [facility]  Neurosurgery Clinic Visit      CC: Low back pain    Primary care Provider: Jeremy Mobley      Reason For Visit:   I was asked to consult on the patient for discitis.      HPI: Amadou Bach is a 51 year old male who presents for evaluation of L4-5 discitis. He has had back pain since 6/2017 and undergone severeal imaging studies and 2 LESIs at [facility]. Over the past few weeks he devleoped a new type of pain that wrapped around to his right groin and abdomen. MRI was ordered which revealed possible L4-5 discitis, for which he went to [place] ED and was subsequently transferred for ID management at [facility] on 8/24/2017. He was placed on empiric treatment of vancomycin and ceftazidime with an anticipated treatment until 10/6/17 and has an upcoming appointment scheduled with ID at [facility] in the meantime. Cultures are still pending at this time, but have yet to grow. He continues to have pain in bilateral low back and radiates to the groin and down anterior bilateral legs. He has also had numbness in the lateral aspect of the right thigh. Denies bladder/bowel incontinence.  Current pain: 5/10 At worst: 8/10    Past Medical History:   Diagnosis Date     gerd     occas.     Hayfever      hx of asthma     in childhood     Other and unspecified hyperlipidemia      Type II or unspecified type diabetes mellitus without mention of complication, not stated as uncontrolled      Unspecified essential hypertension      Vitamin D deficiency        Past Medical History reviewed with patient during visit.    Past Surgical History:   Procedure Laterality Date     BIOPSY OF SKIN LESION       COLONOSCOPY  3/11/2014    Procedure: COMBINED COLONOSCOPY, SINGLE BIOPSY/POLYPECTOMY BY BIOPSY;;  Surgeon: Stephen Amin MD;  Location:  OR     COLONOSCOPY WITH CO2 INSUFFLATION N/A 8/19/2014    Procedure: COLONOSCOPY WITH CO2 INSUFFLATION;  Surgeon: Stephen Amin  MD Sarthak;  Location: [facility] OR     HC TOOTH EXTRACTION W/FORCEP      wisdom teeth x4     Past Surgical History reviewed with patient during visit.    Current Outpatient Prescriptions   Medication     oxyCODONE-acetaminophen (PERCOCET) 5-325 MG per tablet     cyclobenzaprine (FLEXERIL) 10 MG tablet     B-D U/F 31G X 8 MM insulin pen needle     VIAGRA 100 MG cap/tab     diazepam (VALIUM) 5 MG tablet     HYDROmorphone (DILAUDID) 2 MG tablet     hydrOXYzine (VISTARIL) 25 MG capsule     ketorolac (TORADOL) 10 MG tablet     LORazepam (ATIVAN) 0.5 MG tablet     ONE TOUCH VERIO IQ test strip     B-D U/F 31G X 8 MM insulin pen needle     liraglutide (VICTOZA PEN) 18 MG/3ML soln     liraglutide (VICTOZA PEN) 18 MG/3ML soln     lisinopril (PRINIVIL/ZESTRIL) 20 MG tablet     metoprolol (LOPRESSOR) 25 MG tablet     metFORMIN (GLUCOPHAGE-XR) 500 MG 24 hr tablet     aspirin 81 MG EC tablet     rosuvastatin (CRESTOR) 40 MG tablet     insulin glargine (LANTUS SOLOSTAR) 100 UNIT/ML injection     insulin lispro (HUMALOG KWIKPEN) 100 UNIT/ML injection     sildenafil (VIAGRA) 100 MG cap/tab     ORDER FOR DME     ORDER FOR DME     ACCU-CHEK MARICRUZ PLUS test strip     insulin pen needle (B-D U/F) 31G X 5 MM     ORDER FOR DME     Cyanocobalamin (VITAMIN B-12 PO)     Coenzyme Q10 (CO Q 10 PO)     FISH OIL CONCENTRATE 1000 MG OR CAPS     VITAMIN D 1000 UNIT OR CAPS     MULTIPLE VITAMINS OR     No current facility-administered medications for this visit.        No Known Allergies    Social History     Social History     Marital status:      Spouse name: N/A     Number of children: N/A     Years of education: N/A     Social History Main Topics     Smoking status: Never Smoker     Smokeless tobacco: Never Used     Alcohol use Yes      Comment: occ     Drug use: No     Sexual activity: Yes     Partners: Female      Comment:      Other Topics Concern     Not on file     Social History Narrative       Family History   Problem Relation Age  of Onset     DIABETES Mother      Hypertension Mother      DIABETES Father      CANCER Father      lung     CANCER Other      aunt - skin cancer     Skin Cancer Other      Melanoma No family hx of           ROS: 10 point ROS neg other than the symptoms noted above in the HPI.    Vital Signs: There were no vitals taken for this visit.    Examination:  Constitutional:  Alert, well nourished, NAD.  HEENT: Normocephalic, atraumatic.   Pulmonary:  Without shortness of breath, normal effort.   Lymph: no lymphadenopathy to low back or LE.   Integumentary: Skin is free of rashes or lesions.   Cardiovascular:  No pitting edema of BLE.      Neurological:  Awake  Alert  Oriented x 3  Speech clear  Cranial nerves II - XII grossly intact  PERRL  EOMI  Face symmetric  Tongue midline  Motor exam   Hip Flexor:                Right: 5/5  Left:  5/5  Hip Adductor:             Right:  5/5  Left:  5/5  Hip Abductor:             Right:  5/5  Left:  5/5  Gastroc Soleus:        Right:  5/5  Left:  5/5  Tib/Ant:                      Right:  5/5  Left:  5/5  EHL:                          Right:  5/5  Left:  5/5       Sensation normal to bilateral upper and lower extremities.    Reflexes are 2+ in the patellar and Achilles. There is no clonus. Downgoing Babinski.  Musculoskeletal:  Gait: Able to stand from a seated position. Normal non-antalgic, non-myelopathic gait.  Able to heel/toe walk without loss of balance  Lumbar examination reveals no tenderness of the spine or paraspinous muscles.  Hip height is symmetrical. Negative SI joint, sciatic notch or greater trochanteric tenderness to palpation bilaterally.  Straight leg raise is positive bilaterally.    Imaging:   MRI of the lumbar spine and CT abdomen and pelvis from 8/24/2017 was reviewed in the office today. Reveal L4-5 discitis.    Assessment/Plan:   Amadou Bach is a 51 year old male ho presents for evaluation of L4-5 discitis. He has had back pain since 6/2017 and undergone  severeal imaging studies and 2 LESIs at [facility]. Over the past few weeks he devleoped a new type of pain that wrapped around to his right groin and abdomen. MRI was ordered which revealed possible L4-5 discitis, for which he went to [place] ED and was subsequently transferred for ID management at [facility] on 8/24/2017. He was placed on empiric treatment of vancomycin and ceftazidime with an anticipated treatment until 10/6/17 and has an upcoming appointment scheduled with ID at [facility] in the meantime. Advised to continue with his follow up with ID and after his infection has resolved, if he has continued back pain, he will call and schedule appointment with us for further evaluation. Patient voiced understanding and agreement.          Rosangela Orosco PA-C  [facility]  [address] [address]    Tel [phone]  Pager [phone]

## 2017-08-31 NOTE — MR AVS SNAPSHOT
After Visit Summary   8/31/2017    Amadou Bach    MRN: [id number]           Patient Information     Date Of Birth          1965        Visit Information        Provider Department      8/31/2017 10:45 AM Rosangela Orosco PA-C [facility]        Today's Diagnoses     Discitis of lumbar region    -  1       Follow-ups after your visit        Your next 10 appointments already scheduled     Sep 05, 2017 11:30 AM CDT   (Arrive by 11:15 AM)   [facility] with Maren Bearden PT   [facility] [place] [facility] ([facility] [place])    [address]  [address]  [place] [id number] [phone]            Sep 11, 2017   Procedure with Shan Jain MD   [facility] (--)    [address] [place]  [place] [address] [phone]              Who to contact     If you have questions or need follow up information about today's clinic visit or your schedule please contact [facility] directly at [phone].  Normal or non-critical lab and imaging results will be communicated to you by [employer], letter or phone within 4 business days after the clinic has received the results. If you do not hear from us within 7 days, please contact the clinic through [employer] or phone. If you have a critical or abnormal lab result, we will notify you by phone as soon as possible.  Submit refill requests through [employer] or call your pharmacy and they will forward the refill request to us. Please allow 3 business days for your refill to be completed.          Additional Information About Your Visit        [employer] Information     [employer] gives you secure access to your electronic health record. If you see a primary care provider, you can also send messages to your care team and make appointments. If you have questions, please call your primary care clinic.  If you do not have a primary care provider, please call [phone] and they will  "assist you.        Care EveryWhere ID     This is your Care EveryWhere ID. This could be used by other organizations to access your [place] medical records  [id number]        Your Vitals Were     Temperature Height BMI (Body Mass Index)             98.4  F (36.9  C) (Temporal) 5' 5.75\" (1.67 m) 29.27 kg/m2          Blood Pressure from Last 3 Encounters:   08/19/17 118/78   07/18/17 158/86   06/23/17 140/84    Weight from Last 3 Encounters:   08/31/17 180 lb (81.6 kg)   08/19/17 191 lb (86.6 kg)   07/18/17 191 lb (86.6 kg)              Today, you had the following     No orders found for display       Primary Care Provider Office Phone # Fax #    Jeremy Mobley [phone]       [address]  [facility] [address]        Equal Access to Services     [facility] : Hadii aad ku hadasho Soomaali, waaxda luqadaha, qaybta kaalmada adeegyada, waxay ernestinein haybrodyn tremayne kharavalerio connolly . So [facility] [phone].    ATENCIÓN: Si cathiela micah, tiene a chairez disposición servicios gratuitos de asistencia lingüística. Llame al [phone].    We comply with applicable federal civil rights laws and [place] laws. We do not discriminate on the basis of race, color, national origin, age, disability sex, sexual orientation or gender identity.            Thank you!     Thank you for choosing [facility]  for your care. Our goal is always to provide you with excellent care. Hearing back from our patients is one way we can continue to improve our services. Please take a few minutes to complete the written survey that you may receive in the mail after your visit with us. Thank you!             Your Updated Medication List - Protect others around you: Learn how to safely use, store and throw away your medicines at www.disposemymeds.org.          This list is accurate as of: 8/31/17 11:10 AM.  Always use your most recent med list.                   Brand Name Dispense Instructions for use Diagnosis "    * ACCU-CHEK MARICRUZ PLUS test strip   Generic drug:  blood glucose monitoring     750 strip    TEST BLOOD SUGARS 8 TIMES DAILY AS DIRECTED    Type 2 diabetes, HbA1c goal < 7% (H)       * ONE TOUCH VERIO IQ test strip   Generic drug:  blood glucose monitoring     400 strip    TEST BLOOD SUGARS UP TO 4 TIMES DAILY OR AS DIRECTED    Type 2 diabetes mellitus without complication, with long-term current use of insulin (H)       aspirin 81 MG EC tablet     90 tablet    Take 1 tablet (81 mg) by mouth daily    Type 2 diabetes mellitus without complication, unspecified long term insulin use status (H)       CO Q 10 PO      Take  by mouth. 2 per day    Type 2 diabetes, HbA1c goal < 7% (H)       cyclobenzaprine 10 MG tablet    FLEXERIL    42 tablet    Take 1 tablet (10 mg) by mouth 3 times daily as needed    Acute bilateral low back pain with right-sided sciatica       diazepam 5 MG tablet    VALIUM    40 tablet    Take 0.5-1 tablets (2.5-5 mg) by mouth 2 times daily as needed for muscle spasms    Acute midline low back pain without sciatica       FISH OIL CONCENTRATE 1000 MG Caps      2 tablets daily        HYDROmorphone 2 MG tablet    DILAUDID     Take 2 mg by mouth every 4 hours as needed for moderate to severe pain        hydrOXYzine 25 MG capsule    VISTARIL    40 capsule    Take 1 capsule (25 mg) by mouth 3 times daily as needed (pain/muscle relaxor may double dosage if needed)    Back strain, subsequent encounter       insulin glargine 100 UNIT/ML injection    LANTUS SOLOSTAR    45 mL    INJECT 50 UNITS SUBCUTANEOUSLY AT BEDTIME    Type 2 diabetes mellitus without complication, unspecified long term insulin use status (H)       insulin lispro 100 UNIT/ML injection    HumaLOG KWIKpen    54 mL    Inject 10-12u before each meal plus correction: Pre-meal and bedtime correction, as per previous sliding scale    Type 2 diabetes mellitus without complication, unspecified long term insulin use status (H)       * insulin pen  needle 31G X 5 MM    B-D U/F    300 each    Use 4 times daily as directed.    Type 2 diabetes, HbA1c goal < 7% (H)       * B-D U/F 31G X 8 MM   Generic drug:  insulin pen needle     300 each    USE 4 TIMES DAILY AS DIRECTED.    Type 2 diabetes mellitus without complication (H)       * B-D U/F 31G X 8 MM   Generic drug:  insulin pen needle     300 each    USE 4 TIMES DAILY AS DIRECTED.    Type 2 diabetes mellitus without complication (H)       ketorolac 10 MG tablet    TORADOL    12 tablet    Take 1 tablet (10 mg) by mouth every 6 hours as needed for moderate pain    Back strain, subsequent encounter       * liraglutide 18 MG/3ML soln    VICTOZA PEN    27 mL    Inject 1.8 mg Subcutaneous daily    Type 2 diabetes mellitus without complication, with long-term current use of insulin (H), Non morbid obesity due to excess calories       * liraglutide 18 MG/3ML soln    VICTOZA PEN    18 mL    Inject 1.2 mg Subcutaneous daily    Type 2 diabetes mellitus without complication, with long-term current use of insulin (H)       lisinopril 20 MG tablet    PRINIVIL/ZESTRIL    180 tablet    Take 1 tablet (20 mg) by mouth 2 times daily    Hypertension goal BP (blood pressure) < 140/90       LORazepam 0.5 MG tablet    ATIVAN    45 tablet    TAKE 1 TABLET BY MOUTH EVERY 8 HOURS AS NEEDED    Generalized anxiety disorder       metFORMIN 500 MG 24 hr tablet    GLUCOPHAGE-XR    360 tablet    TAKE 2 TABLETS (1,000 MG) BY MOUTH 2 TIMES DAILY    Type 2 diabetes mellitus without complication, unspecified long term insulin use status (H)       metoprolol 25 MG tablet    LOPRESSOR    90 tablet    Take 0.5 tablets (12.5 mg) by mouth 2 times daily    Hypertension goal BP (blood pressure) < 140/90       MULTIPLE VITAMINS PO      1000mg daily        order for DME     3 Month    1 Device by * route 8 times daily TEST STRIPS  ACCU-CHEK MARICRUZ plus test strips    Type 2 diabetes, HbA1c goal < 7% (H)       * order for DME     1 Device    Equipment being  ordered: glucometer brand per insurance    Type 2 diabetes, HbA1c goal < 7% (H)       * order for DME     400 each    LANCETS 4 x DAILY AND PRN    Type 2 diabetes, HbA1c goal < 7% (H)       oxyCODONE-acetaminophen 5-325 MG per tablet    PERCOCET    20 tablet    Take 1-2 tablets by mouth every 8 hours as needed for pain    Acute bilateral low back pain with right-sided sciatica, Right hip pain       rosuvastatin 40 MG tablet    CRESTOR    90 tablet    Take 1 tablet (40 mg) by mouth daily Take 40 mg by mouth daily    Hyperlipidemia LDL goal <100       * sildenafil 100 MG cap/tab    VIAGRA    12 tablet    TAKE 1 TABLET BY MOUTH 30 MINS BEFORE INTERCOURSE    Erectile dysfunction, unspecified erectile dysfunction type       * VIAGRA 100 MG cap/tab   Generic drug:  sildenafil     12 tablet    TAKE 1 TABLET BY MOUTH 30 MINS BEFORE INTERCOURSE    Erectile dysfunction, unspecified erectile dysfunction type       VITAMIN B-12 PO      Take  by mouth daily.        vitamin D 1000 UNITS capsule      Take 2 capsules by mouth daily.        * Notice:  This list has 11 medication(s) that are the same as other medications prescribed for you. Read the directions carefully, and ask your doctor or other care provider to review them with you.

## 2017-08-31 NOTE — NURSING NOTE
"Amadou Bach is a 51 year old male who presents for:  Chief Complaint   Patient presents with     Consult     discitis, [place] [facility] and [facility] follow up for infection after [facility]     Neurologic Problem        Initial Vitals:  Temp 98.4  F (36.9  C) (Temporal)  Ht 5' 5.75\" (1.67 m)  Wt 180 lb (81.6 kg)  BMI 29.27 kg/m2 Estimated body mass index is 29.27 kg/(m^2) as calculated from the following:    Height as of this encounter: 5' 5.75\" (1.67 m).    Weight as of this encounter: 180 lb (81.6 kg).. Body surface area is 1.95 meters squared. BP completed using cuff size: NA (Not Taken)  Data Unavailable    Do you feel safe in your environment?  Yes  Do you need any refills today? No    Nursing Comments:         Nicholas Liu    "

## 2017-09-01 NOTE — TELEPHONE ENCOUNTER
I called the pharmacy and notified them of the providers note.  The pharmacy will call the patients Endocrinologist for the PA.  Ama Leung,

## 2017-09-01 NOTE — TELEPHONE ENCOUNTER
This query needs to be addressed to his listed PCP or his Endocrinologist (per my last note, the Endocrinologist had prescribed the Victoza).    Thank you,  Makenzie Villarreal MD

## 2017-09-01 NOTE — TELEPHONE ENCOUNTER
Would you like to try one of the formulary alternative listed below or start the PA?Faith Ramsey,

## 2017-09-05 ENCOUNTER — THERAPY VISIT (OUTPATIENT)
Dept: PHYSICAL THERAPY | Facility: CLINIC | Age: 52
End: 2017-09-05
Payer: COMMERCIAL

## 2017-09-05 DIAGNOSIS — M54.50 RIGHT-SIDED LOW BACK PAIN WITHOUT SCIATICA, UNSPECIFIED CHRONICITY: Primary | ICD-10-CM

## 2017-09-05 PROCEDURE — 97163 PT EVAL HIGH COMPLEX 45 MIN: CPT | Mod: GP | Performed by: PHYSICAL THERAPIST

## 2017-09-05 NOTE — PROGRESS NOTES
[facility] Initial Evaluation    Subjective:    Patient is a 51 year old male presenting with rehab back hpi. The history is provided by the patient.   Amadou Bach is a 51 year old male with a lumbar condition.  Condition occurred with:  A fall/slip (June 2017: slipped and fell on a wet floor).  Condition occurred: at home.  This is a new condition  Since his injury, patient has been hospitalized on two different occasions: once for pain control and once for infection following an TALIA 8/04/17.  He is currently about 10 days into a 40 day course of antibiotics, which he receives at home 5x/day through an IV port in his RUE. Patient completed 2 prior sessions of PT in June before being hospitalized and is now hoping to resume therapy; however, he has not cleared this with his physician yet.  Encouraged patient to do so before scheduling any additional PT appointments.  Patient agreed to proceed with just an evaluation for today.    Patient reports pain:  Central lumbar spine and lumbar spine right.  Radiates to: right lower abdomen, right groin, and right anterior thigh.  Pain is described as aching, sharp and stabbing and is constant and reported as 7/10.  Associated symptoms:  Numbness, tingling, loss of motion and loss of strength. Pain is the same all the time.  Symptoms are exacerbated by sitting, bending and other (coughing/sneezing) and relieved by analgesics, heat and ice.  Since onset symptoms are gradually worsening.  Special tests:  MRI.  Previous treatment includes physical therapy (2 sessions before TALIA).  There was no improvement following previous treatment.  General health as reported by patient is fair.  Pertinent medical history includes:  Overweight, high blood pressure and diabetes.  Medical allergies: no.  Other surgeries include:  No.  Current medications:  High blood pressure medication, heparin/coumadin, anti-inflammatory, pain medication and muscle relaxants.   Current occupation is Sales.  Patient is working in normal job with restrictions (currently works out of home; unable to travel).  Primary job tasks include:  Prolonged sitting, driving and other (computer work).    Barriers include:  Transportation and requires assistance with ADL's (wife helps with putting on socks and shoes).    Red flags:  Pain at rest/night (night sweats & chills believed to be related to infection per patient; Sx being monitored by home nurse and MD).                        Objective:      System         Lumbar/SI Evaluation    Lumbar Myotomes:    T12-L3 (Hip Flex):  Left: 4+    Right: 4  L2-4 (Quads):  Left:  5    Right:  5  L4 (Ankle DF):  Left:  5    Right:  5  L5 (Great Toe Ext): Left: 5    Right: 5   S1 (Toe Raise):  Left: 4+    Right: 4+  Lumbar DTR's:    L4 (Quad):  Left:  0   Right:  0  S1 (Achilles):  Left:  0   Right:  0      Neural Tension/Mobility:      Left side:SLR or SLR w/DF  negative.     Right side:   SLR w/DF or SLR  negative.   Lumbar Palpation:      Tenderness not present at Left:    Erector Spinae; Piriformis; PSIS or Gluteus Medius  Tenderness present at Right: Erector Spinae (lower lumbar region)  Tenderness not present at Right:  Piriformis; PSIS or Gluteus Medius    Lumbar Provocation:      Left negative with:  PROM hip    Right negative with:  PROM hip                                                   [place] Lumbar Evaluation    Posture:  Sitting: poor  Standing: fair  Lordosis: WNL  Lateral Shift: no  Correction of Posture: better    Movement Loss:  Flexion (Flex): major and pain  Extension (EXT): mod and pain  Side [place] R (SG R): min  Side Glide L (SG L): major and pain                                               ROS    Assessment/Plan:      Patient is a 51 year old male with lumbar complaints.    Patient has the following significant findings with corresponding treatment plan.                Diagnosis 1:  LBP  Pain -  self management, education, directional  preference exercise and home program  Decreased ROM/flexibility - therapeutic exercise  Decreased strength - therapeutic exercise  Decreased function - therapeutic activities and home program  Impaired posture - neuro re-education    Therapy Evaluation Codes:   1) History comprised of:   Personal factors that impact the plan of care:      Past/current experiences.    Comorbidity factors that impact the plan of care are:      Diabetes, High blood pressure, Numbness/tingling, Overweight, Pain at night/rest and infection.     Medications impacting care: Anti-inflammatory, High blood pressure, Heparin/coumadin, Muscle relaxant and Pain.  2) Examination of Body Systems comprised of:   Body structures and functions that impact the plan of care:      Lumbar spine.   Activity limitations that impact the plan of care are:      Bending, Dressing, Sitting and Working.  3) Clinical presentation characteristics are:   Unstable/Unpredictable.  4) Decision-Making    High complexity using standardized patient assessment instrument and/or measureable assessment of functional outcome.  Cumulative Therapy Evaluation is: High complexity.    Previous and current functional limitations:  (See Goal Flow Sheet for this information)    Short term and Long term goals: (See Goal Flow Sheet for this information)     Communication ability:  Patient appears to be able to clearly communicate and understand verbal and written communication and follow directions correctly.  Treatment Explanation - The following has been discussed with the patient:    RX ordered/plan of care  Anticipated outcomes  Possible risks and side effects  This patient would benefit from PT intervention to resume normal activities.   Rehab potential is guarded.    Frequency:  1 X week, once daily pending MD approval  Duration:  for 6 weeks  Discharge Plan:  Achieve all LTG.  Independent in home treatment program.  Reach maximal therapeutic benefit.    Please refer to the  daily flowsheet for treatment today, total treatment time and time spent performing 1:1 timed codes.

## 2017-09-05 NOTE — MR AVS SNAPSHOT
After Visit Summary   9/5/2017    Amadou Bach    MRN: [id number]           Patient Information     Date Of Birth          1965        Visit Information        Provider Department      9/5/2017 11:30 AM [facility] PT [facility]        Today's Diagnoses     Right-sided low back pain without sciatica, unspecified chronicity    -  1       Follow-ups after your visit        Your next 10 appointments already scheduled     Sep 11, 2017   Procedure with Shan Jain MD   [facility] (--)    [address] [place] [address] [phone]              Who to contact     If you have questions or need follow up information about today's clinic visit or your schedule please contact [facility] directly at [phone].  Normal or non-critical lab and imaging results will be communicated to you by MyChart, letter or phone within 4 business days after the clinic has received the results. If you do not hear from us within 7 days, please contact the clinic through MyChart or phone. If you have a critical or abnormal lab result, we will notify you by phone as soon as possible.  Submit refill requests through [facility] or call your pharmacy and they will forward the refill request to us. Please allow 3 business days for your refill to be completed.          Additional Information About Your Visit        MyChart Information     [facility] gives you secure access to your electronic health record. If you see a primary care provider, you can also send messages to your care team and make appointments. If you have questions, please call your primary care clinic.  If you do not have a primary care provider, please call [phone] and they will assist you.        Care EveryWhere ID     This is your Care EveryWhere ID. This could be used by other organizations to access your [facility] medical records  [id number]          Blood Pressure from Last 3 Encounters:   08/19/17 118/78   07/18/17 158/86   06/23/17 140/84    Weight from Last 3 Encounters:   08/31/17 81.6 kg (180 lb)   08/19/17 86.6 kg (191 lb)   07/18/17 86.6 kg (191 lb)              We Performed the Following     HC PT EVAL, HIGH COMPLEXITY     LIZBETH INITIAL EVAL REPORT        Primary Care Provider Office Phone # Fax #    Jeremy Mobley [phone]       [address]  [facility] [address]        Equal Access to Services     [facility]: Hadii aad ku hadasho Soomaali, waaxda luqadaha, qaybta kaalmada adeegyada, waxay idiin hayaan adeeg kharavalerio connolly . So [facility] [phone].    ATENCIÓN: Si habla español, tiene a chairez disposición servicios gratuitos de asistencia lingüística. Llame al [phone].    We comply with applicable federal civil rights laws and [place] laws. We do not discriminate on the basis of race, color, national origin, age, disability sex, sexual orientation or gender identity.            Thank you!     Thank you for choosing [facility]  for your care. Our goal is always to provide you with excellent care. Hearing back from our patients is one way we can continue to improve our services. Please take a few minutes to complete the written survey that you may receive in the mail after your visit with us. Thank you!             Your Updated Medication List - Protect others around you: Learn how to safely use, store and throw away your medicines at www.disposemymeds.org.          This list is accurate as of: 9/5/17  1:20 PM.  Always use your most recent med list.                   Brand Name Dispense Instructions for use Diagnosis    * ACCU-CHEK MARICRUZ PLUS test strip   Generic drug:  blood glucose monitoring     750 strip    TEST BLOOD SUGARS 8 TIMES DAILY AS DIRECTED    Type 2 diabetes, HbA1c goal < 7% (H)       * ONE TOUCH VERIO IQ test strip   Generic drug:  blood glucose monitoring     400 strip     TEST BLOOD SUGARS UP TO 4 TIMES DAILY OR AS DIRECTED    Type 2 diabetes mellitus without complication, with long-term current use of insulin (H)       aspirin 81 MG EC tablet     90 tablet    Take 1 tablet (81 mg) by mouth daily    Type 2 diabetes mellitus without complication, unspecified long term insulin use status (H)       CO Q 10 PO      Take  by mouth. 2 per day    Type 2 diabetes, HbA1c goal < 7% (H)       cyclobenzaprine 10 MG tablet    FLEXERIL    42 tablet    Take 1 tablet (10 mg) by mouth 3 times daily as needed    Acute bilateral low back pain with right-sided sciatica       diazepam 5 MG tablet    VALIUM    40 tablet    Take 0.5-1 tablets (2.5-5 mg) by mouth 2 times daily as needed for muscle spasms    Acute midline low back pain without sciatica       FISH OIL CONCENTRATE 1000 MG Caps      2 tablets daily        insulin glargine 100 UNIT/ML injection    LANTUS SOLOSTAR    45 mL    INJECT 50 UNITS SUBCUTANEOUSLY AT BEDTIME    Type 2 diabetes mellitus without complication, unspecified long term insulin use status (H)       insulin lispro 100 UNIT/ML injection    HumaLOG KWIKpen    54 mL    Inject 10-12u before each meal plus correction: Pre-meal and bedtime correction, as per previous sliding scale    Type 2 diabetes mellitus without complication, unspecified long term insulin use status (H)       * insulin pen needle 31G X 5 MM    B-D U/F    300 each    Use 4 times daily as directed.    Type 2 diabetes, HbA1c goal < 7% (H)       * B-D U/F 31G X 8 MM   Generic drug:  insulin pen needle     300 each    USE 4 TIMES DAILY AS DIRECTED.    Type 2 diabetes mellitus without complication (H)       * B-D U/F 31G X 8 MM   Generic drug:  insulin pen needle     300 each    USE 4 TIMES DAILY AS DIRECTED.    Type 2 diabetes mellitus without complication (H)       ketorolac 10 MG tablet    TORADOL    12 tablet    Take 1 tablet (10 mg) by mouth every 6 hours as needed for moderate pain    Back strain, subsequent  encounter       * liraglutide 18 MG/3ML soln    VICTOZA PEN    27 mL    Inject 1.8 mg Subcutaneous daily    Type 2 diabetes mellitus without complication, with long-term current use of insulin (H), Non morbid obesity due to excess calories       * liraglutide 18 MG/3ML soln    VICTOZA PEN    18 mL    Inject 1.2 mg Subcutaneous daily    Type 2 diabetes mellitus without complication, with long-term current use of insulin (H)       lisinopril 20 MG tablet    PRINIVIL/ZESTRIL    180 tablet    Take 1 tablet (20 mg) by mouth 2 times daily    Hypertension goal BP (blood pressure) < 140/90       LORazepam 0.5 MG tablet    ATIVAN    45 tablet    TAKE 1 TABLET BY MOUTH EVERY 8 HOURS AS NEEDED    Generalized anxiety disorder       metFORMIN 500 MG 24 hr tablet    GLUCOPHAGE-XR    360 tablet    TAKE 2 TABLETS (1,000 MG) BY MOUTH 2 TIMES DAILY    Type 2 diabetes mellitus without complication, unspecified long term insulin use status (H)       metoprolol 25 MG tablet    LOPRESSOR    90 tablet    Take 0.5 tablets (12.5 mg) by mouth 2 times daily    Hypertension goal BP (blood pressure) < 140/90       MULTIPLE VITAMINS PO      1000mg daily        order for DME     3 Month    1 Device by * route 8 times daily TEST STRIPS  ACCU-CHEK MARICRUZ plus test strips    Type 2 diabetes, HbA1c goal < 7% (H)       * order for DME     1 Device    Equipment being ordered: glucometer brand per insurance    Type 2 diabetes, HbA1c goal < 7% (H)       * order for DME     400 each    LANCETS 4 x DAILY AND PRN    Type 2 diabetes, HbA1c goal < 7% (H)       oxyCODONE-acetaminophen 5-325 MG per tablet    PERCOCET    20 tablet    Take 1-2 tablets by mouth every 8 hours as needed for pain    Acute bilateral low back pain with right-sided sciatica, Right hip pain       rosuvastatin 40 MG tablet    CRESTOR    90 tablet    Take 1 tablet (40 mg) by mouth daily Take 40 mg by mouth daily    Hyperlipidemia LDL goal <100       * sildenafil 100 MG cap/tab    VIAGRA     12 tablet    TAKE 1 TABLET BY MOUTH 30 MINS BEFORE INTERCOURSE    Erectile dysfunction, unspecified erectile dysfunction type       * VIAGRA 100 MG cap/tab   Generic drug:  sildenafil     12 tablet    TAKE 1 TABLET BY MOUTH 30 MINS BEFORE INTERCOURSE    Erectile dysfunction, unspecified erectile dysfunction type       VITAMIN B-12 PO      Take  by mouth daily.        vitamin D 1000 UNITS capsule      Take 2 capsules by mouth daily.        * Notice:  This list has 11 medication(s) that are the same as other medications prescribed for you. Read the directions carefully, and ask your doctor or other care provider to review them with you.

## 2017-09-14 ENCOUNTER — OFFICE VISIT (OUTPATIENT)
Dept: FAMILY MEDICINE | Facility: CLINIC | Age: 52
End: 2017-09-14
Payer: COMMERCIAL

## 2017-09-14 VITALS
SYSTOLIC BLOOD PRESSURE: 134 MMHG | HEART RATE: 90 BPM | TEMPERATURE: 101.2 F | WEIGHT: 179 LBS | BODY MASS INDEX: 29.11 KG/M2 | OXYGEN SATURATION: 97 % | DIASTOLIC BLOOD PRESSURE: 77 MMHG

## 2017-09-14 DIAGNOSIS — G89.4 CHRONIC PAIN SYNDROME: Primary | ICD-10-CM

## 2017-09-14 PROCEDURE — 99000 SPECIMEN HANDLING OFFICE-LAB: CPT | Performed by: INTERNAL MEDICINE

## 2017-09-14 PROCEDURE — 99213 OFFICE O/P EST LOW 20 MIN: CPT | Performed by: INTERNAL MEDICINE

## 2017-09-14 PROCEDURE — 80307 DRUG TEST PRSMV CHEM ANLYZR: CPT | Mod: 90 | Performed by: INTERNAL MEDICINE

## 2017-09-14 RX ORDER — OXYCODONE HYDROCHLORIDE 5 MG/1
5 TABLET ORAL EVERY 4 HOURS PRN
Qty: 120 TABLET | Refills: 0 | Status: SHIPPED | OUTPATIENT
Start: 2017-09-14 | End: 2017-10-09

## 2017-09-14 RX ORDER — MORPHINE SULFATE 15 MG/1
15 TABLET, FILM COATED, EXTENDED RELEASE ORAL EVERY 12 HOURS
Qty: 60 TABLET | Refills: 0 | Status: SHIPPED | OUTPATIENT
Start: 2017-09-14 | End: 2017-09-15

## 2017-09-14 RX ORDER — OXYCODONE HYDROCHLORIDE 5 MG/1
5-10 TABLET ORAL
COMMUNITY
Start: 2017-09-06 | End: 2017-09-15

## 2017-09-14 RX ORDER — DULOXETIN HYDROCHLORIDE 60 MG/1
60 CAPSULE, DELAYED RELEASE ORAL DAILY
Qty: 90 CAPSULE | Refills: 1 | Status: SHIPPED | OUTPATIENT
Start: 2017-09-14 | End: 2017-09-15

## 2017-09-14 RX ORDER — ONDANSETRON 4 MG/1
4 TABLET, FILM COATED ORAL EVERY 8 HOURS PRN
Qty: 30 TABLET | Refills: 3 | Status: SHIPPED | OUTPATIENT
Start: 2017-09-14 | End: 2019-05-17

## 2017-09-14 ASSESSMENT — PAIN SCALES - GENERAL: PAINLEVEL: SEVERE PAIN (7)

## 2017-09-14 NOTE — NURSING NOTE
"Chief Complaint   Patient presents with     Back Pain     Health Maintenance     FOOT EXAM Q1 YEAR, LIPID MONITORING Q1 YEAR, COLONOSCOPY Q2 YR        Initial /77 (BP Location: Right arm, Patient Position: Chair, Cuff Size: Adult Regular)  Pulse 90  Temp 101.2  F (38.4  C) (Oral)  Wt 179 lb (81.2 kg)  SpO2 97%  BMI 29.11 kg/m2 Estimated body mass index is 29.11 kg/(m^2) as calculated from the following:    Height as of 8/31/17: 5' 5.75\" (1.67 m).    Weight as of this encounter: 179 lb (81.2 kg).  Medication Reconciliation: complete   Claudia See YOSELIN Villela      "

## 2017-09-14 NOTE — MR AVS SNAPSHOT
After Visit Summary   9/14/2017    Amadou Bach    MRN: [id number]           Patient Information     Date Of Birth          1965        Visit Information        Provider Department      9/14/2017 2:40 PM Joseph Alanis MD [facility]        Today's Diagnoses     Chronic pain syndrome    -  1       Follow-ups after your visit        Who to contact     If you have questions or need follow up information about today's clinic visit or your schedule please contact [facility] directly at [phone].  Normal or non-critical lab and imaging results will be communicated to you by [employer], letter or phone within 4 business days after the clinic has received the results. If you do not hear from us within 7 days, please contact the clinic through [employer] or phone. If you have a critical or abnormal lab result, we will notify you by phone as soon as possible.  Submit refill requests through [employer] or call your pharmacy and they will forward the refill request to us. Please allow 3 business days for your refill to be completed.          Additional Information About Your Visit        MyChart Information     [employer] gives you secure access to your electronic health record. If you see a primary care provider, you can also send messages to your care team and make appointments. If you have questions, please call your primary care clinic.  If you do not have a primary care provider, please call [phone] and they will assist you.        Care EveryWhere ID     This is your Care EveryWhere ID. This could be used by other organizations to access your [facility] medical records  [id number]        Your Vitals Were     Pulse Temperature Pulse Oximetry BMI (Body Mass Index)          90 101.2  F (38.4  C) (Oral) 97% 29.11 kg/m2         Blood Pressure from Last 3 Encounters:   09/14/17 134/77   08/19/17 118/78   07/18/17 158/86    Weight from Last 3 Encounters:    09/14/17 179 lb (81.2 kg)   08/31/17 180 lb (81.6 kg)   08/19/17 191 lb (86.6 kg)              We Performed the Following     Drug  Screen Comprehensive, Urine w/o Reported Meds (Pain Care Package)          Today's Medication Changes          These changes are accurate as of: 9/14/17  3:23 PM.  If you have any questions, ask your nurse or doctor.               Start taking these medicines.        Dose/Directions    DULoxetine 60 MG EC capsule   Commonly known as:  CYMBALTA   Used for:  Chronic pain syndrome   Started by:  Joseph Alanis MD        Dose:  60 mg   Take 1 capsule (60 mg) by mouth daily   Quantity:  90 capsule   Refills:  1       morphine 15 MG 12 hr tablet   Commonly known as:  MS CONTIN   Used for:  Chronic pain syndrome   Started by:  Joseph Alanis MD        Dose:  15 mg   Take 1 tablet (15 mg) by mouth every 12 hours maximum 2 tablet(s) per day   Quantity:  60 tablet   Refills:  0       ondansetron 4 MG tablet   Commonly known as:  ZOFRAN   Used for:  Chronic pain syndrome   Started by:  Joseph Alanis MD        Dose:  4 mg   Take 1 tablet (4 mg) by mouth every 8 hours as needed for nausea   Quantity:  30 tablet   Refills:  3         These medicines have changed or have updated prescriptions.        Dose/Directions    liraglutide 18 MG/3ML soln   Commonly known as:  VICTOZA PEN   This may have changed:  Another medication with the same name was removed. Continue taking this medication, and follow the directions you see here.   Used for:  Type 2 diabetes mellitus without complication, with long-term current use of insulin (H), Non morbid obesity due to excess calories   Changed by:  Makenzie Villarreal MD        Dose:  1.8 mg   Inject 1.8 mg Subcutaneous daily   Quantity:  27 mL   Refills:  1       * oxyCODONE 5 MG IR tablet   Commonly known as:  ROXICODONE   This may have changed:  Another medication with the same name was added. Make sure you understand how and when to take  each.   Used for:  Chronic pain syndrome   Changed by:  Joseph Alanis MD        Dose:  5-10 mg   Take 5-10 mg by mouth   Refills:  0       * oxyCODONE 5 MG IR tablet   Commonly known as:  ROXICODONE   This may have changed:  You were already taking a medication with the same name, and this prescription was added. Make sure you understand how and when to take each.   Used for:  Chronic pain syndrome   Changed by:  Joseph Alanis MD        Dose:  5 mg   Take 1 tablet (5 mg) by mouth every 4 hours as needed for pain maximum 4 tablet(s) per day   Quantity:  120 tablet   Refills:  0       * Notice:  This list has 2 medication(s) that are the same as other medications prescribed for you. Read the directions carefully, and ask your doctor or other care provider to review them with you.      Stop taking these medicines if you haven't already. Please contact your care team if you have questions.     diazepam 5 MG tablet   Commonly known as:  VALIUM   Stopped by:  Joseph Alanis MD           ketorolac 10 MG tablet   Commonly known as:  TORADOL   Stopped by:  Joseph Alanis MD           metFORMIN 500 MG 24 hr tablet   Commonly known as:  GLUCOPHAGE-XR   Stopped by:  Joseph Alanis MD           oxyCODONE-acetaminophen 5-325 MG per tablet   Commonly known as:  PERCOCET   Stopped by:  Joseph Alanis MD                Where to get your medicines      These medications were sent to [facility]/pharmacy #[id number] - [place], [place] - [address] [place] [address] AT [address]SE[address], [place] [address]     Phone:  [phone]     DULoxetine 60 MG EC capsule    ondansetron 4 MG tablet         Some of these will need a paper prescription and others can be bought over the counter.  Ask your nurse if you have questions.     Bring a paper prescription for each of these medications     morphine 15 MG 12 hr tablet    oxyCODONE 5 MG IR tablet                Primary Care Provider Office Phone # Fax #    Jeremy BACON  MD Leandra [phone]       [address]  [facility] [address]        Equal Access to Services     DEVORAH ARANA : Elijah aad ku hadjacklynjoanen Fiorali, waleannada griffintoñoha, qachristita kaalmada daina, yamilka ribeiro lajeffyelvin melendez. So [facility] [phone].    ATENCIÓN: Si habla español, tiene a chairez disposición servicios gratuitos de asistencia lingüística. Llame al [phone].    We comply with applicable federal civil rights laws and [place] laws. We do not discriminate on the basis of race, color, national origin, age, disability sex, sexual orientation or gender identity.            Thank you!     Thank you for choosing [facility]  for your care. Our goal is always to provide you with excellent care. Hearing back from our patients is one way we can continue to improve our services. Please take a few minutes to complete the written survey that you may receive in the mail after your visit with us. Thank you!             Your Updated Medication List - Protect others around you: Learn how to safely use, store and throw away your medicines at www.disposemymeds.org.          This list is accurate as of: 9/14/17  3:23 PM.  Always use your most recent med list.                   Brand Name Dispense Instructions for use Diagnosis    * ACCU-CHEK MARICRUZ PLUS test strip   Generic drug:  blood glucose monitoring     750 strip    TEST BLOOD SUGARS 8 TIMES DAILY AS DIRECTED    Type 2 diabetes, HbA1c goal < 7% (H)       * ONE TOUCH VERIO IQ test strip   Generic drug:  blood glucose monitoring     400 strip    TEST BLOOD SUGARS UP TO 4 TIMES DAILY OR AS DIRECTED    Type 2 diabetes mellitus without complication, with long-term current use of insulin (H)       aspirin 81 MG EC tablet     90 tablet    Take 1 tablet (81 mg) by mouth daily    Type 2 diabetes mellitus without complication, unspecified long term insulin use status (H)       CO Q 10 PO      Take  by mouth. 2 per day    Type 2  diabetes, HbA1c goal < 7% (H)       cyclobenzaprine 10 MG tablet    FLEXERIL    42 tablet    Take 1 tablet (10 mg) by mouth 3 times daily as needed    Acute bilateral low back pain with right-sided sciatica       DULoxetine 60 MG EC capsule    CYMBALTA    90 capsule    Take 1 capsule (60 mg) by mouth daily    Chronic pain syndrome       FISH OIL CONCENTRATE 1000 MG Caps      2 tablets daily        insulin glargine 100 UNIT/ML injection    LANTUS SOLOSTAR    45 mL    INJECT 50 UNITS SUBCUTANEOUSLY AT BEDTIME    Type 2 diabetes mellitus without complication, unspecified long term insulin use status (H)       insulin lispro 100 UNIT/ML injection    HumaLOG KWIKpen    54 mL    Inject 10-12u before each meal plus correction: Pre-meal and bedtime correction, as per previous sliding scale    Type 2 diabetes mellitus without complication, unspecified long term insulin use status (H)       * insulin pen needle 31G X 5 MM    B-D U/F    300 each    Use 4 times daily as directed.    Type 2 diabetes, HbA1c goal < 7% (H)       * B-D U/F 31G X 8 MM   Generic drug:  insulin pen needle     300 each    USE 4 TIMES DAILY AS DIRECTED.    Type 2 diabetes mellitus without complication (H)       * B-D U/F 31G X 8 MM   Generic drug:  insulin pen needle     300 each    USE 4 TIMES DAILY AS DIRECTED.    Type 2 diabetes mellitus without complication (H)       liraglutide 18 MG/3ML soln    VICTOZA PEN    27 mL    Inject 1.8 mg Subcutaneous daily    Type 2 diabetes mellitus without complication, with long-term current use of insulin (H), Non morbid obesity due to excess calories       lisinopril 20 MG tablet    PRINIVIL/ZESTRIL    180 tablet    Take 1 tablet (20 mg) by mouth 2 times daily    Hypertension goal BP (blood pressure) < 140/90       LORazepam 0.5 MG tablet    ATIVAN    45 tablet    TAKE 1 TABLET BY MOUTH EVERY 8 HOURS AS NEEDED    Generalized anxiety disorder       metoprolol 25 MG tablet    LOPRESSOR    90 tablet    Take 0.5 tablets  (12.5 mg) by mouth 2 times daily    Hypertension goal BP (blood pressure) < 140/90       morphine 15 MG 12 hr tablet    MS CONTIN    60 tablet    Take 1 tablet (15 mg) by mouth every 12 hours maximum 2 tablet(s) per day    Chronic pain syndrome       MULTIPLE VITAMINS PO      1000mg daily        ondansetron 4 MG tablet    ZOFRAN    30 tablet    Take 1 tablet (4 mg) by mouth every 8 hours as needed for nausea    Chronic pain syndrome       order for DME     3 Month    1 Device by * route 8 times daily TEST STRIPS  ACCU-CHEK MARICRUZ plus test strips    Type 2 diabetes, HbA1c goal < 7% (H)       * order for DME     1 Device    Equipment being ordered: glucometer brand per insurance    Type 2 diabetes, HbA1c goal < 7% (H)       * order for DME     400 each    LANCETS 4 x DAILY AND PRN    Type 2 diabetes, HbA1c goal < 7% (H)       * oxyCODONE 5 MG IR tablet    ROXICODONE     Take 5-10 mg by mouth    Chronic pain syndrome       * oxyCODONE 5 MG IR tablet    ROXICODONE    120 tablet    Take 1 tablet (5 mg) by mouth every 4 hours as needed for pain maximum 4 tablet(s) per day    Chronic pain syndrome       rosuvastatin 40 MG tablet    CRESTOR    90 tablet    Take 1 tablet (40 mg) by mouth daily Take 40 mg by mouth daily    Hyperlipidemia LDL goal <100       * sildenafil 100 MG tablet    VIAGRA    12 tablet    TAKE 1 TABLET BY MOUTH 30 MINS BEFORE INTERCOURSE    Erectile dysfunction, unspecified erectile dysfunction type       * VIAGRA 100 MG tablet   Generic drug:  sildenafil     12 tablet    TAKE 1 TABLET BY MOUTH 30 MINS BEFORE INTERCOURSE    Erectile dysfunction, unspecified erectile dysfunction type       VITAMIN B-12 PO      Take  by mouth daily.        vitamin D 1000 UNITS capsule      Take 2 capsules by mouth daily.        * Notice:  This list has 11 medication(s) that are the same as other medications prescribed for you. Read the directions carefully, and ask your doctor or other care provider to review them with you.

## 2017-09-14 NOTE — LETTER
[facility]    09/14/17    Patient: Amadou Bach  YOB: 1965  Medical Record Number: [id number]                                                                  Controlled Substance Agreement  I understand that my care provider has prescribed controlled substances (narcotics, tranquilizers, and/or stimulants) to help manage my condition(s).  I am taking this medicine to help me function or work.  I know that this is strong medicine.  It could have serious side effects and even cause a dependency on the drug.  If I stop these medicines suddenly, I could have severe withdrawal symptoms.    The risks, benefits, and side effects of these medication(s) were explained to me.  I agree that:  1. I will take part in other treatments as advised by my provider.  This may be psychiatry or counseling, physical therapy, behavioral therapy, group treatment, or a referral to a pain clinic.  I will reduce or stop my medicine when my provider tells me to do so.   2. I will take my medicines as prescribed.  I will not change the dose or schedule unless my provider tells me to.  There will be no refills if I  run out early.   I may be contacted at any time without warning and asked to complete a drug test or pill count.   3. I will keep all my appointments at the clinic.  If I miss appointments or fail to follow instructions, my provider may stop my medicine.  4. I will not ask other providers to prescribe controlled substances. And I will not accept controlled substances from other people. If I need another prescribed controlled substance for a new reason, I will notify my provider within one business day.  5. If I enroll in the [place] Medical Marijuana program, I will tell my provider.  I will also sign an agreement to share my medical records with my provider.  6. I will use one pharmacy to fill all of my controlled substance prescriptions.  If my prescription is mailed to my pharmacy, it  may take 5 to 7 days for my medicine to be ready.  7. I understand that my provider, clinic care team, and pharmacy can track controlled substance prescriptions from other providers through a central database (prescription monitoring program).  8. I will bring in my list of medications (or my medicine bottles) each time I come to the clinic.  REV- 04/2016                                                                                                                                            Page 1 of 2      [facility]    09/14/17    Patient: Amadou Bach  YOB: 1965  Medical Record Number: [id number]    9. Refills of controlled substances will be made only during office hours.  It is up to me to make sure that I do not run out of my medicines on weekends or holidays.    10. I am responsible for my prescriptions.  If the medicine is lost or stolen, it will not be replaced.   I also agree not to share these medicines with anyone.  11. I agree to not use ANY illegal or recreational drugs.  This includes marijuana, cocaine, bath salts or other drugs.  I agree not to use alcohol unless my provider says I may.  I agree to give urine samples whenever asked.  If I fail to give a urine sample, the provider may stop my medicine.     12. I will tell my nurse or provider right away if I become pregnant or have a new medical problem treated outside of [facility].  13. I understand that this medicine can affect my thinking and judgment.  It may be unsafe for me to drive, use machinery and do dangerous tasks.  I will not do any of these things until I know how the medicine affects me.  If my dose changes, I will wait to see how it affects me.  I will contact my provider if I have concerns about medicine side effects.  I understand that if I do not follow any of the conditions above, my prescriptions or treatment may be stopped.    I agree that my provider, clinic care team, and  pharmacy may work with any city, state or federal law enforcement agency that investigates the misuse, sale, or other diversion of my controlled medicine. I will allow my provider to discuss my care with or share a copy of this agreement with any other treating provider, pharmacy or emergency room where I receive care.  I agree to give up (waive) any right of privacy or confidentiality with respect to these authorizations.   I have read this agreement and have asked questions about anything I did not understand.   ___________________________________    ___________________________  Patient Signature                                                           Date and Time  ___________________________________     ____________________________  Witness                                                                            Date and Time  ___________________________________  Joseph Alanis MD  REV-  04/2016                                                                                                                                                                 Page 2 of 2  Opioid Pain Medicines (also known as Narcotics)  What You Need to Know      What are opioids?   Opioids are pain medicines that must be prescribed by a doctor. Examples are:     morphine (MS Contin, Viktoria)    oxycodone (Oxycontin)    oxycodone and acetaminophen (Percocet)    hydrocodone and acetaminophen (Vicodin, Norco)     fentanyl patch (Duragesic)     hydromorphone (Dilaudid)     methadone     What do opioids do well?   Opioids are best for short-term pain after a surgery or injury. They also work well for cancer pain. Unlike other pain medicines, they do not cause liver or kidney failure or ulcers. They may help some people with long-lasting (chronic) pain.     What do opioids NOT do well?   Opioids never get rid of pain entirely, and they do not work well for most patients with chronic pain. Opioids do not reduce swelling, one of the  causes of pain. They also don t work well for nerve pain.     Side effects  Talk to your doctor before you start or decide to keep taking one of these medicines. Side effects include:    Lowers your breathing rate enough that it could cause death    Death due to taking more than the prescribed dose    Serious lifelong opioid use      Dependence is not the same as addiction. Addiction is when people keep using a substance that harms their body, their mind or their relations with others. If you have a history of drug or alcohol abuse, taking opioids can cause a relapse.  Over time, opioids don t work as well. Most people will need higher and higher doses. The higher the dose, the more serious the side effects. We don t know the long-term effects of opioids.   People who have used opioids for a long time have a lower quality of life, worse depression, higher levels of pain and more visits to doctors.  Overdose from prescription drugs is the second leading cause of death in the [place] The risk of overdose rises when opioids are taken with other drugs such as:    Medicines used for anxiety and panic attacks (such as lorazepam, alprazolam, clonazepam    Other sedatives    Alcohol    Illegal drugs such as heroin  Never share your opioids with others. Be sure to store opioids in a secure place, locked if possible.Young children can easily swallow them and overdose.     Are there other ways to manage pain?  Ways to help reduce pain:    Exercise every day.    Treat health problems that may be causing pain.    Treat mental health problems like depression and anxiety.     Worse depression symptoms; Less pleasure in things you usually enjoy    Feeling tired or sluggish    Slower thoughts or cloudy thinking    Being more sensitive to pain over time; Pain is harder to control.    Trouble sleeping or restless sleep    Changes in hormone levels (for example, less testosterone).     Changes in sex drive or ability to have sex    Long  lasting nausea and constipation    Trouble breathing while asleep; This is worse with lung problems like COPD or sleep apnea.    Unsafe driving    Getting sick more often    Itching    Feeling dizzy    Dry mouth    Sweating    Trouble emptying the bladder (peeing). This is worse if you have an enlarged prostate or get urinary tract infections (UTIs).    What else should I know about opioids?  When someone takes opioids for too long or too often, they become dependent. This means that if you stop or reduce the medicine too quickly, you will have withdrawal symptoms.          Practice good sleep habits.  Try to go to bed and get up at the same time every day.    Stop smoking.  Tobacco use can make pain worse.    Do things that you enjoy.    Find a way to work through pain without drugs.  Try deep breathing, meditation, visual imagery and aromatherapy.    Ask your doctor to help you create a plan to manage your pain.

## 2017-09-14 NOTE — PROGRESS NOTES
SUBJECTIVE:   Amadou Bach is a 51 year old male who presents to clinic today for the following health issues:      New Patient/Transfer of Care  Patient is here about his back pain. He had an epidural injection which did not seem to be sterile and now he has a spinal infection and is looking for a primary doctor.    Malignant melanoma  4-5 years ago   Cellulitis   Iv antibiotics ( [facility] mapww[facility]  Port been in place 2.5 weeks      In June slipped on a floor, tweaked back --- had bad back pain and 2 hospital stays at [place] and [place]    Into the front and sharp pain   MRI inflammation and infection and antibiotics.   Admitted to [facility] and pic line and [place] home nurse once an week and blood drawing   Vancomycin and kidney and inflammation  ID aspiriated in the spine and tried to grow a culture   Off metformin .   Getting worse. ( 1 of the oxycodone every 4 hours)  Working on the medications.  No symptoms at that tie  Right thigh is numb  Chiropractor and massage   Working with vitamin treatments  crestor co q 10   Restless needs pain.  Not on pain    L$ L5 disckitis  Not travel. ( can drive at this time.  Another MRI coming up   Prior to that appointment  Appetite is down lost 25 pounds, feels sick all the time         Problem list and histories reviewed & adjusted, as indicated.  Additional history: as documented    Patient Active Problem List   Diagnosis     Erectile dysfunction     Type 2 diabetes mellitus without complication, without long-term current use of insulin (H)     Hyperlipidemia LDL goal <100     Obesity, Class I, BMI 30-34.9     Generalized anxiety disorder     ACP (advance care planning)     History of adenomatous polyp of colon     Essential hypertension with goal blood pressure less than 140/90     Malignant melanoma of skin of trunk (H)     History of melanoma     AK (actinic keratosis)     Multiple benign nevi     Cherry hemangioma     Lentigines     Acute bilateral  low back pain without sciatica     Right-sided low back pain without sciatica, unspecified chronicity     Past Surgical History:   Procedure Laterality Date     BIOPSY OF SKIN LESION       COLONOSCOPY  3/11/2014    Procedure: COMBINED COLONOSCOPY, SINGLE BIOPSY/POLYPECTOMY BY BIOPSY;;  Surgeon: Stephen Amin MD;  Location: [facility] OR     COLONOSCOPY WITH CO2 INSUFFLATION N/A 8/19/2014    Procedure: COLONOSCOPY WITH CO2 INSUFFLATION;  Surgeon: Stephen Amin MD;  Location: [facility] OR     HC TOOTH EXTRACTION W/FORCEP      wisdom teeth x4       Social History   Substance Use Topics     Smoking status: Never Smoker     Smokeless tobacco: Never Used     Alcohol use Yes      Comment: occ     Family History   Problem Relation Age of Onset     DIABETES Mother      Hypertension Mother      DIABETES Father      CANCER Father      lung     CANCER Other      aunt - skin cancer     Skin Cancer Other      Melanoma No family hx of          Current Outpatient Prescriptions   Medication Sig Dispense Refill     oxyCODONE (ROXICODONE) 5 MG IR tablet Take 5-10 mg by mouth       morphine (MS CONTIN) 15 MG 12 hr tablet Take 1 tablet (15 mg) by mouth every 12 hours maximum 2 tablet(s) per day 60 tablet 0     oxyCODONE (ROXICODONE) 5 MG IR tablet Take 1 tablet (5 mg) by mouth every 4 hours as needed for pain maximum 4 tablet(s) per day 120 tablet 0     ondansetron (ZOFRAN) 4 MG tablet Take 1 tablet (4 mg) by mouth every 8 hours as needed for nausea 30 tablet 3     DULoxetine (CYMBALTA) 60 MG EC capsule Take 1 capsule (60 mg) by mouth daily 90 capsule 1     cyclobenzaprine (FLEXERIL) 10 MG tablet Take 1 tablet (10 mg) by mouth 3 times daily as needed 42 tablet 1     B-D U/F 31G X 8 MM insulin pen needle USE 4 TIMES DAILY AS DIRECTED. 300 each 0     VIAGRA 100 MG cap/tab TAKE 1 TABLET BY MOUTH 30 MINS BEFORE INTERCOURSE 12 tablet 3     LORazepam (ATIVAN) 0.5 MG tablet TAKE 1 TABLET BY MOUTH EVERY 8 HOURS AS NEEDED 45 tablet 1     ONE  TOUCH VERIO IQ test strip TEST BLOOD SUGARS UP TO 4 TIMES DAILY OR AS DIRECTED 400 strip 2     B-D U/F 31G X 8 MM insulin pen needle USE 4 TIMES DAILY AS DIRECTED. 300 each 1     liraglutide (VICTOZA PEN) 18 MG/3ML soln Inject 1.8 mg Subcutaneous daily 27 mL 1     lisinopril (PRINIVIL/ZESTRIL) 20 MG tablet Take 1 tablet (20 mg) by mouth 2 times daily 180 tablet 1     metoprolol (LOPRESSOR) 25 MG tablet Take 0.5 tablets (12.5 mg) by mouth 2 times daily 90 tablet 1     aspirin 81 MG EC tablet Take 1 tablet (81 mg) by mouth daily 90 tablet 3     rosuvastatin (CRESTOR) 40 MG tablet Take 1 tablet (40 mg) by mouth daily Take 40 mg by mouth daily 90 tablet 1     insulin glargine (LANTUS SOLOSTAR) 100 UNIT/ML injection INJECT 50 UNITS SUBCUTANEOUSLY AT BEDTIME 45 mL 1     insulin lispro (HUMALOG KWIKPEN) 100 UNIT/ML injection Inject 10-12u before each meal plus correction: Pre-meal and bedtime correction, as per previous sliding scale 54 mL 1     sildenafil (VIAGRA) 100 MG cap/tab TAKE 1 TABLET BY MOUTH 30 MINS BEFORE INTERCOURSE 12 tablet 3     ORDER FOR DME Equipment being ordered: glucometer brand per insurance 1 Device 0     ORDER FOR DME LANCETS 4 x DAILY AND  each 4     ACCU-CHEK MARICRUZ PLUS test strip TEST BLOOD SUGARS 8 TIMES DAILY AS DIRECTED 750 strip 1     insulin pen needle (B-D U/F) 31G X 5 MM Use 4 times daily as directed. 300 each 3     ORDER FOR DME 1 Device by * route 8 times daily TEST STRIPS   ACCU-CHEK MARICRUZ plus test strips 3 Month 0     Cyanocobalamin (VITAMIN B-12 PO) Take  by mouth daily.       Coenzyme Q10 (CO Q 10 PO) Take  by mouth. 2 per day       FISH OIL CONCENTRATE 1000 MG OR CAPS 2 tablets daily       VITAMIN D 1000 UNIT OR CAPS Take 2 capsules by mouth daily.       MULTIPLE VITAMINS OR 1000mg daily       No Known Allergies  Recent Labs   Lab Test  03/27/17   1559  08/12/16   0913  11/13/15   0814  02/02/15   0911  08/04/14   1115  02/06/14   0753  09/17/13   0704   A1C  8.2*  7.3*   7.9*  6.6*  6.9*  6.6*   --    LDL   --   101*  138*  Cannot estimate LDL when triglyceride exceeds 400 mg/dL  117   --   63  84   HDL   --   38*  47  30*   --   35*  47   TRIG   --   354*  235*  414*   --   138  222*   ALT   --    --   35   --    --   41  49   CR  0.69  0.77  0.70  0.77   --   0.98   --    GFRESTIMATED  >90  Non  GFR Calc    >90  Non  GFR Calc    >90  Non  GFR Calc    >90  Non  GFR Calc     --   81   --    GFRESTBLACK  >90   GFR Calc    >90   GFR Calc    >90   GFR Calc    >90   GFR Calc     --   >90   --    POTASSIUM  3.5  4.2  3.9  3.7   --   4.1   --    TSH   --    --   1.64   --   1.23   --    --             Reviewed and updated as needed this visit by clinical staffTobacco  Allergies  Meds  Med Hx  Surg Hx  Fam Hx  Soc Hx      Reviewed and updated as needed this visit by Provider         ROS:  Constitutional, HEENT, cardiovascular, pulmonary, gi and gu systems are negative, except as otherwise noted.      OBJECTIVE:   /77 (BP Location: Right arm, Patient Position: Chair, Cuff Size: Adult Regular)  Pulse 90  Temp 101.2  F (38.4  C) (Oral)  Wt 179 lb (81.2 kg)  SpO2 97%  BMI 29.11 kg/m2  Body mass index is 29.11 kg/(m^2).  GENERAL: healthy, alert and no distress  NECK: no adenopathy, no asymmetry, masses, or scars and thyroid normal to palpation  RESP: lungs clear to auscultation - no rales, rhonchi or wheezes  CV: regular rate and rhythm, normal S1 S2, no S3 or S4, no murmur, click or rub, no peripheral edema and peripheral pulses strong  ABDOMEN:tender all through the lower abdomen and into the right thigh  MS: no gross musculoskeletal defects noted, no edema    Diagnostic Test Results:  Results for orders placed or performed in visit on 08/21/17   MR Pelvis w/o Contrast   Result Value Ref Range    Radiologist flags Possible L4-L5 discitis. (Urgent)      Narrative    MR PELVIS WITHOUT CONTRAST August 21, 2017 8:03 PM    HISTORY: Pain in right hip. Lumbago with sciatica, right side.    TECHNIQUE: Coronal T1 and inversion recovery, sagittal T1, and  transverse proton density and fat suppressed T2 weighted images.    COMPARISON: 6/7/2017.    FINDINGS:   Osseous and Cartilaginous Structures: There is a prominent abnormality  in the lower lumbar spine on the edge of the scan. There is marked  ill-defined marrow edema filling the L4 and L5 vertebral bodies with  central endplate irregularity/erosion about the L4-L5 disc. There is  also irregularity of the anterosuperior corner of the L5 vertebral  body. Adjacent ill-defined paraspinous soft tissue edema is noted. No  discrete paraspinous soft tissue fluid collection is visible. There is  no indentation upon the thecal sac on the sagittal images. Marrow  signal within the sacrum and remainder of the osseous pelvis/proximal  femurs appears within normal limits.    Acetabular Labrum: No juxta-acetabular cyst. No obvious labral tear is  appreciated, allowing for the large FOV technique. If indicated  clinically, MR arthrography would be considered the study of choice in  this regard.    Common Hamstring Tendon: Intact.    Gluteal Tendon Greater Trochanteric Attachments: The gluteus medius  and minimus tendons appear within normal limits and symmetrical.    Trochanteric and Iliopsoas Bursae: No fluid collection in the  trochanteric and iliopsoas bursae. However, there is a lentiform 5.0 x  5.5 x 1.0 cm fluid collection with a low signal thick wall within the  deep subcutaneous fat along the lateral margin of the right gluteus  aaron muscle, lateral to the greater trochanter. No adjacent soft  tissue edema is demonstrated.    Joint space: No joint effusion.       Impression    IMPRESSION:   1. Suspicion of L4-L5 discitis. There is marked signal abnormality  about the L4-L5 disc with central endplate  irregularity/erosion,  marked ill-defined marrow edema filling the L4 and L5 vertebral  bodies, and adjacent paraspinous soft tissue edema. Although the disc  height is relatively well preserved, this has progressed dramatically  since 6/17/2017. Infectious discitis cannot be excluded.  2. Lentiform low signal/thick-walled 5.5 x 5.0 x 1.0 cm fluid signal  intensity within the deep subcutaneous fat abutting the lateral margin  of the right gluteus aaron muscle. This is nonspecific but most  likely represents a resolving subcutaneous hematoma. This is likely  chronic. No adjacent soft tissue edema is noted.    [Access Center: Possible L4-L5 discitis.]    This report will be copied to the [facility] to ensure a  provider acknowledges the finding. [facility] Center is available Monday  through Friday 8am-3:30 pm.     ARUNA CASTILLO MD       ASSESSMENT/PLAN:       ICD-10-CM    1. Chronic pain syndrome G89.4 oxyCODONE (ROXICODONE) 5 MG IR tablet     Drug  Screen Comprehensive, Urine w/o Reported Meds (Pain Care Package)     morphine (MS CONTIN) 15 MG 12 hr tablet     oxyCODONE (ROXICODONE) 5 MG IR tablet     ondansetron (ZOFRAN) 4 MG tablet     DULoxetine (CYMBALTA) 60 MG EC capsule     Reviewed extensive records.  Patient using 6-7 oxycodone per day to manage pain  This is about 50 MME  Will start 15 MS contin  Po BID  And reduce the oxycodone amount    Will need to establish with pain clinic if not improving within the next 1-2 months    Patient with infectious discitis on Vancomycin and Rocephin iv through the picc line    Will start cymblata for anxiety and pain relief and stop lorazepam      Pain contract and urine test completed.     is down once again and I cannot check today    Will establish care.      Joseph Alanis MD  [facility]

## 2017-09-15 ENCOUNTER — TELEPHONE (OUTPATIENT)
Dept: FAMILY MEDICINE | Facility: CLINIC | Age: 52
End: 2017-09-15

## 2017-09-15 ENCOUNTER — HOSPITAL ENCOUNTER (EMERGENCY)
Facility: CLINIC | Age: 52
Discharge: HOME | End: 2017-09-15
Attending: FAMILY MEDICINE | Admitting: FAMILY MEDICINE
Payer: COMMERCIAL

## 2017-09-15 VITALS
OXYGEN SATURATION: 97 % | RESPIRATION RATE: 13 BRPM | SYSTOLIC BLOOD PRESSURE: 144 MMHG | DIASTOLIC BLOOD PRESSURE: 87 MMHG | WEIGHT: 175 LBS | TEMPERATURE: 98.5 F | BODY MASS INDEX: 28.46 KG/M2 | HEART RATE: 87 BPM

## 2017-09-15 DIAGNOSIS — T78.40XA ALLERGIC REACTION, INITIAL ENCOUNTER: ICD-10-CM

## 2017-09-15 PROCEDURE — 99284 EMERGENCY DEPT VISIT MOD MDM: CPT | Mod: 25

## 2017-09-15 PROCEDURE — S0028 INJECTION, FAMOTIDINE, 20 MG: HCPCS | Performed by: FAMILY MEDICINE

## 2017-09-15 PROCEDURE — 25000128 H RX IP 250 OP 636: Performed by: FAMILY MEDICINE

## 2017-09-15 PROCEDURE — 99284 EMERGENCY DEPT VISIT MOD MDM: CPT | Performed by: FAMILY MEDICINE

## 2017-09-15 PROCEDURE — 96365 THER/PROPH/DIAG IV INF INIT: CPT

## 2017-09-15 PROCEDURE — 96361 HYDRATE IV INFUSION ADD-ON: CPT

## 2017-09-15 PROCEDURE — 25000125 ZZHC RX 250: Performed by: FAMILY MEDICINE

## 2017-09-15 RX ORDER — HEPARIN SODIUM,PORCINE 10 UNIT/ML
3 VIAL (ML) INTRAVENOUS
Status: DISCONTINUED | OUTPATIENT
Start: 2017-09-15 | End: 2017-09-15 | Stop reason: HOSPADM

## 2017-09-15 RX ORDER — PREDNISONE 20 MG/1
40 TABLET ORAL DAILY
Qty: 2 TABLET | Refills: 0 | Status: SHIPPED | OUTPATIENT
Start: 2017-09-15 | End: 2017-09-17

## 2017-09-15 RX ORDER — METHYLPREDNISOLONE SODIUM SUCCINATE 125 MG/2ML
125 INJECTION, POWDER, LYOPHILIZED, FOR SOLUTION INTRAMUSCULAR; INTRAVENOUS ONCE
Status: COMPLETED | OUTPATIENT
Start: 2017-09-15 | End: 2017-09-15

## 2017-09-15 RX ADMIN — SODIUM CHLORIDE 1000 ML: 9 INJECTION, SOLUTION INTRAVENOUS at 08:55

## 2017-09-15 RX ADMIN — FAMOTIDINE 20 MG: 20 INJECTION, SOLUTION INTRAVENOUS at 08:55

## 2017-09-15 RX ADMIN — METHYLPREDNISOLONE SODIUM SUCCINATE 125 MG: 125 INJECTION, POWDER, FOR SOLUTION INTRAMUSCULAR; INTRAVENOUS at 08:53

## 2017-09-15 RX ADMIN — SODIUM CHLORIDE, PRESERVATIVE FREE 3 ML: 5 INJECTION INTRAVENOUS at 11:43

## 2017-09-15 NOTE — ED PROVIDER NOTES
History     Chief Complaint   Patient presents with     Allergic Reaction     hives     HPI  Amadou Bach is a 51 year old male, past medical history significant for erectile dysfunction, type II diabetes, hyperlipidemia, obesity, generalized anxiety disorder, hypertension, melanoma, actinic keratoses, bilateral low back pain, presents to the emergency department with concerns of potential allergic reaction via EMS.  History obtained from the patient states that he is being treated for a spinal infection believed to be secondary to contaminated or nonsterile TALIA lumbar approximately 3 months ago.  He currently has a PICC line and is receiving daily antibiotics.  He saw a new physician yesterday to establish care and was switched from his oxycodone to MS Contin, added duloxetine and Zofran as new medications.  He has never had any of these 3 medicines to his knowledge previously.   Had at least one dose of each of these since then being prescribed yesterday.   This morning the a.m. of presentation he developed widespread whole-body urticaria and cutaneous flushing and became lightheaded and passed out.  He does not feel that he is injured himself in the following process his wife called 911 he was brought here.  He has no concerns of swelling of his tongue or lips, no difficulty swallowing or breathing.  No swelling of his digits or toes.  He does feel that his pain is adequately controlled at this point.  He identified no new areas of pain.      Active Ambulatory Problems     Diagnosis Date Noted     Erectile dysfunction 01/06/2010     Type 2 diabetes mellitus without complication, without long-term current use of insulin (H) 10/31/2010     Hyperlipidemia LDL goal <100 03/21/2011     Obesity, Class I, BMI 30-34.9 07/25/2011     Generalized anxiety disorder 04/04/2013     ACP (advance care planning) 07/09/2013     History of adenomatous polyp of colon 08/07/2014     Essential hypertension with goal blood  pressure less than 140/90 09/18/2014     Malignant melanoma of skin of trunk (H) 02/02/2015     History of melanoma 04/14/2015     AK (actinic keratosis) 01/15/2016     Multiple benign nevi 01/15/2016     Cherry hemangioma 01/15/2016     Lentigines 01/15/2016     Acute bilateral low back pain without sciatica 06/20/2017     Right-sided low back pain without sciatica, unspecified chronicity 09/05/2017     Resolved Ambulatory Problems     Diagnosis Date Noted     Diabetes mellitus type II, uncontrolled (H) 01/14/2013     Past Medical History:   Diagnosis Date     gerd      Hayfever      hx of asthma      Other and unspecified hyperlipidemia      Type II or unspecified type diabetes mellitus without mention of complication, not stated as uncontrolled      Unspecified essential hypertension      Vitamin D deficiency      Past Surgical History:   Procedure Laterality Date     BIOPSY OF SKIN LESION       COLONOSCOPY  3/11/2014    Procedure: COMBINED COLONOSCOPY, SINGLE BIOPSY/POLYPECTOMY BY BIOPSY;;  Surgeon: Stephen Amin MD;  Location: [facility] OR     COLONOSCOPY WITH CO2 INSUFFLATION N/A 8/19/2014    Procedure: COLONOSCOPY WITH CO2 INSUFFLATION;  Surgeon: Stephen Amin MD;  Location: [facility] OR     HC TOOTH EXTRACTION W/FORCEP      wisdom teeth x4     Social History     Social History     Marital status:      Spouse name: N/A     Number of children: N/A     Years of education: N/A     Occupational History     Not on file.     Social History Main Topics     Smoking status: Never Smoker     Smokeless tobacco: Never Used     Alcohol use Yes      Comment: occ     Drug use: No     Sexual activity: Yes     Partners: Female      Comment:      Other Topics Concern     Not on file     Social History Narrative     Family History   Problem Relation Age of Onset     DIABETES Mother      Hypertension Mother      DIABETES Father      CANCER Father      lung     CANCER Other      aunt - skin cancer     Skin Cancer  Other      Melanoma No family hx of      No current facility-administered medications for this encounter.      Current Outpatient Prescriptions   Medication     ranitidine (ZANTAC) 150 MG tablet     predniSONE (DELTASONE) 20 MG tablet     morphine (MS CONTIN) 15 MG 12 hr tablet     oxyCODONE (ROXICODONE) 5 MG IR tablet     ondansetron (ZOFRAN) 4 MG tablet     DULoxetine (CYMBALTA) 60 MG EC capsule     cyclobenzaprine (FLEXERIL) 10 MG tablet     B-D U/F 31G X 8 MM insulin pen needle     VIAGRA 100 MG cap/tab     LORazepam (ATIVAN) 0.5 MG tablet     ONE TOUCH VERIO IQ test strip     liraglutide (VICTOZA PEN) 18 MG/3ML soln     lisinopril (PRINIVIL/ZESTRIL) 20 MG tablet     metoprolol (LOPRESSOR) 25 MG tablet     aspirin 81 MG EC tablet     rosuvastatin (CRESTOR) 40 MG tablet     insulin glargine (LANTUS SOLOSTAR) 100 UNIT/ML injection     insulin lispro (HUMALOG KWIKPEN) 100 UNIT/ML injection     [DISCONTINUED] sildenafil (VIAGRA) 100 MG cap/tab     ORDER FOR DME     ORDER FOR DME     Cyanocobalamin (VITAMIN B-12 PO)     Coenzyme Q10 (CO Q 10 PO)     FISH OIL CONCENTRATE 1000 MG OR CAPS     VITAMIN D 1000 UNIT OR CAPS     MULTIPLE VITAMINS OR      No Known Allergies    I have reviewed the Medications, Allergies, Past Medical and Surgical History, and Social History in the Epic system.         Review of Systems   All other systems reviewed and are negative.      Physical Exam   BP: 150/88  Pulse: 87  Heart Rate: 85  Temp: 98.5  F (36.9  C)  Resp: 18  Weight: 79.4 kg (175 lb)  SpO2: 100 %  Physical Exam   Constitutional: He is oriented to person, place, and time. He appears well-developed and well-nourished. He appears distressed.   Anxious, appropriate, no slurred speech, no difficulty swallowing or clearing secretions, no stridor.   HENT:   Head: Normocephalic and atraumatic.   Right Ear: External ear normal.   Left Ear: External ear normal.   Nose: Nose normal.   Mouth/Throat: Oropharynx is clear and moist.    Oropharynx is clear, there is no swelling of the lips, tongue, soft palate or uvula.   Eyes: Conjunctivae and EOM are normal. Pupils are equal, round, and reactive to light.   Neck: Normal range of motion. Neck supple.   Cardiovascular: Normal rate, regular rhythm, normal heart sounds and intact distal pulses.    Pulmonary/Chest: Effort normal and breath sounds normal.   Abdominal: Soft. Bowel sounds are normal.   Musculoskeletal: Normal range of motion.   Neurological: He is alert and oriented to person, place, and time.   Skin: Rash noted.   Urticarial rash from the patient's head down to his toes.   Psychiatric: He has a normal mood and affect. His behavior is normal.   Nursing note and vitals reviewed.      ED Course     ED Course     Procedures             Critical Care time:  none               Labs Ordered and Resulted from Time of ED Arrival Up to the Time of Departure from the ED - No data to display  11:30 AM   symptomatically much better.  Not itching.  No nausea.  Pain controlled.  His rash is faded slightly.  No vital sign instability.      Assessments & Plan (with Medical Decision Making)   51-year-old male past medical history reviewed as above, presents to the emergency department with acute development of intensely pruritic urticarial rash without evidence of airway involvement historically or on exam.  Multiple new medications started yesterday so difficult to know exactly which one precipitated this although this does appear to be drug related.  No other environmental contacts that could've precipitated this to his knowledge.  He was medicated in the emergency department per the nursing record and improved.  I've asked him not to take MS Contin or the duloxetine again.  He'll use his oxycodone as he has been using for a considerable period of time for pain and follow up with his new primary care provider Dr. Alanis in the near future.      Disclaimer: This note consists of symbols derived  from keyboarding, dictation and/or voice recognition software. As a result, there may be errors in the script that have gone undetected. Please consider this when interpreting information found in this chart.      I have reviewed the nursing notes.    I have reviewed the findings, diagnosis, plan and need for follow up with the patient.       New Prescriptions    PREDNISONE (DELTASONE) 20 MG TABLET    Take 2 tablets (40 mg) by mouth daily for 2 days    RANITIDINE (ZANTAC) 150 MG TABLET    Take 1 tablet (150 mg) by mouth 2 times daily for 10 days       Final diagnoses:   Allergic reaction, initial encounter       9/15/2017   [facility] EMERGENCY DEPARTMENT     Pipe Mckeon MD  09/15/17 [id number]

## 2017-09-15 NOTE — TELEPHONE ENCOUNTER
Epic med list updated.  Routed to provider as FYI; do you want to list morphine and Cymbalta as allergies?  Arleth Lane RN  [facility]

## 2017-09-15 NOTE — TELEPHONE ENCOUNTER
Reason for call:  Patient reporting a symptom    Symptom or request: Allergic reaction.    Duration (how long have symptoms been present): 1 day    Have you been treated for this before? No    Additional comments:     Phone Number patient can be reached at:  Other phone number:  Amadou Bach  Male, 51 yrs, 1965  MRN:   [id number]    Best Time:  na    Can we leave a detailed message on this number:  Not Applicable    Call taken on 9/15/2017 at 8:17 AM by Dustin Foy

## 2017-09-15 NOTE — TELEPHONE ENCOUNTER
On the way to the hospital.  Went to [facility] and got home and nurse came to change the picc line and had quiet evening  Through the night changing the antibiotics.  Rash over the body and the face  Checked blood sugar and lost consciousness,  Got up a minute later.   Vomited in the bucket.    Karol Kemp.  On the way to find out what.

## 2017-09-15 NOTE — TELEPHONE ENCOUNTER
Reason for Call:  Other call back    Detailed comments: patient calling back with update, Hospital said to D/C both morphine (MS CONTIN) 15 MG 12 hr tablet & DULoxetine (CYMBALTA) 60 MG EC capsule, patient will be taking oxyCODONE (ROXICODONE) 5 MG IR tablet as needed. He still has a rash from the allergic reaction. Patient is back home from ED. Patient made appt for ED f/u on Tuesday, 9/19 with Dr. Alanis.    Phone Number Patient can be reached at: Cell number on file [phone]    Best Time: any    Can we leave a detailed message on this number? YES    Call taken on 9/15/2017 at 12:46 PM by Anastasiia Tamez

## 2017-09-15 NOTE — ED AVS SNAPSHOT
[facility] Emergency Department    [address] [place] [address]    Phone:  [phone]    Fax:  [phone]                                       Amadou Bach   MRN: [id number]    Department:  [facility] Emergency Department   Date of Visit:  9/15/2017           Patient Information     Date Of Birth          1965        Your diagnoses for this visit were:     Allergic reaction, initial encounter        You were seen by Pipe Mckeon MD.      Follow-up Information     Schedule an appointment as soon as possible for a visit with Jeremy Mobley MD.    Specialty:  Family Practice    Why:  As needed, If symptoms worsen    Contact information:    [address]  [place] [address]  [phone]          Discharge Instructions       Benadryl 50 mg 3 times daily ×2 days.  Prednisone 40 mg once daily ×2 days.  Zantac 150 milligrams 2 times daily ×2 days.  Do not take MS Contin or duloxetine again.  Take your previously utilized pain medication and follow-up in the immediate future with your primary care provider to try to come up with an alternate approach to pain management.    24 Hour Appointment Hotline       To make an appointment at any [facility], call [phone] ([phone]). If you don't have a family doctor or clinic, we will help you find one. [facility] clinics are conveniently located to serve the needs of you and your family.             Review of your medicines      START taking        Dose / Directions Last dose taken    predniSONE 20 MG tablet   Commonly known as:  DELTASONE   Dose:  40 mg   Quantity:  2 tablet        Take 2 tablets (40 mg) by mouth daily for 2 days   Refills:  0        ranitidine 150 MG tablet   Commonly known as:  ZANTAC   Dose:  150 mg   Quantity:  4 tablet        Take 1 tablet (150 mg) by mouth 2 times daily for 10 days   Refills:  0          Our records show that you are taking the medicines listed below. If these are  incorrect, please call your family doctor or clinic.        Dose / Directions Last dose taken    aspirin 81 MG EC tablet   Dose:  81 mg   Quantity:  90 tablet        Take 1 tablet (81 mg) by mouth daily   Refills:  3        B-D U/F 31G X 8 MM   Quantity:  300 each   Generic drug:  insulin pen needle        USE 4 TIMES DAILY AS DIRECTED.   Refills:  0        CO Q 10 PO        Take  by mouth. 2 per day   Refills:  0        cyclobenzaprine 10 MG tablet   Commonly known as:  FLEXERIL   Dose:  10 mg   Quantity:  42 tablet        Take 1 tablet (10 mg) by mouth 3 times daily as needed   Refills:  1        DULoxetine 60 MG EC capsule   Commonly known as:  CYMBALTA   Dose:  60 mg   Quantity:  90 capsule        Take 1 capsule (60 mg) by mouth daily   Refills:  1        FISH OIL CONCENTRATE 1000 MG Caps        2 tablets daily   Refills:  0        insulin glargine 100 UNIT/ML injection   Commonly known as:  LANTUS SOLOSTAR   Quantity:  45 mL        INJECT 50 UNITS SUBCUTANEOUSLY AT BEDTIME   Refills:  1        insulin lispro 100 UNIT/ML injection   Commonly known as:  HumaLOG KWIKpen   Quantity:  54 mL        Inject 10-12u before each meal plus correction: Pre-meal and bedtime correction, as per previous sliding scale   Refills:  1        liraglutide 18 MG/3ML soln   Commonly known as:  VICTOZA PEN   Dose:  1.8 mg   Quantity:  27 mL        Inject 1.8 mg Subcutaneous daily   Refills:  1        lisinopril 20 MG tablet   Commonly known as:  PRINIVIL/ZESTRIL   Dose:  20 mg   Quantity:  180 tablet        Take 1 tablet (20 mg) by mouth 2 times daily   Refills:  1        LORazepam 0.5 MG tablet   Commonly known as:  ATIVAN   Quantity:  45 tablet        TAKE 1 TABLET BY MOUTH EVERY 8 HOURS AS NEEDED   Refills:  1        metoprolol 25 MG tablet   Commonly known as:  LOPRESSOR   Dose:  12.5 mg   Quantity:  90 tablet        Take 0.5 tablets (12.5 mg) by mouth 2 times daily   Refills:  1        morphine 15 MG 12 hr tablet   Commonly known  as:  MS CONTIN   Dose:  15 mg   Quantity:  60 tablet        Take 1 tablet (15 mg) by mouth every 12 hours maximum 2 tablet(s) per day   Refills:  0        MULTIPLE VITAMINS PO        1000mg daily   Refills:  0        ondansetron 4 MG tablet   Commonly known as:  ZOFRAN   Dose:  4 mg   Quantity:  30 tablet        Take 1 tablet (4 mg) by mouth every 8 hours as needed for nausea   Refills:  3        ONE TOUCH VERIO IQ test strip   Quantity:  400 strip   Generic drug:  blood glucose monitoring        TEST BLOOD SUGARS UP TO 4 TIMES DAILY OR AS DIRECTED   Refills:  2        * order for DME   Quantity:  1 Device        Equipment being ordered: glucometer brand per insurance   Refills:  0        * order for DME   Quantity:  400 each        LANCETS 4 x DAILY AND PRN   Refills:  4        oxyCODONE 5 MG IR tablet   Commonly known as:  ROXICODONE   Dose:  5 mg   Quantity:  120 tablet        Take 1 tablet (5 mg) by mouth every 4 hours as needed for pain maximum 4 tablet(s) per day   Refills:  0        rosuvastatin 40 MG tablet   Commonly known as:  CRESTOR   Dose:  40 mg   Quantity:  90 tablet        Take 1 tablet (40 mg) by mouth daily Take 40 mg by mouth daily   Refills:  1        VIAGRA 100 MG tablet   Quantity:  12 tablet   Generic drug:  sildenafil        TAKE 1 TABLET BY MOUTH 30 MINS BEFORE INTERCOURSE   Refills:  3        VITAMIN B-12 PO        Take  by mouth daily.   Refills:  0        vitamin D 1000 UNITS capsule   Dose:  2 capsule        Take 2 capsules by mouth daily.   Refills:  0        * Notice:  This list has 2 medication(s) that are the same as other medications prescribed for you. Read the directions carefully, and ask your doctor or other care provider to review them with you.            Prescriptions were sent or printed at these locations (2 Prescriptions)                   [facility]/pharmacy #[id number] - [place], [place] - [address] & [facility]   [address], [place] [address]     Telephone:  [phone]   Fax:  [phone]   Hours:                  E-Prescribed (2 of 2)         ranitidine (ZANTAC) 150 MG tablet               predniSONE (DELTASONE) 20 MG tablet                Procedures and tests performed during your visit     Pulse oximetry nursing      Orders Needing Specimen Collection     None      Pending Results     Date and Time Order Name Status Description    9/14/2017 1520 [facility]PRE[facility] DRUG ANALYSIS UR In process             Pending Culture Results     Date and Time Order Name Status Description    9/14/2017 1520 COMPREHEN DRUG ANALYSIS UR In process             Pending Results Instructions     If you had any lab results that were not finalized at the time of your Discharge, you can call the ED Lab Result RN at [phone]. You will be contacted by this team for any positive Lab results or changes in treatment. The nurses are available 7 days a week from 10A to 6:30P.  You can leave a message 24 hours per day and they will return your call.        Test Results From Your Hospital Stay               Thank you for choosing [facility]       Thank you for choosing [facility] for your care. Our goal is always to provide you with excellent care. Hearing back from our patients is one way we can continue to improve our services. Please take a few minutes to complete the written survey that you may receive in the mail after you visit with us. Thank you!        [employer] Information     [employer] gives you secure access to your electronic health record. If you see a primary care provider, you can also send messages to your care team and make appointments. If you have questions, please call your primary care clinic.  If you do not have a primary care provider, please call [phone] and they will assist you.        Care EveryWhere ID     This is your Care EveryWhere ID. This could be used by other organizations to access your [facility] medical records  [id number]        Equal Access to Services      DEVORAH ARANA : Hadii grace Sharma, waleannada luqadaha, qaybta kaalmakatelyn lama, yamilka melendez. So [facility] [phone].    ATENCIÓN: Si habla español, tiene a chairez disposición servicios gratuitos de asistencia lingüística. Llame al [phone].    We comply with applicable federal civil rights laws and [place] laws. We do not discriminate on the basis of race, color, national origin, age, disability sex, sexual orientation or gender identity.            After Visit Summary       This is your record. Keep this with you and show to your community pharmacist(s) and doctor(s) at your next visit.

## 2017-09-15 NOTE — ED NOTES
Pt has spinal infection after steroid injections after back injury. Gives self iv abx at home via picc. Yesterday saw new md for primary care and was given morphine. Took at 5PM Yesterday and had no problems. Took at 5AM and noticed rash within 1 hr. Last vanco dose 9pm last night. Rash over upper half of body. Given 50 benedryl and 4 zofran IM via ems with improvement. Denies airway impairment

## 2017-09-15 NOTE — ED AVS SNAPSHOT
[facility] Emergency Department    [address] [facility] [address]    Phone:  [phone]    Fax:  [phone]                                       Amadou Bach   MRN: [id number]    Department:  [facility] Emergency Department   Date of Visit:  9/15/2017           After Visit Summary Signature Page     I have received my discharge instructions, and my questions have been answered. I have discussed any challenges I see with this plan with the nurse or doctor.    ..........................................................................................................................................  Patient/Patient Representative Signature      ..........................................................................................................................................  Patient Representative Print Name and Relationship to Patient    ..................................................               ................................................  Date                                            Time    ..........................................................................................................................................  Reviewed by Signature/Title    ...................................................              ..............................................  Date                                                            Time

## 2017-09-15 NOTE — ED NOTES
Bed: ED03  Expected date:   Expected time:   Means of arrival:   Comments:  Ambulance allergic reaction

## 2017-09-15 NOTE — TELEPHONE ENCOUNTER
Attempt # 1  Called patient at home number.  Was call answered?  Yes, relayed below - patient had no questions - states is sleepy from the Benadryl he is taking for the reaction.     Chacha Iraheta RN  [facility]

## 2017-09-15 NOTE — TELEPHONE ENCOUNTER
Wife calling to let us know Amadou took his morphine this AM at 4 am, went back to bed, got up at 7 am and said he did not feel very well, he had rash to back and face, he walked into kitchen to check his blood sugar and he passed out and fell to floor, wife called 911.  States he did not turn blue but is being evaluated at [facility].  She assumes is allergy reaction to the morphine (new med prescribed yesterday at first visit with Dr. Alanis).  She would like a call from Dr. Alanis to discuss, her cell phone is [phone].     We don't yet have consent to communicate on file, I advised she do that at [facility] when Amadou is stabilized.    Routed to Dr. Alanis to address, call wife later after notes are in ED visit.    Arleth Lane RN  [facility]

## 2017-09-15 NOTE — TELEPHONE ENCOUNTER
Attempt # 1  Called patient at home number.    Was call answered?  Yes, saw Zeke yesterday - new scripts written - patient woke up with rash on back and face - diaphoresis - fainted -  911 called - awakened disoriented - vomited - ambulance transported to ED - currently in ED. Wife Paige flannery  [phone] would like a call from Dr. Alanis to discuss where to go from here with his medications.  Routing to provider.  Chacha Iraheta RN  [facility]

## 2017-09-15 NOTE — DISCHARGE INSTRUCTIONS
Benadryl 50 mg 3 times daily ×2 days.  Prednisone 40 mg once daily ×2 days.  Zantac 150 milligrams 2 times daily ×2 days.  Do not take MS Contin or duloxetine again.  Take your previously utilized pain medication and follow-up in the immediate future with your primary care provider to try to come up with an alternate approach to pain management.

## 2017-09-19 ENCOUNTER — TELEPHONE (OUTPATIENT)
Dept: FAMILY MEDICINE | Facility: CLINIC | Age: 52
End: 2017-09-19

## 2017-09-19 NOTE — TELEPHONE ENCOUNTER
Reason for Call:  Other call back    Detailed comments: Pt called to inform Dr Alanis that he is currently hospitalized at the [facility] and will reschedule his appt when he gets out.    Phone Number Patient can be reached at: Cell number on file:    Telephone Information:   Mobile [phone]       Best Time: any    Can we leave a detailed message on this number? YES    Call taken on 9/19/2017 at 8:30 AM by Mariana López

## 2017-09-22 LAB — COMPREHEN DRUG ANALYSIS UR: NORMAL

## 2017-09-27 ENCOUNTER — OFFICE VISIT (OUTPATIENT)
Dept: FAMILY MEDICINE | Facility: CLINIC | Age: 52
End: 2017-09-27
Payer: COMMERCIAL

## 2017-09-27 VITALS
OXYGEN SATURATION: 97 % | TEMPERATURE: 98.1 F | SYSTOLIC BLOOD PRESSURE: 137 MMHG | WEIGHT: 174 LBS | HEART RATE: 94 BPM | BODY MASS INDEX: 28.3 KG/M2 | DIASTOLIC BLOOD PRESSURE: 83 MMHG

## 2017-09-27 DIAGNOSIS — I10 HYPERTENSION GOAL BP (BLOOD PRESSURE) < 140/90: ICD-10-CM

## 2017-09-27 PROCEDURE — 99214 OFFICE O/P EST MOD 30 MIN: CPT | Performed by: INTERNAL MEDICINE

## 2017-09-27 RX ORDER — DAPTOMYCIN 50 MG/ML
500 INJECTION, POWDER, LYOPHILIZED, FOR SOLUTION INTRAVENOUS DAILY
COMMUNITY
Start: 2017-09-21 | End: 2017-10-06

## 2017-09-27 RX ORDER — METOPROLOL TARTRATE 25 MG/1
25 TABLET, FILM COATED ORAL 2 TIMES DAILY
Qty: 180 TABLET | Refills: 3 | COMMUNITY
Start: 2017-09-27 | End: 2017-11-13

## 2017-09-27 RX ORDER — LEVOFLOXACIN 750 MG/1
750 TABLET, FILM COATED ORAL DAILY
COMMUNITY
Start: 2017-09-21 | End: 2017-10-06

## 2017-09-27 ASSESSMENT — PAIN SCALES - GENERAL: PAINLEVEL: NO PAIN (0)

## 2017-09-27 NOTE — PROGRESS NOTES
SUBJECTIVE:   Amadou Bach is a 51 year old male who presents to clinic today for the following health issues:    ED/UC Followup:    Facility:  [facility]  Date of visit: 9/15/17  Reason for visit: Rash  Current Status: Better     Hospital Follow-up Visit:    Hospital/Nursing Home/IP Rehab Facility: [facility]  Date of Admission: 9/17/17  Date of Discharge: 9/22/17  Reason(s) for Admission: Drug Reaction            Problems taking medications regularly:  None       Medication changes since discharge: Levaquin, Dycomycin       Problems adhering to non-medication therapy:  None    Off vancomycin  Unclear which medication  Normal liver and kidney function  White count   Prednisone improved.  On benadryl and zantac for the anti-histamine  MRI and Dr. Espinal  Concern about elevated BP  Appetite down  30 pounds since June    2 weeks improved off pain medications    Better now off the steroid  100-120 not eating as much      Summary of hospitalization:  [facility] discharge summary reviewed  Diagnostic Tests/Treatments reviewed.  Follow up needed: none  Other Healthcare Providers Involved in Patient s Care:         None  Update since discharge: improved.     Post Discharge Medication Reconciliation: discharge medications reconciled and changed, per note/orders (see AVS).  Plan of care communicated with patient     Coding guidelines for this visit:  Type of Medical   Decision Making Face-to-Face Visit       within 7 Days of discharge Face-to-Face Visit        within 14 days of discharge   Moderate Complexity 79876 [id number]   High Complexity 99476 42697                    Problem list and histories reviewed & adjusted, as indicated.  Additional history: as documented    Patient Active Problem List   Diagnosis     Erectile dysfunction     Type 2 diabetes mellitus without complication, without long-term current use of insulin (H)     Hyperlipidemia LDL goal <100     Obesity, Class I, BMI 30-34.9      Generalized anxiety disorder     ACP (advance care planning)     History of adenomatous polyp of colon     Essential hypertension with goal blood pressure less than 140/90     Malignant melanoma of skin of trunk (H)     History of melanoma     AK (actinic keratosis)     Multiple benign nevi     Cherry hemangioma     Lentigines     Acute bilateral low back pain without sciatica     Right-sided low back pain without sciatica, unspecified chronicity     Past Surgical History:   Procedure Laterality Date     BIOPSY OF SKIN LESION       COLONOSCOPY  3/11/2014    Procedure: COMBINED COLONOSCOPY, SINGLE BIOPSY/POLYPECTOMY BY BIOPSY;;  Surgeon: Stephen Amin MD;  Location: [facility] OR     COLONOSCOPY WITH CO2 INSUFFLATION N/A 8/19/2014    Procedure: COLONOSCOPY WITH CO2 INSUFFLATION;  Surgeon: Stephen Amin MD;  Location: [facility] OR     HC TOOTH EXTRACTION W/FORCEP      wisdom teeth x4       Social History   Substance Use Topics     Smoking status: Never Smoker     Smokeless tobacco: Never Used     Alcohol use Yes      Comment: occ     Family History   Problem Relation Age of Onset     DIABETES Mother      Hypertension Mother      DIABETES Father      CANCER Father      lung     CANCER Other      aunt - skin cancer     Skin Cancer Other      Melanoma No family hx of          Current Outpatient Prescriptions   Medication Sig Dispense Refill     levofloxacin (LEVAQUIN) 750 MG tablet Take 750 mg by mouth daily       DAPTOmycin (CUBICIN) 500 MG injection Inject 500 mg into the vein daily       metoprolol (LOPRESSOR) 25 MG tablet Take 1 tablet (25 mg) by mouth 2 times daily 180 tablet 3     order for DME 1 ambulatory blood pressure cuff 1 each 0     B-D U/F 31G X 8 MM insulin pen needle USE 4 TIMES DAILY AS DIRECTED. 300 each 0     VIAGRA 100 MG cap/tab TAKE 1 TABLET BY MOUTH 30 MINS BEFORE INTERCOURSE 12 tablet 3     LORazepam (ATIVAN) 0.5 MG tablet TAKE 1 TABLET BY MOUTH EVERY 8 HOURS AS NEEDED 45 tablet 1     ONE TOUCH  VERIO IQ test strip TEST BLOOD SUGARS UP TO 4 TIMES DAILY OR AS DIRECTED 400 strip 2     liraglutide (VICTOZA PEN) 18 MG/3ML soln Inject 1.8 mg Subcutaneous daily 27 mL 1     lisinopril (PRINIVIL/ZESTRIL) 20 MG tablet Take 1 tablet (20 mg) by mouth 2 times daily 180 tablet 1     aspirin 81 MG EC tablet Take 1 tablet (81 mg) by mouth daily 90 tablet 3     rosuvastatin (CRESTOR) 40 MG tablet Take 1 tablet (40 mg) by mouth daily Take 40 mg by mouth daily 90 tablet 1     insulin glargine (LANTUS SOLOSTAR) 100 UNIT/ML injection INJECT 50 UNITS SUBCUTANEOUSLY AT BEDTIME 45 mL 1     insulin lispro (HUMALOG KWIKPEN) 100 UNIT/ML injection Inject 10-12u before each meal plus correction: Pre-meal and bedtime correction, as per previous sliding scale 54 mL 1     ORDER FOR DME Equipment being ordered: glucometer brand per insurance 1 Device 0     ORDER FOR DME LANCETS 4 x DAILY AND  each 4     Cyanocobalamin (VITAMIN B-12 PO) Take  by mouth daily.       Coenzyme Q10 (CO Q 10 PO) Take  by mouth. 2 per day       FISH OIL CONCENTRATE 1000 MG OR CAPS 2 tablets daily       VITAMIN D 1000 UNIT OR CAPS Take 2 capsules by mouth daily.       MULTIPLE VITAMINS OR 1000mg daily       oxyCODONE (ROXICODONE) 5 MG IR tablet Take 1 tablet (5 mg) by mouth every 4 hours as needed for pain maximum 4 tablet(s) per day (Patient not taking: Reported on 9/27/2017) 120 tablet 0     ondansetron (ZOFRAN) 4 MG tablet Take 1 tablet (4 mg) by mouth every 8 hours as needed for nausea (Patient not taking: Reported on 9/27/2017) 30 tablet 3     Allergies   Allergen Reactions     Cymbalta      Ms Contin [Morphine]      Recent Labs   Lab Test  03/27/17   1559  08/12/16   0913  11/13/15   0814  02/02/15   0911  08/04/14   1115  02/06/14   0753  09/17/13   0704   A1C  8.2*  7.3*  7.9*  6.6*  6.9*  6.6*   --    LDL   --   101*  138*  Cannot estimate LDL when triglyceride exceeds 400 mg/dL  117   --   63  84   HDL   --   38*  47  30*   --   35*  47   TRIG    --   354*  235*  414*   --   138  222*   ALT   --    --   35   --    --   41  49   CR  0.69  0.77  0.70  0.77   --   0.98   --    GFRESTIMATED  >90  Non  GFR Calc    >90  Non  GFR Calc    >90  Non  GFR Calc    >90  Non  GFR Calc     --   81   --    GFRESTBLACK  >90   GFR Calc    >90   GFR Calc    >90   GFR Calc    >90   GFR Calc     --   >90   --    POTASSIUM  3.5  4.2  3.9  3.7   --   4.1   --    TSH   --    --   1.64   --   1.23   --    --             Reviewed and updated as needed this visit by clinical staff       Reviewed and updated as needed this visit by Provider         ROS:  Constitutional, HEENT, cardiovascular, pulmonary, gi and gu systems are negative, except as otherwise noted.      OBJECTIVE:   /83 (BP Location: Left arm, Patient Position: Chair, Cuff Size: Adult Regular)  Pulse 94  Temp 98.1  F (36.7  C) (Oral)  Wt 174 lb (78.9 kg)  SpO2 97%  BMI 28.3 kg/m2  Body mass index is 28.3 kg/(m^2).  GENERAL: healthy, alert and no distress  NECK: no adenopathy, no asymmetry, masses, or scars and thyroid normal to palpation  RESP: lungs clear to auscultation - no rales, rhonchi or wheezes  CV: regular rate and rhythm, normal S1 S2, no S3 or S4, no murmur, click or rub, no peripheral edema and peripheral pulses strong  ABDOMEN: soft, nontender, no hepatosplenomegaly, no masses and bowel sounds normal  MS: no gross musculoskeletal defects noted, no edema    Diagnostic Test Results:  Results for orders placed or performed in visit on 09/14/17   Drug  Screen Comprehensive, Urine w/o Reported Meds (Pain Care Package)   Result Value Ref Range    Comprehen Drug Analysis UR FINAL        ASSESSMENT/PLAN:       ICD-10-CM    1. Hypertension goal BP (blood pressure) < 140/90 I10 metoprolol (LOPRESSOR) 25 MG tablet     order for DME     2. Patient off medications for back pain  3.  Continues on antibiotics off vanco and rocephin.  Is having a good clinical response.  Rash has resolved.  MRI pending for upcoming evalaution    Off cymbalta unsure which medication triggered change.  Tried to convert to longer acting med as patient was developing break through symptoms      Will continue follow-up with Alex Alanis MD  [facility]

## 2017-09-27 NOTE — NURSING NOTE
"Chief Complaint   Patient presents with     Hospital F/U       Initial /83 (BP Location: Left arm, Patient Position: Chair, Cuff Size: Adult Regular)  Pulse 94  Temp 98.1  F (36.7  C) (Oral)  Wt 174 lb (78.9 kg)  SpO2 97%  BMI 28.3 kg/m2 Estimated body mass index is 28.3 kg/(m^2) as calculated from the following:    Height as of 8/31/17: 5' 5.75\" (1.67 m).    Weight as of this encounter: 174 lb (78.9 kg).  Medication Reconciliation: complete   Vidhi Andre MA      "

## 2017-10-04 DIAGNOSIS — F41.1 GENERALIZED ANXIETY DISORDER: ICD-10-CM

## 2017-10-04 NOTE — TELEPHONE ENCOUNTER
LORazepam (ATIVAN) 0.5 MG tablet      Last Written Prescription Date:  5/25/17  Last Fill Quantity: 45,   # refills: 1  Last Office Visit with [facility] or [facility] prescribing provider: 9/27/17  Future Office visit:       Routing refill request to provider for review/approval because:  Drug not on the [facility], [facility] or [facility] refill protocol or controlled substance

## 2017-10-05 RX ORDER — LORAZEPAM 0.5 MG/1
TABLET ORAL
Qty: 45 TABLET | Refills: 1 | Status: SHIPPED | OUTPATIENT
Start: 2017-10-05 | End: 2017-12-28

## 2017-10-09 ENCOUNTER — OFFICE VISIT (OUTPATIENT)
Dept: FAMILY MEDICINE | Facility: CLINIC | Age: 52
End: 2017-10-09
Payer: COMMERCIAL

## 2017-10-09 ENCOUNTER — TELEPHONE (OUTPATIENT)
Dept: FAMILY MEDICINE | Facility: CLINIC | Age: 52
End: 2017-10-09

## 2017-10-09 VITALS
HEART RATE: 86 BPM | DIASTOLIC BLOOD PRESSURE: 80 MMHG | OXYGEN SATURATION: 100 % | TEMPERATURE: 97 F | WEIGHT: 177 LBS | BODY MASS INDEX: 28.79 KG/M2 | SYSTOLIC BLOOD PRESSURE: 158 MMHG

## 2017-10-09 DIAGNOSIS — E78.5 HYPERLIPIDEMIA LDL GOAL <100: ICD-10-CM

## 2017-10-09 DIAGNOSIS — Z23 NEED FOR PROPHYLACTIC VACCINATION AND INOCULATION AGAINST INFLUENZA: ICD-10-CM

## 2017-10-09 DIAGNOSIS — E11.9 TYPE 2 DIABETES MELLITUS WITHOUT COMPLICATION, WITH LONG-TERM CURRENT USE OF INSULIN (H): ICD-10-CM

## 2017-10-09 DIAGNOSIS — E11.9 TYPE 2 DIABETES, HBA1C GOAL < 7% (H): ICD-10-CM

## 2017-10-09 DIAGNOSIS — I10 HTN, GOAL BELOW 140/90: Primary | ICD-10-CM

## 2017-10-09 DIAGNOSIS — Z79.4 TYPE 2 DIABETES MELLITUS WITHOUT COMPLICATION, WITH LONG-TERM CURRENT USE OF INSULIN (H): ICD-10-CM

## 2017-10-09 DIAGNOSIS — E66.09 NON MORBID OBESITY DUE TO EXCESS CALORIES: ICD-10-CM

## 2017-10-09 DIAGNOSIS — D12.6 BENIGN NEOPLASM OF COLON, UNSPECIFIED PART OF COLON: ICD-10-CM

## 2017-10-09 DIAGNOSIS — I10 ESSENTIAL HYPERTENSION WITH GOAL BLOOD PRESSURE LESS THAN 140/90: ICD-10-CM

## 2017-10-09 DIAGNOSIS — E11.9 TYPE 2 DIABETES MELLITUS WITHOUT COMPLICATION, WITHOUT LONG-TERM CURRENT USE OF INSULIN (H): ICD-10-CM

## 2017-10-09 PROCEDURE — 90686 IIV4 VACC NO PRSV 0.5 ML IM: CPT | Performed by: INTERNAL MEDICINE

## 2017-10-09 PROCEDURE — 99214 OFFICE O/P EST MOD 30 MIN: CPT | Mod: 25 | Performed by: INTERNAL MEDICINE

## 2017-10-09 PROCEDURE — 90471 IMMUNIZATION ADMIN: CPT | Performed by: INTERNAL MEDICINE

## 2017-10-09 PROCEDURE — 99207 C FOOT EXAM  NO CHARGE: CPT | Performed by: INTERNAL MEDICINE

## 2017-10-09 RX ORDER — LIRAGLUTIDE 6 MG/ML
1.8 INJECTION SUBCUTANEOUS DAILY
Qty: 27 ML | Refills: 11 | Status: SHIPPED | OUTPATIENT
Start: 2017-10-09 | End: 2018-10-26

## 2017-10-09 RX ORDER — LOSARTAN POTASSIUM 100 MG/1
100 TABLET ORAL DAILY
Qty: 90 TABLET | Refills: 3 | Status: SHIPPED | OUTPATIENT
Start: 2017-10-09 | End: 2018-11-15

## 2017-10-09 ASSESSMENT — PAIN SCALES - GENERAL: PAINLEVEL: NO PAIN (0)

## 2017-10-09 NOTE — TELEPHONE ENCOUNTER
190/90 100  Bee stings -     Amadou Bach is a 51 year old male who calls with hypertension and bee sting yesterday October 8 with swelling in R arm from wrist on underside of arm climbing almost to elbow from the bee sting.    NURSING ASSESSMENT:  DENIES: chest pain, headache, lightheadedness, dizziness, swelling of tongue/face, SOB,   Description:  Increased BP med a week ago - week in [facility] - rash due to Vancomycin   Onset/duration:  Hypertension in hospital  Precip. factors:  Steroid - off steroid 2 weeks - IV infusion of ABX at home - stopped - rash is mostly cleared  Associated symptoms:  Denies headaches - or other symptoms  Improves/worsens symptoms:  Benadryl and Zantac for the bee sting - helping some  Pain scale (0-10)   4/10 in arm  LMP/preg/breast feeding:  na  Last exam/Treatment:  9/27/17  Allergies:   Allergies   Allergen Reactions     Cymbalta      Ms Contin [Morphine]        MEDICATIONS:   Taking medication(s) as prescribed? Yes  Taking over the counter medication(s?) Yes and N/A benadryl and Zantac for the bee sting, CoQ10, multi vitamin  Any medication side effects? Not Applicable    Any barriers to taking medication(s) as prescribed?  No  Medication(s) improving/managing symptoms?  No  Medication reconciliation completed: N/A      NURSING PLAN: Nursing advice to patient come to clinic - appointment schedule    RECOMMENDED DISPOSITION:  See in 2 to 4 hours - appointment scheduled   Will comply with recommendation: Yes  If further questions/concerns or if symptoms do not improve, worsen or new symptoms develop, call your PCP or [facility] as soon as possible.      Guideline used: hypertension page 347 to 349 and allergic reaction page 26 to 28   Telephone Triage Protocols for Nurses, Fifth Edition, Rabia Iraheta RN

## 2017-10-09 NOTE — NURSING NOTE
"Chief Complaint   Patient presents with     Hypertension       Initial BP (!) 179/100 (BP Location: Right arm, Patient Position: Chair, Cuff Size: Adult Regular)  Pulse 86  Temp 97  F (36.1  C) (Oral)  Wt 177 lb (80.3 kg)  SpO2 100%  BMI 28.79 kg/m2 Estimated body mass index is 28.79 kg/(m^2) as calculated from the following:    Height as of 8/31/17: 5' 5.75\" (1.67 m).    Weight as of this encounter: 177 lb (80.3 kg).  Medication Reconciliation: complete   Vidhi Andre MA      "

## 2017-10-09 NOTE — NURSING NOTE
Prior to injection verified patient identity using patient's name and date of birth.  Vidhi Andre MA    Per orders of Dr. Alanis, injection of Flu given by Vidhi Andre. Patient instructed to remain in clinic for 15 minutes afterwards, and to report any adverse reaction to me immediately.  Vidhi Andre MA    VIS for Flu given on same date of administration.  Staff signature/Title: Vidhi Andre MA

## 2017-10-09 NOTE — PROGRESS NOTES
Injectable Influenza Immunization Documentation    1.  Is the person to be vaccinated sick today?   No    2. Does the person to be vaccinated have an allergy to a component   of the vaccine?   No    3. Has the person to be vaccinated ever had a serious reaction   to influenza vaccine in the past?   No    4. Has the person to be vaccinated ever had Guillain-Barré syndrome?   No    Form completed by Vidhi Andre MA

## 2017-10-09 NOTE — TELEPHONE ENCOUNTER
VICTOZA PEN 18 MG/3ML soln (Discontinued)         Last Written Prescription Date: D/C 11-10-15  Last Fill Quantity: n/a, # refills: n/a  Last Office Visit with G, P or [facility] prescribing provider:  9-27-17   Next 5 appointments (look out 90 days)     Oct 09, 2017 11:40 AM CDT   [facility]    [address] [facility] [address] [phone]                   BP Readings from Last 3 Encounters:   09/27/17 137/83   09/15/17 144/87   09/14/17 134/77     Lab Results   Component Value Date    MICROL 65 03/27/2017     Lab Results   Component Value Date    UMALCR 34.79 03/27/2017     Creatinine   Date Value Ref Range Status   03/27/2017 0.69 0.66 - 1.25 mg/dL Final   ]  GFR Estimate   Date Value Ref Range Status   03/27/2017 >90  Non  GFR Calc   >60 mL/min/1.7m2 Final   08/12/2016 >90  Non  GFR Calc   >60 mL/min/1.7m2 Final   11/13/2015 >90  Non  GFR Calc   >60 mL/min/1.7m2 Final     GFR Estimate If Black   Date Value Ref Range Status   03/27/2017 >90   GFR Calc   >60 mL/min/1.7m2 Final   08/12/2016 >90   GFR Calc   >60 mL/min/1.7m2 Final   11/13/2015 >90   GFR Calc   >60 mL/min/1.7m2 Final     Lab Results   Component Value Date    CHOL 210 08/12/2016     Lab Results   Component Value Date    HDL 38 08/12/2016     Lab Results   Component Value Date     08/12/2016     Lab Results   Component Value Date    TRIG 354 08/12/2016     Lab Results   Component Value Date    CHOLHDLRATIO 4.9 11/13/2015     Lab Results   Component Value Date    AST 8 11/13/2015     Lab Results   Component Value Date    ALT 35 11/13/2015     Lab Results   Component Value Date    A1C 8.2 03/27/2017    A1C 7.3 08/12/2016    A1C 7.9 11/13/2015    A1C 6.6 02/02/2015    A1C 6.9 08/04/2014     Potassium   Date Value Ref Range Status    03/27/2017 3.5 3.4 - 5.3 mmol/L Final

## 2017-10-09 NOTE — MR AVS SNAPSHOT
After Visit Summary   10/9/2017    Amadou Bach    MRN: [id number]           Patient Information     Date Of Birth          1965        Visit Information        Provider Department      10/9/2017 11:40 AM Joseph Alanis MD [facility]        Today's Diagnoses     HTN, goal below 140/90    -  1    Type 2 diabetes mellitus without complication, without long-term current use of insulin (H)        Hyperlipidemia LDL goal <100        Essential hypertension with goal blood pressure less than 140/90        Benign neoplasm of colon, unspecified part of colon        Type 2 diabetes mellitus without complication, with long-term current use of insulin (H)        Non morbid obesity due to excess calories           Follow-ups after your visit        Additional Services     GASTROENTEROLOGY ADULT REF PROCEDURE ONLY       Last Lab Result: Creatinine (mg/dL)       Date                     Value                 03/27/2017               0.69             ----------  Body mass index is 28.79 kg/(m^2).     Needed:  No  Language:  English    Patient will be contacted to schedule procedure.     Please be aware that coverage of these services is subject to the terms and limitations of your health insurance plan.  Call member services at your health plan with any benefit or coverage questions.  Any procedures must be performed at a [facility] facility OR coordinated by your clinic's referral office.    Please bring the following with you to your appointment:    (1) Any X-Rays, CTs or MRIs which have been performed.  Contact the facility where they were done to arrange for  prior to your scheduled appointment.    (2) List of current medications   (3) This referral request   (4) Any documents/labs given to you for this referral                  Your next 10 appointments already scheduled     Oct 11, 2017   Procedure with Jeremy Mcqueen DO   [facility] (--)     [address] [place] [address]   [phone]              Future tests that were ordered for you today     Open Future Orders        Priority Expected Expires Ordered    Basic metabolic panel Routine  4/9/2018 10/9/2017    Hepatic panel (Albumin, ALT, AST, Bili, Alk Phos, TP) Routine  4/9/2018 10/9/2017    Albumin Random Urine Quantitative with Creat Ratio Routine 10/9/2017 10/9/2018 10/9/2017    Lipid panel reflex to direct LDL Routine  4/9/2018 10/9/2017    Hemoglobin A1c Routine  4/9/2018 10/9/2017            Who to contact     If you have questions or need follow up information about today's clinic visit or your schedule please contact [facility] directly at [phone].  Normal or non-critical lab and imaging results will be communicated to you by [email or web address], letter or phone within 4 business days after the clinic has received the results. If you do not hear from us within 7 days, please contact the clinic through [email or web address] or phone. If you have a critical or abnormal lab result, we will notify you by phone as soon as possible.  Submit refill requests through uniRow or call your pharmacy and they will forward the refill request to us. Please allow 3 business days for your refill to be completed.          Additional Information About Your Visit        [email or web address] Information     uniRow gives you secure access to your electronic health record. If you see a primary care provider, you can also send messages to your care team and make appointments. If you have questions, please call your primary care clinic.  If you do not have a primary care provider, please call [phone] and they will assist you.        Care EveryWhere ID     This is your Care EveryWhere ID. This could be used by other organizations to access your [facility] medical records  [id number]        Your Vitals Were     Pulse Temperature Pulse Oximetry BMI (Body Mass Index)          86 97  F (36.1  C) (Oral) 100%  28.79 kg/m2         Blood Pressure from Last 3 Encounters:   10/09/17 158/80   09/27/17 137/83   09/15/17 144/87    Weight from Last 3 Encounters:   10/09/17 177 lb (80.3 kg)   09/27/17 174 lb (78.9 kg)   09/15/17 175 lb (79.4 kg)              We Performed the Following     FOOT EXAM     GASTROENTEROLOGY ADULT REF PROCEDURE ONLY          Today's Medication Changes          These changes are accurate as of: 10/9/17 12:40 PM.  If you have any questions, ask your nurse or doctor.               Start taking these medicines.        Dose/Directions    losartan 100 MG tablet   Commonly known as:  COZAAR   Used for:  HTN, goal below 140/90   Started by:  Joseph Alanis MD        Dose:  100 mg   Take 1 tablet (100 mg) by mouth daily   Quantity:  90 tablet   Refills:  3         Stop taking these medicines if you haven't already. Please contact your care team if you have questions.     lisinopril 20 MG tablet   Commonly known as:  PRINIVIL/ZESTRIL   Stopped by:  Joseph Alanis MD           oxyCODONE 5 MG IR tablet   Commonly known as:  ROXICODONE   Stopped by:  Joseph Alanis MD                Where to get your medicines      These medications were sent to [facility] #[id number] - [place] - [address] [place] [address] AT [address] [facility]  [address] [place] [address]     Phone:  [phone]     liraglutide 18 MG/3ML soln    losartan 100 MG tablet                Primary Care Provider Office Phone # Fax #    Joseph Alanis [phone]       [address] [facility] [address]        Equal Access to Services     [facility]: Hadii aad ku hadasho Soomaali, waaxda luqadaha, qaybta kaalmada adeegyada, yamilka melendez. So [facility] [phone].    ATENCIÓN: Si habla español, tiene a chairez disposición servicios gratuitos de asistencia lingüística. Anthonyame al [phone].    We comply with applicable federal civil rights laws and [place] laws. We do not  discriminate on the basis of race, color, national origin, age, disability, sex, sexual orientation, or gender identity.            Thank you!     Thank you for choosing [facility]  for your care. Our goal is always to provide you with excellent care. Hearing back from our patients is one way we can continue to improve our services. Please take a few minutes to complete the written survey that you may receive in the mail after your visit with us. Thank you!             Your Updated Medication List - Protect others around you: Learn how to safely use, store and throw away your medicines at www.disposemymeds.org.          This list is accurate as of: 10/9/17 12:40 PM.  Always use your most recent med list.                   Brand Name Dispense Instructions for use Diagnosis    aspirin 81 MG EC tablet     90 tablet    Take 1 tablet (81 mg) by mouth daily    Type 2 diabetes mellitus without complication, unspecified long term insulin use status (H)       B-D U/F 31G X 8 MM   Generic drug:  insulin pen needle     300 each    USE 4 TIMES DAILY AS DIRECTED.    Type 2 diabetes mellitus without complication (H)       CO Q 10 PO      Take  by mouth. 2 per day    Type 2 diabetes, HbA1c goal < 7% (H)       FISH OIL CONCENTRATE 1000 MG Caps      2 tablets daily        insulin glargine 100 UNIT/ML injection    LANTUS SOLOSTAR    45 mL    INJECT 50 UNITS SUBCUTANEOUSLY AT BEDTIME    Type 2 diabetes mellitus without complication, unspecified long term insulin use status (H)       insulin lispro 100 UNIT/ML injection    HumaLOG KWIKpen    54 mL    Inject 10-12u before each meal plus correction: Pre-meal and bedtime correction, as per previous sliding scale    Type 2 diabetes mellitus without complication, unspecified long term insulin use status (H)       liraglutide 18 MG/3ML soln    VICTOZA PEN    27 mL    Inject 1.8 mg Subcutaneous daily    Type 2 diabetes mellitus without complication, with long-term  current use of insulin (H), Non morbid obesity due to excess calories       LORazepam 0.5 MG tablet    ATIVAN    45 tablet    TAKE 1 TABLET BY MOUTH EVERY 8 HOURS AS NEEDED    Generalized anxiety disorder       losartan 100 MG tablet    COZAAR    90 tablet    Take 1 tablet (100 mg) by mouth daily    HTN, goal below 140/90       metoprolol 25 MG tablet    LOPRESSOR    180 tablet    Take 1 tablet (25 mg) by mouth 2 times daily    Hypertension goal BP (blood pressure) < 140/90       MULTIPLE VITAMINS PO      1000mg daily        ondansetron 4 MG tablet    ZOFRAN    30 tablet    Take 1 tablet (4 mg) by mouth every 8 hours as needed for nausea    Chronic pain syndrome       ONE TOUCH VERIO IQ test strip   Generic drug:  blood glucose monitoring     400 strip    TEST BLOOD SUGARS UP TO 4 TIMES DAILY OR AS DIRECTED    Type 2 diabetes mellitus without complication, with long-term current use of insulin (H)       * order for DME     1 Device    Equipment being ordered: glucometer brand per insurance    Type 2 diabetes, HbA1c goal < 7% (H)       * order for DME     400 each    LANCETS 4 x DAILY AND PRN    Type 2 diabetes, HbA1c goal < 7% (H)       * order for DME     1 each    1 ambulatory blood pressure cuff    Hypertension goal BP (blood pressure) < 140/90       rosuvastatin 40 MG tablet    CRESTOR    90 tablet    Take 1 tablet (40 mg) by mouth daily Take 40 mg by mouth daily    Hyperlipidemia LDL goal <100       VIAGRA 100 MG tablet   Generic drug:  sildenafil     12 tablet    TAKE 1 TABLET BY MOUTH 30 MINS BEFORE INTERCOURSE    Erectile dysfunction, unspecified erectile dysfunction type       VITAMIN B-12 PO      Take  by mouth daily.        vitamin D 1000 UNITS capsule      Take 2 capsules by mouth daily.        * Notice:  This list has 3 medication(s) that are the same as other medications prescribed for you. Read the directions carefully, and ask your doctor or other care provider to review them with you.

## 2017-10-09 NOTE — PROGRESS NOTES
SUBJECTIVE:   Amadou Bach is a 51 year old male who presents to clinic today for the following health issues:    Hypertension Follow-up      Outpatient blood pressures are being checked at home.  Results are 170-190/80-90.  Pulse is 100-110    Low Salt Diet: low salt    Amount of exercise or physical activity: 2-3 days/week for an average of 15-30 minutes    Problems taking medications regularly: No    Medication side effects: none    Diet: regular (no restrictions) and low salt  Stung by wasp   Took off antibiotics  Removed picc line   Oral and iv are stopped  Off narcotics 3 weeks  Wednesday colonoscopy  3rd in 3 years - polyps each time   Working with endocrinology  Blood sugar 60 get symptomatic          Problem list and histories reviewed & adjusted, as indicated.  Additional history: as documented    Patient Active Problem List   Diagnosis     Erectile dysfunction     Type 2 diabetes mellitus without complication, without long-term current use of insulin (H)     Hyperlipidemia LDL goal <100     Obesity, Class I, BMI 30-34.9     Generalized anxiety disorder     ACP (advance care planning)     History of adenomatous polyp of colon     Essential hypertension with goal blood pressure less than 140/90     Malignant melanoma of skin of trunk (H)     History of melanoma     AK (actinic keratosis)     Multiple benign nevi     Cherry hemangioma     Lentigines     Acute bilateral low back pain without sciatica     Right-sided low back pain without sciatica, unspecified chronicity     Past Surgical History:   Procedure Laterality Date     BIOPSY OF SKIN LESION       COLONOSCOPY  3/11/2014    Procedure: COMBINED COLONOSCOPY, SINGLE BIOPSY/POLYPECTOMY BY BIOPSY;;  Surgeon: tSephen Amin MD;  Location: [facility] OR     COLONOSCOPY WITH CO2 INSUFFLATION N/A 8/19/2014    Procedure: COLONOSCOPY WITH CO2 INSUFFLATION;  Surgeon: Stephen Amin MD;  Location: [facility] OR     HC TOOTH EXTRACTION W/FORCEP      wisdom  teeth x4       Social History   Substance Use Topics     Smoking status: Never Smoker     Smokeless tobacco: Never Used     Alcohol use Yes      Comment: occ     Family History   Problem Relation Age of Onset     DIABETES Mother      Hypertension Mother      DIABETES Father      CANCER Father      lung     CANCER Other      aunt - skin cancer     Skin Cancer Other      Melanoma No family hx of          Current Outpatient Prescriptions   Medication Sig Dispense Refill     LORazepam (ATIVAN) 0.5 MG tablet TAKE 1 TABLET BY MOUTH EVERY 8 HOURS AS NEEDED 45 tablet 1     metoprolol (LOPRESSOR) 25 MG tablet Take 1 tablet (25 mg) by mouth 2 times daily 180 tablet 3     order for DME 1 ambulatory blood pressure cuff 1 each 0     ondansetron (ZOFRAN) 4 MG tablet Take 1 tablet (4 mg) by mouth every 8 hours as needed for nausea 30 tablet 3     B-D U/F 31G X 8 MM insulin pen needle USE 4 TIMES DAILY AS DIRECTED. 300 each 0     VIAGRA 100 MG cap/tab TAKE 1 TABLET BY MOUTH 30 MINS BEFORE INTERCOURSE 12 tablet 3     ONE TOUCH VERIO IQ test strip TEST BLOOD SUGARS UP TO 4 TIMES DAILY OR AS DIRECTED 400 strip 2     liraglutide (VICTOZA PEN) 18 MG/3ML soln Inject 1.8 mg Subcutaneous daily 27 mL 1     lisinopril (PRINIVIL/ZESTRIL) 20 MG tablet Take 1 tablet (20 mg) by mouth 2 times daily 180 tablet 1     aspirin 81 MG EC tablet Take 1 tablet (81 mg) by mouth daily 90 tablet 3     rosuvastatin (CRESTOR) 40 MG tablet Take 1 tablet (40 mg) by mouth daily Take 40 mg by mouth daily 90 tablet 1     insulin glargine (LANTUS SOLOSTAR) 100 UNIT/ML injection INJECT 50 UNITS SUBCUTANEOUSLY AT BEDTIME 45 mL 1     insulin lispro (HUMALOG KWIKPEN) 100 UNIT/ML injection Inject 10-12u before each meal plus correction: Pre-meal and bedtime correction, as per previous sliding scale 54 mL 1     ORDER FOR DME Equipment being ordered: glucometer brand per insurance 1 Device 0     ORDER FOR DME LANCETS 4 x DAILY AND  each 4     Cyanocobalamin  (VITAMIN B-12 PO) Take  by mouth daily.       Coenzyme Q10 (CO Q 10 PO) Take  by mouth. 2 per day       FISH OIL CONCENTRATE 1000 MG OR CAPS 2 tablets daily       VITAMIN D 1000 UNIT OR CAPS Take 2 capsules by mouth daily.       MULTIPLE VITAMINS OR 1000mg daily       Allergies   Allergen Reactions     Cymbalta      Ms Contin [Morphine]      Recent Labs   Lab Test  03/27/17   1559  08/12/16   0913  11/13/15   0814  02/02/15   0911  08/04/14   1115  02/06/14   0753  09/17/13   0704   A1C  8.2*  7.3*  7.9*  6.6*  6.9*  6.6*   --    LDL   --   101*  138*  Cannot estimate LDL when triglyceride exceeds 400 mg/dL  117   --   63  84   HDL   --   38*  47  30*   --   35*  47   TRIG   --   354*  235*  414*   --   138  222*   ALT   --    --   35   --    --   41  49   CR  0.69  0.77  0.70  0.77   --   0.98   --    GFRESTIMATED  >90  Non  GFR Calc    >90  Non  GFR Calc    >90  Non  GFR Calc    >90  Non  GFR Calc     --   81   --    GFRESTBLACK  >90   GFR Calc    >90   GFR Calc    >90   GFR Calc    >90   GFR Calc     --   >90   --    POTASSIUM  3.5  4.2  3.9  3.7   --   4.1   --    TSH   --    --   1.64   --   1.23   --    --             [facility]  M[facility]       Reviewed and updated as needed this visit by Provider         ROS:  Constitutional, HEENT, cardiovascular, pulmonary, gi and gu systems are negative, except as otherwise noted.      OBJECTIVE:   /80  Pulse 86  Temp 97  F (36.1  C) (Oral)  Wt 177 lb (80.3 kg)  SpO2 100%  BMI 28.79 kg/m2  Body mass index is 28.79 kg/(m^2).  GENERAL: healthy, alert and no distress  EYES: Eyes grossly normal to inspection, PERRL and conjunctivae and sclerae normal  HENT: ear canals and TM's normal, nose and mouth without ulcers or lesions  NECK: no adenopathy, no asymmetry, masses, or scars and  thyroid normal to palpation  RESP: lungs clear to auscultation - no rales, rhonchi or wheezes  CV: regular rate and rhythm, normal S1 S2, no S3 or S4, no murmur, click or rub, no peripheral edema and peripheral pulses strong  ABDOMEN: soft, nontender, no hepatosplenomegaly, no masses and bowel sounds normal  MS: no gross musculoskeletal defects noted, no edema  SKIN: no suspicious lesions or rashes  NEURO: Normal strength and tone, mentation intact and speech normal  BACK: no CVA tenderness, no paralumbar tenderness  PSYCH: mentation appears normal, affect normal/bright    Diagnostic Test Results:  Results for orders placed or performed in visit on 09/14/17   Drug  Screen Comprehensive, Urine w/o Reported Meds (Pain Care Package)   Result Value Ref Range    Comprehen Drug Analysis UR FINAL        ASSESSMENT/PLAN:       ICD-10-CM    1. HTN, goal below 140/90 I10 losartan (COZAAR) 100 MG tablet   2. Type 2 diabetes mellitus without complication, without long-term current use of insulin (H) E11.9 Hemoglobin A1c     FOOT EXAM     Albumin Random Urine Quantitative with Creat Ratio   3. Hyperlipidemia LDL goal <100 E78.5 Lipid panel reflex to direct LDL     Hepatic panel (Albumin, ALT, AST, Bili, Alk Phos, TP)   4. Essential hypertension with goal blood pressure less than 140/90 I10 Basic metabolic panel   5. Benign neoplasm of colon, unspecified part of colon D12.6 GASTROENTEROLOGY ADULT REF PROCEDURE ONLY   6. Type 2 diabetes mellitus without complication, with long-term current use of insulin (H) E11.9 liraglutide (VICTOZA PEN) 18 MG/3ML soln    Z79.4    7. Non morbid obesity due to excess calories E66.09 liraglutide (VICTOZA PEN) 18 MG/3ML soln   8. Need for prophylactic vaccination and inoculation against influenza Z23 FLU VAC, SPLIT VIRUS IM > 3 YO (QUADRIVALENT) [60556]     Vaccine Administration, Initial [88013]     BP     158/80  10/10/2017    Lab Results   Component Value Date     03/27/2017     Lab  Results   Component Value Date    A1C 8.2 03/27/2017     Lab Results   Component Value Date     08/12/2016     Lab Results   Component Value Date    MICROL 65 03/27/2017     No results found for: MICROALBUMIN          Joseph Alanis MD  [facility]

## 2017-10-10 ENCOUNTER — TELEPHONE (OUTPATIENT)
Dept: FAMILY MEDICINE | Facility: CLINIC | Age: 52
End: 2017-10-10

## 2017-10-10 DIAGNOSIS — I10 HYPERTENSION GOAL BP (BLOOD PRESSURE) < 140/90: ICD-10-CM

## 2017-10-10 RX ORDER — LISINOPRIL 20 MG/1
TABLET ORAL
Qty: 180 TABLET | Refills: 1 | OUTPATIENT
Start: 2017-10-10

## 2017-10-10 RX ORDER — LIRAGLUTIDE 6 MG/ML
INJECTION SUBCUTANEOUS
OUTPATIENT
Start: 2017-10-10

## 2017-10-10 RX ORDER — METOPROLOL TARTRATE 25 MG/1
TABLET, FILM COATED ORAL
Qty: 90 TABLET | Refills: 1 | OUTPATIENT
Start: 2017-10-10

## 2017-10-10 NOTE — TELEPHONE ENCOUNTER
PA is needed for the medication, Victoza 18mg.     Insurance has been called.  Alternatives that are covered are: Bydureon 2 mg pen, Byetta 5 mcg pen, Trulicity 1.5mg pen   Called patient; Reaction with Bydureon  Byetta and Trulicity he has never tried but he really wants to stay with Victoza    Would you like to start PA or Rx alternative medication?    If PA, please list all medications this patient has tried along with any contraindications that may have been experienced in the past. Thank you.    Pharmacy: [facility]   Phone: [phone]    Insurance Plan: [employer]  Phone: [phone]  Pt ID: [id number]  Group:     Forwarded to Dr. Alanis for review.

## 2017-10-10 NOTE — TELEPHONE ENCOUNTER
"Rx was sent by PCP yesterday at office visit.  27 ml with 11 refills received by [facility].    \"Refusal\" routed back to pharmacy with note.  Arleth Lane RN  [facility]      "

## 2017-10-11 ENCOUNTER — HOSPITAL ENCOUNTER (OUTPATIENT)
Facility: AMBULATORY SURGERY CENTER | Age: 52
Discharge: HOME | End: 2017-10-11
Attending: SURGERY | Admitting: SURGERY
Payer: COMMERCIAL

## 2017-10-11 ENCOUNTER — SURGERY (OUTPATIENT)
Age: 52
End: 2017-10-11

## 2017-10-11 VITALS
SYSTOLIC BLOOD PRESSURE: 138 MMHG | OXYGEN SATURATION: 100 % | TEMPERATURE: 97.8 F | DIASTOLIC BLOOD PRESSURE: 109 MMHG | RESPIRATION RATE: 16 BRPM

## 2017-10-11 LAB
COLONOSCOPY: NORMAL
GLUCOSE BLDC GLUCOMTR-MCNC: 107 MG/DL (ref 70–99)
GLUCOSE BLDC GLUCOMTR-MCNC: 72 MG/DL (ref 70–99)

## 2017-10-11 PROCEDURE — G8907 PT DOC NO EVENTS ON DISCHARG: HCPCS

## 2017-10-11 PROCEDURE — 88305 TISSUE EXAM BY PATHOLOGIST: CPT | Performed by: SURGERY

## 2017-10-11 PROCEDURE — 99153 MOD SED SAME PHYS/QHP EA: CPT | Performed by: SURGERY

## 2017-10-11 PROCEDURE — 45385 COLONOSCOPY W/LESION REMOVAL: CPT | Mod: PT | Performed by: SURGERY

## 2017-10-11 PROCEDURE — 45378 DIAGNOSTIC COLONOSCOPY: CPT

## 2017-10-11 PROCEDURE — G8918 PT W/O PREOP ORDER IV AB PRO: HCPCS

## 2017-10-11 PROCEDURE — 99152 MOD SED SAME PHYS/QHP 5/>YRS: CPT | Mod: 59 | Performed by: SURGERY

## 2017-10-11 RX ORDER — LIDOCAINE 40 MG/G
CREAM TOPICAL
Status: DISCONTINUED | OUTPATIENT
Start: 2017-10-11 | End: 2017-10-12 | Stop reason: HOSPADM

## 2017-10-11 RX ORDER — ONDANSETRON 2 MG/ML
4 INJECTION INTRAMUSCULAR; INTRAVENOUS
Status: DISCONTINUED | OUTPATIENT
Start: 2017-10-11 | End: 2017-10-12 | Stop reason: HOSPADM

## 2017-10-11 RX ORDER — FENTANYL CITRATE 50 UG/ML
INJECTION, SOLUTION INTRAMUSCULAR; INTRAVENOUS PRN
Status: DISCONTINUED | OUTPATIENT
Start: 2017-10-11 | End: 2017-10-11 | Stop reason: HOSPADM

## 2017-10-11 RX ADMIN — FENTANYL CITRATE 50 MCG: 50 INJECTION, SOLUTION INTRAMUSCULAR; INTRAVENOUS at 10:23

## 2017-10-11 RX ADMIN — FENTANYL CITRATE 50 MCG: 50 INJECTION, SOLUTION INTRAMUSCULAR; INTRAVENOUS at 10:24

## 2017-10-11 RX ADMIN — FENTANYL CITRATE 50 MCG: 50 INJECTION, SOLUTION INTRAMUSCULAR; INTRAVENOUS at 10:29

## 2017-10-11 RX ADMIN — FENTANYL CITRATE 50 MCG: 50 INJECTION, SOLUTION INTRAMUSCULAR; INTRAVENOUS at 10:59

## 2017-10-11 NOTE — OR NURSING
Post colonoscopy Blood glucose: 107.  BP remained elevated at 138-158/107-109, patient instructed to take BP medications when he gets home.  Reviewed information and discharge papers with his wife.    Lila Moya RN

## 2017-10-11 NOTE — TELEPHONE ENCOUNTER
Ok to fill PA  Failed Bydureon  Well controlled on victoza  Has also been on lantus/ humalog and failed metformin

## 2017-10-12 NOTE — TELEPHONE ENCOUNTER
Completed PA through covermymeds.    Got a automatic response; PA Approved.    CaseId:[id number];Product Name:FCR: Victoza TALIA Std;Status:Approved;Coverage Start Date:09/12/2017;Coverage End Date:10/12/2018;  Called pharmacy and had them re run the medication; it went through.    Vidhi Andre MA

## 2017-10-13 LAB — COPATH REPORT: NORMAL

## 2017-11-04 DIAGNOSIS — I10 HYPERTENSION GOAL BP (BLOOD PRESSURE) < 140/90: ICD-10-CM

## 2017-11-07 ENCOUNTER — TELEPHONE (OUTPATIENT)
Dept: ENDOCRINOLOGY | Facility: CLINIC | Age: 52
End: 2017-11-07

## 2017-11-07 RX ORDER — METOPROLOL TARTRATE 25 MG/1
TABLET, FILM COATED ORAL
Qty: 90 TABLET | Refills: 1 | OUTPATIENT
Start: 2017-11-07

## 2017-11-07 NOTE — TELEPHONE ENCOUNTER
Amadou has a new provider for his Diabetes Dr Segundo [facility]. All refills should go to him.

## 2017-11-13 DIAGNOSIS — I10 HYPERTENSION GOAL BP (BLOOD PRESSURE) < 140/90: ICD-10-CM

## 2017-11-13 RX ORDER — METOPROLOL TARTRATE 25 MG/1
TABLET, FILM COATED ORAL
Qty: 60 TABLET | Refills: 0 | Status: SHIPPED | OUTPATIENT
Start: 2017-11-13 | End: 2017-12-25

## 2017-11-13 NOTE — TELEPHONE ENCOUNTER
Requested Prescriptions   Pending Prescriptions Disp Refills     metoprolol (LOPRESSOR) 25 MG tablet [Pharmacy Med Name: METOPROLOL TARTRATE 25 MG TAB] 60 tablet 0     Sig: TAKE 1 TABLET (25 MG) BY MOUTH TWICE A DAY.    Beta-Blockers Protocol Failed    11/13/2017  9:49 AM       Failed - Blood pressure under 140/90    BP Readings from Last 3 Encounters:   10/11/17 (!) 138/109   10/09/17 158/80   09/27/17 137/83                Passed - Patient is age 6 or older       Passed - Recent or future visit with authorizing provider's specialty    Patient had office visit in the last year or has a visit in the next 30 days with authorizing provider.  See chart review.

## 2017-12-06 DIAGNOSIS — E11.9 TYPE 2 DIABETES MELLITUS WITHOUT COMPLICATION, UNSPECIFIED LONG TERM INSULIN USE STATUS: ICD-10-CM

## 2017-12-06 DIAGNOSIS — E11.9 TYPE 2 DIABETES MELLITUS WITHOUT COMPLICATION, WITH LONG-TERM CURRENT USE OF INSULIN (H): ICD-10-CM

## 2017-12-06 DIAGNOSIS — Z79.4 TYPE 2 DIABETES MELLITUS WITHOUT COMPLICATION, WITH LONG-TERM CURRENT USE OF INSULIN (H): ICD-10-CM

## 2017-12-08 RX ORDER — INSULIN LISPRO 100 [IU]/ML
INJECTION, SOLUTION INTRAVENOUS; SUBCUTANEOUS
Qty: 45 ML | Refills: 3 | Status: SHIPPED | OUTPATIENT
Start: 2017-12-08 | End: 2020-03-12

## 2017-12-08 RX ORDER — BLOOD SUGAR DIAGNOSTIC
STRIP MISCELLANEOUS
Qty: 1250 STRIP | Refills: 1 | Status: SHIPPED | OUTPATIENT
Start: 2017-12-08 | End: 2018-01-29

## 2017-12-08 NOTE — TELEPHONE ENCOUNTER
Requested Prescriptions   Pending Prescriptions Disp Refills     ONETOLeanWagon VERIO IQ test strip [Pharmacy Med Name: ONE TOUCH VERIO TEST STRIP] 1,250 strip 0     Sig: TEST UP TO 8 TIMES DAILY OR AS DIRECTED    Diabetic Supplies Protocol Passed    12/6/2017  3:28 PM       Passed - Patient is 18 years of age or older       Passed - Patient has had appt within past 6 mos    IV to PO - Antibiotics     None                    HUMALOG KWIKPEN 100 UNIT/ML soln [Pharmacy Med Name: HUMALOG 100 UNITS/ML KWIKPEN]  1     Sig: INJECT 10-12 UNITLS BEFORE EACH MEAL WITH CORRECTION    Short Acting Insulin Protocol Failed    12/6/2017  3:28 PM       Failed - Blood pressure less than 140/90 in past 6 months    BP Readings from Last 3 Encounters:   10/11/17 (!) 138/109   10/09/17 158/80   09/27/17 137/83                Failed - LDL on file in past 12 months    Recent Labs   Lab Test  08/12/16   0913   LDL  101*            Failed - HgbA1C in past 3 or 6 months    Recent Labs   Lab Test  03/27/17   1559   A1C  8.2*            Passed - Microalbumin on file in past 12 months    Recent Labs   Lab Test  03/27/17   1605   MICROL  65   UMALCR  34.79*            Passed - Serum creatinine on file in past 12 months    Recent Labs   Lab Test  03/27/17   1559   CR  0.69            Passed - Patient is age 18 or older       Passed - Recent visit with authorizing provider's specialty in past 6 months    Patient had office visit in the last 6 months or has a visit in the next 30 days with authorizing provider.  See chart review.

## 2017-12-08 NOTE — TELEPHONE ENCOUNTER
Test strips Prescription approved per [facility] Refill Protocol.    Routing refill request to provider for review/approval because:  Labs out of range:  LDL, Microalbumin  Labs not current:  LDL      Shruthi Stern RN  [facility]

## 2017-12-20 DIAGNOSIS — E11.9 TYPE 2 DIABETES MELLITUS WITHOUT COMPLICATION, UNSPECIFIED LONG TERM INSULIN USE STATUS: ICD-10-CM

## 2017-12-21 RX ORDER — INSULIN GLARGINE 100 [IU]/ML
INJECTION, SOLUTION SUBCUTANEOUS
Qty: 3 ML | Refills: 0 | Status: SHIPPED | OUTPATIENT
Start: 2017-12-21 | End: 2018-03-19

## 2017-12-25 DIAGNOSIS — I10 HYPERTENSION GOAL BP (BLOOD PRESSURE) < 140/90: ICD-10-CM

## 2017-12-26 RX ORDER — METOPROLOL TARTRATE 25 MG/1
TABLET, FILM COATED ORAL
Qty: 60 TABLET | Refills: 1 | Status: SHIPPED | OUTPATIENT
Start: 2017-12-26 | End: 2018-02-14

## 2017-12-28 DIAGNOSIS — F41.1 GENERALIZED ANXIETY DISORDER: ICD-10-CM

## 2017-12-28 RX ORDER — LORAZEPAM 0.5 MG/1
TABLET ORAL
Qty: 45 TABLET | Refills: 0 | Status: SHIPPED | OUTPATIENT
Start: 2017-12-28 | End: 2018-02-05

## 2017-12-31 DIAGNOSIS — E78.5 HYPERLIPIDEMIA LDL GOAL <100: ICD-10-CM

## 2018-01-03 PROBLEM — M54.50 RIGHT-SIDED LOW BACK PAIN WITHOUT SCIATICA, UNSPECIFIED CHRONICITY: Status: RESOLVED | Noted: 2017-09-05 | Resolved: 2018-01-03

## 2018-01-03 PROBLEM — M54.50 ACUTE BILATERAL LOW BACK PAIN WITHOUT SCIATICA: Status: RESOLVED | Noted: 2017-06-20 | Resolved: 2018-01-03

## 2018-01-03 RX ORDER — ROSUVASTATIN CALCIUM 40 MG/1
TABLET, COATED ORAL
Qty: 30 TABLET | Refills: 0 | Status: SHIPPED | OUTPATIENT
Start: 2018-01-03 | End: 2018-02-02

## 2018-01-11 ENCOUNTER — TELEPHONE (OUTPATIENT)
Dept: FAMILY MEDICINE | Facility: CLINIC | Age: 53
End: 2018-01-11

## 2018-01-11 DIAGNOSIS — E78.5 HYPERLIPIDEMIA LDL GOAL <100: Primary | ICD-10-CM

## 2018-01-11 DIAGNOSIS — E11.9 TYPE 2 DIABETES MELLITUS WITHOUT COMPLICATION, WITHOUT LONG-TERM CURRENT USE OF INSULIN (H): ICD-10-CM

## 2018-01-11 NOTE — TELEPHONE ENCOUNTER
Reason for Call:  Other call back    Detailed comments: Patient is requesting a call back, he would like to know if all the orders for his annual labs are in for his physical and if they are not can they be placed before his appointment on 01/29/18. Please call back to discuss.     Phone Number Patient can be reached at: Cell number on file:    Telephone Information:   Mobile [phone]       Best Time: anytime     Can we leave a detailed message on this number? YES    Call taken on 1/11/2018 at 4:10 PM by Gala Bobo

## 2018-01-11 NOTE — TELEPHONE ENCOUNTER
Nurse sees labs entered future on 10/9/2017 - Albumin, Hepatic, BMP, Hgb A1C, Lipid Reflex Panel.   Routing to provider to advise if needs anything more?    Chacha Iraheta RN  [facility]

## 2018-01-12 NOTE — TELEPHONE ENCOUNTER
Attempt # 1  Called patient at home number.  Was call answered?  Yes, relayed below information from provider. Patient verbalized understanding and will call for a lab appointment at [place] lab near him.    Chacha Iraheta RN  [facility]

## 2018-01-17 DIAGNOSIS — E11.9 TYPE 2 DIABETES MELLITUS WITHOUT COMPLICATION, WITHOUT LONG-TERM CURRENT USE OF INSULIN (H): ICD-10-CM

## 2018-01-17 DIAGNOSIS — E78.5 HYPERLIPIDEMIA LDL GOAL <100: ICD-10-CM

## 2018-01-17 DIAGNOSIS — I10 ESSENTIAL HYPERTENSION WITH GOAL BLOOD PRESSURE LESS THAN 140/90: ICD-10-CM

## 2018-01-17 DIAGNOSIS — E11.9 TYPE 2 DIABETES MELLITUS WITHOUT COMPLICATION, WITHOUT LONG-TERM CURRENT USE OF INSULIN (H): Primary | ICD-10-CM

## 2018-01-17 LAB
ALBUMIN SERPL-MCNC: 4.1 G/DL (ref 3.4–5)
ALP SERPL-CCNC: 66 U/L (ref 40–150)
ALT SERPL W P-5'-P-CCNC: 52 U/L (ref 0–70)
ANION GAP SERPL CALCULATED.3IONS-SCNC: 8 MMOL/L (ref 3–14)
AST SERPL W P-5'-P-CCNC: 24 U/L (ref 0–45)
BILIRUB DIRECT SERPL-MCNC: 0.1 MG/DL (ref 0–0.2)
BILIRUB SERPL-MCNC: 0.4 MG/DL (ref 0.2–1.3)
BUN SERPL-MCNC: 12 MG/DL (ref 7–30)
CALCIUM SERPL-MCNC: 9.3 MG/DL (ref 8.5–10.1)
CHLORIDE SERPL-SCNC: 104 MMOL/L (ref 94–109)
CHOLEST SERPL-MCNC: 124 MG/DL
CO2 SERPL-SCNC: 27 MMOL/L (ref 20–32)
CREAT SERPL-MCNC: 0.81 MG/DL (ref 0.66–1.25)
CREAT UR-MCNC: 66 MG/DL
GFR SERPL CREATININE-BSD FRML MDRD: >90 ML/MIN/1.7M2
GLUCOSE SERPL-MCNC: 81 MG/DL (ref 70–99)
HBA1C MFR BLD: 6.3 % (ref 4.3–6)
HDLC SERPL-MCNC: 53 MG/DL
LDLC SERPL CALC-MCNC: 56 MG/DL
MICROALBUMIN UR-MCNC: 22 MG/L
MICROALBUMIN/CREAT UR: 32.53 MG/G CR (ref 0–17)
NONHDLC SERPL-MCNC: 71 MG/DL
POTASSIUM SERPL-SCNC: 4 MMOL/L (ref 3.4–5.3)
PROT SERPL-MCNC: 7.3 G/DL (ref 6.8–8.8)
SODIUM SERPL-SCNC: 139 MMOL/L (ref 133–144)
TRIGL SERPL-MCNC: 75 MG/DL
TSH SERPL DL<=0.005 MIU/L-ACNC: 1.64 MU/L (ref 0.4–4)

## 2018-01-17 PROCEDURE — 84443 ASSAY THYROID STIM HORMONE: CPT | Performed by: INTERNAL MEDICINE

## 2018-01-17 PROCEDURE — 83036 HEMOGLOBIN GLYCOSYLATED A1C: CPT | Performed by: INTERNAL MEDICINE

## 2018-01-17 PROCEDURE — 82043 UR ALBUMIN QUANTITATIVE: CPT | Performed by: INTERNAL MEDICINE

## 2018-01-17 PROCEDURE — 36415 COLL VENOUS BLD VENIPUNCTURE: CPT | Performed by: INTERNAL MEDICINE

## 2018-01-17 PROCEDURE — 80048 BASIC METABOLIC PNL TOTAL CA: CPT | Performed by: INTERNAL MEDICINE

## 2018-01-17 PROCEDURE — 80076 HEPATIC FUNCTION PANEL: CPT | Performed by: INTERNAL MEDICINE

## 2018-01-17 PROCEDURE — 80061 LIPID PANEL: CPT | Performed by: INTERNAL MEDICINE

## 2018-01-17 NOTE — TELEPHONE ENCOUNTER
Requested Prescriptions   Pending Prescriptions Disp Refills     insulin pen needle (B-D U/F) 31G X 8  each 0     Sig: Use  pen needles daily or as directed.    There is no refill protocol information for this order   Last Written Prescription Date:  8-16-17  Last Fill Quantity: 300,  # refills: 0   [place] Office Visit with [facility] or [facility] prescribing provider:  10-9-17   Future Office Visit:    Next 5 appointments (look out 90 days)     Jan 29, 2018 11:00 AM CST   PHYSICAL with Joseph Alanis MD   [facility]    [address] [place] [id number] [phone]

## 2018-01-19 NOTE — TELEPHONE ENCOUNTER
Prescription approved per [facility] Refill Protocol.    Shruthi Stern RN  [facility]

## 2018-01-29 ENCOUNTER — OFFICE VISIT (OUTPATIENT)
Dept: FAMILY MEDICINE | Facility: CLINIC | Age: 53
End: 2018-01-29
Payer: COMMERCIAL

## 2018-01-29 VITALS
BODY MASS INDEX: 30.09 KG/M2 | SYSTOLIC BLOOD PRESSURE: 130 MMHG | DIASTOLIC BLOOD PRESSURE: 74 MMHG | WEIGHT: 185 LBS | TEMPERATURE: 97.7 F | HEART RATE: 97 BPM | OXYGEN SATURATION: 98 %

## 2018-01-29 DIAGNOSIS — E11.9 TYPE 2 DIABETES MELLITUS WITHOUT COMPLICATION, UNSPECIFIED LONG TERM INSULIN USE STATUS: ICD-10-CM

## 2018-01-29 DIAGNOSIS — E11.9 TYPE 2 DIABETES MELLITUS WITHOUT COMPLICATION, WITH LONG-TERM CURRENT USE OF INSULIN (H): ICD-10-CM

## 2018-01-29 DIAGNOSIS — Z79.4 TYPE 2 DIABETES MELLITUS WITHOUT COMPLICATION, WITH LONG-TERM CURRENT USE OF INSULIN (H): ICD-10-CM

## 2018-01-29 PROCEDURE — 99396 PREV VISIT EST AGE 40-64: CPT | Performed by: INTERNAL MEDICINE

## 2018-01-29 RX ORDER — METFORMIN HCL 500 MG
TABLET, EXTENDED RELEASE 24 HR ORAL
Qty: 360 TABLET | Refills: 1 | COMMUNITY
Start: 2018-01-29 | End: 2018-03-27

## 2018-01-29 NOTE — MR AVS SNAPSHOT
After Visit Summary   1/29/2018    Amadou Bach    MRN: [id number]           Patient Information     Date Of Birth          1965        Visit Information        Provider Department      1/29/2018 11:00 AM Joseph Alanis MD [facility]        Today's Diagnoses     Type 2 diabetes mellitus without complication, unspecified long term insulin use status (H)          Care Instructions      Preventive Health Recommendations  Male Ages 50 - 64    Yearly exam:             See your health care provider every year in order to  o   Review health changes.   o   Discuss preventive care.    o   Review your medicines if your doctor has prescribed any.     Have a cholesterol test every 5 years, or more frequently if you are at risk for high cholesterol/heart disease.     Have a diabetes test (fasting glucose) every three years. If you are at risk for diabetes, you should have this test more often.     Have a colonoscopy at age 50, or have a yearly FIT test (stool test). These exams will check for colon cancer.      Talk with your health care provider about whether or not a prostate cancer screening test (PSA) is right for you.    You should be tested each year for STDs (sexually transmitted diseases), if you re at risk.     Shots: Get a flu shot each year. Get a tetanus shot every 10 years.     Nutrition:    Eat at least 5 servings of fruits and vegetables daily.     Eat whole-grain bread, whole-wheat pasta and brown rice instead of white grains and rice.     Talk to your provider about Calcium and Vitamin D.     Lifestyle    Exercise for at least 150 minutes a week (30 minutes a day, 5 days a week). This will help you control your weight and prevent disease.     Limit alcohol to one drink per day.     No smoking.     Wear sunscreen to prevent skin cancer.     See your dentist every six months for an exam and cleaning.     See your eye doctor every 1 to 2 years.  1.  Leonard ramirez  "changes every 4 days)    Decrease lantus by 2 units every 4 days.    Stop once fasting blood sugars are between  on average.      2.  On your metformin bottle look for the symbol \"XR\"    3.            Follow-ups after your visit        Who to contact     If you have questions or need follow up information about today's clinic visit or your schedule please contact [facility] directly at [phone].  Normal or non-critical lab and imaging results will be communicated to you by LabArchiveshart, letter or phone within 4 business days after the clinic has received the results. If you do not hear from us within 7 days, please contact the clinic through [employer] or phone. If you have a critical or abnormal lab result, we will notify you by phone as soon as possible.  Submit refill requests through [employer] or call your pharmacy and they will forward the refill request to us. Please allow 3 business days for your refill to be completed.          Additional Information About Your Visit        Cl[employer]brain Information     [employer] gives you secure access to your electronic health record. If you see a primary care provider, you can also send messages to your care team and make appointments. If you have questions, please call your primary care clinic.  If you do not have a primary care provider, please call [phone] and they will assist you.        Care EveryWhere ID     This is your Care EveryWhere ID. This could be used by other organizations to access your [facility] medical records  [id number]        Your Vitals Were     Pulse Temperature Pulse Oximetry BMI (Body Mass Index)          97 97.7  F (36.5  C) (Oral) 98% 30.09 kg/m2         Blood Pressure from Last 3 Encounters:   01/29/18 130/74   10/11/17 (!) 138/109   10/09/17 158/80    Weight from Last 3 Encounters:   01/29/18 185 lb (83.9 kg)   10/09/17 177 lb (80.3 kg)   09/27/17 174 lb (78.9 kg)              Today, you had the following     No orders " found for display       Primary Care Provider Office Phone # Fax #    Joseph Alanis [phone]       [address] [facility] [address]        Equal Access to Services     DEVORAH ARANA : Hadii grace hallman man Sharma, waaxda luqadaha, qaybta kaalmada daina, yamilka ribeiro laNickjohnson melendez. So [facility] [phone].    ATENCIÓN: Si habla español, tiene a chairez disposición servicios gratuitos de asistencia lingüística. Llame al [phone].    We comply with applicable federal civil rights laws and [place] laws. We do not discriminate on the basis of race, color, national origin, age, disability, sex, sexual orientation, or gender identity.            Thank you!     Thank you for choosing [facility]  for your care. Our goal is always to provide you with excellent care. Hearing back from our patients is one way we can continue to improve our services. Please take a few minutes to complete the written survey that you may receive in the mail after your visit with us. Thank you!             Your Updated Medication List - Protect others around you: Learn how to safely use, store and throw away your medicines at www.disposemymeds.org.          This list is accurate as of 1/29/18 12:05 PM.  Always use your most recent med list.                   Brand Name Dispense Instructions for use Diagnosis    aspirin 81 MG EC tablet     90 tablet    Take 1 tablet (81 mg) by mouth daily    Type 2 diabetes mellitus without complication, unspecified long term insulin use status (H)       CO Q 10 PO      Take  by mouth. 2 per day    Type 2 diabetes, HbA1c goal < 7% (H)       FISH OIL CONCENTRATE 1000 MG Caps      2 tablets daily        HumaLOG KWIKpen 100 UNIT/ML injection   Generic drug:  insulin lispro     45 mL    INJECT 10-12 UNITLS BEFORE EACH MEAL WITH CORRECTION    Type 2 diabetes mellitus without complication, unspecified long term insulin use status (H)        insulin pen needle 31G X 8 MM    B-D U/F    300 each    USE 4 TIMES DAILY AS DIRECTED.    Type 2 diabetes mellitus without complication, without long-term current use of insulin (H)       LANTUS SOLOSTAR 100 UNIT/ML injection   Generic drug:  insulin glargine     3 mL    INJECT 50 UNITS SUBCUTANEOUSLY AT BEDTIME    Type 2 diabetes mellitus without complication, unspecified long term insulin use status (H)       liraglutide 18 MG/3ML soln    VICTOZA PEN    27 mL    Inject 1.8 mg Subcutaneous daily    Type 2 diabetes mellitus without complication, with long-term current use of insulin (H), Non morbid obesity due to excess calories       LORazepam 0.5 MG tablet    ATIVAN    45 tablet    TAKE 1 TABLET BY MOUTH EVERY 8 HOURS AS NEEDED    Generalized anxiety disorder       losartan 100 MG tablet    COZAAR    90 tablet    Take 1 tablet (100 mg) by mouth daily    HTN, goal below 140/90       metFORMIN 500 MG 24 hr tablet    GLUCOPHAGE-XR    360 tablet    TAKE 2 TABLETS (1,000 MG) BY MOUTH 2 TIMES DAILY    Type 2 diabetes mellitus without complication, unspecified long term insulin use status (H)       metoprolol tartrate 25 MG tablet    LOPRESSOR    60 tablet    TAKE 1 TABLET (25 MG) BY MOUTH TWICE A DAY.    Hypertension goal BP (blood pressure) < 140/90       MULTIPLE VITAMINS PO      1000mg daily        ondansetron 4 MG tablet    ZOFRAN    30 tablet    Take 1 tablet (4 mg) by mouth every 8 hours as needed for nausea    Chronic pain syndrome       ONETOUCH VERIO IQ test strip   Generic drug:  blood glucose monitoring     1250 strip    TEST UP TO 8 TIMES DAILY OR AS DIRECTED    Type 2 diabetes mellitus without complication, with long-term current use of insulin (H)       * order for DME     1 Device    Equipment being ordered: glucometer brand per insurance    Type 2 diabetes, HbA1c goal < 7% (H)       * order for DME     400 each    LANCETS 4 x DAILY AND PRN    Type 2 diabetes, HbA1c goal < 7% (H)       * order for DME      1 each    1 ambulatory blood pressure cuff    Hypertension goal BP (blood pressure) < 140/90       rosuvastatin 40 MG tablet    CRESTOR    30 tablet    TAKE 1 TABLET BY MOUTH DAILY    Hyperlipidemia LDL goal <100       VIAGRA 100 MG tablet   Generic drug:  sildenafil     12 tablet    TAKE 1 TABLET BY MOUTH 30 MINS BEFORE INTERCOURSE    Erectile dysfunction, unspecified erectile dysfunction type       VITAMIN B-12 PO      Take  by mouth daily.        vitamin D 1000 UNITS capsule      Take 2 capsules by mouth daily.        * Notice:  This list has 3 medication(s) that are the same as other medications prescribed for you. Read the directions carefully, and ask your doctor or other care provider to review them with you.

## 2018-01-29 NOTE — PROGRESS NOTES
SUBJECTIVE:   CC: Amadou Bach is an 52 year old male who presents for preventative health visit.     Physical   Annual:     Getting at least 3 servings of Calcium per day::  Yes    Bi-annual eye exam::  Yes    Dental care twice a year::  Yes    Sleep apnea or symptoms of sleep apnea::  None    Diet::  Diabetic    Frequency of exercise::  2-3 days/week    Duration of exercise::  45-60 minutes    Taking medications regularly::  Yes    Medication side effects::  None    Additional concerns today::  YES (Pt has reported a blood sugar below 70, feeling shaky and dizzy)            Infection and drug reaction Improved.  Blood sugars getting too low    In the morning before breakfast.   If not carbs in the morning gets low      Today's PHQ-2 Score:   PHQ-2 ( 1999 Pfizer) 1/29/2018   Q1: Little interest or pleasure in doing things 0   Q2: Feeling down, depressed or hopeless 0   PHQ-2 Score 0   Q1: Little interest or pleasure in doing things Not at all   Q2: Feeling down, depressed or hopeless Not at all   PHQ-2 Score 0       Abuse: Current or Past(Physical, Sexual or Emotional)- No  Do you feel safe in your environment - Yes    Social History   Substance Use Topics     Smoking status: Never Smoker     Smokeless tobacco: Never Used     Alcohol use Yes      Comment: occ     Alcohol Use 1/29/2018   If you drink alcohol, do you typically have greater than 3 drinks per day OR greater than 7 drinks per week?   Yes   AUDIT SCORE  10     AUDIT - Alcohol Use Disorders Identification Test - Reproduced from the World Health Organization Audit 2001 (Second Edition) 1/29/2018   1.  How often do you have a drink containing alcohol? 2 to 4 times a month   2.  How many drinks containing alcohol do you have on a typical day when you are drinking? 5 or 6   3.  How often do you have five or more drinks on one occasion? Monthly   4.  How often during the last year have you found that you were not able to stop drinking once you had  started? Never   5.  How often during the last year have you failed to do what was normally expected of you because of drinking? Never   6.  How often during the last year have you needed a first drink in the morning to get yourself going after a heavy drinking session? Never   7.  How often during the last year have you had a feeling of guilt or remorse after drinking? Never   8.  How often during the last year have you been unable to remember what happened the night before because of your drinking? Never   9.  Have you or someone else been injured because of your drinking? No   10. Has a relative, friend, doctor or other health care worker been concerned about your drinking or suggested you cut down? Yes, during the last year   TOTAL SCORE 10       Last PSA: No results found for: PSA    Reviewed orders with patient. Reviewed health maintenance and updated orders accordingly - Yes  Labs reviewed in EPIC  BP Readings from Last 3 Encounters:   01/29/18 130/74   10/11/17 (!) 138/109   10/09/17 158/80    Wt Readings from Last 3 Encounters:   01/29/18 185 lb (83.9 kg)   10/09/17 177 lb (80.3 kg)   09/27/17 174 lb (78.9 kg)                  Patient Active Problem List   Diagnosis     Erectile dysfunction     Type 2 diabetes mellitus without complication, without long-term current use of insulin (H)     Hyperlipidemia LDL goal <100     Obesity, Class I, BMI 30-34.9     Generalized anxiety disorder     ACP (advance care planning)     History of adenomatous polyp of colon     Essential hypertension with goal blood pressure less than 140/90     Malignant melanoma of skin of trunk (H)     History of melanoma     AK (actinic keratosis)     Multiple benign nevi     Cherry hemangioma     Lentigines     Past Surgical History:   Procedure Laterality Date     BIOPSY OF SKIN LESION       COLONOSCOPY  3/11/2014    Procedure: COMBINED COLONOSCOPY, SINGLE BIOPSY/POLYPECTOMY BY BIOPSY;;  Surgeon: Stephen Amin MD;  Location:   OR     COLONOSCOPY WITH CO2 INSUFFLATION N/A 8/19/2014    Procedure: COLONOSCOPY WITH CO2 INSUFFLATION;  Surgeon: Stephen Amin MD;  Location: [facility] OR     COLONOSCOPY WITH CO2 INSUFFLATION N/A 10/11/2017    Procedure: COLONOSCOPY WITH CO2 INSUFFLATION;  COLON/ REPEAT COLONOSCOPY;  Surgeon: Jeremy Mcqueen DO;  Location: [facility] OR     HC TOOTH EXTRACTION W/FORCEP      wisdom teeth x4       Social History   Substance Use Topics     Smoking status: Never Smoker     Smokeless tobacco: Never Used     Alcohol use Yes      Comment: occ     Family History   Problem Relation Age of Onset     DIABETES Mother      Hypertension Mother      DIABETES Father      CANCER Father      lung     CANCER Other      aunt - skin cancer     Skin Cancer Other      Melanoma No family hx of          Current Outpatient Prescriptions   Medication Sig Dispense Refill     insulin pen needle (B-D U/F) 31G X 8 MM USE 4 TIMES DAILY AS DIRECTED. 300 each 1     rosuvastatin (CRESTOR) 40 MG tablet TAKE 1 TABLET BY MOUTH DAILY 30 tablet 0     LORazepam (ATIVAN) 0.5 MG tablet TAKE 1 TABLET BY MOUTH EVERY 8 HOURS AS NEEDED 45 tablet 0     metoprolol (LOPRESSOR) 25 MG tablet TAKE 1 TABLET (25 MG) BY MOUTH TWICE A DAY. 60 tablet 1     LANTUS SOLOSTAR 100 UNIT/ML soln INJECT 50 UNITS SUBCUTANEOUSLY AT BEDTIME 3 mL 0     ONETOUCH VERIO IQ test strip TEST UP TO 8 TIMES DAILY OR AS DIRECTED 1250 strip 1     HUMALOG KWIKPEN 100 UNIT/ML soln INJECT 10-12 UNITLS BEFORE EACH MEAL WITH CORRECTION 45 mL 3     losartan (COZAAR) 100 MG tablet Take 1 tablet (100 mg) by mouth daily 90 tablet 3     liraglutide (VICTOZA PEN) 18 MG/3ML soln Inject 1.8 mg Subcutaneous daily 27 mL 11     order for DME 1 ambulatory blood pressure cuff 1 each 0     ondansetron (ZOFRAN) 4 MG tablet Take 1 tablet (4 mg) by mouth every 8 hours as needed for nausea 30 tablet 3     VIAGRA 100 MG cap/tab TAKE 1 TABLET BY MOUTH 30 MINS BEFORE INTERCOURSE 12 tablet 3     aspirin 81 MG EC tablet Take  1 tablet (81 mg) by mouth daily 90 tablet 3     ORDER FOR DME Equipment being ordered: glucometer brand per insurance 1 Device 0     ORDER FOR DME LANCETS 4 x DAILY AND  each 4     Cyanocobalamin (VITAMIN B-12 PO) Take  by mouth daily.       Coenzyme Q10 (CO Q 10 PO) Take  by mouth. 2 per day       FISH OIL CONCENTRATE 1000 MG OR CAPS 2 tablets daily       VITAMIN D 1000 UNIT OR CAPS Take 2 capsules by mouth daily.       MULTIPLE VITAMINS OR 1000mg daily         Reviewed and updated as needed this visit by clinical staff         Reviewed and updated as needed this visit by Provider          Victoza.     Review of Systems  C: NEGATIVE for fever, chills, change in weight  I: NEGATIVE for worrisome rashes, moles or lesions  E: NEGATIVE for vision changes or irritation  ENT: NEGATIVE for ear, mouth and throat problems  R: NEGATIVE for significant cough or SOB  CV: NEGATIVE for chest pain, palpitations or peripheral edema  GI: NEGATIVE for nausea, abdominal pain, heartburn, or change in bowel habits   male: negative for dysuria, hematuria, decreased urinary stream, erectile dysfunction, urethral discharge  M: NEGATIVE for significant arthralgias or myalgia  N: NEGATIVE for weakness, dizziness or paresthesias  P: NEGATIVE for changes in mood or affect    OBJECTIVE:   There were no vitals taken for this visit.    Physical Exam  GENERAL: healthy, alert and no distress  NECK: no adenopathy, no asymmetry, masses, or scars and thyroid normal to palpation  RESP: lungs clear to auscultation - no rales, rhonchi or wheezes  CV: regular rate and rhythm, normal S1 S2, no S3 or S4, no murmur, click or rub, no peripheral edema and peripheral pulses strong  ABDOMEN: soft, nontender, no hepatosplenomegaly, no masses and bowel sounds normal  MS: no gross musculoskeletal defects noted, no edema    ASSESSMENT/PLAN:       ICD-10-CM    1. Type 2 diabetes mellitus without complication, unspecified long term insulin use status (H)  "E11.9 metFORMIN (GLUCOPHAGE-XR) 500 MG 24 hr tablet   2. Type 2 diabetes mellitus without complication, with long-term current use of insulin (H) E11.9 blood glucose monitoring (ONETOUCH VERIO IQ) test strip    Z79.4      BP     130/74[pt was talking[  1/30/2018    Lab Results   Component Value Date    GLC 81 01/17/2018     Lab Results   Component Value Date    A1C 6.3 01/17/2018     Lab Results   Component Value Date    LDL 56 01/17/2018     Lab Results   Component Value Date    MICROL 22 01/17/2018     No results found for: MICROALBUMIN  COUNSELING:   Reviewed preventive health counseling, as reflected in patient instructions       Consider AAA screening for ages 65-75 and smoking history       Regular exercise       Healthy diet/nutrition       Vision screening       Hearing screening       Safe sex practices/STD prevention       Consider Hep C screening for patients born between 1945 and 1965         reports that he has never smoked. He has never used smokeless tobacco.    Estimated body mass index is 28.79 kg/(m^2) as calculated from the following:    Height as of 8/31/17: 5' 5.75\" (1.67 m).    Weight as of 10/9/17: 177 lb (80.3 kg).       ICD-10-CM    1. Type 2 diabetes mellitus without complication, unspecified long term insulin use status (H) E11.9 metFORMIN (GLUCOPHAGE-XR) 500 MG 24 hr tablet   2. Type 2 diabetes mellitus without complication, with long-term current use of insulin (H) E11.9 blood glucose monitoring (ONETOUCH VERIO IQ) test strip    Z79.4      BP     130/74[pt was talking[  1/30/2018    Lab Results   Component Value Date    GLC 81 01/17/2018     Lab Results   Component Value Date    A1C 6.3 01/17/2018     Lab Results   Component Value Date    LDL 56 01/17/2018     Lab Results   Component Value Date    MICROL 22 01/17/2018     No results found for: MICROALBUMIN    Counseling Resources:  ATP IV Guidelines  Pooled Cohorts Equation Calculator  FRAX Risk Assessment  ICSI Preventive " Guidelines  Dietary Guidelines for Americans, 2010  USDA's MyPlate  ASA Prophylaxis  Lung CA Screening    Joseph Alanis MD  [facility]

## 2018-01-29 NOTE — PATIENT INSTRUCTIONS
"  Preventive Health Recommendations  Male Ages 50 - 64    Yearly exam:             See your health care provider every year in order to  o   Review health changes.   o   Discuss preventive care.    o   Review your medicines if your doctor has prescribed any.     Have a cholesterol test every 5 years, or more frequently if you are at risk for high cholesterol/heart disease.     Have a diabetes test (fasting glucose) every three years. If you are at risk for diabetes, you should have this test more often.     Have a colonoscopy at age 50, or have a yearly FIT test (stool test). These exams will check for colon cancer.      Talk with your health care provider about whether or not a prostate cancer screening test (PSA) is right for you.    You should be tested each year for STDs (sexually transmitted diseases), if you re at risk.     Shots: Get a flu shot each year. Get a tetanus shot every 10 years.     Nutrition:    Eat at least 5 servings of fruits and vegetables daily.     Eat whole-grain bread, whole-wheat pasta and brown rice instead of white grains and rice.     Talk to your provider about Calcium and Vitamin D.     Lifestyle    Exercise for at least 150 minutes a week (30 minutes a day, 5 days a week). This will help you control your weight and prevent disease.     Limit alcohol to one drink per day.     No smoking.     Wear sunscreen to prevent skin cancer.     See your dentist every six months for an exam and cleaning.     See your eye doctor every 1 to 2 years.  1.  Lantus ( small changes every 4 days)    Decrease lantus by 2 units every 4 days.    Stop once fasting blood sugars are between  on average.      2.  On your metformin bottle look for the symbol \"XR\"    3.    "

## 2018-01-29 NOTE — NURSING NOTE
"Chief Complaint   Patient presents with     Physical       Initial /74 (BP Location: Right arm, Patient Position: Chair, Cuff Size: Adult Regular)  Pulse 97  Temp 97.7  F (36.5  C) (Oral)  Wt 185 lb (83.9 kg)  SpO2 98%  BMI 30.09 kg/m2 Estimated body mass index is 30.09 kg/(m^2) as calculated from the following:    Height as of 8/31/17: 5' 5.75\" (1.67 m).    Weight as of this encounter: 185 lb (83.9 kg).  Medication Reconciliation: complete     KARTIK Arias MA      "

## 2018-02-02 DIAGNOSIS — E78.5 HYPERLIPIDEMIA LDL GOAL <100: ICD-10-CM

## 2018-02-02 NOTE — TELEPHONE ENCOUNTER
"Requested Prescriptions   Pending Prescriptions Disp Refills     rosuvastatin (CRESTOR) 40 MG tablet [Pharmacy Med Name: ROSUVASTATIN CALCIUM 40 MG TAB] 30 tablet 0    Last Written Prescription Date:  1-3-18  Last Fill Quantity: 30,  # refills: 0   Last Office Visit with [facility] provider:  1-29-18   Future Office Visit:      Sig: TAKE 1 TABLET BY MOUTH DAILY    Statins Protocol Passed    2/2/2018  1:28 AM       Passed - LDL on file in past 12 months    Recent Labs   Lab Test  01/17/18   0820   LDL  56            Passed - No abnormal creatine kinase in past 12 months    No lab results found.         Passed - Recent or future visit with authorizing provider    Patient had office visit in the last year or has a visit in the next 30 days with authorizing provider.  See \"Patient Info\" tab in inbasket, or \"Choose Columns\" in Meds & Orders section of the refill encounter.            Passed - Patient is age 18 or older          "

## 2018-02-05 DIAGNOSIS — F41.1 GENERALIZED ANXIETY DISORDER: ICD-10-CM

## 2018-02-05 RX ORDER — LORAZEPAM 0.5 MG/1
TABLET ORAL
Qty: 45 TABLET | Refills: 0 | Status: SHIPPED | OUTPATIENT
Start: 2018-02-05 | End: 2018-02-14

## 2018-02-05 RX ORDER — ROSUVASTATIN CALCIUM 40 MG/1
TABLET, COATED ORAL
Qty: 30 TABLET | Refills: 3 | Status: SHIPPED | OUTPATIENT
Start: 2018-02-05 | End: 2018-02-05

## 2018-02-05 RX ORDER — ROSUVASTATIN CALCIUM 40 MG/1
40 TABLET, COATED ORAL DAILY
Qty: 30 TABLET | Refills: 3 | Status: SHIPPED | OUTPATIENT
Start: 2018-02-05 | End: 2019-03-07

## 2018-02-05 NOTE — TELEPHONE ENCOUNTER
"Provider approved Rx but \"transmission to pharmacy failed\" message received in Epic.   Rx re-sent now as \"provider sign\".  Arleth Lane RN  [facility]      "

## 2018-02-05 NOTE — TELEPHONE ENCOUNTER
Requested Prescriptions   Pending Prescriptions Disp Refills     LORazepam (ATIVAN) 0.5 MG tablet [Pharmacy Med Name: LORAZEPAM 0.5 MG TABLET] 45 tablet 0     Sig: TAKE 1 TABLET BY MOUTH EVERY 8 HOURS AS NEEDED    There is no refill protocol information for this order         Last Written Prescription Date:  12-28-17  Last Fill Quantity: 45,   # refills: 0  Last Office Visit: 1-29-18  Future Office visit:       Routing refill request to provider for review/approval because:  Drug not on the [facility], P or [facility] refill protocol or controlled substance

## 2018-02-06 ENCOUNTER — TRANSFERRED RECORDS (OUTPATIENT)
Dept: HEALTH INFORMATION MANAGEMENT | Facility: CLINIC | Age: 53
End: 2018-02-06

## 2018-02-06 DIAGNOSIS — E78.5 HYPERLIPIDEMIA LDL GOAL <100: ICD-10-CM

## 2018-02-06 RX ORDER — ROSUVASTATIN CALCIUM 40 MG/1
TABLET, COATED ORAL
OUTPATIENT
Start: 2018-02-06

## 2018-02-07 DIAGNOSIS — N52.9 ERECTILE DYSFUNCTION, UNSPECIFIED ERECTILE DYSFUNCTION TYPE: ICD-10-CM

## 2018-02-07 NOTE — TELEPHONE ENCOUNTER
Faxed message from pharmacy:  Patient wants med requested.  Previous Dr Prescribed it is no longer in the State so old RX has been inactivated.    Of note, pharmacy requested Sildenafil 20 MG Tablet.    Requested Prescriptions   Pending Prescriptions Disp Refills     sildenafil (VIAGRA) 100 MG tablet 12 tablet 3    There is no refill protocol information for this order

## 2018-02-09 RX ORDER — SILDENAFIL 100 MG/1
TABLET, FILM COATED ORAL
Qty: 12 TABLET | Refills: 3 | Status: SHIPPED | OUTPATIENT
Start: 2018-02-09 | End: 2019-12-02

## 2018-02-14 DIAGNOSIS — I10 HYPERTENSION GOAL BP (BLOOD PRESSURE) < 140/90: ICD-10-CM

## 2018-02-14 DIAGNOSIS — F41.1 GENERALIZED ANXIETY DISORDER: ICD-10-CM

## 2018-02-14 RX ORDER — LORAZEPAM 0.5 MG/1
TABLET ORAL
Qty: 45 TABLET | Refills: 0 | Status: SHIPPED | OUTPATIENT
Start: 2018-02-14 | End: 2018-04-23

## 2018-02-14 RX ORDER — METOPROLOL TARTRATE 25 MG/1
TABLET, FILM COATED ORAL
Qty: 60 TABLET | Refills: 1 | Status: SHIPPED | OUTPATIENT
Start: 2018-02-14 | End: 2018-03-29

## 2018-02-14 NOTE — TELEPHONE ENCOUNTER
"Requested Prescriptions   Pending Prescriptions Disp Refills     LORazepam (ATIVAN) 0.5 MG tablet [Pharmacy Med Name: LORAZEPAM 0.5 MG TABLET] 45 tablet 0          Last Written Prescription Date: 2-5-18  Last Fill Quantity: 45,   # refills: 0  Last Office Visit: 1-29-18  Future Office visit:       Routing refill request to provider for review/approval because:  Drug not on the [facility], [facility] or [facility] refill protocol or controlled substance   Sig: TAKE 1 TABLET BY MOUTH EVERY 8 HOURS AS NEEDED    There is no refill protocol information for this order        metoprolol tartrate (LOPRESSOR) 25 MG tablet [Pharmacy Med Name: METOPROLOL TARTRATE 25 MG TAB] 60 tablet 1    Last Written Prescription Date:  12-26-17  Last Fill Quantity: 60,  # refills: 1   Last office visit: 1/29/2018 with prescribing provider:     Future Office Visit:     Sig: TAKE 1 TABLET (25 MG) BY MOUTH TWICE A DAY.    Beta-Blockers Protocol Passed    2/14/2018 10:24 AM       Passed - Blood pressure under 140/90 in past 12 months    BP Readings from Last 3 Encounters:   01/29/18 130/74   10/11/17 (!) 138/109   10/09/17 158/80                Passed - Patient is age 6 or older       Passed - Recent or future visit with authorizing provider's specialty    Patient had office visit in the last year or has a visit in the next 30 days with authorizing provider.  See \"Patient Info\" tab in inbasket, or \"Choose Columns\" in Meds & Orders section of the refill encounter.               "

## 2018-03-01 ENCOUNTER — OFFICE VISIT (OUTPATIENT)
Dept: FAMILY MEDICINE | Facility: CLINIC | Age: 53
End: 2018-03-01
Payer: COMMERCIAL

## 2018-03-01 VITALS
HEART RATE: 95 BPM | OXYGEN SATURATION: 99 % | DIASTOLIC BLOOD PRESSURE: 96 MMHG | WEIGHT: 192 LBS | RESPIRATION RATE: 18 BRPM | SYSTOLIC BLOOD PRESSURE: 148 MMHG | TEMPERATURE: 98.5 F | BODY MASS INDEX: 31.23 KG/M2

## 2018-03-01 DIAGNOSIS — L03.032 PARONYCHIA OF TOE, LEFT: Primary | ICD-10-CM

## 2018-03-01 DIAGNOSIS — I10 ESSENTIAL HYPERTENSION WITH GOAL BLOOD PRESSURE LESS THAN 140/90: ICD-10-CM

## 2018-03-01 PROCEDURE — 99213 OFFICE O/P EST LOW 20 MIN: CPT | Performed by: PHYSICIAN ASSISTANT

## 2018-03-01 RX ORDER — AZITHROMYCIN 250 MG/1
TABLET, FILM COATED ORAL
Qty: 6 TABLET | Refills: 0 | Status: SHIPPED | OUTPATIENT
Start: 2018-03-01 | End: 2018-04-23

## 2018-03-01 NOTE — PROGRESS NOTES
SUBJECTIVE:   Amadou Bach is a 52 year old male who presents to clinic today for the following health issues:      Ingrown toenail-left great toe ingrown toenail redness and swelling noted over the past 3 days    Blood pressure elevated. Did not take his blood pressure meds this am     Problem list and histories reviewed & adjusted, as indicated.  Additional history: as documented    BP Readings from Last 3 Encounters:   03/01/18 (!) 148/96   01/29/18 130/74   10/11/17 (!) 138/109    Wt Readings from Last 3 Encounters:   03/01/18 192 lb (87.1 kg)   01/29/18 185 lb (83.9 kg)   10/09/17 177 lb (80.3 kg)                    Reviewed and updated as needed this visit by clinical staff  Tobacco  Allergies  Meds       Reviewed and updated as needed this visit by Provider         All other systems negative except as outline above  OBJECTIVE:  CHEST:chest clear to IPPA, no tachypnea, retractions or cyanosis and S1, S2 normal, no murmur, no gallop, rate regular.  Eye exam - right eye normal lid, conjunctiva, cornea, pupil and fundus, left eye normal lid, conjunctiva, cornea, pupil and fundus.  Paronychial infection involving his left great toe.       Amadou was seen today for ingrown toenail.    Diagnoses and all orders for this visit:    Paronychia of toe, left  -     azithromycin (ZITHROMAX) 250 MG tablet; Two tablets first day, then one tablet daily for four days.    Essential hypertension with goal blood pressure less than 140/90      Patient to stay consistent with taking his blood pressure meds.  Recheck with his pcp in the next few months if his numbers are elevated at home.

## 2018-03-01 NOTE — MR AVS SNAPSHOT
After Visit Summary   3/1/2018    Amadou Bach    MRN: [id number]           Patient Information     Date Of Birth          1965        Visit Information        Provider Department      3/1/2018 3:00 PM Marco Matute PA-C [facility]        Today's Diagnoses     Paronychia of toe, left    -  1    Essential hypertension with goal blood pressure less than 140/90           Follow-ups after your visit        Who to contact     Normal or non-critical lab and imaging results will be communicated to you by [employer], letter or phone within 4 business days after the clinic has received the results. If you do not hear from us within 7 days, please contact the clinic through [employer] or phone. If you have a critical or abnormal lab result, we will notify you by phone as soon as possible.  Submit refill requests through [employer] or call your pharmacy and they will forward the refill request to us. Please allow 3 business days for your refill to be completed.          If you need to speak with a  for additional information , please call: [phone]             Additional Information About Your Visit        MyCharUfree Information     [employer] gives you secure access to your electronic health record. If you see a primary care provider, you can also send messages to your care team and make appointments. If you have questions, please call your primary care clinic.  If you do not have a primary care provider, please call [phone] and they will assist you.        Care EveryWhere ID     This is your Care EveryWhere ID. This could be used by other organizations to access your [facility] medical records  [id number]        Your Vitals Were     Pulse Temperature Respirations Pulse Oximetry BMI (Body Mass Index)       95 98.5  F (36.9  C) (Tympanic) 18 99% 31.23 kg/m2        Blood Pressure from Last 3 Encounters:   03/01/18 (!) 148/96   01/29/18 130/74   10/11/17 (!) 138/109    Weight  from Last 3 Encounters:   03/01/18 192 lb (87.1 kg)   01/29/18 185 lb (83.9 kg)   10/09/17 177 lb (80.3 kg)              Today, you had the following     No orders found for display         Today's Medication Changes          These changes are accurate as of 3/1/18  3:21 PM.  If you have any questions, ask your nurse or doctor.               Start taking these medicines.        Dose/Directions    azithromycin 250 MG tablet   Commonly known as:  ZITHROMAX   Used for:  Paronychia of toe, left   Started by:  Marco Matute PA-C        Two tablets first day, then one tablet daily for four days.   Quantity:  6 tablet   Refills:  0            Where to get your medicines      These medications were sent to [facility]/pharmacy #[id number] - [place], [place] - [address] [facility] [address] [facility]  [address] [facility] [address]     Phone:  [phone]     azithromycin 250 MG tablet                Primary Care Provider Office Phone # Fax #    Joseph Alanis [phone]       [address] [facility] [address]        Equal Access to Services     [facility]: Hadii aad ku hadasho Soomaali, waaxda luqadaha, qaybta kaalmada adeegyada, yamilka connolly . So [facility] [phone].    ATENCIÓN: Si habla español, tiene a chairez disposición servicios gratuitos de asistencia lingüística. [facility] [phone].    We comply with applicable federal civil rights laws and [place] laws. We do not discriminate on the basis of race, color, national origin, age, disability, sex, sexual orientation, or gender identity.            Thank you!     Thank you for choosing [facility]  for your care. Our goal is always to provide you with excellent care. Hearing back from our patients is one way we can continue to improve our services. Please take a few minutes to complete the written survey that you may receive in the mail after your visit with us. Thank you!             Your  Updated Medication List - Protect others around you: Learn how to safely use, store and throw away your medicines at www.disposemymeds.org.          This list is accurate as of 3/1/18  3:21 PM.  Always use your most recent med list.                   Brand Name Dispense Instructions for use Diagnosis    aspirin 81 MG EC tablet     90 tablet    Take 1 tablet (81 mg) by mouth daily    Type 2 diabetes mellitus without complication, unspecified long term insulin use status (H)       azithromycin 250 MG tablet    ZITHROMAX    6 tablet    Two tablets first day, then one tablet daily for four days.    Paronychia of toe, left       blood glucose monitoring test strip    ONETOUCH VERIO IQ    1250 strip    TEST UP TO 8 TIMES DAILY OR AS DIRECTED    Type 2 diabetes mellitus without complication, with long-term current use of insulin (H)       CO Q 10 PO      Take  by mouth. 2 per day    Type 2 diabetes, HbA1c goal < 7% (H)       FISH OIL CONCENTRATE 1000 MG Caps      2 tablets daily        HumaLOG KWIKpen 100 UNIT/ML injection   Generic drug:  insulin lispro     45 mL    INJECT 10-12 UNITLS BEFORE EACH MEAL WITH CORRECTION    Type 2 diabetes mellitus without complication, unspecified long term insulin use status (H)       insulin pen needle 31G X 8 MM    B-D U/F    300 each    USE 4 TIMES DAILY AS DIRECTED.    Type 2 diabetes mellitus without complication, without long-term current use of insulin (H)       LANTUS SOLOSTAR 100 UNIT/ML injection   Generic drug:  insulin glargine     3 mL    INJECT 50 UNITS SUBCUTANEOUSLY AT BEDTIME    Type 2 diabetes mellitus without complication, unspecified long term insulin use status (H)       liraglutide 18 MG/3ML soln    VICTOZA PEN    27 mL    Inject 1.8 mg Subcutaneous daily    Type 2 diabetes mellitus without complication, with long-term current use of insulin (H), Non morbid obesity due to excess calories       LORazepam 0.5 MG tablet    ATIVAN    45 tablet    TAKE 1 TABLET BY MOUTH  EVERY 8 HOURS AS NEEDED    Generalized anxiety disorder       losartan 100 MG tablet    COZAAR    90 tablet    Take 1 tablet (100 mg) by mouth daily    HTN, goal below 140/90       metFORMIN 500 MG 24 hr tablet    GLUCOPHAGE-XR    360 tablet    TAKE 2 TABLETS (1,000 MG) BY MOUTH 2 TIMES DAILY    Type 2 diabetes mellitus without complication, unspecified long term insulin use status (H)       metoprolol tartrate 25 MG tablet    LOPRESSOR    60 tablet    TAKE 1 TABLET (25 MG) BY MOUTH TWICE A DAY.    Hypertension goal BP (blood pressure) < 140/90       MULTIPLE VITAMINS PO      1000mg daily        ondansetron 4 MG tablet    ZOFRAN    30 tablet    Take 1 tablet (4 mg) by mouth every 8 hours as needed for nausea    Chronic pain syndrome       * order for DME     1 Device    Equipment being ordered: glucometer brand per insurance    Type 2 diabetes, HbA1c goal < 7% (H)       * order for DME     400 each    LANCETS 4 x DAILY AND PRN    Type 2 diabetes, HbA1c goal < 7% (H)       * order for DME     1 each    1 ambulatory blood pressure cuff    Hypertension goal BP (blood pressure) < 140/90       rosuvastatin 40 MG tablet    CRESTOR    30 tablet    Take 1 tablet (40 mg) by mouth daily    Hyperlipidemia LDL goal <100       sildenafil 100 MG tablet    VIAGRA    12 tablet    TAKE 1 TABLET BY MOUTH 30 MINS BEFORE INTERCOURSE    Erectile dysfunction, unspecified erectile dysfunction type       VITAMIN B-12 PO      Take  by mouth daily.        vitamin D 1000 UNITS capsule      Take 2 capsules by mouth daily.        * Notice:  This list has 3 medication(s) that are the same as other medications prescribed for you. Read the directions carefully, and ask your doctor or other care provider to review them with you.

## 2018-03-19 DIAGNOSIS — E11.9 TYPE 2 DIABETES MELLITUS WITHOUT COMPLICATION, UNSPECIFIED LONG TERM INSULIN USE STATUS: ICD-10-CM

## 2018-03-19 NOTE — TELEPHONE ENCOUNTER
"Requested Prescriptions   Pending Prescriptions Disp Refills     LANTUS SOLOSTAR 100 UNIT/ML soln [Pharmacy Med Name: LANTUS SOLOSTAR 100 UNIT/ML]  0    Last Written Prescription Date:  12/21/17  Last Fill Quantity: 3,  # refills: 0   Last office visit: 3/27/2017 with prescribing provider:     Future Office Visit:     Sig: INJECT 50 UNITS SUBCUTANEOUSLY AT BEDTIME **LABS REQUIRED FOR ADDITIONAL REFILLS**    Long Acting Insulin Protocol Failed    3/19/2018  2:08 AM       Failed - Blood pressure less than 140/90 in past 6 months    BP Readings from Last 3 Encounters:   03/01/18 (!) 148/96   01/29/18 130/74   10/11/17 (!) 138/109                Passed - LDL on file in past 12 months    Recent Labs   Lab Test  01/17/18   0820   LDL  56            Passed - Microalbumin on file in past 12 months    Recent Labs   Lab Test  01/17/18   0825   MICROL  22   UMALCR  32.53*            Passed - Serum creatinine on file in past 12 months    Recent Labs   Lab Test  01/17/18   0820   CR  0.81            Passed - HgbA1C in past 3 or 6 months    Recent Labs   Lab Test  01/17/18   0820   A1C  6.3*            Passed - Patient is age 18 or older       Passed - Recent (6 mo) or future (30 days) visit within the authorizing provider's specialty    Patient had office visit in the last 6 months or has a visit in the next 30 days with authorizing provider or within the authorizing provider's specialty.  See \"Patient Info\" tab in inbasket, or \"Choose Columns\" in Meds & Orders section of the refill encounter.              "

## 2018-03-21 ENCOUNTER — TELEPHONE (OUTPATIENT)
Dept: FAMILY MEDICINE | Facility: CLINIC | Age: 53
End: 2018-03-21

## 2018-03-22 NOTE — TELEPHONE ENCOUNTER
Routing refill request to provider for review/approval because:  Failed protocol.  Emely Bowman RN [facility] Triage.

## 2018-03-24 DIAGNOSIS — E11.9 TYPE 2 DIABETES MELLITUS WITHOUT COMPLICATION (H): ICD-10-CM

## 2018-03-24 NOTE — TELEPHONE ENCOUNTER
"Requested Prescriptions   Pending Prescriptions Disp Refills     metFORMIN (GLUCOPHAGE-XR) 500 MG 24 hr tablet [Pharmacy Med Name: METFORMIN  MG TABLET] 360 tablet 2    Last Written Prescription Date:  1/29/18  Last Fill Quantity: 360,  # refills: 1   Last Office Visit with ISMAEL, [facility]ORIO or [facility] prescribing provider:  6/23/2017     Future Office Visit:      Sig: TAKE 2 TABLETS (1,000 MG) BY MOUTH 2 TIMES DAILY    Biguanide Agents Failed    3/24/2018  9:30 AM       Failed - Blood pressure less than 140/90 in past 6 months    BP Readings from Last 3 Encounters:   03/01/18 (!) 148/96   01/29/18 130/74   10/11/17 (!) 138/109                Passed - Patient has documented LDL within the past 12 mos.    Recent Labs   Lab Test  01/17/18   0820   LDL  56            Passed - Patient has had a Microalbumin in the past 12 mos.    Recent Labs   Lab Test  01/17/18   0825   MICROL  22   UMALCR  32.53*            Passed - Patient is age 10 or older       Passed - Patient has documented A1c within the specified period of time.    Recent Labs   Lab Test  01/17/18   0820   A1C  6.3*            Passed - Patient's CR is NOT>1.4 OR Patient's EGFR is NOT<45 within past 12 mos.    Recent Labs   Lab Test  01/17/18   0820   GFRESTIMATED  >90   GFRESTBLACK  >90       Recent Labs   Lab Test  01/17/18   0820   CR  0.81            Passed - Patient does NOT have a diagnosis of CHF.       Passed - Recent (6 mo) or future (30 days) visit within the authorizing provider's specialty    Patient had office visit in the last 6 months or has a visit in the next 30 days with authorizing provider or within the authorizing provider's specialty.  See \"Patient Info\" tab in inbasket, or \"Choose Columns\" in Meds & Orders section of the refill encounter.              "

## 2018-03-27 DIAGNOSIS — E11.9 TYPE 2 DIABETES MELLITUS WITHOUT COMPLICATION, UNSPECIFIED LONG TERM INSULIN USE STATUS: ICD-10-CM

## 2018-03-27 NOTE — TELEPHONE ENCOUNTER
Routing refill request to provider for review/approval because:  Medication is reported/historical    Lab Results   Component Value Date    A1C 6.3 01/17/2018    A1C 8.2 03/27/2017    A1C 7.3 08/12/2016    A1C 7.9 11/13/2015    A1C 6.6 02/02/2015     Last seen 1/29/18 by Dr. Alanis.    Arleth Lane RN  [facility]

## 2018-03-27 NOTE — TELEPHONE ENCOUNTER
Reason for Call:  Medication or medication refill:    Do you use a [facility]?  Name of the pharmacy and phone number for the current request:  [facility]/PHARMACY #[id number] - [place], [place] - [address] [place] [address] AT [facility] [address]    Name of the medication requested: metFORMIN (GLUCOPHAGE-XR) 500 MG 24 hr tablet    Other request:     Can we leave a detailed message on this number?    Phone number patient can be reached at: [facility]: [phone]    Best Time: any    Call taken on 3/27/2018 at 4:52 PM by Ashley Urias

## 2018-03-28 ENCOUNTER — TELEPHONE (OUTPATIENT)
Dept: FAMILY MEDICINE | Facility: CLINIC | Age: 53
End: 2018-03-28

## 2018-03-28 DIAGNOSIS — E11.9 TYPE 2 DIABETES MELLITUS WITHOUT COMPLICATION, WITHOUT LONG-TERM CURRENT USE OF INSULIN (H): ICD-10-CM

## 2018-03-28 RX ORDER — METFORMIN HCL 500 MG
TABLET, EXTENDED RELEASE 24 HR ORAL
Qty: 360 TABLET | Refills: 2
Start: 2018-03-28

## 2018-03-28 RX ORDER — METFORMIN HCL 500 MG
TABLET, EXTENDED RELEASE 24 HR ORAL
Qty: 360 TABLET | Refills: 1 | Status: SHIPPED | OUTPATIENT
Start: 2018-03-28 | End: 2018-12-15

## 2018-03-28 NOTE — TELEPHONE ENCOUNTER
Requested Prescriptions   Pending Prescriptions Disp Refills     insulin pen needle (BD YANNI U/F) 32G X 4  each prn     Sig: Use 4 times daily as directed.    There is no refill protocol information for this order        Routing refill request to provider for review/approval because:  Nurse entered new order for needles      Chacha Iraheta RN  [facility]

## 2018-03-28 NOTE — TELEPHONE ENCOUNTER
Reason for Call:  Other     Detailed comments: Neptali is calling from [employer] and patient is requesting a different pin needle/ he is wanting to get the BD Colleen pin needle 32 romeo,    Phone Number Patient can be reached at: Other phone number:  Neptali/ [employer]/ [phone]    Best Time: any    Can we leave a detailed message on this number? YES    Call taken on 3/28/2018 at 12:16 PM by Adelita Cruz

## 2018-03-29 DIAGNOSIS — I10 HYPERTENSION GOAL BP (BLOOD PRESSURE) < 140/90: ICD-10-CM

## 2018-03-29 NOTE — TELEPHONE ENCOUNTER
"Requested Prescriptions   Pending Prescriptions Disp Refills     metoprolol tartrate (LOPRESSOR) 25 MG tablet [Pharmacy Med Name: METOPROLOL TARTRATE 25 MG TAB] 60 tablet 1    Last Written Prescription Date:  2/14/18  Last Fill Quantity: 60,  # refills: 1   Last office visit: 3/1/2018 with prescribing provider:     Future Office Visit:     Sig: TAKE 1 TABLET (25 MG) BY MOUTH TWICE A DAY.    Beta-Blockers Protocol Failed    3/29/2018  3:06 PM       Failed - Blood pressure under 140/90 in past 12 months    BP Readings from Last 3 Encounters:   03/01/18 (!) 148/96   01/29/18 130/74   10/11/17 (!) 138/109                Passed - Patient is age 6 or older       Passed - Recent (12 mo) or future (30 days) visit within the authorizing provider's specialty    Patient had office visit in the last 12 months or has a visit in the next 30 days with authorizing provider or within the authorizing provider's specialty.  See \"Patient Info\" tab in inbasket, or \"Choose Columns\" in Meds & Orders section of the refill encounter.              "

## 2018-03-30 RX ORDER — METOPROLOL TARTRATE 25 MG/1
TABLET, FILM COATED ORAL
Qty: 60 TABLET | Refills: 1 | Status: SHIPPED | OUTPATIENT
Start: 2018-03-30 | End: 2018-12-15

## 2018-03-30 NOTE — TELEPHONE ENCOUNTER
Routing refill request to provider for review/approval because:  BP not at goal.  Emely Bowman, ADAM [facility] Triage.

## 2018-04-21 DIAGNOSIS — E11.9 TYPE 2 DIABETES MELLITUS WITHOUT COMPLICATION, UNSPECIFIED LONG TERM INSULIN USE STATUS: ICD-10-CM

## 2018-04-23 ENCOUNTER — OFFICE VISIT (OUTPATIENT)
Dept: FAMILY MEDICINE | Facility: CLINIC | Age: 53
End: 2018-04-23
Payer: COMMERCIAL

## 2018-04-23 VITALS
BODY MASS INDEX: 31.23 KG/M2 | TEMPERATURE: 97.8 F | DIASTOLIC BLOOD PRESSURE: 78 MMHG | HEART RATE: 85 BPM | WEIGHT: 192 LBS | OXYGEN SATURATION: 98 % | SYSTOLIC BLOOD PRESSURE: 132 MMHG

## 2018-04-23 DIAGNOSIS — E11.9 TYPE 2 DIABETES MELLITUS WITHOUT COMPLICATION, WITHOUT LONG-TERM CURRENT USE OF INSULIN (H): ICD-10-CM

## 2018-04-23 DIAGNOSIS — E78.5 HYPERLIPIDEMIA LDL GOAL <100: ICD-10-CM

## 2018-04-23 DIAGNOSIS — M54.40 CHRONIC LOW BACK PAIN WITH SCIATICA, SCIATICA LATERALITY UNSPECIFIED, UNSPECIFIED BACK PAIN LATERALITY: Primary | ICD-10-CM

## 2018-04-23 DIAGNOSIS — G89.29 CHRONIC LOW BACK PAIN WITH SCIATICA, SCIATICA LATERALITY UNSPECIFIED, UNSPECIFIED BACK PAIN LATERALITY: Primary | ICD-10-CM

## 2018-04-23 DIAGNOSIS — F41.1 GENERALIZED ANXIETY DISORDER: ICD-10-CM

## 2018-04-23 DIAGNOSIS — I10 ESSENTIAL HYPERTENSION WITH GOAL BLOOD PRESSURE LESS THAN 140/90: ICD-10-CM

## 2018-04-23 PROCEDURE — 99214 OFFICE O/P EST MOD 30 MIN: CPT | Performed by: INTERNAL MEDICINE

## 2018-04-23 RX ORDER — OXYCODONE HYDROCHLORIDE 5 MG/1
5 TABLET ORAL EVERY 6 HOURS PRN
Qty: 90 TABLET | Refills: 0 | Status: SHIPPED | OUTPATIENT
Start: 2018-04-23 | End: 2018-11-19

## 2018-04-23 RX ORDER — LORAZEPAM 0.5 MG/1
TABLET ORAL
Qty: 45 TABLET | Refills: 0 | Status: SHIPPED | OUTPATIENT
Start: 2018-04-23 | End: 2018-11-19

## 2018-04-23 ASSESSMENT — PAIN SCALES - GENERAL: PAINLEVEL: NO PAIN (0)

## 2018-04-23 NOTE — MR AVS SNAPSHOT
After Visit Summary   4/23/2018    Amadou Bach    MRN: [id number]           Patient Information     Date Of Birth          1965        Visit Information        Provider Department      4/23/2018 11:20 AM Joseph Alanis MD [facility]        Today's Diagnoses     Chronic low back pain with sciatica, sciatica laterality unspecified, unspecified back pain laterality    -  1    Generalized anxiety disorder          Care Instructions      Low-Salt Choices  Eating salt (sodium) can make your body retain too much water. Excess water makes your heart work harder. Canned, packaged, and frozen foods are easy to prepare. But they are often high in sodium. Here are some ideas for low-salt foods you can easily make yourself.    For breakfast    Fruit or 100% fruit juice    Whole-wheat bread or an English muffin. Look for sodium content on Nutrition Facts labels.    Low-fat milk or yogurt    Unsalted eggs    Shredded wheat    Corn tortillas    Unsalted steamed rice    Regular (not instant) hot cereal, made without salt  Stay away from:    Sausage, oconnell, and ham    Flour tortillas    Packaged muffins, pancakes, and biscuits    Instant hot cereals    Cottage cheese  For lunch and dinner    Fresh fish, chicken, turkey, or meat--baked, broiled, or roasted without salt    Dry beans, cooked without salt    Tofu, stir-fried without salt    Unsalted fresh fruit and vegetables, or frozen or canned fruit and vegetables with no added salt  Stay away from:    Lunch or deli meat that is cured or smoked    Cheese    Tomato juice and ketchup    Canned vegetables, soups, and fish not labeled as no-salt-added or reduced sodium    Packaged gravies and sauces    Olives, pickles, and relish    Bottled salad dressings  For snacks and desserts    Yogurt    Unsalted, air-popped popcorn    Unsalted nuts or seeds  Stay away from:    Pies and cakes    Packaged dessert mixes    Pizza    Canned and  packaged puddings    Pretzels, chips, crackers, and nuts--unless the label says unsalted  Date Last Reviewed: 6/1/2017 2000-2017 [employer]. [address] [place] [address], [place] [address]. All rights reserved. This information is not intended as a substitute for professional medical care. Always follow your healthcare professional's instructions.        Low-Salt Diet  This diet removes foods that are high in salt. It also limits the amount of salt you use when cooking. It is most often used for people with high blood pressure, edema (fluid retention), and kidney, liver, or heart disease.  Table salt contains the mineral sodium. Your body needs sodium to work normally. But too much sodium can make your health problems worse. Your healthcare provider is recommending a low-salt (also called low-sodium) diet for you. Your total daily allowance of salt is 1,500 to 2,300 milligrams (mg). It is less than 1 teaspoon of table salt. This means you can have only about 500 to 700 mg of sodium at each meal. People with certain health problems should limit salt intake to the lower end of the recommended range.    When you cook, don t add much salt. If you can cook without using salt, even better. Don t add salt to your food at the table.  When shopping, read food labels. Salt is often called sodium on the label. Choose foods that are salt-free, low salt, or very low salt. Note that foods with reduced salt may not lower your salt intake enough.    Beans, potatoes, and pasta  Ok: Dry beans, split peas, lentils, potatoes, rice, macaroni, pasta, spaghetti without added salt  Avoid: Potato chips, tortilla chips, and similar products  Breads and cereals  Ok: Low-sodium breads, rolls, cereals, and cakes; low-salt crackers, matzo crackers  Avoid: Salted crackers, pretzels, popcorn, [place] toast, pancakes, muffins  Dairy  Ok: Milk, chocolate milk, hot chocolate mix, low-salt cheeses, and yogurt  Avoid: Processed cheese and  cheese spreads; Roquefort, Camembert, and cottage cheese; buttermilk, instant breakfast drink  Desserts  Ok: Ice cream, frozen yogurt, juice bars, gelatin, cookies and pies, sugar, honey, jelly, hard candy  Avoid: Most pies, cakes and cookies prepared or processed with salt; instant pudding  Drinks  Ok: Tea, coffee, fizzy (carbonated) drinks, juices  Avoid: Flavored coffees, electrolyte replacement drinks, sports drinks  Meats  Ok: All fresh meat, fish, poultry, low-salt tuna, eggs, egg substitute  Avoid: Smoked, pickled, brine-cured, or salted meats and fish. This includes oconnell, chipped beef, corned beef, hot dogs, deli meats, ham, kosher meats, salt pork, sausage, canned tuna, salted codfish, smoked salmon, herring, sardines, or anchovies.  Seasonings and spices  Ok: Most seasonings are okay. Good substitutes for salt include: fresh herb blends, hot sauce, lemon, garlic, valle, vinegar, dry mustard, parsley, cilantro, horseradish, tomato paste, regular margarine, mayonnaise, unsalted butter, cream cheese, vegetable oil, cream, low-salt salad dressing and gravy.  Avoid: Regular ketchup, relishes, pickles, soy sauce, teriyaki sauce, Worcestershire sauce, BBQ sauce, tartar sauce, meat tenderizer, chili sauce, regular gravy, regular salad dressing, salted butter  Soups  Ok: Low-salt soups and broths made with allowed foods  Avoid: Bouillon cubes, soups with smoked or salted meats, regular soup and broth  Vegetables  Ok: Most vegetables are okay; also low-salt tomato and vegetable juices  Avoid: Sauerkraut and other brine-soaked vegetables; pickles and other pickled vegetables; tomato juice, olives  Date Last Reviewed: 8/1/2016 2000-2017 [employer]. [address] [place] [address], [place] [address]. All rights reserved. This information is not intended as a substitute for professional medical care. Always follow your healthcare professional's instructions.        Tips for Using Less Salt    Most people with  heart problems need to eat less salt (sodium). Reducing the amount of salt you eat may help control your blood pressure. The higher your blood pressure, the greater your risk for heart disease, stroke, blindness, and kidney problems.  At the store    Make low-salt choices by reading labels carefully. Look for the total amount of sodium per serving.    Use more fresh food. Buy more fruits and vegetables. Select lean meats, fish, and poultry.    Use fewer frozen, canned, and packaged foods which often contain a lot of sodium.    Use plain frozen vegetables without sauces or toppings. These products are often low-sodium or no-sodium.    Choose reduced-sodium or no-salt-added versions of canned vegetables and soups.  In the kitchen    Don't add salt to food when you're cooking. Season with flavorings such as onion, garlic, pepper, salt-free herbal blends, and lemon or lime juice.    Use a cookbook containing low-salt recipes. It can give you ideas for tasty meals that are healthy for your heart.    Sprinkle salt-free herbal blends on vegetables and meat.    Drain and rinse canned foods, such as canned beans and vegetables, before cooking or eating.  Eating out    Tell the  you're on a low-salt diet. Ask questions about the menu.    Order fish, chicken, and meat broiled, baked, poached, or grilled without salt, butter, or breading.    Use lemon, pepper, and salt-free herb mixes to add flavor.    Choose plain steamed rice, boiled noodles, and baked or boiled potatoes. Top potatoes with chives and a little sour cream.     Beware! Salt goes by many other names. Limit foods with these words listed as ingredients: salt, sodium, soy sauce, baking soda, baking powder, MSG, monosodium, Na (the chemical symbol for sodium). Some antacids are also high in salt.   Date Last Reviewed: 6/1/2017 2000-2017 [employer]. [address] [place] [address]. All rights reserved. This information is not intended  as a substitute for professional medical care. Always follow your healthcare professional's instructions.                Follow-ups after your visit        Who to contact     If you have questions or need follow up information about today's clinic visit or your schedule please contact [facility] directly at [phone].  Normal or non-critical lab and imaging results will be communicated to you by MyChart, letter or phone within 4 business days after the clinic has received the results. If you do not hear from us within 7 days, please contact the clinic through [employer] or phone. If you have a critical or abnormal lab result, we will notify you by phone as soon as possible.  Submit refill requests through [employer] or call your pharmacy and they will forward the refill request to us. Please allow 3 business days for your refill to be completed.          Additional Information About Your Visit        MyChart Information     BringIt gives you secure access to your electronic health record. If you see a primary care provider, you can also send messages to your care team and make appointments. If you have questions, please call your primary care clinic.  If you do not have a primary care provider, please call [phone] and they will assist you.        Care EveryWhere ID     This is your Care EveryWhere ID. This could be used by other organizations to access your [facility] medical records  [id number]        Your Vitals Were     Pulse Temperature Pulse Oximetry BMI (Body Mass Index)          85 97.8  F (36.6  C) (Oral) 98% 31.23 kg/m2         Blood Pressure from Last 3 Encounters:   04/23/18 132/78   03/01/18 (!) 148/96   01/29/18 130/74    Weight from Last 3 Encounters:   04/23/18 192 lb (87.1 kg)   03/01/18 192 lb (87.1 kg)   01/29/18 185 lb (83.9 kg)              Today, you had the following     No orders found for display         Today's Medication Changes          These changes are accurate  as of 4/23/18 12:00 PM.  If you have any questions, ask your nurse or doctor.               Start taking these medicines.        Dose/Directions    oxyCODONE IR 5 MG tablet   Commonly known as:  ROXICODONE   Used for:  Chronic low back pain with sciatica, sciatica laterality unspecified, unspecified back pain laterality   Started by:  Joseph Alanis MD        Dose:  5 mg   Take 1 tablet (5 mg) by mouth every 6 hours as needed for severe pain maximum 6 tablet(s) per day   Quantity:  90 tablet   Refills:  0         These medicines have changed or have updated prescriptions.        Dose/Directions    LORazepam 0.5 MG tablet   Commonly known as:  ATIVAN   This may have changed:  See the new instructions.   Used for:  Generalized anxiety disorder   Changed by:  Joseph Alanis MD        TAKE 1 TABLET BY MOUTH EVERY 8 HOURS AS NEEDED   Quantity:  45 tablet   Refills:  0            Where to get your medicines      Some of these will need a paper prescription and others can be bought over the counter.  Ask your nurse if you have questions.     Bring a paper prescription for each of these medications     LORazepam 0.5 MG tablet    oxyCODONE IR 5 MG tablet               Information about OPIOIDS     PRESCRIPTION OPIOIDS: WHAT YOU NEED TO KNOW   You have a prescription for an opioid (narcotic) pain medicine. Opioids can cause addiction. If you have a history of chemical dependency of any type, you are at a higher risk of becoming addicted to opioids. Only take this medicine after all other options have been tried. Take it for as short a time and as few doses as possible.     Do not:    Drive. If you drive while taking these medicines, you could be arrested for driving under the influence (DUI).    Operate heavy machinery    Do any other dangerous activities while taking these medicines.     Drink any alcohol while taking these medicines.      Take with any other medicines that contain acetaminophen. Read all labels  carefully. Look for the word  acetaminophen  or  Tylenol.  Ask your pharmacist if you have questions or are unsure.    Store your pills in a secure place, locked if possible. We will not replace any lost or stolen medicine. If you don t finish your medicine, please throw away (dispose) as directed by your pharmacist. The [place] [employer]ution [employer] has more information about safe disposal: https://www.pca.[facility].us/living-green/managing-unwanted-medications    All opioids tend to cause constipation. Drink plenty of water and eat foods that have a lot of fiber, such as fruits, vegetables, prune juice, apple juice and high-fiber cereal. Take a laxative (Miralax, milk of magnesia, Colace, Senna) if you don t move your bowels at least every other day.          Primary Care Provider Office Phone # Fax #    Joseph Alanis [phone]       [address] [facility] [address]        Equal Access to Services     DEVORAH ARANA : Hadii grace ku hadasho Soomaali, waaxda luqadaha, qaybta kaalmada adeegyada, yamilka connolly . So [facility] [phone].    ATENCIÓN: Si habla español, tiene a chairez disposición servicios gratuitos de asistencia lingüística. Llame al [phone].    We comply with applicable federal civil rights laws and [place] laws. We do not discriminate on the basis of race, color, national origin, age, disability, sex, sexual orientation, or gender identity.            Thank you!     Thank you for choosing [facility]  for your care. Our goal is always to provide you with excellent care. Hearing back from our patients is one way we can continue to improve our services. Please take a few minutes to complete the written survey that you may receive in the mail after your visit with us. Thank you!             Your Updated Medication List - Protect others around you: Learn how to safely use, store and throw away your medicines at  www.disposemymeds.org.          This list is accurate as of 4/23/18 12:00 PM.  Always use your most recent med list.                   Brand Name Dispense Instructions for use Diagnosis    aspirin 81 MG EC tablet     90 tablet    Take 1 tablet (81 mg) by mouth daily    Type 2 diabetes mellitus without complication, unspecified long term insulin use status (H)       blood glucose monitoring test strip    ONETOUCH VERIO IQ    1250 strip    TEST UP TO 8 TIMES DAILY OR AS DIRECTED    Type 2 diabetes mellitus without complication, with long-term current use of insulin (H)       CO Q 10 PO      Take  by mouth. 2 per day    Type 2 diabetes, HbA1c goal < 7% (H)       FISH OIL CONCENTRATE 1000 MG Caps      2 tablets daily        HumaLOG KWIKpen 100 UNIT/ML injection   Generic drug:  insulin lispro     45 mL    INJECT 10-12 UNITLS BEFORE EACH MEAL WITH CORRECTION    Type 2 diabetes mellitus without complication, unspecified long term insulin use status (H)       insulin glargine 100 UNIT/ML injection    LANTUS SOLOSTAR    60 mL    50 units sq QHS    Type 2 diabetes mellitus without complication, unspecified long term insulin use status (H)       * insulin pen needle 31G X 8 MM    B-D U/F    300 each    USE 4 TIMES DAILY AS DIRECTED.    Type 2 diabetes mellitus without complication, without long-term current use of insulin (H)       * insulin pen needle 32G X 4 MM    BD YANNI U/F    100 each    Use 4 times daily as directed.    Type 2 diabetes mellitus without complication, without long-term current use of insulin (H)       liraglutide 18 MG/3ML soln    VICTOZA PEN    27 mL    Inject 1.8 mg Subcutaneous daily    Type 2 diabetes mellitus without complication, with long-term current use of insulin (H), Non morbid obesity due to excess calories       LORazepam 0.5 MG tablet    ATIVAN    45 tablet    TAKE 1 TABLET BY MOUTH EVERY 8 HOURS AS NEEDED    Generalized anxiety disorder       losartan 100 MG tablet    COZAAR    90 tablet     Take 1 tablet (100 mg) by mouth daily    HTN, goal below 140/90       metFORMIN 500 MG 24 hr tablet    GLUCOPHAGE-XR    360 tablet    TAKE 2 TABLETS (1,000 MG) BY MOUTH 2 TIMES DAILY    Type 2 diabetes mellitus without complication, unspecified long term insulin use status (H)       metoprolol tartrate 25 MG tablet    LOPRESSOR    60 tablet    TAKE 1 TABLET (25 MG) BY MOUTH TWICE A DAY.    Hypertension goal BP (blood pressure) < 140/90       MULTIPLE VITAMINS PO      1000mg daily        ondansetron 4 MG tablet    ZOFRAN    30 tablet    Take 1 tablet (4 mg) by mouth every 8 hours as needed for nausea    Chronic pain syndrome       order for DME     1 Device    Equipment being ordered: glucometer brand per insurance    Type 2 diabetes, HbA1c goal < 7% (H)       order for DME     400 each    LANCETS 4 x DAILY AND PRN    Type 2 diabetes, HbA1c goal < 7% (H)       order for DME     1 each    1 ambulatory blood pressure cuff    Hypertension goal BP (blood pressure) < 140/90       oxyCODONE IR 5 MG tablet    ROXICODONE    90 tablet    Take 1 tablet (5 mg) by mouth every 6 hours as needed for severe pain maximum 6 tablet(s) per day    Chronic low back pain with sciatica, sciatica laterality unspecified, unspecified back pain laterality       rosuvastatin 40 MG tablet    CRESTOR    30 tablet    Take 1 tablet (40 mg) by mouth daily    Hyperlipidemia LDL goal <100       sildenafil 100 MG tablet    VIAGRA    12 tablet    TAKE 1 TABLET BY MOUTH 30 MINS BEFORE INTERCOURSE    Erectile dysfunction, unspecified erectile dysfunction type       VITAMIN B-12 PO      Take  by mouth daily.        vitamin D 1000 units capsule      Take 2 capsules by mouth daily.        * Notice:  This list has 2 medication(s) that are the same as other medications prescribed for you. Read the directions carefully, and ask your doctor or other care provider to review them with you.

## 2018-04-23 NOTE — NURSING NOTE
"Chief Complaint   Patient presents with     Hypertension       Initial BP (!) 150/93 (BP Location: Right arm, Patient Position: Chair, Cuff Size: Adult Regular)  Pulse 85  Temp 97.8  F (36.6  C) (Oral)  Wt 192 lb (87.1 kg)  SpO2 98%  BMI 31.23 kg/m2 Estimated body mass index is 31.23 kg/(m^2) as calculated from the following:    Height as of 8/31/17: 5' 5.75\" (1.67 m).    Weight as of this encounter: 192 lb (87.1 kg).  Medication Reconciliation: complete   Vidhi Andre MA      "

## 2018-04-23 NOTE — TELEPHONE ENCOUNTER
"Requested Prescriptions   Pending Prescriptions Disp Refills     aspirin 81 MG EC tablet [Pharmacy Med Name: ASPIRIN EC 81 MG TABLET] 90 tablet 2        Last Written Prescription Date:  3/27/17  Last Fill Quantity: 90,  # refills: 3   Last office visit: 3/27/2017 with prescribing provider:  1/29/18, is also being seen today   Future Office Visit:     Sig: TAKE 1 TABLET (81 MG) BY MOUTH DAILY    Analgesics (Non-Narcotic Tylenol and ASA Only) Passed    4/23/2018 10:07 AM       Passed - Recent (12 mo) or future (30 days) visit within the authorizing provider's specialty    Patient had office visit in the last 12 months or has a visit in the next 30 days with authorizing provider or within the authorizing provider's specialty.  See \"Patient Info\" tab in inbasket, or \"Choose Columns\" in Meds & Orders section of the refill encounter.           Passed - Patient is age 20 years or older    If ASA is flagged for ages under 20 years old. Forward to provider for confirmation Ryes Syndrome is not a concern.                  Routing refill request to provider for review/approval because:  Routed to Dr. Alanis to send under his name, old Rx is under previous/other provider.          Arleth Lane RN  [facility]      "

## 2018-04-23 NOTE — PROGRESS NOTES
SUBJECTIVE:   Amadou Bach is a 52 year old male who presents to clinic today for the following health issues:      Hypertension Follow-up      Outpatient blood pressures are not being checked.    Low Salt Diet: no added salt      Amount of exercise or physical activity: 2-3 days/week for an average of 45-60 minutes    Problems taking medications regularly: No    Medication side effects: none    Diet: low salt    Biking is the goal.  Checking at home - 130/90 at home                Problem list and histories reviewed & adjusted, as indicated.  Additional history: as documented    Patient Active Problem List   Diagnosis     Erectile dysfunction     Type 2 diabetes mellitus without complication, without long-term current use of insulin (H)     Hyperlipidemia LDL goal <100     Obesity, Class I, BMI 30-34.9     Generalized anxiety disorder     ACP (advance care planning)     History of adenomatous polyp of colon     Essential hypertension with goal blood pressure less than 140/90     Malignant melanoma of skin of trunk (H)     History of melanoma     AK (actinic keratosis)     Multiple benign nevi     Cherry hemangioma     Lentigines     Past Surgical History:   Procedure Laterality Date     BIOPSY OF SKIN LESION       COLONOSCOPY  3/11/2014    Procedure: COMBINED COLONOSCOPY, SINGLE BIOPSY/POLYPECTOMY BY BIOPSY;;  Surgeon: Stephen Amin MD;  Location: [facility]     COLONOSCOPY WITH CO2 INSUFFLATION N/A 8/19/2014    Procedure: COLONOSCOPY WITH CO2 INSUFFLATION;  Surgeon: Stephen Amin MD;  Location: [facility]     COLONOSCOPY WITH CO2 INSUFFLATION N/A 10/11/2017    Procedure: COLONOSCOPY WITH CO2 INSUFFLATION;  COLON/ REPEAT COLONOSCOPY;  Surgeon: Jeremy Mcqueen DO;  Location: [facility] OR     HC TOOTH EXTRACTION W/FORCEP      wisdom teeth x4       Social History   Substance Use Topics     Smoking status: Never Smoker     Smokeless tobacco: Never Used     Alcohol use Yes      Comment: occ     Family History    Problem Relation Age of Onset     DIABETES Mother      Hypertension Mother      DIABETES Father      CANCER Father      lung     CANCER Other      aunt - skin cancer     Skin Cancer Other      Melanoma No family hx of          Current Outpatient Prescriptions   Medication Sig Dispense Refill     aspirin 81 MG EC tablet TAKE 1 TABLET (81 MG) BY MOUTH DAILY 90 tablet 3     blood glucose monitoring (ONETOUCH VERIO IQ) test strip TEST UP TO 8 TIMES DAILY OR AS DIRECTED 1250 strip 1     Coenzyme Q10 (CO Q 10 PO) Take  by mouth. 2 per day       Cyanocobalamin (VITAMIN B-12 PO) Take  by mouth daily.       FISH OIL CONCENTRATE 1000 MG OR CAPS 2 tablets daily       HUMALOG KWIKPEN 100 UNIT/ML soln INJECT 10-12 UNITLS BEFORE EACH MEAL WITH CORRECTION 45 mL 3     insulin glargine (LANTUS SOLOSTAR) 100 UNIT/ML injection 50 units sq QHS 60 mL 3     insulin pen needle (B-D U/F) 31G X 8 MM USE 4 TIMES DAILY AS DIRECTED. 300 each 1     insulin pen needle (BD YANNI U/F) 32G X 4 MM Use 4 times daily as directed. 100 each 11     liraglutide (VICTOZA PEN) 18 MG/3ML soln Inject 1.8 mg Subcutaneous daily 27 mL 11     LORazepam (ATIVAN) 0.5 MG tablet TAKE 1 TABLET BY MOUTH EVERY 8 HOURS AS NEEDED 45 tablet 0     losartan (COZAAR) 100 MG tablet Take 1 tablet (100 mg) by mouth daily 90 tablet 3     metFORMIN (GLUCOPHAGE-XR) 500 MG 24 hr tablet TAKE 2 TABLETS (1,000 MG) BY MOUTH 2 TIMES DAILY 360 tablet 1     metoprolol tartrate (LOPRESSOR) 25 MG tablet TAKE 1 TABLET (25 MG) BY MOUTH TWICE A DAY. 60 tablet 1     MULTIPLE VITAMINS OR 1000mg daily       ondansetron (ZOFRAN) 4 MG tablet Take 1 tablet (4 mg) by mouth every 8 hours as needed for nausea 30 tablet 3     ORDER FOR DME Equipment being ordered: glucometer brand per insurance 1 Device 0     ORDER FOR DME LANCETS 4 x DAILY AND  each 4     order for DME 1 ambulatory blood pressure cuff 1 each 0     oxyCODONE IR (ROXICODONE) 5 MG tablet Take 1 tablet (5 mg) by mouth every 6 hours  as needed for severe pain maximum 6 tablet(s) per day 90 tablet 0     rosuvastatin (CRESTOR) 40 MG tablet Take 1 tablet (40 mg) by mouth daily 30 tablet 3     sildenafil (VIAGRA) 100 MG tablet TAKE 1 TABLET BY MOUTH 30 MINS BEFORE INTERCOURSE 12 tablet 3     VITAMIN D 1000 UNIT OR CAPS Take 2 capsules by mouth daily.       Allergies   Allergen Reactions     Cats      Cat dander     Cymbalta      Molds & Smuts      Ms Contin [Morphine]      Ceftazidime Rash     Was also on vanco     Vancomycin Rash     Was also on ceftazidime     Recent Labs   Lab Test  01/17/18   0820  03/27/17   1559  08/12/16   0913  11/13/15   0814   02/06/14   0753   A1C  6.3*  8.2*  7.3*  7.9*   < >  6.6*   LDL  56   --   101*  138*   < >  63   HDL  53   --   38*  47   < >  35*   TRIG  75   --   354*  235*   < >  138   ALT  52   --    --   35   --   41   CR  0.81  0.69  0.77  0.70   < >  0.98   GFRESTIMATED  >90  >90  Non African American GFR Calc    >90  Non  GFR Calc    >90  Non  GFR Calc     < >  81   GFRESTBLACK  >90  >90  African American GFR Calc    >90   GFR Calc    >90   GFR Calc     < >  >90   POTASSIUM  4.0  3.5  4.2  3.9   < >  4.1   TSH  1.64   --    --   1.64   < >   --     < > = values in this interval not displayed.        Reviewed and updated as needed this visit by clinical staff  Tobacco  Meds  Med Hx  Surg Hx  Fam Hx  Soc Hx      Reviewed and updated as needed this visit by Provider         ROS:  Constitutional, HEENT, cardiovascular, pulmonary, gi and gu systems are negative, except as otherwise noted.    OBJECTIVE:     /78  Pulse 85  Temp 97.8  F (36.6  C) (Oral)  Wt 192 lb (87.1 kg)  SpO2 98%  BMI 31.23 kg/m2  Body mass index is 31.23 kg/(m^2).  GENERAL: healthy, alert and no distress  EYES: Eyes grossly normal to inspection, PERRL and conjunctivae and sclerae normal  HENT: ear canals and TM's normal, nose and mouth without ulcers or  lesions  NECK: no adenopathy, no asymmetry, masses, or scars and thyroid normal to palpation  RESP: lungs clear to auscultation - no rales, rhonchi or wheezes  CV: regular rate and rhythm, normal S1 S2, no S3 or S4, no murmur, click or rub, no peripheral edema and peripheral pulses strong  ABDOMEN: soft, nontender, no hepatosplenomegaly, no masses and bowel sounds normal  MS: no gross musculoskeletal defects noted, no edema  SKIN: no suspicious lesions or rashes  NEURO: Normal strength and tone, mentation intact and speech normal  BACK: no CVA tenderness, no paralumbar tenderness  PSYCH: mentation appears normal, affect normal/bright  LYMPH: no cervical, supraclavicular, axillary, or inguinal adenopathy    Diagnostic Test Results:  Results for orders placed or performed in visit on 02/06/18   Eye Exam - HIM Scan    Narrative    TOTAL EYE CARE       ASSESSMENT/PLAN:       ICD-10-CM    1. Chronic low back pain with sciatica, sciatica laterality unspecified, unspecified back pain laterality M54.40 oxyCODONE IR (ROXICODONE) 5 MG tablet    G89.29    2. Generalized anxiety disorder F41.1 LORazepam (ATIVAN) 0.5 MG tablet   3. Type 2 diabetes mellitus without complication, without long-term current use of insulin (H) E11.9    4. Hyperlipidemia LDL goal <100 E78.5    5. Essential hypertension with goal blood pressure less than 140/90 I10        BP     132/78  4/24/2018    Lab Results   Component Value Date    GLC 81 01/17/2018     Lab Results   Component Value Date    A1C 6.3 01/17/2018     Lab Results   Component Value Date    LDL 56 01/17/2018     Lab Results   Component Value Date    MICROL 22 01/17/2018     No results found for: MICROALBUMIN    Reviewed the risk of using these together - leave 6-8 hours apart and use both sparingly  Patient is recovering from back infection      Joseph Alanis MD  [facility]

## 2018-04-23 NOTE — PATIENT INSTRUCTIONS
Low-Salt Choices  Eating salt (sodium) can make your body retain too much water. Excess water makes your heart work harder. Canned, packaged, and frozen foods are easy to prepare. But they are often high in sodium. Here are some ideas for low-salt foods you can easily make yourself.    For breakfast    Fruit or 100% fruit juice    Whole-wheat bread or an English muffin. Look for sodium content on Nutrition Facts labels.    Low-fat milk or yogurt    Unsalted eggs    Shredded wheat    Corn tortillas    Unsalted steamed rice    Regular (not instant) hot cereal, made without salt  Stay away from:    Sausage, oconnell, and ham    Flour tortillas    Packaged muffins, pancakes, and biscuits    Instant hot cereals    Cottage cheese  For lunch and dinner    Fresh fish, chicken, turkey, or meat--baked, broiled, or roasted without salt    Dry beans, cooked without salt    Tofu, stir-fried without salt    Unsalted fresh fruit and vegetables, or frozen or canned fruit and vegetables with no added salt  Stay away from:    Lunch or deli meat that is cured or smoked    Cheese    Tomato juice and ketchup    Canned vegetables, soups, and fish not labeled as no-salt-added or reduced sodium    Packaged gravies and sauces    Olives, pickles, and relish    Bottled salad dressings  For snacks and desserts    Yogurt    Unsalted, air-popped popcorn    Unsalted nuts or seeds  Stay away from:    Pies and cakes    Packaged dessert mixes    Pizza    Canned and packaged puddings    Pretzels, chips, crackers, and nuts--unless the label says unsalted  Date Last Reviewed: 6/1/2017 2000-2017 [employer]. [address] [place] [address], [place] [address]. All rights reserved. This information is not intended as a substitute for professional medical care. Always follow your healthcare professional's instructions.        Low-Salt Diet  This diet removes foods that are high in salt. It also limits the amount of salt you use when cooking. It is  most often used for people with high blood pressure, edema (fluid retention), and kidney, liver, or heart disease.  Table salt contains the mineral sodium. Your body needs sodium to work normally. But too much sodium can make your health problems worse. Your healthcare provider is recommending a low-salt (also called low-sodium) diet for you. Your total daily allowance of salt is 1,500 to 2,300 milligrams (mg). It is less than 1 teaspoon of table salt. This means you can have only about 500 to 700 mg of sodium at each meal. People with certain health problems should limit salt intake to the lower end of the recommended range.    When you cook, don t add much salt. If you can cook without using salt, even better. Don t add salt to your food at the table.  When shopping, read food labels. Salt is often called sodium on the label. Choose foods that are salt-free, low salt, or very low salt. Note that foods with reduced salt may not lower your salt intake enough.    Beans, potatoes, and pasta  Ok: Dry beans, split peas, lentils, potatoes, rice, macaroni, pasta, spaghetti without added salt  Avoid: Potato chips, tortilla chips, and similar products  Breads and cereals  Ok: Low-sodium breads, rolls, cereals, and cakes; low-salt crackers, matzo crackers  Avoid: Salted crackers, pretzels, popcorn, Polish toast, pancakes, muffins  Dairy  Ok: Milk, chocolate milk, hot chocolate mix, low-salt cheeses, and yogurt  Avoid: Processed cheese and cheese spreads; Roquefort, Camembert, and cottage cheese; buttermilk, instant breakfast drink  Desserts  Ok: Ice cream, frozen yogurt, juice bars, gelatin, cookies and pies, sugar, honey, jelly, hard candy  Avoid: Most pies, cakes and cookies prepared or processed with salt; instant pudding  Drinks  Ok: Tea, coffee, fizzy (carbonated) drinks, juices  Avoid: Flavored coffees, electrolyte replacement drinks, sports drinks  Meats  Ok: All fresh meat, fish, poultry, low-salt tuna, eggs, egg  substitute  Avoid: Smoked, pickled, brine-cured, or salted meats and fish. This includes oconnell, chipped beef, corned beef, hot dogs, deli meats, ham, kosher meats, salt pork, sausage, canned tuna, salted codfish, smoked salmon, herring, sardines, or anchovies.  Seasonings and spices  Ok: Most seasonings are okay. Good substitutes for salt include: fresh herb blends, hot sauce, lemon, garlic, valle, vinegar, dry mustard, parsley, cilantro, horseradish, tomato paste, regular margarine, mayonnaise, unsalted butter, cream cheese, vegetable oil, cream, low-salt salad dressing and gravy.  Avoid: Regular ketchup, relishes, pickles, soy sauce, teriyaki sauce, Worcestershire sauce, BBQ sauce, tartar sauce, meat tenderizer, chili sauce, regular gravy, regular salad dressing, salted butter  Soups  Ok: Low-salt soups and broths made with allowed foods  Avoid: Bouillon cubes, soups with smoked or salted meats, regular soup and broth  Vegetables  Ok: Most vegetables are okay; also low-salt tomato and vegetable juices  Avoid: Sauerkraut and other brine-soaked vegetables; pickles and other pickled vegetables; tomato juice, olives  Date Last Reviewed: 8/1/2016 2000-2017 [employer]. [address] [place] [address], [place] [address]. All rights reserved. This information is not intended as a substitute for professional medical care. Always follow your healthcare professional's instructions.        Tips for Using Less Salt    Most people with heart problems need to eat less salt (sodium). Reducing the amount of salt you eat may help control your blood pressure. The higher your blood pressure, the greater your risk for heart disease, stroke, blindness, and kidney problems.  At the store    Make low-salt choices by reading labels carefully. Look for the total amount of sodium per serving.    Use more fresh food. Buy more fruits and vegetables. Select lean meats, fish, and poultry.    Use fewer frozen, canned, and packaged foods  which often contain a lot of sodium.    Use plain frozen vegetables without sauces or toppings. These products are often low-sodium or no-sodium.    Choose reduced-sodium or no-salt-added versions of canned vegetables and soups.  In the kitchen    Don't add salt to food when you're cooking. Season with flavorings such as onion, garlic, pepper, salt-free herbal blends, and lemon or lime juice.    Use a cookbook containing low-salt recipes. It can give you ideas for tasty meals that are healthy for your heart.    Sprinkle salt-free herbal blends on vegetables and meat.    Drain and rinse canned foods, such as canned beans and vegetables, before cooking or eating.  Eating out    Tell the  you're on a low-salt diet. Ask questions about the menu.    Order fish, chicken, and meat broiled, baked, poached, or grilled without salt, butter, or breading.    Use lemon, pepper, and salt-free herb mixes to add flavor.    Choose plain steamed rice, boiled noodles, and baked or boiled potatoes. Top potatoes with chives and a little sour cream.     Beware! Salt goes by many other names. Limit foods with these words listed as ingredients: salt, sodium, soy sauce, baking soda, baking powder, MSG, monosodium, Na (the chemical symbol for sodium). Some antacids are also high in salt.   Date Last Reviewed: 6/1/2017 2000-2017 [employer]. [address] [place] [address], [place] [address]. All rights reserved. This information is not intended as a substitute for professional medical care. Always follow your healthcare professional's instructions.

## 2018-08-20 ENCOUNTER — TELEPHONE (OUTPATIENT)
Dept: FAMILY MEDICINE | Facility: CLINIC | Age: 53
End: 2018-08-20

## 2018-08-20 NOTE — TELEPHONE ENCOUNTER
Forms received from: [place] [employer]  Phone number listed: [phone]   Fax listed: [phone]  Date received: 08/20/18  Form description: Insulin-Treated Diabetes Report  Once forms are completed, please return to [place] [employer] via Fax.  Is patient requesting to be contacted when forms are completed: NA    Form placed: In Provider's basket  Joycelyn Hill

## 2018-10-26 DIAGNOSIS — E66.09 NON MORBID OBESITY DUE TO EXCESS CALORIES: ICD-10-CM

## 2018-10-26 DIAGNOSIS — E11.9 TYPE 2 DIABETES MELLITUS WITHOUT COMPLICATION, WITH LONG-TERM CURRENT USE OF INSULIN (H): ICD-10-CM

## 2018-10-26 DIAGNOSIS — Z79.4 TYPE 2 DIABETES MELLITUS WITHOUT COMPLICATION, WITH LONG-TERM CURRENT USE OF INSULIN (H): ICD-10-CM

## 2018-10-26 NOTE — TELEPHONE ENCOUNTER
"Requested Prescriptions   Pending Prescriptions Disp Refills     VICTOZA PEN 18 MG/3ML soln [Pharmacy Med Name: VICTOZA 3-ROSE MARY 18 MG/3 ML PEN]  10    Last Written Prescription Date:  10/9/17  Last Fill Quantity: 27,  # refills: 11   Last office visit: 4/23/2018 with prescribing provider:     Future Office Visit:     Sig: INJECT 1.8 MG SUBCUTANEOUS DAILY    GLP-1 Agonists Protocol Failed    10/26/2018  3:08 PM       Failed - Blood pressure less than 140/90 in past 6 months    BP Readings from Last 3 Encounters:   04/23/18 132/78   03/01/18 (!) 148/96   01/29/18 130/74                Failed - HgbA1C in past 3 or 6 months    If HgbA1C is 8 or greater, it needs to be on file within the past 3 months.  If less than 8, must be on file within the past 6 months.     Recent Labs   Lab Test  01/17/18   0820   A1C  6.3*            Failed - Recent (6 mo) or future (30 days) visit within the authorizing provider's specialty    Patient had office visit in the last 6 months or has a visit in the next 30 days with authorizing provider.  See \"Patient Info\" tab in inbasket, or \"Choose Columns\" in Meds & Orders section of the refill encounter.           Passed - LDL on file in past 12 months    Recent Labs   Lab Test  01/17/18   0820   LDL  56            Passed - Microalbumin on file in past 12 months    Recent Labs   Lab Test  01/17/18   0825   MICROL  22   UMALCR  32.53*            Passed - Patient is age 18 or older       Passed - Normal serum creatinine on file in past 12 months    Recent Labs   Lab Test  01/17/18   0820   CR  0.81               "

## 2018-10-29 RX ORDER — LIRAGLUTIDE 6 MG/ML
INJECTION SUBCUTANEOUS
Qty: 27 ML | Refills: 11 | Status: SHIPPED | OUTPATIENT
Start: 2018-10-29 | End: 2019-11-06

## 2018-10-29 NOTE — TELEPHONE ENCOUNTER
Annual visit was advised at January 29, 2018 physical.  Routing refill request to provider for review/approval because:  Labs out of range:  Microalbumin, BP elevated  No plan for follow up noted at April visit  A1C out of date      Arleth Lane RN  [facility]

## 2018-11-14 ENCOUNTER — TELEPHONE (OUTPATIENT)
Dept: FAMILY MEDICINE | Facility: CLINIC | Age: 53
End: 2018-11-14

## 2018-11-14 DIAGNOSIS — E11.9 TYPE 2 DIABETES MELLITUS WITHOUT COMPLICATION, WITHOUT LONG-TERM CURRENT USE OF INSULIN (H): Primary | ICD-10-CM

## 2018-11-14 NOTE — TELEPHONE ENCOUNTER
Reason for Call:  Other     Detailed comments: Patient calling stating that he has been trying to get his Victoza refilled. He has been notified by the pharmacy that a prior authorization needs to be completed for his Victoza. Someone needs to call [employer] to complete an authorization at [phone]. Patient is unable to get his medication until this is completed. Patient is completely out of medication.     Phone Number Patient can be reached at: Cell number on file:    Telephone Information:   Mobile [phone]       Best Time: any    Can we leave a detailed message on this number? YES    Call taken on 11/14/2018 at 9:26 AM by Renée Li

## 2018-11-14 NOTE — TELEPHONE ENCOUNTER
Please advise [employer] states formulary alternatives are Bydureon, Byetta, and Trulicity:          The last telephone addressing this in 2017 states he has not tried/failed Byetta and Trulicity.  Is this still the case? If so this will be denied by insurance.

## 2018-11-14 NOTE — TELEPHONE ENCOUNTER
I would recommend a trial of Trulicity  If not tolerable, they will then pay for victoza    trulicity has the same glucose lower and often has the same degree of weight loss   Principal Discharge DX:	URI (upper respiratory infection)

## 2018-11-14 NOTE — TELEPHONE ENCOUNTER
PA Denied.    If you want to Appeal please write a letter explaining why the pt needs to be on this medication.    Once the letter is complete route the encounter to [facility] PA Pool.    If you don't want to Appeal please advise on what you would like the patient to do.    Vidhi Andre MA

## 2018-11-15 NOTE — TELEPHONE ENCOUNTER
Called patient and relayed message below. Patient agreeable to try medication.     Patient stated his has been having increased anxiety and is in need of a refill but preferred to talk to Dr Alanis but the soonest appointment was November 27th. Patient wondering if he can be seen sooner.     Nurse see primary visits open Monday November 19th.     Routed to provider.     Faith Mc RN

## 2018-11-15 NOTE — TELEPHONE ENCOUNTER
I sent a script for Trulicity to try.    I would recommend a trial of Trulicity  If not tolerable, they will then pay for victoza    trulicity has the same glucose lower and often has the same degree of weight loss

## 2018-11-15 NOTE — TELEPHONE ENCOUNTER
PA Denied.    If you want to Appeal please write a letter explaining why the pt needs to be on this medication.    Once the letter is complete route the encounter to [facility] PA Pool.    If you don't want to Appeal please advise on what you would like the patient to do.    Vidhi Andre MA

## 2018-11-19 ENCOUNTER — OFFICE VISIT (OUTPATIENT)
Dept: FAMILY MEDICINE | Facility: CLINIC | Age: 53
End: 2018-11-19
Payer: COMMERCIAL

## 2018-11-19 VITALS
TEMPERATURE: 97.6 F | DIASTOLIC BLOOD PRESSURE: 83 MMHG | HEART RATE: 85 BPM | SYSTOLIC BLOOD PRESSURE: 139 MMHG | OXYGEN SATURATION: 99 % | WEIGHT: 190 LBS | BODY MASS INDEX: 31.65 KG/M2 | HEIGHT: 65 IN

## 2018-11-19 DIAGNOSIS — I10 ESSENTIAL HYPERTENSION WITH GOAL BLOOD PRESSURE LESS THAN 140/90: ICD-10-CM

## 2018-11-19 DIAGNOSIS — G89.29 CHRONIC LOW BACK PAIN WITH SCIATICA, SCIATICA LATERALITY UNSPECIFIED, UNSPECIFIED BACK PAIN LATERALITY: ICD-10-CM

## 2018-11-19 DIAGNOSIS — E78.5 HYPERLIPIDEMIA LDL GOAL <100: ICD-10-CM

## 2018-11-19 DIAGNOSIS — E11.9 TYPE 2 DIABETES MELLITUS WITHOUT COMPLICATION, WITHOUT LONG-TERM CURRENT USE OF INSULIN (H): Primary | ICD-10-CM

## 2018-11-19 DIAGNOSIS — F41.1 GENERALIZED ANXIETY DISORDER: ICD-10-CM

## 2018-11-19 DIAGNOSIS — M54.40 CHRONIC LOW BACK PAIN WITH SCIATICA, SCIATICA LATERALITY UNSPECIFIED, UNSPECIFIED BACK PAIN LATERALITY: ICD-10-CM

## 2018-11-19 PROCEDURE — 99214 OFFICE O/P EST MOD 30 MIN: CPT | Performed by: INTERNAL MEDICINE

## 2018-11-19 RX ORDER — OXYCODONE HYDROCHLORIDE 5 MG/1
5 TABLET ORAL EVERY 6 HOURS PRN
Qty: 45 TABLET | Refills: 0 | Status: SHIPPED | OUTPATIENT
Start: 2018-11-19 | End: 2019-05-17

## 2018-11-19 RX ORDER — LORAZEPAM 0.5 MG/1
TABLET ORAL
Qty: 45 TABLET | Refills: 0 | Status: SHIPPED | OUTPATIENT
Start: 2018-11-19 | End: 2019-05-07

## 2018-11-19 NOTE — PROGRESS NOTES
SUBJECTIVE:   Amadou Bach is a 52 year old male who presents to clinic today for the following health issues:    victoza and trulicity.  Tried bydureon ( made sick to stomach ) altered injection site.  Causing hard cysts under the skin  trulicity this morning   Weekly injection this morning   3 months at the house.   Going to try it.   Odor like smoked almonds ( noticed on victoza)   In general     Back working really well   Oxycodone      Abnormal Mood Symptoms  Onset: 2 years    Description:   Depression: no   Anxiety: YES    Accompanying Signs & Symptoms:  Still participating in activities that you used to enjoy: no  Fatigue: no  Irritability: no  Difficulty concentrating: no  Changes in appetite: no  Problems with sleep: YES  Heart racing/beating fast : no  Thoughts of hurting yourself or others: none    History:   Recent stress: YES  Prior depression hospitalization: None  Family history of depression: no  Family history of anxiety: YES    Precipitating factors:   Alcohol/drug use: no    Alleviating factors:  exercise    Therapies Tried and outcome: Ativan (Lorezepam) and exercise helps        Problem list and histories reviewed & adjusted, as indicated.  Additional history: as documented    Patient Active Problem List   Diagnosis     Erectile dysfunction     Type 2 diabetes mellitus without complication, without long-term current use of insulin (H)     Hyperlipidemia LDL goal <100     Obesity, Class I, BMI 30-34.9     Generalized anxiety disorder     ACP (advance care planning)     History of adenomatous polyp of colon     Essential hypertension with goal blood pressure less than 140/90     Malignant melanoma of skin of trunk (H)     History of melanoma     AK (actinic keratosis)     Multiple benign nevi     Cherry hemangioma     Lentigines     Past Surgical History:   Procedure Laterality Date     BIOPSY OF SKIN LESION       COLONOSCOPY  3/11/2014    Procedure: COMBINED COLONOSCOPY, SINGLE  BIOPSY/POLYPECTOMY BY BIOPSY;;  Surgeon: Stephen Amin MD;  Location: [facility]     COLONOSCOPY WITH CO2 INSUFFLATION N/A 8/19/2014    Procedure: COLONOSCOPY WITH CO2 INSUFFLATION;  Surgeon: Stephen Amin MD;  Location: [facility]     COLONOSCOPY WITH CO2 INSUFFLATION N/A 10/11/2017    Procedure: COLONOSCOPY WITH CO2 INSUFFLATION;  COLON/ REPEAT COLONOSCOPY;  Surgeon: Jeremy Mcqueen DO;  Location: [facility] OR     HC TOOTH EXTRACTION W/FORCEP      wisdom teeth x4       Social History   Substance Use Topics     Smoking status: Never Smoker     Smokeless tobacco: Never Used     Alcohol use Yes      Comment: occ     Family History   Problem Relation Age of Onset     Diabetes Mother      Hypertension Mother      Diabetes Father      Cancer Father      lung     Cancer Other      aunt - skin cancer     Skin Cancer Other      Melanoma No family hx of          Current Outpatient Prescriptions   Medication Sig Dispense Refill     aspirin 81 MG EC tablet TAKE 1 TABLET (81 MG) BY MOUTH DAILY 90 tablet 3     blood glucose monitoring (ONETOUCH VERIO IQ) test strip TEST UP TO 8 TIMES DAILY OR AS DIRECTED 1250 strip 1     Coenzyme Q10 (CO Q 10 PO) Take  by mouth. 2 per day       Cyanocobalamin (VITAMIN B-12 PO) Take  by mouth daily.       dulaglutide (TRULICITY) 0.75 MG/0.5ML pen Inject 0.75 mg Subcutaneous every 7 days 6 mL 3     FISH OIL CONCENTRATE 1000 MG OR CAPS 2 tablets daily       HUMALOG KWIKPEN 100 UNIT/ML soln INJECT 10-12 UNITLS BEFORE EACH MEAL WITH CORRECTION 45 mL 3     insulin glargine (LANTUS SOLOSTAR) 100 UNIT/ML injection 50 units sq QHS 60 mL 3     insulin pen needle (BD YANNI U/F) 32G X 4 MM Use 4 times daily as directed. 100 each 11     LORazepam (ATIVAN) 0.5 MG tablet TAKE 1 TABLET BY MOUTH EVERY 8 HOURS AS NEEDED 45 tablet 0     losartan (COZAAR) 100 MG tablet TAKE 1 TABLET (100 MG) BY MOUTH DAILY 90 tablet 2     metFORMIN (GLUCOPHAGE-XR) 500 MG 24 hr tablet TAKE 2 TABLETS (1,000 MG) BY MOUTH 2 TIMES  DAILY 360 tablet 1     metoprolol tartrate (LOPRESSOR) 25 MG tablet TAKE 1 TABLET (25 MG) BY MOUTH TWICE A DAY. 60 tablet 1     ondansetron (ZOFRAN) 4 MG tablet Take 1 tablet (4 mg) by mouth every 8 hours as needed for nausea 30 tablet 3     ORDER FOR DME Equipment being ordered: glucometer brand per insurance 1 Device 0     ORDER FOR DME LANCETS 4 x DAILY AND  each 4     oxyCODONE IR (ROXICODONE) 5 MG tablet Take 1 tablet (5 mg) by mouth every 6 hours as needed for severe pain maximum 6 tablet(s) per day 45 tablet 0     rosuvastatin (CRESTOR) 40 MG tablet Take 1 tablet (40 mg) by mouth daily 30 tablet 3     sildenafil (VIAGRA) 100 MG tablet TAKE 1 TABLET BY MOUTH 30 MINS BEFORE INTERCOURSE 12 tablet 3     VITAMIN D 1000 UNIT OR CAPS Take 1 capsule by mouth daily        insulin pen needle (B-D U/F) 31G X 8 MM USE 4 TIMES DAILY AS DIRECTED. (Patient not taking: Reported on 11/19/2018) 300 each 1     MULTIPLE VITAMINS OR 1000mg daily       order for DME 1 ambulatory blood pressure cuff (Patient not taking: Reported on 11/19/2018) 1 each 0     VICTOZA PEN 18 MG/3ML soln INJECT 1.8 MG SUBCUTANEOUS DAILY (Patient not taking: Reported on 11/19/2018) 27 mL 11     Allergies   Allergen Reactions     Cats      Cat dander     Cymbalta      Molds & Smuts      Ms Contin [Morphine]      Ceftazidime Rash     Was also on vanco     Vancomycin Rash     Was also on ceftazidime     Recent Labs   Lab Test  01/17/18   0820  03/27/17   1559  08/12/16   0913  11/13/15   0814   02/06/14   0753   A1C  6.3*  8.2*  7.3*  7.9*   < >  6.6*   LDL  56   --   101*  138*   < >  63   HDL  53   --   38*  47   < >  35*   TRIG  75   --   354*  235*   < >  138   ALT  52   --    --   35   --   41   CR  0.81  0.69  0.77  0.70   < >  0.98   GFRESTIMATED  >90  >90  Non African American GFR Calc    >90  Non  GFR Calc    >90  Non  GFR Calc     < >  81   GFRESTBLACK  >90  >90  African American GFR Calc    >90    "American GFR Calc    >90   GFR Calc     < >  >90   POTASSIUM  4.0  3.5  4.2  3.9   < >  4.1   TSH  1.64   --    --   1.64   < >   --     < > = values in this interval not displayed.      BP Readings from Last 3 Encounters:   11/19/18 139/83   04/23/18 132/78   03/01/18 (!) 148/96    Wt Readings from Last 3 Encounters:   11/19/18 190 lb (86.2 kg)   04/23/18 192 lb (87.1 kg)   03/01/18 192 lb (87.1 kg)                    Reviewed and updated as needed this visit by clinical staff  Tobacco  Allergies  Meds  Med Hx  Surg Hx  Fam Hx  Soc Hx      Reviewed and updated as needed this visit by Provider         ROS:  Constitutional, HEENT, cardiovascular, pulmonary, gi and gu systems are negative, except as otherwise noted.    OBJECTIVE:     /83 (BP Location: Left arm, Patient Position: Chair, Cuff Size: Adult Regular)  Pulse 85  Temp 97.6  F (36.4  C) (Oral)  Ht 5' 5.06\" (1.652 m)  Wt 190 lb (86.2 kg)  SpO2 99%  BMI 31.56 kg/m2  Body mass index is 31.56 kg/(m^2).  GENERAL: healthy, alert and no distress  EYES: Eyes grossly normal to inspection, PERRL and conjunctivae and sclerae normal  HENT: ear canals and TM's normal, nose and mouth without ulcers or lesions  NECK: no adenopathy, no asymmetry, masses, or scars and thyroid normal to palpation  RESP: lungs clear to auscultation - no rales, rhonchi or wheezes  CV: regular rate and rhythm, normal S1 S2, no S3 or S4, no murmur, click or rub, no peripheral edema and peripheral pulses strong  ABDOMEN: soft, nontender, no hepatosplenomegaly, no masses and bowel sounds normal  MS: no gross musculoskeletal defects noted, no edema  SKIN: no suspicious lesions or rashes  NEURO: Normal strength and tone, mentation intact and speech normal  BACK: no CVA tenderness, no paralumbar tenderness  PSYCH: mentation appears normal, affect normal/bright  LYMPH: no cervical, supraclavicular, axillary, or inguinal adenopathy    Diagnostic Test Results:  Results for " orders placed or performed in visit on 02/06/18   Eye Exam - HIM Scan    Narrative    TOTAL EYE CARE       ASSESSMENT/PLAN:     (E11.9) Type 2 diabetes mellitus without complication, without long-term current use of insulin (H)  (primary encounter diagnosis)  Comment:   Plan: **A1C FUTURE anytime, **TSH with free T4 reflex        FUTURE anytime, Albumin Random Urine         Quantitative with Creat Ratio        BP     139/83  11/21/2018    Lab Results   Component Value Date    GLC 81 01/17/2018     Lab Results   Component Value Date    A1C 6.3 01/17/2018     Lab Results   Component Value Date    LDL 56 01/17/2018     Lab Results   Component Value Date    MICROL 22 01/17/2018     No results found for: MICROALBUMIN      (F41.1) Generalized anxiety disorder  Comment:   Plan: LORazepam (ATIVAN) 0.5 MG tablet            (M54.40,  G89.29) Chronic low back pain with sciatica, sciatica laterality unspecified, unspecified back pain laterality  Comment:   Plan: oxyCODONE IR (ROXICODONE) 5 MG tablet            (E78.5) Hyperlipidemia LDL goal <100  Comment:   Plan: Lipid panel reflex to direct LDL Fasting            (I10) Essential hypertension with goal blood pressure less than 140/90  Comment:   Plan: **Basic metabolic panel FUTURE anytime                Joseph Alanis MD  [facility]

## 2018-11-19 NOTE — MR AVS SNAPSHOT
After Visit Summary   11/19/2018    Amadou Bach    MRN: [id number]           Patient Information     Date Of Birth          1965        Visit Information        Provider Department      11/19/2018 2:40 PM Joseph Alanis MD [facility]        Today's Diagnoses     Type 2 diabetes mellitus without complication, without long-term current use of insulin (H)    -  1    Generalized anxiety disorder        Chronic low back pain with sciatica, sciatica laterality unspecified, unspecified back pain laterality        Hyperlipidemia LDL goal <100        Essential hypertension with goal blood pressure less than 140/90           Follow-ups after your visit        Future tests that were ordered for you today     Open Future Orders        Priority Expected Expires Ordered    **Basic metabolic panel FUTURE anytime Routine 11/19/2018 11/19/2019 11/19/2018    Lipid panel reflex to direct LDL Fasting Routine 11/19/2018 11/19/2019 11/19/2018    **A1C FUTURE anytime Routine 11/19/2018 11/19/2019 11/19/2018    **TSH with free T4 reflex FUTURE anytime Routine 11/19/2018 11/19/2019 11/19/2018    Albumin Random Urine Quantitative with Creat Ratio Routine 11/19/2018 11/19/2019 11/19/2018            Who to contact     If you have questions or need follow up information about today's clinic visit or your schedule please contact [facility] directly at [phone].  Normal or non-critical lab and imaging results will be communicated to you by MyChart, letter or phone within 4 business days after the clinic has received the results. If you do not hear from us within 7 days, please contact the clinic through MyChart or phone. If you have a critical or abnormal lab result, we will notify you by phone as soon as possible.  Submit refill requests through [employer] or call your pharmacy and they will forward the refill request to us. Please allow 3 business days for your refill  "to be completed.          Additional Information About Your Visit        [employer] Information     [employer] gives you secure access to your electronic health record. If you see a primary care provider, you can also send messages to your care team and make appointments. If you have questions, please call your primary care clinic.  If you do not have a primary care provider, please call [phone] and they will assist you.        Care EveryWhere ID     This is your Care EveryWhere ID. This could be used by other organizations to access your [place] medical records  [id number]        Your Vitals Were     Pulse Temperature Height Pulse Oximetry BMI (Body Mass Index)       85 97.6  F (36.4  C) (Oral) 5' 5.06\" (1.652 m) 99% 31.56 kg/m2        Blood Pressure from Last 3 Encounters:   11/19/18 139/83   04/23/18 132/78   03/01/18 (!) 148/96    Weight from Last 3 Encounters:   11/19/18 190 lb (86.2 kg)   04/23/18 192 lb (87.1 kg)   03/01/18 192 lb (87.1 kg)                 Where to get your medicines      Some of these will need a paper prescription and others can be bought over the counter.  Ask your nurse if you have questions.     Bring a paper prescription for each of these medications     LORazepam 0.5 MG tablet    oxyCODONE IR 5 MG tablet         Information about OPIOIDS     PRESCRIPTION OPIOIDS: WHAT YOU NEED TO KNOW   We gave you an opioid (narcotic) pain medicine. It is important to manage your pain, but opioids are not always the best choice. You should first try all the other options your care team gave you. Take this medicine for as short a time (and as few doses) as possible.    Some activities can increase your pain, such as bandage changes or therapy sessions. It may help to take your pain medicine 30 to 60 minutes before these activities. Reduce your stress by getting enough sleep, working on hobbies you enjoy and practicing relaxation or meditation. Talk to your care team about ways to manage your pain " beyond prescription opioids.    These medicines have risks:    DO NOT drive when on new or higher doses of pain medicine. These medicines can affect your alertness and reaction times, and you could be arrested for driving under the influence (DUI). If you need to use opioids long-term, talk to your care team about driving.    DO NOT operate heavy machinery    DO NOT do any other dangerous activities while taking these medicines.    DO NOT drink any alcohol while taking these medicines.     If the opioid prescribed includes acetaminophen, DO NOT take with any other medicines that contain acetaminophen. Read all labels carefully. Look for the word  acetaminophen  or  Tylenol.  Ask your pharmacist if you have questions or are unsure.    You can get addicted to pain medicines, especially if you have a history of addiction (chemical, alcohol or substance dependence). Talk to your care team about ways to reduce this risk.    All opioids tend to cause constipation. Drink plenty of water and eat foods that have a lot of fiber, such as fruits, vegetables, prune juice, apple juice and high-fiber cereal. Take a laxative (Miralax, milk of magnesia, Colace, Senna) if you don t move your bowels at least every other day. Other side effects include upset stomach, sleepiness, dizziness, throwing up, tolerance (needing more of the medicine to have the same effect), physical dependence and slowed breathing.    Store your pills in a secure place, locked if possible. We will not replace any lost or stolen medicine. If you don t finish your medicine, please throw away (dispose) as directed by your pharmacist. The [place] Pollution Control Agency has more information about safe disposal: [email or web address]/[email or web address]-medications         Primary Care Provider Office Phone # Fax #    Joseph Alanis [phone]       [address] [facility] [address]        Equal Access to  Services     [facility]: Hadii aad ku hadjacklynjoanne Judyyadira, waaxda luqadaha, qaybta kaalmada daina, yamilka connolly . So [facility] [phone].    ATENCIÓN: Si habla micah, tiene a chairez disposición servicios gratuitos de asistencia lingüística. Llame al [phone].    We comply with applicable federal civil rights laws and [place] laws. We do not discriminate on the basis of race, color, national origin, age, disability, sex, sexual orientation, or gender identity.            Thank you!     Thank you for choosing [facility]  for your care. Our goal is always to provide you with excellent care. Hearing back from our patients is one way we can continue to improve our services. Please take a few minutes to complete the written survey that you may receive in the mail after your visit with us. Thank you!             Your Updated Medication List - Protect others around you: Learn how to safely use, store and throw away your medicines at www.disposemymeds.org.          This list is accurate as of 11/19/18  3:24 PM.  Always use your most recent med list.                   Brand Name Dispense Instructions for use Diagnosis    aspirin 81 MG EC tablet     90 tablet    TAKE 1 TABLET (81 MG) BY MOUTH DAILY    Type 2 diabetes mellitus without complication, unspecified long term insulin use status       blood glucose monitoring test strip    ONETOUCH VERIO IQ    1250 strip    TEST UP TO 8 TIMES DAILY OR AS DIRECTED    Type 2 diabetes mellitus without complication, with long-term current use of insulin (H)       CO Q 10 PO      Take  by mouth. 2 per day    Type 2 diabetes, HbA1c goal < 7% (H)       dulaglutide 0.75 MG/0.5ML pen    TRULICITY    6 mL    Inject 0.75 mg Subcutaneous every 7 days    Type 2 diabetes mellitus without complication, without long-term current use of insulin (H)       FISH OIL CONCENTRATE 1000 MG Caps      2 tablets daily        HumaLOG KWIKpen 100 UNIT/ML  injection   Generic drug:  insulin lispro     45 mL    INJECT 10-12 UNITLS BEFORE EACH MEAL WITH CORRECTION    Type 2 diabetes mellitus without complication, unspecified long term insulin use status       insulin glargine 100 UNIT/ML injection    LANTUS SOLOSTAR    60 mL    50 units sq QHS    Type 2 diabetes mellitus without complication, unspecified long term insulin use status       * insulin pen needle 31G X 8 MM miscellaneous    B-D U/F    300 each    USE 4 TIMES DAILY AS DIRECTED.    Type 2 diabetes mellitus without complication, without long-term current use of insulin (H)       * insulin pen needle 32G X 4 MM miscellaneous    BD YANNI U/F    100 each    Use 4 times daily as directed.    Type 2 diabetes mellitus without complication, without long-term current use of insulin (H)       LORazepam 0.5 MG tablet    ATIVAN    45 tablet    TAKE 1 TABLET BY MOUTH EVERY 8 HOURS AS NEEDED    Generalized anxiety disorder       losartan 100 MG tablet    COZAAR    90 tablet    TAKE 1 TABLET (100 MG) BY MOUTH DAILY    HTN, goal below 140/90       metFORMIN 500 MG 24 hr tablet    GLUCOPHAGE-XR    360 tablet    TAKE 2 TABLETS (1,000 MG) BY MOUTH 2 TIMES DAILY    Type 2 diabetes mellitus without complication, unspecified long term insulin use status       metoprolol tartrate 25 MG tablet    LOPRESSOR    60 tablet    TAKE 1 TABLET (25 MG) BY MOUTH TWICE A DAY.    Hypertension goal BP (blood pressure) < 140/90       MULTIPLE VITAMINS PO      1000mg daily        ondansetron 4 MG tablet    ZOFRAN    30 tablet    Take 1 tablet (4 mg) by mouth every 8 hours as needed for nausea    Chronic pain syndrome       order for DME     1 Device    Equipment being ordered: glucometer brand per insurance    Type 2 diabetes, HbA1c goal < 7% (H)       order for DME     400 each    LANCETS 4 x DAILY AND PRN    Type 2 diabetes, HbA1c goal < 7% (H)       order for DME     1 each    1 ambulatory blood pressure cuff    Hypertension goal BP (blood  pressure) < 140/90       oxyCODONE IR 5 MG tablet    ROXICODONE    45 tablet    Take 1 tablet (5 mg) by mouth every 6 hours as needed for severe pain maximum 6 tablet(s) per day    Chronic low back pain with sciatica, sciatica laterality unspecified, unspecified back pain laterality       rosuvastatin 40 MG tablet    CRESTOR    30 tablet    Take 1 tablet (40 mg) by mouth daily    Hyperlipidemia LDL goal <100       sildenafil 100 MG tablet    VIAGRA    12 tablet    TAKE 1 TABLET BY MOUTH 30 MINS BEFORE INTERCOURSE    Erectile dysfunction, unspecified erectile dysfunction type       VICTOZA PEN 18 MG/3ML soln   Generic drug:  liraglutide     27 mL    INJECT 1.8 MG SUBCUTANEOUS DAILY    Type 2 diabetes mellitus without complication, with long-term current use of insulin (H), Non morbid obesity due to excess calories       VITAMIN B-12 PO      Take  by mouth daily.        vitamin D 1000 units capsule      Take 1 capsule by mouth daily        * Notice:  This list has 2 medication(s) that are the same as other medications prescribed for you. Read the directions carefully, and ask your doctor or other care provider to review them with you.

## 2018-11-27 ENCOUNTER — TELEPHONE (OUTPATIENT)
Dept: FAMILY MEDICINE | Facility: CLINIC | Age: 53
End: 2018-11-27

## 2018-11-27 NOTE — TELEPHONE ENCOUNTER
I see 11/14/18 phone encounter, [place] denial for victoza was appealed and now is approved.  I see he was advised to do trial of trulicity, if failed, then victoza approved and apparently that is what happened.    See patient note, should he wait a week after last dose of trulicity before starting the victoza again?    Routed to covering provider, Dr. Alanis gone for the day.    Arleth Lane RN  [facility]

## 2018-11-27 NOTE — TELEPHONE ENCOUNTER
MEDICATION APPEAL APPROVED    Medication: Victoza approved   Authorization Effective Date:  11/01/18  Authorization Expiration Date:  11/27/2020  Approved Dose/Quantity:   Reference #:     Insurance Company: [employer] - Phone [phone] Fax [phone]  Expected CoPay:       CoPay Card Available:      Foundation Assistance Needed:    Which Pharmacy is filling the prescription (Not needed for infusion/clinic administered): [id number] [place] - [address] [place] [address] AT [address]

## 2018-11-27 NOTE — TELEPHONE ENCOUNTER
Called patient at [phone] (W) .  Left message on voicemail to return phone call to triage line at [phone].  Emely Bowman RN [facility] Triage.

## 2018-11-27 NOTE — TELEPHONE ENCOUNTER
Reason for Call:  Other / New medication question (Victoza)    Detailed comments: Patient called and stated that he was switched to Victoza, however, since Trulicity is taken once a week he took it yesterday.  Patient also stated that he needs to know if he can start the Victoza right away or if he have to wait until next week.  Patient is requesting a call back ASAP.     Phone Number Patient can be reached at: Cell number on file:    Telephone Information:   Mobile [phone]     Best Time: Anytime    Can we leave a detailed message on this number? YES    Call taken on 11/27/2018 at 4:13 PM by Kayley Prajapati

## 2018-11-28 NOTE — TELEPHONE ENCOUNTER
Notified patient of the message below per Radha Lema PA-C.  Patient stated understanding and agreeable with the plan of care. Emely Bowman, RN-BSN, [facility] Triage.

## 2018-12-15 DIAGNOSIS — I10 HYPERTENSION GOAL BP (BLOOD PRESSURE) < 140/90: ICD-10-CM

## 2018-12-15 DIAGNOSIS — E11.9 TYPE 2 DIABETES MELLITUS WITHOUT COMPLICATION (H): ICD-10-CM

## 2018-12-17 RX ORDER — METFORMIN HCL 500 MG
TABLET, EXTENDED RELEASE 24 HR ORAL
Qty: 360 TABLET | Refills: 1 | Status: SHIPPED | OUTPATIENT
Start: 2018-12-17 | End: 2019-09-24

## 2018-12-17 RX ORDER — METOPROLOL TARTRATE 25 MG/1
TABLET, FILM COATED ORAL
Qty: 60 TABLET | Refills: 1 | Status: SHIPPED | OUTPATIENT
Start: 2018-12-17 | End: 2019-02-20

## 2018-12-17 NOTE — TELEPHONE ENCOUNTER
Prescription approved per [facility] Refill Protocol.  Huyen Arellano RN

## 2018-12-17 NOTE — TELEPHONE ENCOUNTER
"Requested Prescriptions   Pending Prescriptions Disp Refills     metoprolol tartrate (LOPRESSOR) 25 MG tablet [Pharmacy Med Name: METOPROLOL TARTRATE 25 MG TAB] 60 tablet 1    Last Written Prescription Date:  3/30/18  Last Fill Quantity: 60,  # refills: 1   Last office visit: 11/19/2018 with prescribing provider:     Future Office Visit:     Sig: TAKE 1 TABLET (25 MG) BY MOUTH TWICE A DAY.    Beta-Blockers Protocol Passed - 12/15/2018  9:10 AM       Passed - Blood pressure under 140/90 in past 12 months    BP Readings from Last 3 Encounters:   11/19/18 139/83   04/23/18 132/78   03/01/18 (!) 148/96                Passed - Patient is age 6 or older       Passed - Recent (12 mo) or future (30 days) visit within the authorizing provider's specialty    Patient had office visit in the last 12 months or has a visit in the next 30 days with authorizing provider or within the authorizing provider's specialty.  See \"Patient Info\" tab in inbasket, or \"Choose Columns\" in Meds & Orders section of the refill encounter.              metFORMIN (GLUCOPHAGE-XR) 500 MG 24 hr tablet [Pharmacy Med Name: METFORMIN  MG TABLET] 360 tablet 1    Last Written Prescription Date:  3/28/18  Last Fill Quantity: 360,  # refills: 1   Last office visit: 11/19/2018 with prescribing provider:     Future Office Visit:     Sig: TAKE 2 TABLETS (1,000 MG) BY MOUTH 2 TIMES DAILY    Biguanide Agents Failed - 12/15/2018  9:10 AM       Failed - Patient has documented A1c within the specified period of time.    If HgbA1C is 8 or greater, it needs to be on file within the past 3 months.  If less than 8, must be on file within the past 6 months.     Recent Labs   Lab Test 01/17/18  0820   A1C 6.3*            Passed - Blood pressure less than 140/90 in past 6 months    BP Readings from Last 3 Encounters:   11/19/18 139/83   04/23/18 132/78   03/01/18 (!) 148/96                Passed - Patient has documented LDL within the past 12 mos.    Recent Labs   Lab " "Test 01/17/18  0820   LDL 56            Passed - Patient has had a Microalbumin in the past 15 mos.    Recent Labs   Lab Test 01/17/18  0825   MICROL 22   UMALCR 32.53*            Passed - Patient is age 10 or older       Passed - Patient's CR is NOT>1.4 OR Patient's EGFR is NOT<45 within past 12 mos.    Recent Labs   Lab Test 01/17/18  0820   GFRESTIMATED >90   GFRESTBLACK >90       Recent Labs   Lab Test 01/17/18  0820   CR 0.81            Passed - Patient does NOT have a diagnosis of CHF.       Passed - Recent (6 mo) or future (30 days) visit within the authorizing provider's specialty    Patient had office visit in the last 6 months or has a visit in the next 30 days with authorizing provider or within the authorizing provider's specialty.  See \"Patient Info\" tab in inbasket, or \"Choose Columns\" in Meds & Orders section of the refill encounter.              "

## 2019-01-21 DIAGNOSIS — E11.9 TYPE 2 DIABETES MELLITUS WITHOUT COMPLICATION, WITH LONG-TERM CURRENT USE OF INSULIN (H): ICD-10-CM

## 2019-01-21 DIAGNOSIS — Z79.4 TYPE 2 DIABETES MELLITUS WITHOUT COMPLICATION, WITH LONG-TERM CURRENT USE OF INSULIN (H): ICD-10-CM

## 2019-01-21 RX ORDER — BLOOD SUGAR DIAGNOSTIC
STRIP MISCELLANEOUS
Qty: 1250 STRIP | Refills: 0 | Status: SHIPPED | OUTPATIENT
Start: 2019-01-21 | End: 2019-09-24

## 2019-01-21 NOTE — TELEPHONE ENCOUNTER
"Requested Prescriptions   Pending Prescriptions Disp Refills     [email or web address] VERIO IQ test strip [Pharmacy Med Name: ONE TOUCH VERIO TEST STRIP] 1250 strip 0    Last Written Prescription Date:  1-29-18  Last Fill Quantity: 1250,  # refills: 1   Last office visit: 11/19/2018 with prescribing provider:  11-19-18   Future Office Visit:     Sig: TEST UP TO 8 TIMES DAILY OR AS DIRECTED    Diabetic Supplies Protocol Passed - 1/21/2019 12:20 PM       Passed - Medication is active on med list       Passed - Patient is 18 years of age or older       Passed - Recent (6 mo) or future (30 days) visit within the authorizing provider's specialty    Patient had office visit in the last 6 months or has a visit in the next 30 days with authorizing provider.  See \"Patient Info\" tab in inbasket, or \"Choose Columns\" in Meds & Orders section of the refill encounter.              "

## 2019-01-21 NOTE — TELEPHONE ENCOUNTER
Prescription approved per [facility] [place].  Emely Bowman, RN C[facility] Triage.

## 2019-02-20 DIAGNOSIS — I10 HYPERTENSION GOAL BP (BLOOD PRESSURE) < 140/90: ICD-10-CM

## 2019-02-20 RX ORDER — METOPROLOL TARTRATE 25 MG/1
TABLET, FILM COATED ORAL
Qty: 60 TABLET | Refills: 1 | Status: SHIPPED | OUTPATIENT
Start: 2019-02-20 | End: 2019-04-12

## 2019-02-20 NOTE — TELEPHONE ENCOUNTER
Prescription [place] per [facility] [place].  Faith Mc RN

## 2019-02-20 NOTE — TELEPHONE ENCOUNTER
"Requested Prescriptions   Pending Prescriptions Disp Refills     metoprolol tartrate (LOPRESSOR) 25 MG tablet [Pharmacy Med Name: METOPROLOL TARTRATE 25 MG TAB] 60 tablet 1    Last Written Prescription Date:  12-17-18  Last Fill Quantity: 60,  # refills: 1   Last office visit: 11/19/2018 with prescribing provider:  11-19-18   Future Office Visit:     Sig: TAKE 1 TABLET (25 MG) BY MOUTH TWICE A DAY.    Beta-Blockers Protocol Passed - 2/20/2019  1:15 AM       Passed - Blood pressure under 140/90 in past 12 months    BP Readings from Last 3 Encounters:   11/19/18 139/83   04/23/18 132/78   03/01/18 (!) 148/96                Passed - Patient is age 6 or older       Passed - Recent (12 mo) or future (30 days) visit within the authorizing provider's specialty    Patient had office visit in the last 12 months or has a visit in the next 30 days with authorizing provider or within the authorizing provider's specialty.  See \"Patient Info\" tab in inbasket, or \"Choose Columns\" in Meds & Orders section of the refill encounter.             Passed - Medication is active on med list          "

## 2019-03-07 DIAGNOSIS — E78.5 HYPERLIPIDEMIA LDL GOAL <100: ICD-10-CM

## 2019-03-07 NOTE — TELEPHONE ENCOUNTER
"Requested Prescriptions   Pending Prescriptions Disp Refills     rosuvastatin (CRESTOR) 40 MG tablet [Pharmacy Med Name: ROSUVASTATIN CALCIUM 40 MG TAB] 30 tablet 2    Last Written Prescription Date:  2/5/18  Last Fill Quantity: 30,  # refills: 3   Last office visit: 3/27/2017 with prescribing provider:     Future Office Visit:     Sig: TAKE 1 TABLET (40 MG) BY MOUTH DAILY    Statins Protocol Failed - 3/7/2019  2:51 PM       Failed - LDL on file in past 12 months    Recent Labs   Lab Test 01/17/18  0820   LDL 56            Passed - No abnormal creatine kinase in past 12 months    No lab results found.            Passed - Recent (12 mo) or future (30 days) visit within the authorizing provider's specialty    Patient had office visit in the last 12 months or has a visit in the next 30 days with authorizing provider or within the authorizing provider's specialty.  See \"Patient Info\" tab in inbasket, or \"Choose Columns\" in Meds & Orders section of the refill encounter.             Passed - Medication is active on med list       Passed - Patient is age 18 or older          "

## 2019-03-08 RX ORDER — ROSUVASTATIN CALCIUM 40 MG/1
40 TABLET, COATED ORAL DAILY
Qty: 30 TABLET | Refills: 2 | Status: SHIPPED | OUTPATIENT
Start: 2019-03-08 | End: 2019-08-16

## 2019-04-09 ENCOUNTER — TRANSFERRED RECORDS (OUTPATIENT)
Dept: HEALTH INFORMATION MANAGEMENT | Facility: CLINIC | Age: 54
End: 2019-04-09

## 2019-04-17 DIAGNOSIS — E11.9 TYPE 2 DIABETES MELLITUS WITHOUT COMPLICATION (H): ICD-10-CM

## 2019-04-17 NOTE — TELEPHONE ENCOUNTER
"Requested Prescriptions   Pending Prescriptions Disp Refills     insulin glargine (LANTUS SOLOSTAR PEN) 100 UNIT/ML pen [Pharmacy Med Name: LANTUS SOLOSTAR 100 UNIT/ML]  0     Sig: INJECT 50 UNITS SUBCUTANEOUSLY AT BEDTIME   Last Written Prescription Date:  3-22-18  Last Fill Quantity: 60,  # refills: 3   Last office visit: 3/27/2017 with prescribing provider:  11-19-18   Future Office Visit:        Long Acting Insulin Protocol Failed - 4/17/2019  9:14 AM        Failed - LDL on file in past 12 months     Recent Labs   Lab Test 01/17/18  0820   LDL 56             Failed - Microalbumin on file in past 12 months     Recent Labs   Lab Test 01/17/18  0825   MICROL 22   UMALCR 32.53*             Failed - Serum creatinine on file in past 12 months     Recent Labs   Lab Test 01/17/18  0820   CR 0.81             Failed - HgbA1C in past 3 or 6 months     If HgbA1C is 8 or greater, it needs to be on file within the past 3 months.  If less than 8, must be on file within the past 6 months.     Recent Labs   Lab Test 01/17/18  0820   A1C 6.3*             Passed - Blood pressure less than 140/90 in past 6 months     BP Readings from Last 3 Encounters:   11/19/18 139/83   04/23/18 132/78   03/01/18 (!) 148/96                 Passed - Medication is active on med list        Passed - Patient is age 18 or older        Passed - Recent (6 mo) or future (30 days) visit within the authorizing provider's specialty     Patient had office visit in the last 6 months or has a visit in the next 30 days with authorizing provider or within the authorizing provider's specialty.  See \"Patient Info\" tab in inbasket, or \"Choose Columns\" in Meds & Orders section of the refill encounter.              "

## 2019-05-07 ENCOUNTER — TELEPHONE (OUTPATIENT)
Dept: FAMILY MEDICINE | Facility: CLINIC | Age: 54
End: 2019-05-07

## 2019-05-07 DIAGNOSIS — F41.1 GENERALIZED ANXIETY DISORDER: ICD-10-CM

## 2019-05-07 RX ORDER — LORAZEPAM 0.5 MG/1
TABLET ORAL
Qty: 45 TABLET | Refills: 0 | Status: SHIPPED | OUTPATIENT
Start: 2019-05-07 | End: 2019-05-17

## 2019-05-07 NOTE — TELEPHONE ENCOUNTER
Patient called back checking on the status of this prescription. Please call him to let him know what's gong on.

## 2019-05-07 NOTE — TELEPHONE ENCOUNTER
Reason for Call:  Medication or medication refill:    Do you use a [facility]?  Name of the pharmacy and phone number for the current request:  [facility], [address], [place] [address]    Name of the medication requested: LORazepam (ATIVAN) 0.5 MG tablet    Other request:  Patient is scheduled to see Dr Alanis on 05/17/19 but he does not feel like he can wait until the 17th to get his medication. Patient is hoping to get this ASAP. Please call patient.    Can we leave a detailed message on this number? YES    Phone number patient can be reached at: Cell number on file:    Telephone Information:   Mobile [phone]       Best Time: any    Call taken on 5/7/2019 at 9:49 AM by Adelita Cruz

## 2019-05-07 NOTE — TELEPHONE ENCOUNTER
Requested Prescriptions   Pending Prescriptions Disp Refills     LORazepam (ATIVAN) 0.5 MG tablet 45 tablet 0     Sig: TAKE 1 TABLET BY MOUTH EVERY 8 HOURS AS NEEDED       There is no refill protocol information for this order        Last Written Prescription Date:  11/19/2018  Last Fill Quantity: 45,  # refills: 0   Last office visit: 11/19/2018 with prescribing provider:  Zeke - Type 2 DM   Future Office Visit:   Next 5 appointments (look out 90 days)    May 17, 2019  8:20 AM CDT  SHORT with Joseph Alanis MD  [facility] ([facility]) [address] [place] [id number]  [phone]           Routing refill request to provider for review/approval because:  Drug not on the [facility] refill protocol       Chacha Iraheta RN  [facility]

## 2019-05-17 ENCOUNTER — OFFICE VISIT (OUTPATIENT)
Dept: FAMILY MEDICINE | Facility: CLINIC | Age: 54
End: 2019-05-17
Payer: COMMERCIAL

## 2019-05-17 VITALS
BODY MASS INDEX: 30.9 KG/M2 | DIASTOLIC BLOOD PRESSURE: 84 MMHG | HEART RATE: 83 BPM | OXYGEN SATURATION: 100 % | WEIGHT: 186 LBS | SYSTOLIC BLOOD PRESSURE: 166 MMHG

## 2019-05-17 DIAGNOSIS — Z11.4 SCREENING FOR HIV (HUMAN IMMUNODEFICIENCY VIRUS): ICD-10-CM

## 2019-05-17 DIAGNOSIS — E78.5 HYPERLIPIDEMIA LDL GOAL <100: Primary | ICD-10-CM

## 2019-05-17 DIAGNOSIS — I10 HTN, GOAL BELOW 140/90: ICD-10-CM

## 2019-05-17 DIAGNOSIS — M54.40 CHRONIC LOW BACK PAIN WITH SCIATICA, SCIATICA LATERALITY UNSPECIFIED, UNSPECIFIED BACK PAIN LATERALITY: ICD-10-CM

## 2019-05-17 DIAGNOSIS — E11.9 TYPE 2 DIABETES MELLITUS WITHOUT COMPLICATION, WITHOUT LONG-TERM CURRENT USE OF INSULIN (H): ICD-10-CM

## 2019-05-17 DIAGNOSIS — F41.1 GENERALIZED ANXIETY DISORDER: ICD-10-CM

## 2019-05-17 DIAGNOSIS — G89.29 CHRONIC LOW BACK PAIN WITH SCIATICA, SCIATICA LATERALITY UNSPECIFIED, UNSPECIFIED BACK PAIN LATERALITY: ICD-10-CM

## 2019-05-17 LAB
ANION GAP SERPL CALCULATED.3IONS-SCNC: 9 MMOL/L (ref 3–14)
BUN SERPL-MCNC: 13 MG/DL (ref 7–30)
CALCIUM SERPL-MCNC: 9.7 MG/DL (ref 8.5–10.1)
CHLORIDE SERPL-SCNC: 105 MMOL/L (ref 94–109)
CHOLEST SERPL-MCNC: 158 MG/DL
CO2 SERPL-SCNC: 24 MMOL/L (ref 20–32)
CREAT SERPL-MCNC: 0.74 MG/DL (ref 0.66–1.25)
CREAT UR-MCNC: 90 MG/DL
GFR SERPL CREATININE-BSD FRML MDRD: >90 ML/MIN/{1.73_M2}
GLUCOSE SERPL-MCNC: 131 MG/DL (ref 70–99)
HBA1C MFR BLD: 6.2 % (ref 0–5.6)
HDLC SERPL-MCNC: 46 MG/DL
LDLC SERPL CALC-MCNC: 78 MG/DL
MICROALBUMIN UR-MCNC: 19 MG/L
MICROALBUMIN/CREAT UR: 20.8 MG/G CR (ref 0–17)
NONHDLC SERPL-MCNC: 112 MG/DL
POTASSIUM SERPL-SCNC: 4.3 MMOL/L (ref 3.4–5.3)
SODIUM SERPL-SCNC: 138 MMOL/L (ref 133–144)
TRIGL SERPL-MCNC: 172 MG/DL

## 2019-05-17 PROCEDURE — 87389 HIV-1 AG W/HIV-1&-2 AB AG IA: CPT | Performed by: INTERNAL MEDICINE

## 2019-05-17 PROCEDURE — 82043 UR ALBUMIN QUANTITATIVE: CPT | Performed by: INTERNAL MEDICINE

## 2019-05-17 PROCEDURE — 99207 C FOOT EXAM  NO CHARGE: CPT | Mod: 25 | Performed by: INTERNAL MEDICINE

## 2019-05-17 PROCEDURE — 83036 HEMOGLOBIN GLYCOSYLATED A1C: CPT | Performed by: INTERNAL MEDICINE

## 2019-05-17 PROCEDURE — 80048 BASIC METABOLIC PNL TOTAL CA: CPT | Performed by: INTERNAL MEDICINE

## 2019-05-17 PROCEDURE — 99214 OFFICE O/P EST MOD 30 MIN: CPT | Performed by: INTERNAL MEDICINE

## 2019-05-17 PROCEDURE — 36415 COLL VENOUS BLD VENIPUNCTURE: CPT | Performed by: INTERNAL MEDICINE

## 2019-05-17 PROCEDURE — 80061 LIPID PANEL: CPT | Performed by: INTERNAL MEDICINE

## 2019-05-17 RX ORDER — OXYCODONE HYDROCHLORIDE 5 MG/1
5 TABLET ORAL EVERY 6 HOURS PRN
Qty: 45 TABLET | Refills: 0 | Status: SHIPPED | OUTPATIENT
Start: 2019-05-17 | End: 2019-07-22

## 2019-05-17 RX ORDER — LORAZEPAM 0.5 MG/1
TABLET ORAL
Qty: 45 TABLET | Refills: 0 | Status: SHIPPED | OUTPATIENT
Start: 2019-05-17 | End: 2019-07-22

## 2019-05-17 RX ORDER — LOSARTAN POTASSIUM AND HYDROCHLOROTHIAZIDE 25; 100 MG/1; MG/1
1 TABLET ORAL DAILY
Qty: 90 TABLET | Refills: 3 | Status: SHIPPED | OUTPATIENT
Start: 2019-05-17 | End: 2020-05-11

## 2019-05-17 RX ORDER — ESCITALOPRAM OXALATE 10 MG/1
10 TABLET ORAL DAILY
Qty: 90 TABLET | Refills: 3 | Status: SHIPPED | OUTPATIENT
Start: 2019-05-17 | End: 2019-07-22

## 2019-05-17 ASSESSMENT — ANXIETY QUESTIONNAIRES
6. BECOMING EASILY ANNOYED OR IRRITABLE: NOT AT ALL
GAD7 TOTAL SCORE: 10
1. FEELING NERVOUS, ANXIOUS, OR ON EDGE: NEARLY EVERY DAY
IF YOU CHECKED OFF ANY PROBLEMS ON THIS QUESTIONNAIRE, HOW DIFFICULT HAVE THESE PROBLEMS MADE IT FOR YOU TO DO YOUR WORK, TAKE CARE OF THINGS AT HOME, OR GET ALONG WITH OTHER PEOPLE: SOMEWHAT DIFFICULT
5. BEING SO RESTLESS THAT IT IS HARD TO SIT STILL: SEVERAL DAYS
3. WORRYING TOO MUCH ABOUT DIFFERENT THINGS: MORE THAN HALF THE DAYS
2. NOT BEING ABLE TO STOP OR CONTROL WORRYING: MORE THAN HALF THE DAYS
7. FEELING AFRAID AS IF SOMETHING AWFUL MIGHT HAPPEN: SEVERAL DAYS

## 2019-05-17 ASSESSMENT — PATIENT HEALTH QUESTIONNAIRE - PHQ9
SUM OF ALL RESPONSES TO PHQ QUESTIONS 1-9: 11
5. POOR APPETITE OR OVEREATING: SEVERAL DAYS

## 2019-05-17 NOTE — PROGRESS NOTES
"  SUBJECTIVE:   Amadou Bach is a 53 year old male who presents to clinic today for the following   health issues:    Diabetes Follow-up    Patient is checking blood sugars: 6 times daily.     Blood sugar testing frequency justification: Frequent hypoglycemia  Results are as follows:       am - 100-120       noon - 100-120          Post lunch- 160's       supper - 140          Post supper- 160's       bedtime - 120    Diabetic concerns: None     Symptoms of hypoglycemia (low blood sugar): none     Paresthesias (numbness or burning in feet) or sores: No     Date of last diabetic eye exam:     Diabetes Management Resources    Hyperlipidemia Follow-Up      Rate your low fat/cholesterol diet?: good    Taking statin?  No    Other lipid medications/supplements?:  none    Hypertension Follow-up      Outpatient blood pressures are not being checked.    Low Salt Diet: low salt    BP Readings from Last 2 Encounters:   11/19/18 139/83   04/23/18 132/78     Hemoglobin A1C (%)   Date Value   01/17/2018 6.3 (H)   03/27/2017 8.2 (H)     LDL Cholesterol Calculated (mg/dL)   Date Value   01/17/2018 56   08/12/2016 101 (H)     Anxiety Follow-Up    Status since last visit: Worsened, refill lorazapam     Other associated symptoms:None    Complicating factors:   Significant life event: Yes-  Work and home stress   Current substance abuse: alcohol but \"not to that level\"  Depression symptoms: Yes-  Sometimes    170/101 with blood pressure     MEGAN-7 SCORE 1/7/2014   Total Score 10       MEGAN-7          Additional history: as documented    Reviewed  and updated as needed this visit by clinical staff         Reviewed and updated as needed this visit by Provider         Patient Active Problem List   Diagnosis     Erectile dysfunction     Type 2 diabetes mellitus without complication, without long-term current use of insulin (H)     Hyperlipidemia LDL goal <100     Obesity, Class I, BMI 30-34.9     Generalized anxiety disorder     ACP " (advance care planning)     History of adenomatous polyp of colon     Essential hypertension with goal blood pressure less than 140/90     Malignant melanoma of skin of trunk (H)     History of melanoma     AK (actinic keratosis)     Multiple benign nevi     Cherry hemangioma     Lentigines     Past Surgical History:   Procedure Laterality Date     BIOPSY OF SKIN LESION       COLONOSCOPY  3/11/2014    Procedure: COMBINED COLONOSCOPY, SINGLE BIOPSY/POLYPECTOMY BY BIOPSY;;  Surgeon: Stephen Amin MD;  Location: [facility] OR     COLONOSCOPY WITH CO2 INSUFFLATION N/A 8/19/2014    Procedure: COLONOSCOPY WITH CO2 INSUFFLATION;  Surgeon: Stephen Amin MD;  Location: [facility] OR     COLONOSCOPY WITH CO2 INSUFFLATION N/A 10/11/2017    Procedure: COLONOSCOPY WITH CO2 INSUFFLATION;  COLON/ REPEAT COLONOSCOPY;  Surgeon: Jeremy Mcqueen DO;  Location: [facility] OR     HC TOOTH EXTRACTION W/FORCEP      wisdom teeth x4       Social History     Tobacco Use     Smoking status: Never Smoker     Smokeless tobacco: Never Used   Substance Use Topics     Alcohol use: Yes     Comment: occ     Family History   Problem Relation Age of Onset     Diabetes Mother      Hypertension Mother      Diabetes Father      Cancer Father         lung     Cancer Other         aunt - skin cancer     Skin Cancer Other      Melanoma No family hx of          Current Outpatient Medications   Medication Sig Dispense Refill     aspirin 81 MG EC tablet TAKE 1 TABLET (81 MG) BY MOUTH DAILY 90 tablet 3     blood glucose monitoring (ONETOUCH VERIO IQ) test strip TEST UP TO 8 TIMES DAILY OR AS DIRECTED 1250 strip 1     Coenzyme Q10 (CO Q 10 PO) Take  by mouth. 2 per day       Cyanocobalamin (VITAMIN B-12 PO) Take  by mouth daily.       escitalopram (LEXAPRO) 10 MG tablet Take 1 tablet (10 mg) by mouth daily 90 tablet 3     FISH OIL CONCENTRATE 1000 MG OR CAPS 2 tablets daily       HUMALOG KWIKPEN 100 UNIT/ML soln INJECT 10-12 UNITLS BEFORE EACH MEAL WITH CORRECTION 45  mL 3     insulin glargine (LANTUS SOLOSTAR PEN) 100 UNIT/ML pen INJECT 50 UNITS SUBCUTANEOUSLY AT BEDTIME 60 mL 0     insulin pen needle (BD YANNI U/F) 32G X 4 MM Use 4 times daily as directed. 100 each 11     LORazepam (ATIVAN) 0.5 MG tablet TAKE 1 TABLET BY MOUTH EVERY 8 HOURS AS NEEDED 45 tablet 0     losartan (COZAAR) 100 MG tablet TAKE 1 TABLET (100 MG) BY MOUTH DAILY 90 tablet 2     losartan-hydrochlorothiazide (HYZAAR) 100-25 MG tablet Take 1 tablet by mouth daily 90 tablet 3     metFORMIN (GLUCOPHAGE-XR) 500 MG 24 hr tablet TAKE 2 TABLETS (1,000 MG) BY MOUTH 2 TIMES DAILY 360 tablet 1     MULTIPLE VITAMINS OR 1000mg daily       ONETOUCH VERIO IQ test strip TEST UP TO 8 TIMES DAILY OR AS DIRECTED 1250 strip 0     ORDER FOR DME Equipment being ordered: glucometer brand per insurance 1 Device 0     ORDER FOR DME LANCETS 4 x DAILY AND  each 4     oxyCODONE (ROXICODONE) 5 MG tablet Take 1 tablet (5 mg) by mouth every 6 hours as needed for severe pain maximum 6 tablet(s) per day 45 tablet 0     rosuvastatin (CRESTOR) 40 MG tablet TAKE 1 TABLET (40 MG) BY MOUTH DAILY 30 tablet 2     sildenafil (VIAGRA) 100 MG tablet TAKE 1 TABLET BY MOUTH 30 MINS BEFORE INTERCOURSE 12 tablet 3     VICTOZA PEN 18 MG/3ML soln INJECT 1.8 MG SUBCUTANEOUS DAILY 27 mL 11     VITAMIN D 1000 UNIT OR CAPS Take 1 capsule by mouth daily        Allergies   Allergen Reactions     Cats      Cat dander     Cymbalta      Molds & Smuts      Ms Contin [Morphine]      Ceftazidime Rash     Was also on vanco     Vancomycin Rash     Was also on ceftazidime     Recent Labs   Lab Test 05/17/19  0919 01/17/18  0820 03/27/17  1559 08/12/16  0913 11/13/15  0814  02/06/14  0753   A1C 6.2* 6.3* 8.2* 7.3* 7.9*   < > 6.6*   LDL 78 56  --  101* 138*   < > 63   HDL 46 53  --  38* 47   < > 35*   TRIG 172* 75  --  354* 235*   < > 138   ALT  --  52  --   --  35  --  41   CR 0.74 0.81 0.69 0.77 0.70   < > 0.98   GFRESTIMATED >90 >90 >90  Non  GFR  "Calc   >90  Non  GFR Calc   >90  Non  GFR Calc     < > 81   GFRESTBLACK >90 >90 >90   GFR Calc   >90   GFR Calc   >90   GFR Calc     < > >90   POTASSIUM 4.3 4.0 3.5 4.2 3.9   < > 4.1   TSH  --  1.64  --   --  1.64   < >  --     < > = values in this interval not displayed.      BP Readings from Last 3 Encounters:   05/17/19 166/84   11/19/18 139/83   04/23/18 132/78    Wt Readings from Last 3 Encounters:   05/17/19 84.4 kg (186 lb)   11/19/18 86.2 kg (190 lb)   04/23/18 87.1 kg (192 lb)                    Assessment & Plan       ICD-10-CM    1. Hyperlipidemia LDL goal <100 E78.5 Lipid panel reflex to direct LDL Non-fasting   2. Generalized anxiety disorder F41.1 escitalopram (LEXAPRO) 10 MG tablet     LORazepam (ATIVAN) 0.5 MG tablet   3. Chronic low back pain with sciatica, sciatica laterality unspecified, unspecified back pain laterality M54.40 oxyCODONE (ROXICODONE) 5 MG tablet    G89.29    4. Type 2 diabetes mellitus without complication, without long-term current use of insulin (H) E11.9 Hemoglobin A1c     FOOT EXAM     Albumin Random Urine Quantitative with Creat Ratio   5. Screening for HIV (human immunodeficiency virus) Z11.4 HIV Antigen Antibody Combo   6. HTN, goal below 140/90 I10 losartan-hydrochlorothiazide (HYZAAR) 100-25 MG tablet     Basic metabolic panel        BMI:   Estimated body mass index is 30.9 kg/m  as calculated from the following:    Height as of 11/19/18: 1.653 m (5' 5.06\").    Weight as of this encounter: 84.4 kg (186 lb).   Weight management plan: Discussed healthy diet and exercise guidelines        Work on weight loss  Regular exercise        ICD-10-CM    1. Hyperlipidemia LDL goal <100 E78.5 Lipid panel reflex to direct LDL Non-fasting   2. Generalized anxiety disorder F41.1 escitalopram (LEXAPRO) 10 MG tablet     LORazepam (ATIVAN) 0.5 MG tablet   3. Chronic low back pain with sciatica, sciatica " laterality unspecified, unspecified back pain laterality M54.40 oxyCODONE (ROXICODONE) 5 MG tablet    G89.29    4. Type 2 diabetes mellitus without complication, without long-term current use of insulin (H) E11.9 Hemoglobin A1c     FOOT EXAM     Albumin Random Urine Quantitative with Creat Ratio   5. Screening for HIV (human immunodeficiency virus) Z11.4 HIV Antigen Antibody Combo   6. HTN, goal below 140/90 I10 losartan-hydrochlorothiazide (HYZAAR) 100-25 MG tablet     Basic metabolic panel     BP     166/84  5/20/2019    Lab Results   Component Value Date     05/17/2019     Lab Results   Component Value Date    A1C 6.2 05/17/2019     Lab Results   Component Value Date    LDL 78 05/17/2019     Lab Results   Component Value Date    MICROL 19 05/17/2019     No results found for: MICROALBUMIN    Patient with increases in daily BP    Will add hydrochlorothiazide to the losartan   Check lytes at next visit and BP in 6 weeks  Potassium will hopefully regulate    Return in about 6 weeks (around 6/28/2019).    Joseph Alanis MD  [facility]

## 2019-05-18 LAB — HIV 1+2 AB+HIV1 P24 AG SERPL QL IA: NONREACTIVE

## 2019-05-18 ASSESSMENT — ANXIETY QUESTIONNAIRES: GAD7 TOTAL SCORE: 10

## 2019-06-09 DIAGNOSIS — E11.9 TYPE 2 DIABETES MELLITUS WITHOUT COMPLICATION, WITHOUT LONG-TERM CURRENT USE OF INSULIN (H): ICD-10-CM

## 2019-06-10 NOTE — TELEPHONE ENCOUNTER
Prescription approved per [facility] Refill Protocol.  Emely Bowman, RN CPC Triage.

## 2019-06-10 NOTE — TELEPHONE ENCOUNTER
"Requested Prescriptions   Pending Prescriptions Disp Refills     insulin pen needle (BD YANNI U/F) 32G X 4 MM miscellaneous [Pharmacy Med Name: BD UF YANNI PEN NEEDLE [id number]]  8     Sig: USE 4 TIMES DAILY AS DIRECTED.   Last Written Prescription Date:  3/28/18  Last Fill Quantity: 100,  # refills: 11   Last office visit: 5/17/2019 with prescribing provider:     Future Office Visit:        Diabetic Supplies Protocol Passed - 6/9/2019 10:08 AM        Passed - Medication is active on med list        Passed - Patient is 18 years of age or older        Passed - Recent (6 mo) or future (30 days) visit within the authorizing provider's specialty     Patient had office visit in the last 6 months or has a visit in the next 30 days with authorizing provider.  See \"Patient Info\" tab in inbasket, or \"Choose Columns\" in Meds & Orders section of the refill encounter.              "

## 2019-07-10 ENCOUNTER — TELEPHONE (OUTPATIENT)
Dept: FAMILY MEDICINE | Facility: CLINIC | Age: 54
End: 2019-07-10

## 2019-07-10 NOTE — TELEPHONE ENCOUNTER
Called and left message for patient to call the clinic back, also sent letter out to patient asking him to contact the clinic to schedule an appointment for a BP check.    Vidhi Andre MA

## 2019-07-10 NOTE — TELEPHONE ENCOUNTER
Panel Management Review      Patient has the following on his problem list:     Diabetes    ASA: Passed    Last A1C  Lab Results   Component Value Date    A1C 6.2 05/17/2019    A1C 6.3 01/17/2018    A1C 8.2 03/27/2017    A1C 7.3 08/12/2016    A1C 7.9 11/13/2015     A1C tested: Passed    Last LDL:    Lab Results   Component Value Date    CHOL 158 05/17/2019     Lab Results   Component Value Date    HDL 46 05/17/2019     Lab Results   Component Value Date    LDL 78 05/17/2019     Lab Results   Component Value Date    TRIG 172 05/17/2019     Lab Results   Component Value Date    CHOLHDLRATIO 4.9 11/13/2015     Lab Results   Component Value Date    NHDL 112 05/17/2019       Is the patient on a Statin? YES             Is the patient on Aspirin? YES    Medications     [facility] CoA Reductase Inhibitors     rosuvastatin (CRESTOR) 40 MG tablet       Salicylates     aspirin 81 MG EC tablet             Last three blood pressure readings:  BP Readings from Last 3 Encounters:   05/17/19 166/84   11/19/18 139/83   04/23/18 132/78       Date of last diabetes office visit: 5/15/2019     Tobacco History:     History   Smoking Status     Never Smoker   Smokeless Tobacco     Never Used         Hypertension   Last three blood pressure readings:  BP Readings from Last 3 Encounters:   05/17/19 166/84   11/19/18 139/83   04/23/18 132/78     Blood pressure: FAILED    HTN Guidelines:  Less than 140/90      Composite cancer screening  Chart review shows that this patient is due/due soon for the following Colonoscopy  Summary:    Patient is due/failing the following:   BP CHECK    Action needed:   Patient needs office visit for bp check.    Type of outreach:    Phone, left message for patient to call back.  and Sent letter.    Questions for provider review:    None                                                                                                                                    CHERELLE OCHOA MD     Chart routed to Care Team  .

## 2019-07-22 ENCOUNTER — TELEPHONE (OUTPATIENT)
Dept: FAMILY MEDICINE | Facility: CLINIC | Age: 54
End: 2019-07-22

## 2019-07-22 ENCOUNTER — OFFICE VISIT (OUTPATIENT)
Dept: FAMILY MEDICINE | Facility: CLINIC | Age: 54
End: 2019-07-22
Payer: COMMERCIAL

## 2019-07-22 VITALS
HEIGHT: 66 IN | WEIGHT: 192 LBS | OXYGEN SATURATION: 100 % | HEART RATE: 100 BPM | BODY MASS INDEX: 30.86 KG/M2 | SYSTOLIC BLOOD PRESSURE: 155 MMHG | DIASTOLIC BLOOD PRESSURE: 93 MMHG

## 2019-07-22 DIAGNOSIS — G89.29 CHRONIC LOW BACK PAIN WITH SCIATICA, SCIATICA LATERALITY UNSPECIFIED, UNSPECIFIED BACK PAIN LATERALITY: ICD-10-CM

## 2019-07-22 DIAGNOSIS — E11.9 TYPE 2 DIABETES MELLITUS WITHOUT COMPLICATION, WITHOUT LONG-TERM CURRENT USE OF INSULIN (H): ICD-10-CM

## 2019-07-22 DIAGNOSIS — M54.40 CHRONIC LOW BACK PAIN WITH SCIATICA, SCIATICA LATERALITY UNSPECIFIED, UNSPECIFIED BACK PAIN LATERALITY: ICD-10-CM

## 2019-07-22 DIAGNOSIS — F41.1 GENERALIZED ANXIETY DISORDER: ICD-10-CM

## 2019-07-22 DIAGNOSIS — R07.89 ATYPICAL CHEST PAIN: Primary | ICD-10-CM

## 2019-07-22 LAB
BASOPHILS # BLD AUTO: 0 10E9/L (ref 0–0.2)
BASOPHILS NFR BLD AUTO: 0.2 %
DIFFERENTIAL METHOD BLD: ABNORMAL
EOSINOPHIL # BLD AUTO: 0.2 10E9/L (ref 0–0.7)
EOSINOPHIL NFR BLD AUTO: 2 %
ERYTHROCYTE [DISTWIDTH] IN BLOOD BY AUTOMATED COUNT: 13.2 % (ref 10–15)
HCT VFR BLD AUTO: 45.6 % (ref 40–53)
HGB BLD-MCNC: 15.7 G/DL (ref 13.3–17.7)
LYMPHOCYTES # BLD AUTO: 3.1 10E9/L (ref 0.8–5.3)
LYMPHOCYTES NFR BLD AUTO: 25.1 %
MCH RBC QN AUTO: 30.1 PG (ref 26.5–33)
MCHC RBC AUTO-ENTMCNC: 34.4 G/DL (ref 31.5–36.5)
MCV RBC AUTO: 87 FL (ref 78–100)
MONOCYTES # BLD AUTO: 1 10E9/L (ref 0–1.3)
MONOCYTES NFR BLD AUTO: 8.1 %
NEUTROPHILS # BLD AUTO: 7.9 10E9/L (ref 1.6–8.3)
NEUTROPHILS NFR BLD AUTO: 64.6 %
PLATELET # BLD AUTO: 306 10E9/L (ref 150–450)
RBC # BLD AUTO: 5.22 10E12/L (ref 4.4–5.9)
WBC # BLD AUTO: 12.2 10E9/L (ref 4–11)

## 2019-07-22 PROCEDURE — 80048 BASIC METABOLIC PNL TOTAL CA: CPT | Performed by: INTERNAL MEDICINE

## 2019-07-22 PROCEDURE — 84443 ASSAY THYROID STIM HORMONE: CPT | Performed by: INTERNAL MEDICINE

## 2019-07-22 PROCEDURE — 83735 ASSAY OF MAGNESIUM: CPT | Performed by: INTERNAL MEDICINE

## 2019-07-22 PROCEDURE — 84100 ASSAY OF PHOSPHORUS: CPT | Performed by: INTERNAL MEDICINE

## 2019-07-22 PROCEDURE — 85025 COMPLETE CBC W/AUTO DIFF WBC: CPT | Performed by: INTERNAL MEDICINE

## 2019-07-22 PROCEDURE — 36415 COLL VENOUS BLD VENIPUNCTURE: CPT | Performed by: INTERNAL MEDICINE

## 2019-07-22 PROCEDURE — 99214 OFFICE O/P EST MOD 30 MIN: CPT | Performed by: INTERNAL MEDICINE

## 2019-07-22 RX ORDER — LORAZEPAM 0.5 MG/1
TABLET ORAL
Qty: 45 TABLET | Refills: 0 | Status: CANCELLED | OUTPATIENT
Start: 2019-07-22

## 2019-07-22 RX ORDER — LORAZEPAM 0.5 MG/1
TABLET ORAL
Qty: 45 TABLET | Refills: 0 | Status: SHIPPED | OUTPATIENT
Start: 2019-07-22 | End: 2019-08-16

## 2019-07-22 RX ORDER — OXYCODONE HYDROCHLORIDE 5 MG/1
5 TABLET ORAL EVERY 6 HOURS PRN
Qty: 20 TABLET | Refills: 0 | Status: SHIPPED | OUTPATIENT
Start: 2019-07-22 | End: 2019-10-24

## 2019-07-22 ASSESSMENT — MIFFLIN-ST. JEOR: SCORE: 1648.66

## 2019-07-22 NOTE — TELEPHONE ENCOUNTER
Reason for call:  Patient reporting a symptom    Symptom or request: Patient is scheduled for a BP check with PCP on 07/29. Patient calling stating he would like to get in to be seen sooner. He states he has had a lot of anxiety lately. He is feeling really ill lately, he breaks out with sweats and then gets cold spells as well, he shakes, he feels mental confusion at times, has episodes of a racing heart which he thinks is related to feeling panicked, episodes of blurry vision and he is also having episodes of feeling like he is going to pass out. Earlier this morning he was feeling bad, he did take a lorazepam this morning which did help. Currently patient is feeling ok.     Duration (how long have symptoms been present): on and off the last 3-4 weeks, happening more frequently now, the last week has experienced this daily    Have you been treated for this before? No    Additional comments: Patient uses [facility] [address]    Phone Number patient can be reached at:  Cell number on file:    Telephone Information:   Mobile [phone]       Best Time:  any    Can we leave a detailed message on this number:  YES    Call taken on 7/22/2019 at 9:31 AM by Renée Li

## 2019-07-22 NOTE — TELEPHONE ENCOUNTER
Called patient.  PCP had a cancellation at 3:40 today.  Appointment scheduled.    Patient stated understanding and agreeable with the plan of care. GONZALO Melgar,RN, CPC Triage.

## 2019-07-22 NOTE — TELEPHONE ENCOUNTER
Patient was last seen 5/17/19 for diabetes and anxiety.    Plan that day:   Return in about 6 weeks (around 6/28/2019).     Joseph Alanis MD  [facility]    He is scheduled for BP check with PCP 7/29/19.    I called patient, he is at work, works from home.   States his episodes as described by TC below last 30 minutes or so.   Says chest is tight with the episodes but denies pain or numbness/tingling down arm.  States the ativan resolves the symptoms but he is down to 2 pills so can't wait until seen on 7/29/19.  No openings today, I did schedule him in a private slot with Dr. Alanis tomorrow AM and advised he call 911 if symptoms worsen, change, or last longer.       Routed to Dr. Alanis to address possibly seeing patient today or at least sending a short fill Ativan to cover patient until see tomorrow.    Arleth Lane RN  [facility]

## 2019-07-22 NOTE — PROGRESS NOTES
Subjective     Amadou Bach is a 53 year old male who presents to clinic today for the following health issues:    HPI   Review Bp       Having night sweats, blurry vision, nausea, shaking. It can last 30 minutes to 1 hour. Has had this happen during the day too. States that its different then when his blood sugars are low.    Increasing Anxiety-- wondering if due to BP its causing increased Anxiety or vis versa   1. Month having.  Episodes  Some blood sugar.  Had 57 once and below 100 recently ( on the victoza.) trulicity not helpful and metformin -- >   Needs and lantus ( waking up)  Not low always 100-120    1/2 hour walk.  Not correlated with activity or exercise.  Wakes in the middle of the light sweating and feeling poorly   Feels light headed and vision blurred and tight chest.   lexapro aggrevated.    140-/80 on the medication.  bp not improving lisinopril  2-3 time  No paralysis vision gets blurred and confusion              Patient Active Problem List   Diagnosis     Erectile dysfunction     Type 2 diabetes mellitus without complication, without long-term current use of insulin (H)     Hyperlipidemia LDL goal <100     Obesity, Class I, BMI 30-34.9     Generalized anxiety disorder     ACP (advance care planning)     History of adenomatous polyp of colon     Essential hypertension with goal blood pressure less than 140/90     Malignant melanoma of skin of trunk (H)     History of melanoma     AK (actinic keratosis)     Multiple benign nevi     Cherry hemangioma     Lentigines     Past Surgical History:   Procedure Laterality Date     BIOPSY OF SKIN LESION       COLONOSCOPY  3/11/2014    Procedure: COMBINED COLONOSCOPY, SINGLE BIOPSY/POLYPECTOMY BY BIOPSY;;  Surgeon: Stephen Amin MD;  Location: [facility]     COLONOSCOPY WITH CO2 INSUFFLATION N/A 8/19/2014    Procedure: COLONOSCOPY WITH CO2 INSUFFLATION;  Surgeon: Stephen Amin MD;  Location: [facility] OR     COLONOSCOPY WITH CO2 INSUFFLATION N/A  10/11/2017    Procedure: COLONOSCOPY WITH CO2 INSUFFLATION;  COLON/ REPEAT COLONOSCOPY;  Surgeon: Jeremy Mcqueen DO;  Location: [facility] OR     HC TOOTH EXTRACTION W/FORCEP      wisdom teeth x4       Social History     Tobacco Use     Smoking status: Never Smoker     Smokeless tobacco: Never Used   Substance Use Topics     Alcohol use: Yes     Comment: occ     Family History   Problem Relation Age of Onset     Diabetes Mother      Hypertension Mother      Diabetes Father      Cancer Father         lung     Cancer Other         aunt - skin cancer     Skin Cancer Other      Melanoma No family hx of          Current Outpatient Medications   Medication Sig Dispense Refill     aspirin 81 MG EC tablet TAKE 1 TABLET (81 MG) BY MOUTH DAILY 90 tablet 3     blood glucose monitoring (ONETOUCH VERIO IQ) test strip TEST UP TO 8 TIMES DAILY OR AS DIRECTED 1250 strip 1     Coenzyme Q10 (CO Q 10 PO) Take  by mouth. 2 per day       Cyanocobalamin (VITAMIN B-12 PO) Take  by mouth daily.       FISH OIL CONCENTRATE 1000 MG OR CAPS 2 tablets daily       HUMALOG KWIKPEN 100 UNIT/ML soln INJECT 10-12 UNITLS BEFORE EACH MEAL WITH CORRECTION 45 mL 3     insulin glargine (LANTUS SOLOSTAR PEN) 100 UNIT/ML pen INJECT 50 UNITS SUBCUTANEOUSLY AT BEDTIME 60 mL 0     insulin pen needle (BD YANNI U/F) 32G X 4 MM miscellaneous USE 4 TIMES DAILY AS DIRECTED. 200 each 3     LORazepam (ATIVAN) 0.5 MG tablet TAKE 1 TABLET BY MOUTH EVERY 8 HOURS AS NEEDED 45 tablet 0     losartan-hydrochlorothiazide (HYZAAR) 100-25 MG tablet Take 1 tablet by mouth daily 90 tablet 3     metFORMIN (GLUCOPHAGE-XR) 500 MG 24 hr tablet TAKE 2 TABLETS (1,000 MG) BY MOUTH 2 TIMES DAILY 360 tablet 1     MULTIPLE VITAMINS OR 1000mg daily       ONETOUCH VERIO IQ test strip TEST UP TO 8 TIMES DAILY OR AS DIRECTED 1250 strip 0     ORDER FOR DME Equipment being ordered: glucometer brand per insurance 1 Device 0     ORDER FOR DME LANCETS 4 x DAILY AND  each 4     oxyCODONE  "(ROXICODONE) 5 MG tablet Take 1 tablet (5 mg) by mouth every 6 hours as needed for severe pain maximum 6 tablet(s) per day 20 tablet 0     rosuvastatin (CRESTOR) 40 MG tablet TAKE 1 TABLET (40 MG) BY MOUTH DAILY 30 tablet 2     sildenafil (VIAGRA) 100 MG tablet TAKE 1 TABLET BY MOUTH 30 MINS BEFORE INTERCOURSE 12 tablet 3     VICTOZA PEN 18 MG/3ML soln INJECT 1.8 MG SUBCUTANEOUS DAILY 27 mL 11     VITAMIN D 1000 UNIT OR CAPS Take 1 capsule by mouth daily        Allergies   Allergen Reactions     Cats      Cat dander     Cymbalta      Molds & Smuts      Ms Contin [Morphine]      Ceftazidime Rash     Was also on vanco     Vancomycin Rash     Was also on ceftazidime     Recent Labs   Lab Test 07/22/19  1617 05/17/19  0919 01/17/18  0820 03/27/17  1559 08/12/16  0913 11/13/15  0814  02/06/14  0753   A1C  --  6.2* 6.3* 8.2* 7.3* 7.9*   < > 6.6*   LDL  --  78 56  --  101* 138*   < > 63   HDL  --  46 53  --  38* 47   < > 35*   TRIG  --  172* 75  --  354* 235*   < > 138   ALT  --   --  52  --   --  35  --  41   CR 0.85 0.74 0.81 0.69 0.77 0.70   < > 0.98   GFRESTIMATED >90 >90 >90 >90  Non  GFR Calc   >90  Non  GFR Calc   >90  Non  GFR Calc     < > 81   GFRESTBLACK >90 >90 >90 >90   GFR Calc   >90   GFR Calc   >90   GFR Calc     < > >90   POTASSIUM 3.8 4.3 4.0 3.5 4.2 3.9   < > 4.1   TSH 0.78  --  1.64  --   --  1.64   < >  --     < > = values in this interval not displayed.          Reviewed and updated as needed this visit by Provider         Review of Systems   ROS COMP: Constitutional, HEENT, cardiovascular, pulmonary, gi and gu systems are negative, except as otherwise noted.      Objective    BP (!) 155/93 (BP Location: Right arm, Patient Position: Chair, Cuff Size: Adult Regular)   Pulse 100   Ht 1.676 m (5' 6\")   Wt 87.1 kg (192 lb)   SpO2 100%   BMI 30.99 kg/m    Body mass index is 30.99 kg/m .  Physical Exam "   GENERAL: healthy, alert and no distress  EYES: Eyes grossly normal to inspection, PERRL and conjunctivae and sclerae normal  HENT: ear canals and TM's normal, nose and mouth without ulcers or lesions  NECK: no adenopathy, no asymmetry, masses, or scars and thyroid normal to palpation  RESP: lungs clear to auscultation - no rales, rhonchi or wheezes  CV: regular rate and rhythm, normal S1 S2, no S3 or S4, no murmur, click or rub, no peripheral edema and peripheral pulses strong  ABDOMEN: soft, nontender, no hepatosplenomegaly, no masses and bowel sounds normal  MS: no gross musculoskeletal defects noted, no edema  SKIN: no suspicious lesions or rashes  NEURO: Normal strength and tone, mentation intact and speech normal  BACK: no CVA tenderness, no paralumbar tenderness  PSYCH: mentation appears normal, affect normal/bright    Diagnostic Test Results:  Labs reviewed in Epic  Results for orders placed or performed in visit on 07/22/19   CBC with platelets and differential   Result Value Ref Range    WBC 12.2 (H) 4.0 - 11.0 10e9/L    RBC Count 5.22 4.4 - 5.9 10e12/L    Hemoglobin 15.7 13.3 - 17.7 g/dL    Hematocrit 45.6 40.0 - 53.0 %    MCV 87 78 - 100 fl    MCH 30.1 26.5 - 33.0 pg    MCHC 34.4 31.5 - 36.5 g/dL    RDW 13.2 10.0 - 15.0 %    Platelet Count 306 150 - 450 10e9/L    % Neutrophils 64.6 %    % Lymphocytes 25.1 %    % Monocytes 8.1 %    % Eosinophils 2.0 %    % Basophils 0.2 %    Absolute Neutrophil 7.9 1.6 - 8.3 10e9/L    Absolute Lymphocytes 3.1 0.8 - 5.3 10e9/L    Absolute Monocytes 1.0 0.0 - 1.3 10e9/L    Absolute Eosinophils 0.2 0.0 - 0.7 10e9/L    Absolute Basophils 0.0 0.0 - 0.2 10e9/L    Diff Method Automated Method    Basic metabolic panel   Result Value Ref Range    Sodium 138 133 - 144 mmol/L    Potassium 3.8 3.4 - 5.3 mmol/L    Chloride 104 94 - 109 mmol/L    Carbon Dioxide 28 20 - 32 mmol/L    Anion Gap 6 3 - 14 mmol/L    Glucose 116 (H) 70 - 99 mg/dL    Urea Nitrogen 15 7 - 30 mg/dL     "Creatinine 0.85 0.66 - 1.25 mg/dL    GFR Estimate >90 >60 mL/min/[1.73_m2]    GFR Estimate If Black >90 >60 mL/min/[1.73_m2]    Calcium 9.5 8.5 - 10.1 mg/dL   Magnesium   Result Value Ref Range    Magnesium 2.0 1.6 - 2.3 mg/dL   Phosphorus   Result Value Ref Range    Phosphorus 3.5 2.5 - 4.5 mg/dL   **TSH with free T4 reflex FUTURE anytime   Result Value Ref Range    TSH 0.78 0.40 - 4.00 mU/L           Assessment & Plan       ICD-10-CM    1. Atypical chest pain R07.89 NM Lexiscan stress test     CBC with platelets and differential   2. Generalized anxiety disorder F41.1 LORazepam (ATIVAN) 0.5 MG tablet   3. Chronic low back pain with sciatica, sciatica laterality unspecified, unspecified back pain laterality M54.40 oxyCODONE (ROXICODONE) 5 MG tablet    G89.29    4. Type 2 diabetes mellitus without complication, without long-term current use of insulin (H) E11.9 Basic metabolic panel     Magnesium     Phosphorus     **TSH with free T4 reflex FUTURE anytime      BP     155/93  7/24/2019    Lab Results   Component Value Date     07/22/2019     Lab Results   Component Value Date    A1C 6.2 05/17/2019     Lab Results   Component Value Date    LDL 78 05/17/2019     Lab Results   Component Value Date    MICROL 19 05/17/2019     No results found for: MICROALBUMIN  Reviewed diet and alcohol guidelines in regards to diagnosis   Patient does have 4-5 alcoholic beverages intermittently  And at least 2-3 every night      BMI:   Estimated body mass index is 30.99 kg/m  as calculated from the following:    Height as of this encounter: 1.676 m (5' 6\").    Weight as of this encounter: 87.1 kg (192 lb).           Work on weight loss  Regular exercise    No follow-ups on file.    Joseph Alanis MD  [facility]        "

## 2019-07-22 NOTE — TELEPHONE ENCOUNTER
Patient called back and said that his bp still has been 160/100 but he said that he is feeling better but he wants to know if it is ok that he wait until tomorrow to be seen, and would like to talk with a nurse andrei today

## 2019-07-23 LAB
ANION GAP SERPL CALCULATED.3IONS-SCNC: 6 MMOL/L (ref 3–14)
BUN SERPL-MCNC: 15 MG/DL (ref 7–30)
CALCIUM SERPL-MCNC: 9.5 MG/DL (ref 8.5–10.1)
CHLORIDE SERPL-SCNC: 104 MMOL/L (ref 94–109)
CO2 SERPL-SCNC: 28 MMOL/L (ref 20–32)
CREAT SERPL-MCNC: 0.85 MG/DL (ref 0.66–1.25)
GFR SERPL CREATININE-BSD FRML MDRD: >90 ML/MIN/{1.73_M2}
GLUCOSE SERPL-MCNC: 116 MG/DL (ref 70–99)
MAGNESIUM SERPL-MCNC: 2 MG/DL (ref 1.6–2.3)
PHOSPHATE SERPL-MCNC: 3.5 MG/DL (ref 2.5–4.5)
POTASSIUM SERPL-SCNC: 3.8 MMOL/L (ref 3.4–5.3)
SODIUM SERPL-SCNC: 138 MMOL/L (ref 133–144)
TSH SERPL DL<=0.005 MIU/L-ACNC: 0.78 MU/L (ref 0.4–4)

## 2019-07-29 ENCOUNTER — HOSPITAL ENCOUNTER (OUTPATIENT)
Dept: NUCLEAR MEDICINE | Facility: CLINIC | Age: 54
Setting detail: NUCLEAR MEDICINE
End: 2019-07-29
Attending: INTERNAL MEDICINE
Payer: COMMERCIAL

## 2019-07-29 ENCOUNTER — HOSPITAL ENCOUNTER (OUTPATIENT)
Dept: CARDIOLOGY | Facility: CLINIC | Age: 54
Discharge: HOME | End: 2019-07-29
Attending: INTERNAL MEDICINE | Admitting: INTERNAL MEDICINE
Payer: COMMERCIAL

## 2019-07-29 VITALS — SYSTOLIC BLOOD PRESSURE: 146 MMHG | DIASTOLIC BLOOD PRESSURE: 86 MMHG

## 2019-07-29 DIAGNOSIS — R07.89 ATYPICAL CHEST PAIN: ICD-10-CM

## 2019-07-29 PROCEDURE — 78452 HT MUSCLE IMAGE SPECT MULT: CPT | Mod: 26 | Performed by: INTERNAL MEDICINE

## 2019-07-29 PROCEDURE — 93016 CV STRESS TEST SUPVJ ONLY: CPT | Performed by: INTERNAL MEDICINE

## 2019-07-29 PROCEDURE — 93018 CV STRESS TEST I&R ONLY: CPT | Performed by: INTERNAL MEDICINE

## 2019-07-29 PROCEDURE — 34300033 ZZH RX 343: Performed by: INTERNAL MEDICINE

## 2019-07-29 PROCEDURE — 93017 CV STRESS TEST TRACING ONLY: CPT

## 2019-07-29 PROCEDURE — 78452 HT MUSCLE IMAGE SPECT MULT: CPT

## 2019-07-29 PROCEDURE — 25000128 H RX IP 250 OP 636: Performed by: INTERNAL MEDICINE

## 2019-07-29 PROCEDURE — A9502 TC99M TETROFOSMIN: HCPCS | Performed by: INTERNAL MEDICINE

## 2019-07-29 RX ORDER — REGADENOSON 0.08 MG/ML
0.4 INJECTION, SOLUTION INTRAVENOUS ONCE
Status: COMPLETED | OUTPATIENT
Start: 2019-07-29 | End: 2019-07-29

## 2019-07-29 RX ORDER — ALBUTEROL SULFATE 90 UG/1
2 AEROSOL, METERED RESPIRATORY (INHALATION) EVERY 5 MIN PRN
Status: DISCONTINUED | OUTPATIENT
Start: 2019-07-29 | End: 2019-07-30 | Stop reason: HOSPADM

## 2019-07-29 RX ORDER — ACYCLOVIR 200 MG/1
0-1 CAPSULE ORAL
Status: DISCONTINUED | OUTPATIENT
Start: 2019-07-29 | End: 2019-07-30 | Stop reason: HOSPADM

## 2019-07-29 RX ORDER — AMINOPHYLLINE 25 MG/ML
50-100 INJECTION, SOLUTION INTRAVENOUS
Status: DISCONTINUED | OUTPATIENT
Start: 2019-07-29 | End: 2019-07-30 | Stop reason: HOSPADM

## 2019-07-29 RX ADMIN — REGADENOSON 0.4 MG: 0.08 INJECTION, SOLUTION INTRAVENOUS at 15:03

## 2019-07-29 RX ADMIN — TETROFOSMIN 36.6 MCI.: 1.38 INJECTION, POWDER, LYOPHILIZED, FOR SOLUTION INTRAVENOUS at 15:05

## 2019-07-29 RX ADMIN — TETROFOSMIN 9.46 MCI.: 1.38 INJECTION, POWDER, LYOPHILIZED, FOR SOLUTION INTRAVENOUS at 13:26

## 2019-08-16 ENCOUNTER — OFFICE VISIT (OUTPATIENT)
Dept: FAMILY MEDICINE | Facility: CLINIC | Age: 54
End: 2019-08-16
Payer: COMMERCIAL

## 2019-08-16 VITALS
WEIGHT: 190 LBS | DIASTOLIC BLOOD PRESSURE: 94 MMHG | BODY MASS INDEX: 30.67 KG/M2 | SYSTOLIC BLOOD PRESSURE: 157 MMHG | OXYGEN SATURATION: 99 % | TEMPERATURE: 98.2 F | HEART RATE: 96 BPM

## 2019-08-16 DIAGNOSIS — F41.1 GENERALIZED ANXIETY DISORDER: ICD-10-CM

## 2019-08-16 DIAGNOSIS — E78.5 HYPERLIPIDEMIA LDL GOAL <100: ICD-10-CM

## 2019-08-16 DIAGNOSIS — E11.9 TYPE 2 DIABETES MELLITUS WITHOUT COMPLICATION, WITHOUT LONG-TERM CURRENT USE OF INSULIN (H): Primary | ICD-10-CM

## 2019-08-16 DIAGNOSIS — I10 ESSENTIAL HYPERTENSION WITH GOAL BLOOD PRESSURE LESS THAN 140/90: ICD-10-CM

## 2019-08-16 DIAGNOSIS — E55.9 VITAMIN D DEFICIENCY: ICD-10-CM

## 2019-08-16 LAB
BASOPHILS # BLD AUTO: 0 10E9/L (ref 0–0.2)
BASOPHILS NFR BLD AUTO: 0.3 %
DIFFERENTIAL METHOD BLD: NORMAL
EOSINOPHIL # BLD AUTO: 0.2 10E9/L (ref 0–0.7)
EOSINOPHIL NFR BLD AUTO: 2.5 %
ERYTHROCYTE [DISTWIDTH] IN BLOOD BY AUTOMATED COUNT: 12.9 % (ref 10–15)
HBA1C MFR BLD: 6.2 % (ref 0–5.6)
HCT VFR BLD AUTO: 44.5 % (ref 40–53)
HGB BLD-MCNC: 15.2 G/DL (ref 13.3–17.7)
LYMPHOCYTES # BLD AUTO: 2.8 10E9/L (ref 0.8–5.3)
LYMPHOCYTES NFR BLD AUTO: 30.8 %
MCH RBC QN AUTO: 29.5 PG (ref 26.5–33)
MCHC RBC AUTO-ENTMCNC: 34.2 G/DL (ref 31.5–36.5)
MCV RBC AUTO: 86 FL (ref 78–100)
MONOCYTES # BLD AUTO: 0.8 10E9/L (ref 0–1.3)
MONOCYTES NFR BLD AUTO: 9.2 %
NEUTROPHILS # BLD AUTO: 5.1 10E9/L (ref 1.6–8.3)
NEUTROPHILS NFR BLD AUTO: 57.2 %
PLATELET # BLD AUTO: 288 10E9/L (ref 150–450)
RBC # BLD AUTO: 5.16 10E12/L (ref 4.4–5.9)
WBC # BLD AUTO: 9 10E9/L (ref 4–11)

## 2019-08-16 PROCEDURE — 82306 VITAMIN D 25 HYDROXY: CPT | Performed by: INTERNAL MEDICINE

## 2019-08-16 PROCEDURE — 36415 COLL VENOUS BLD VENIPUNCTURE: CPT | Performed by: INTERNAL MEDICINE

## 2019-08-16 PROCEDURE — 83036 HEMOGLOBIN GLYCOSYLATED A1C: CPT | Performed by: INTERNAL MEDICINE

## 2019-08-16 PROCEDURE — 99214 OFFICE O/P EST MOD 30 MIN: CPT | Performed by: INTERNAL MEDICINE

## 2019-08-16 PROCEDURE — 85025 COMPLETE CBC W/AUTO DIFF WBC: CPT | Performed by: INTERNAL MEDICINE

## 2019-08-16 RX ORDER — LORAZEPAM 0.5 MG/1
TABLET ORAL
Qty: 45 TABLET | Refills: 0 | Status: SHIPPED | OUTPATIENT
Start: 2019-08-16 | End: 2019-10-24

## 2019-08-16 NOTE — PROGRESS NOTES
Subjective     Amadou Bach is a 53 year old male who presents to clinic today for the following health issues:    HPI   Patient is here to follow up on stress test and pt would also like his vitamin D levels checked.  Carotid arteries  Feels hot and sweaty and hot flashes  Sweaty and dizzy.   No slurred speech.  Checking blood pressure each day 125/80  130/80-90  145/90 this morning.  Losartan/ hydrochlorothiazide  2-3 times week with the lorazepam.              Patient Active Problem List   Diagnosis     Erectile dysfunction     Type 2 diabetes mellitus without complication, without long-term current use of insulin (H)     Hyperlipidemia LDL goal <100     Obesity, Class I, BMI 30-34.9     Generalized anxiety disorder     ACP (advance care planning)     History of adenomatous polyp of colon     Essential hypertension with goal blood pressure less than 140/90     Malignant melanoma of skin of trunk (H)     History of melanoma     AK (actinic keratosis)     Multiple benign nevi     Cherry hemangioma     Lentigines     Past Surgical History:   Procedure Laterality Date     BIOPSY OF SKIN LESION       COLONOSCOPY  3/11/2014    Procedure: COMBINED COLONOSCOPY, SINGLE BIOPSY/POLYPECTOMY BY BIOPSY;;  Surgeon: Stephen Amin MD;  Location: [facility] OR     COLONOSCOPY WITH CO2 INSUFFLATION N/A 8/19/2014    Procedure: COLONOSCOPY WITH CO2 INSUFFLATION;  Surgeon: Stephen Amin MD;  Location: [facility] OR     COLONOSCOPY WITH CO2 INSUFFLATION N/A 10/11/2017    Procedure: COLONOSCOPY WITH CO2 INSUFFLATION;  COLON/ REPEAT COLONOSCOPY;  Surgeon: Jeremy Mcqueen DO;  Location: [facility] OR     HC TOOTH EXTRACTION W/FORCEP      wisdom teeth x4       Social History     Tobacco Use     Smoking status: Never Smoker     Smokeless tobacco: Never Used   Substance Use Topics     Alcohol use: Yes     Comment: occ     Family History   Problem Relation Age of Onset     Diabetes Mother      Hypertension Mother      Diabetes Father       Cancer Father         lung     Cancer Other         aunt - skin cancer     Skin Cancer Other      Melanoma No family hx of          Current Outpatient Medications   Medication Sig Dispense Refill     aspirin 81 MG EC tablet TAKE 1 TABLET (81 MG) BY MOUTH DAILY 90 tablet 3     blood glucose monitoring (ONETOUCH VERIO IQ) test strip TEST UP TO 8 TIMES DAILY OR AS DIRECTED 1250 strip 1     Coenzyme Q10 (CO Q 10 PO) Take  by mouth. 2 per day       FISH OIL CONCENTRATE 1000 MG OR CAPS 2 tablets daily       HUMALOG KWIKPEN 100 UNIT/ML soln INJECT 10-12 UNITLS BEFORE EACH MEAL WITH CORRECTION 45 mL 3     insulin glargine (LANTUS SOLOSTAR PEN) 100 UNIT/ML pen INJECT 50 UNITS SUBCUTANEOUSLY AT BEDTIME 60 mL 0     insulin pen needle (BD YANNI U/F) 32G X 4 MM miscellaneous USE 4 TIMES DAILY AS DIRECTED. 200 each 3     LORazepam (ATIVAN) 0.5 MG tablet TAKE 1 TABLET BY MOUTH EVERY 8 HOURS AS NEEDED 45 tablet 0     losartan-hydrochlorothiazide (HYZAAR) 100-25 MG tablet Take 1 tablet by mouth daily 90 tablet 3     metFORMIN (GLUCOPHAGE-XR) 500 MG 24 hr tablet TAKE 2 TABLETS (1,000 MG) BY MOUTH 2 TIMES DAILY 360 tablet 1     MULTIPLE VITAMINS OR 1000mg daily       ONETOUCH VERIO IQ test strip TEST UP TO 8 TIMES DAILY OR AS DIRECTED 1250 strip 0     ORDER FOR DME Equipment being ordered: glucometer brand per insurance 1 Device 0     ORDER FOR DME LANCETS 4 x DAILY AND  each 4     oxyCODONE (ROXICODONE) 5 MG tablet Take 1 tablet (5 mg) by mouth every 6 hours as needed for severe pain maximum 6 tablet(s) per day 20 tablet 0     rosuvastatin (CRESTOR) 40 MG tablet TAKE 1 TABLET (40 MG) BY MOUTH DAILY 30 tablet 2     sildenafil (VIAGRA) 100 MG tablet TAKE 1 TABLET BY MOUTH 30 MINS BEFORE INTERCOURSE 12 tablet 3     VICTOZA PEN 18 MG/3ML soln INJECT 1.8 MG SUBCUTANEOUS DAILY 27 mL 11     VITAMIN D 1000 UNIT OR CAPS Take 1 capsule by mouth daily        Cyanocobalamin (VITAMIN B-12 PO) Take  by mouth daily.       Allergies   Allergen  Reactions     Cats      Cat dander     Cymbalta      Molds & Smuts      Ms Contin [Morphine]      Ceftazidime Rash     Was also on vanco     Vancomycin Rash     Was also on ceftazidime     Recent Labs   Lab Test 08/16/19  0837 07/22/19  1617 05/17/19  0919 01/17/18  0820  08/12/16  0913 11/13/15  0814  02/06/14  0753   A1C 6.2*  --  6.2* 6.3*   < > 7.3* 7.9*   < > 6.6*   LDL  --   --  78 56  --  101* 138*   < > 63   HDL  --   --  46 53  --  38* 47   < > 35*   TRIG  --   --  172* 75  --  354* 235*   < > 138   ALT  --   --   --  52  --   --  35  --  41   CR  --  0.85 0.74 0.81   < > 0.77 0.70   < > 0.98   GFRESTIMATED  --  >90 >90 >90   < > >90  Non  GFR Calc   >90  Non  GFR Calc     < > 81   GFRESTBLACK  --  >90 >90 >90   < > >90   GFR Calc   >90   GFR Calc     < > >90   POTASSIUM  --  3.8 4.3 4.0   < > 4.2 3.9   < > 4.1   TSH  --  0.78  --  1.64  --   --  1.64   < >  --     < > = values in this interval not displayed.          Reviewed and updated as needed this visit by Provider         Review of Systems   ROS COMP: Constitutional, HEENT, cardiovascular, pulmonary, gi and gu systems are negative, except as otherwise noted.      Objective    BP (!) 157/94 (BP Location: Right arm, Patient Position: Chair, Cuff Size: Adult Regular)   Pulse 96   Temp 98.2  F (36.8  C) (Oral)   Wt 86.2 kg (190 lb)   SpO2 99%   BMI 30.67 kg/m    Body mass index is 30.67 kg/m .  Physical Exam   GENERAL: healthy, alert and no distress  EYES: Eyes grossly normal to inspection, PERRL and conjunctivae and sclerae normal  HENT: ear canals and TM's normal, nose and mouth without ulcers or lesions  NECK: no adenopathy, no asymmetry, masses, or scars and thyroid normal to palpation  RESP: lungs clear to auscultation - no rales, rhonchi or wheezes  CV: regular rate and rhythm, normal S1 S2, no S3 or S4, no murmur, click or rub, no peripheral edema and peripheral pulses  strong  ABDOMEN: soft, nontender, no hepatosplenomegaly, no masses and bowel sounds normal  MS: no gross musculoskeletal defects noted, no edema  SKIN: no suspicious lesions or rashes  NEURO: Normal strength and tone, mentation intact and speech normal  BACK: no CVA tenderness, no paralumbar tenderness  PSYCH: mentation appears normal, affect normal/bright  LYMPH: no cervical adenopathy    Diagnostic Test Results:  Labs reviewed in Epic  Results for orders placed or performed in visit on 08/16/19   CBC with platelets and differential   Result Value Ref Range    WBC 9.0 4.0 - 11.0 10e9/L    RBC Count 5.16 4.4 - 5.9 10e12/L    Hemoglobin 15.2 13.3 - 17.7 g/dL    Hematocrit 44.5 40.0 - 53.0 %    MCV 86 78 - 100 fl    MCH 29.5 26.5 - 33.0 pg    MCHC 34.2 31.5 - 36.5 g/dL    RDW 12.9 10.0 - 15.0 %    Platelet Count 288 150 - 450 10e9/L    % Neutrophils 57.2 %    % Lymphocytes 30.8 %    % Monocytes 9.2 %    % Eosinophils 2.5 %    % Basophils 0.3 %    Absolute Neutrophil 5.1 1.6 - 8.3 10e9/L    Absolute Lymphocytes 2.8 0.8 - 5.3 10e9/L    Absolute Monocytes 0.8 0.0 - 1.3 10e9/L    Absolute Eosinophils 0.2 0.0 - 0.7 10e9/L    Absolute Basophils 0.0 0.0 - 0.2 10e9/L    Diff Method Automated Method    Hemoglobin A1c   Result Value Ref Range    Hemoglobin A1C 6.2 (H) 0 - 5.6 %           Assessment & Plan       ICD-10-CM    1. Type 2 diabetes mellitus without complication, without long-term current use of insulin (H) E11.9 Hemoglobin A1c   2. Generalized anxiety disorder F41.1 LORazepam (ATIVAN) 0.5 MG tablet   3. Hyperlipidemia LDL goal <100 E78.5    4. Essential hypertension with goal blood pressure less than 140/90 I10 CBC with platelets and differential   5. Vitamin D deficiency E55.9 Vitamin D Deficiency        BP     157/94  8/19/2019    Lab Results   Component Value Date     07/22/2019     Lab Results   Component Value Date    A1C 6.2 08/16/2019     Lab Results   Component Value Date    LDL 78 05/17/2019     Lab  "Results   Component Value Date    MICROL 19 05/17/2019     No results found for: MICROALBUMIN    BMI:   Estimated body mass index is 30.67 kg/m  as calculated from the following:    Height as of 7/22/19: 1.676 m (5' 6\").    Weight as of this encounter: 86.2 kg (190 lb).   Weight management plan: Discussed healthy diet and exercise guidelines      I recommended low dose lexapro with titration.  He does not want to start at this time    Work on weight loss  Regular exercise    No follow-ups on file.    Joseph Alanis MD  [facility]      "

## 2019-08-16 NOTE — TELEPHONE ENCOUNTER
"Requested Prescriptions   Pending Prescriptions Disp Refills     rosuvastatin (CRESTOR) 40 MG tablet [Pharmacy Med Name: ROSUVASTATIN CALCIUM 40 MG TAB] 30 tablet 2     Sig: TAKE 1 TABLET BY MOUTH EVERY DAY   Last Written Prescription Date:  3/8/19  Last Fill Quantity: 30,  # refills: 2   Last office visit: 3/27/2017 with prescribing provider:     Future Office Visit:        Statins Protocol Passed - 8/16/2019  1:20 AM        Passed - LDL on file in past 12 months     Recent Labs   Lab Test 05/17/19  0919   LDL 78             Passed - No abnormal creatine kinase in past 12 months     No lab results found.             Passed - Recent (12 mo) or future (30 days) visit within the authorizing provider's specialty     Patient had office visit in the last 12 months or has a visit in the next 30 days with authorizing provider or within the authorizing provider's specialty.  See \"Patient Info\" tab in inbasket, or \"Choose Columns\" in Meds & Orders section of the refill encounter.              Passed - Medication is active on med list        Passed - Patient is age 18 or older          "

## 2019-08-19 LAB — DEPRECATED CALCIDIOL+CALCIFEROL SERPL-MC: 53 UG/L (ref 20–75)

## 2019-08-20 RX ORDER — ROSUVASTATIN CALCIUM 40 MG/1
TABLET, COATED ORAL
Qty: 30 TABLET | Refills: 2 | Status: SHIPPED | OUTPATIENT
Start: 2019-08-20 | End: 2019-11-16

## 2019-08-20 NOTE — TELEPHONE ENCOUNTER
Prescription approved per [facility] Refill Protocol.  Emely Bowman, RN [facility] Triage.

## 2019-09-24 DIAGNOSIS — Z79.4 TYPE 2 DIABETES MELLITUS WITHOUT COMPLICATION, WITH LONG-TERM CURRENT USE OF INSULIN (H): ICD-10-CM

## 2019-09-24 DIAGNOSIS — E11.9 TYPE 2 DIABETES MELLITUS WITHOUT COMPLICATION, WITH LONG-TERM CURRENT USE OF INSULIN (H): ICD-10-CM

## 2019-09-24 NOTE — TELEPHONE ENCOUNTER
"Requested Prescriptions   Pending Prescriptions Disp Refills     metFORMIN (GLUCOPHAGE-XR) 500 MG 24 hr tablet [Pharmacy Med Name: METFORMIN HCL  MG TABLET] 120 tablet 5     Sig: TAKE 2 TABLETS (1,000 MG) BY MOUTH 2 TIMES DAILY   Last Written Prescription Date:  12/17/18  Last Fill Quantity: 360,  # refills: 1   Last office visit: 8/16/2019 with prescribing provider:     Future Office Visit:        Biguanide Agents Failed - 9/24/2019  2:07 AM        Failed - Blood pressure less than 140/90 in past 6 months     BP Readings from Last 3 Encounters:   08/16/19 (!) 157/94   07/29/19 (!) 146/86   07/22/19 (!) 155/93                 Passed - Patient has documented LDL within the past 12 mos.     Recent Labs   Lab Test 05/17/19  0919   LDL 78             Passed - Patient has had a Microalbumin in the past 15 mos.     Recent Labs   Lab Test 05/17/19  0925   MICROL 19   UMALCR 20.80*             Passed - Patient is age 10 or older        Passed - Patient has documented A1c within the specified period of time.     If HgbA1C is 8 or greater, it needs to be on file within the past 3 months.  If less than 8, must be on file within the past 6 months.     Recent Labs   Lab Test 08/16/19  0837   A1C 6.2*             Passed - Patient's CR is NOT>1.4 OR Patient's EGFR is NOT<45 within past 12 mos.     Recent Labs   Lab Test 07/22/19  1617   GFRESTIMATED >90   GFRESTBLACK >90       Recent Labs   Lab Test 07/22/19  1617   CR 0.85             Passed - Patient does NOT have a diagnosis of CHF.        Passed - Medication is active on med list        Passed - Recent (6 mo) or future (30 days) visit within the authorizing provider's specialty     Patient had office visit in the last 6 months or has a visit in the next 30 days with authorizing provider or within the authorizing provider's specialty.  See \"Patient Info\" tab in inbasket, or \"Choose Columns\" in Meds & Orders section of the refill encounter.            ONETOUCH VERIO IQ " "test strip [Pharmacy Med Name: ONE TOUCH VERIO TEST STRIP] 250 strip 4     Sig: TEST UP TO 8 TIMES DAILY OR AS DIRECTED   Last Written Prescription Date:  1/21/19  Last Fill Quantity: 1250,  # refills: 0   Last office visit: 8/16/2019 with prescribing provider:     Future Office Visit:        Diabetic Supplies Protocol Passed - 9/24/2019  2:07 AM        Passed - Medication is active on med list        Passed - Patient is 18 years of age or older        Passed - Recent (6 mo) or future (30 days) visit within the authorizing provider's specialty     Patient had office visit in the last 6 months or has a visit in the next 30 days with authorizing provider.  See \"Patient Info\" tab in inbasket, or \"Choose Columns\" in Meds & Orders section of the refill encounter.              "

## 2019-09-26 RX ORDER — BLOOD SUGAR DIAGNOSTIC
STRIP MISCELLANEOUS
Qty: 250 STRIP | Refills: 4 | Status: SHIPPED | OUTPATIENT
Start: 2019-09-26 | End: 2020-03-02

## 2019-09-26 RX ORDER — METFORMIN HCL 500 MG
TABLET, EXTENDED RELEASE 24 HR ORAL
Qty: 120 TABLET | Refills: 5 | Status: SHIPPED | OUTPATIENT
Start: 2019-09-26 | End: 2020-03-18

## 2019-09-26 NOTE — TELEPHONE ENCOUNTER
Routing refill request to provider for review/approval because:  Failed BP.  Emely Bowman RN,BSN  [facility]

## 2019-10-04 ENCOUNTER — HEALTH MAINTENANCE LETTER (OUTPATIENT)
Age: 54
End: 2019-10-04

## 2019-10-24 ENCOUNTER — OFFICE VISIT (OUTPATIENT)
Dept: FAMILY MEDICINE | Facility: CLINIC | Age: 54
End: 2019-10-24
Payer: COMMERCIAL

## 2019-10-24 VITALS
HEIGHT: 65 IN | HEART RATE: 86 BPM | BODY MASS INDEX: 32.49 KG/M2 | WEIGHT: 195 LBS | TEMPERATURE: 97.4 F | DIASTOLIC BLOOD PRESSURE: 78 MMHG | SYSTOLIC BLOOD PRESSURE: 121 MMHG

## 2019-10-24 DIAGNOSIS — E11.9 TYPE 2 DIABETES MELLITUS WITHOUT COMPLICATION, WITHOUT LONG-TERM CURRENT USE OF INSULIN (H): ICD-10-CM

## 2019-10-24 DIAGNOSIS — M54.40 CHRONIC LOW BACK PAIN WITH SCIATICA, SCIATICA LATERALITY UNSPECIFIED, UNSPECIFIED BACK PAIN LATERALITY: ICD-10-CM

## 2019-10-24 DIAGNOSIS — E78.5 HYPERLIPIDEMIA LDL GOAL <100: Primary | ICD-10-CM

## 2019-10-24 DIAGNOSIS — F41.1 GENERALIZED ANXIETY DISORDER: ICD-10-CM

## 2019-10-24 DIAGNOSIS — G89.29 CHRONIC LOW BACK PAIN WITH SCIATICA, SCIATICA LATERALITY UNSPECIFIED, UNSPECIFIED BACK PAIN LATERALITY: ICD-10-CM

## 2019-10-24 DIAGNOSIS — I10 ESSENTIAL HYPERTENSION WITH GOAL BLOOD PRESSURE LESS THAN 140/90: ICD-10-CM

## 2019-10-24 PROCEDURE — 99214 OFFICE O/P EST MOD 30 MIN: CPT | Performed by: INTERNAL MEDICINE

## 2019-10-24 RX ORDER — LORAZEPAM 0.5 MG/1
TABLET ORAL
Qty: 45 TABLET | Refills: 0 | Status: SHIPPED | OUTPATIENT
Start: 2019-10-24 | End: 2019-12-05

## 2019-10-24 RX ORDER — OXYCODONE HYDROCHLORIDE 5 MG/1
5 TABLET ORAL EVERY 6 HOURS PRN
Qty: 20 TABLET | Refills: 0 | Status: SHIPPED | OUTPATIENT
Start: 2019-10-24 | End: 2020-03-11

## 2019-10-24 ASSESSMENT — ANXIETY QUESTIONNAIRES
IF YOU CHECKED OFF ANY PROBLEMS ON THIS QUESTIONNAIRE, HOW DIFFICULT HAVE THESE PROBLEMS MADE IT FOR YOU TO DO YOUR WORK, TAKE CARE OF THINGS AT HOME, OR GET ALONG WITH OTHER PEOPLE: NOT DIFFICULT AT ALL
6. BECOMING EASILY ANNOYED OR IRRITABLE: NOT AT ALL
1. FEELING NERVOUS, ANXIOUS, OR ON EDGE: SEVERAL DAYS
GAD7 TOTAL SCORE: 3
3. WORRYING TOO MUCH ABOUT DIFFERENT THINGS: SEVERAL DAYS
7. FEELING AFRAID AS IF SOMETHING AWFUL MIGHT HAPPEN: NOT AT ALL
2. NOT BEING ABLE TO STOP OR CONTROL WORRYING: SEVERAL DAYS
5. BEING SO RESTLESS THAT IT IS HARD TO SIT STILL: NOT AT ALL

## 2019-10-24 ASSESSMENT — PATIENT HEALTH QUESTIONNAIRE - PHQ9
SUM OF ALL RESPONSES TO PHQ QUESTIONS 1-9: 4
5. POOR APPETITE OR OVEREATING: NOT AT ALL

## 2019-10-24 ASSESSMENT — MIFFLIN-ST. JEOR: SCORE: 1646.39

## 2019-10-24 NOTE — PROGRESS NOTES
Subjective     Amadou Bach is a 53 year old male who presents to clinic today for the following health issues:  roxicodone ( 1 if really bad ibuprofen works the best   Rare lorezapem  HPI   Diabetes Follow-up      How often are you checking your blood sugar? Four or more times daily    What time of day are you checking your blood sugars (select all that apply)?  Before and after meals    Have you had any blood sugars above 200?  No    Have you had any blood sugars below 70?  No    What symptoms do you notice when your blood sugar is low?  Dizzy    What concerns do you have today about your diabetes? None     Do you have any of these symptoms? (Select all that apply)  No numbness or tingling in feet.  No redness, sores or blisters on feet.  No complaints of excessive thirst.  No reports of blurry vision.  No significant changes to weight.     Have you had a diabetic eye exam in the last 12 months? Yes- Date of last eye exam: 4/9/2019    Diabetes Management Resources    Hyperlipidemia Follow-Up      Are you having any of the following symptoms? (Select all that apply)  No complaints of shortness of breath, chest pain or pressure.  No increased sweating or nausea with activity.  No left-sided neck or arm pain.  No complaints of pain in calves when walking 1-2 blocks.    Are you regularly taking any medication or supplement to lower your cholesterol?   Yes- Crestor     Are you having muscle aches or other side effects that you think could be caused by your cholesterol lowering medication?  No    Hypertension Follow-up      Do you check your blood pressure regularly outside of the clinic? Yes     Are you following a low salt diet? Yes    Are your blood pressures ever more than 140 on the top number (systolic) OR more   than 90 on the bottom number (diastolic), for example 140/90? No    BP Readings from Last 2 Encounters:   10/24/19 121/78   08/16/19 (!) 157/94     Hemoglobin A1C (%)   Date Value   08/16/2019 6.2  (H)   05/17/2019 6.2 (H)     LDL Cholesterol Calculated (mg/dL)   Date Value   05/17/2019 78   01/17/2018 56         How many servings of fruits and vegetables do you eat daily?  2-3    On average, how many sweetened beverages do you drink each day (soda, juice, sweet tea, etc)?   0    How many days per week do you miss taking your medication? 0            Patient Active Problem List   Diagnosis     Erectile dysfunction     Type 2 diabetes mellitus without complication, without long-term current use of insulin (H)     Hyperlipidemia LDL goal <100     Obesity, Class I, BMI 30-34.9     Generalized anxiety disorder     ACP (advance care planning)     History of adenomatous polyp of colon     Essential hypertension with goal blood pressure less than 140/90     Malignant melanoma of skin of trunk (H)     History of melanoma     AK (actinic keratosis)     Multiple benign nevi     Cherry hemangioma     Lentigines     Past Surgical History:   Procedure Laterality Date     BIOPSY OF SKIN LESION       COLONOSCOPY  3/11/2014    Procedure: COMBINED COLONOSCOPY, SINGLE BIOPSY/POLYPECTOMY BY BIOPSY;;  Surgeon: Stephen Amin MD;  Location: [facility] OR     COLONOSCOPY WITH CO2 INSUFFLATION N/A 8/19/2014    Procedure: COLONOSCOPY WITH CO2 INSUFFLATION;  Surgeon: Stephen Amin MD;  Location: [facility] OR     COLONOSCOPY WITH CO2 INSUFFLATION N/A 10/11/2017    Procedure: COLONOSCOPY WITH CO2 INSUFFLATION;  COLON/ REPEAT COLONOSCOPY;  Surgeon: Jeremy Mcqueen DO;  Location: [facility] OR     HC TOOTH EXTRACTION W/FORCEP      wisdom teeth x4       Social History     Tobacco Use     Smoking status: Never Smoker     Smokeless tobacco: Never Used   Substance Use Topics     Alcohol use: Yes     Comment: occ     Family History   Problem Relation Age of Onset     Diabetes Mother      Hypertension Mother      Diabetes Father      Cancer Father         lung     Cancer Other         aunt - skin cancer     Skin Cancer Other      Melanoma No family  hx of          Current Outpatient Medications   Medication Sig Dispense Refill     aspirin 81 MG EC tablet TAKE 1 TABLET (81 MG) BY MOUTH DAILY 90 tablet 3     blood glucose monitoring (ONETOUCH VERIO IQ) test strip TEST UP TO 8 TIMES DAILY OR AS DIRECTED 1250 strip 1     Coenzyme Q10 (CO Q 10 PO) Take  by mouth. 2 per day       Cyanocobalamin (VITAMIN B-12 PO) Take  by mouth daily.       FISH OIL CONCENTRATE 1000 MG OR CAPS 2 tablets daily       HUMALOG KWIKPEN 100 UNIT/ML soln INJECT 10-12 UNITLS BEFORE EACH MEAL WITH CORRECTION 45 mL 3     insulin glargine (LANTUS SOLOSTAR PEN) 100 UNIT/ML pen INJECT 50 UNITS SUBCUTANEOUSLY AT BEDTIME 60 mL 0     insulin pen needle (BD YANNI U/F) 32G X 4 MM miscellaneous USE 4 TIMES DAILY AS DIRECTED. 200 each 3     LORazepam (ATIVAN) 0.5 MG tablet TAKE 1 TABLET BY MOUTH EVERY 8 HOURS AS NEEDED 45 tablet 0     losartan-hydrochlorothiazide (HYZAAR) 100-25 MG tablet Take 1 tablet by mouth daily 90 tablet 3     metFORMIN (GLUCOPHAGE-XR) 500 MG 24 hr tablet TAKE 2 TABLETS (1,000 MG) BY MOUTH 2 TIMES DAILY 120 tablet 5     MULTIPLE VITAMINS OR 1000mg daily       ONETOUCH VERIO IQ test strip TEST UP TO 8 TIMES DAILY OR AS DIRECTED 250 strip 4     ORDER FOR DME Equipment being ordered: glucometer brand per insurance 1 Device 0     ORDER FOR DME LANCETS 4 x DAILY AND  each 4     oxyCODONE (ROXICODONE) 5 MG tablet Take 1 tablet (5 mg) by mouth every 6 hours as needed for severe pain maximum 6 tablet(s) per day 20 tablet 0     rosuvastatin (CRESTOR) 40 MG tablet TAKE 1 TABLET BY MOUTH EVERY DAY 30 tablet 2     sildenafil (VIAGRA) 100 MG tablet TAKE 1 TABLET BY MOUTH 30 MINS BEFORE INTERCOURSE 12 tablet 3     VICTOZA PEN 18 MG/3ML soln INJECT 1.8 MG SUBCUTANEOUS DAILY 27 mL 11     VITAMIN D 1000 UNIT OR CAPS Take 1 capsule by mouth daily        Allergies   Allergen Reactions     Cats      Cat dander     Cymbalta      Molds & Smuts      Ms Contin [Morphine]      Ceftazidime Rash      "Was also on vanco     Vancomycin Rash     Was also on ceftazidime     Recent Labs   Lab Test 08/16/19  0837 07/22/19  1617 05/17/19  0919 01/17/18  0820  08/12/16  0913 11/13/15  0814  02/06/14  0753   A1C 6.2*  --  6.2* 6.3*   < > 7.3* 7.9*   < > 6.6*   LDL  --   --  78 56  --  101* 138*   < > 63   HDL  --   --  46 53  --  38* 47   < > 35*   TRIG  --   --  172* 75  --  354* 235*   < > 138   ALT  --   --   --  52  --   --  35  --  41   CR  --  0.85 0.74 0.81   < > 0.77 0.70   < > 0.98   GFRESTIMATED  --  >90 >90 >90   < > >90  Non  GFR Calc   >90  Non  GFR Calc     < > 81   GFRESTBLACK  --  >90 >90 >90   < > >90   GFR Calc   >90   GFR Calc     < > >90   POTASSIUM  --  3.8 4.3 4.0   < > 4.2 3.9   < > 4.1   TSH  --  0.78  --  1.64  --   --  1.64   < >  --     < > = values in this interval not displayed.      BP Readings from Last 3 Encounters:   10/24/19 121/78   08/16/19 (!) 157/94   07/29/19 (!) 146/86    Wt Readings from Last 3 Encounters:   10/24/19 88.5 kg (195 lb)   08/16/19 86.2 kg (190 lb)   07/22/19 87.1 kg (192 lb)                    Reviewed and updated as needed this visit by Provider         Review of Systems   ROS COMP: Constitutional, HEENT, cardiovascular, pulmonary, gi and gu systems are negative, except as otherwise noted.      Objective    /78   Pulse 86   Temp 97.4  F (36.3  C) (Oral)   Ht 1.651 m (5' 5\")   Wt 88.5 kg (195 lb)   BMI 32.45 kg/m    Body mass index is 32.45 kg/m .  Physical Exam   GENERAL: healthy, alert and no distress  EYES: Eyes grossly normal to inspection, PERRL and conjunctivae and sclerae normal  HENT: ear canals and TM's normal, nose and mouth without ulcers or lesions  NECK: no adenopathy, no asymmetry, masses, or scars and thyroid normal to palpation  RESP: lungs clear to auscultation - no rales, rhonchi or wheezes  CV: regular rate and rhythm, normal S1 S2, no S3 or S4, no murmur, click or rub, no " peripheral edema and peripheral pulses strong  ABDOMEN: soft, nontender, no hepatosplenomegaly, no masses and bowel sounds normal  MS: no gross musculoskeletal defects noted, no edema  SKIN: no suspicious lesions or rashes  NEURO: Normal strength and tone, mentation intact and speech normal  BACK: no CVA tenderness, no paralumbar tenderness  PSYCH: mentation appears normal, affect normal/bright  LYMPH: no cervical, supraclavicular, axillary, or inguinal adenopathy    Diagnostic Test Results:  Labs reviewed in Epic  Results for orders placed or performed in visit on 08/16/19   Vitamin D Deficiency   Result Value Ref Range    Vitamin D Deficiency screening 53 20 - 75 ug/L   CBC with platelets and differential   Result Value Ref Range    WBC 9.0 4.0 - 11.0 10e9/L    RBC Count 5.16 4.4 - 5.9 10e12/L    Hemoglobin 15.2 13.3 - 17.7 g/dL    Hematocrit 44.5 40.0 - 53.0 %    MCV 86 78 - 100 fl    MCH 29.5 26.5 - 33.0 pg    MCHC 34.2 31.5 - 36.5 g/dL    RDW 12.9 10.0 - 15.0 %    Platelet Count 288 150 - 450 10e9/L    % Neutrophils 57.2 %    % Lymphocytes 30.8 %    % Monocytes 9.2 %    % Eosinophils 2.5 %    % Basophils 0.3 %    Absolute Neutrophil 5.1 1.6 - 8.3 10e9/L    Absolute Lymphocytes 2.8 0.8 - 5.3 10e9/L    Absolute Monocytes 0.8 0.0 - 1.3 10e9/L    Absolute Eosinophils 0.2 0.0 - 0.7 10e9/L    Absolute Basophils 0.0 0.0 - 0.2 10e9/L    Diff Method Automated Method    Hemoglobin A1c   Result Value Ref Range    Hemoglobin A1C 6.2 (H) 0 - 5.6 %           Assessment & Plan       ICD-10-CM    1. Hyperlipidemia LDL goal <100 E78.5    2. Chronic low back pain with sciatica, sciatica laterality unspecified, unspecified back pain laterality M54.40 oxyCODONE (ROXICODONE) 5 MG tablet    G89.29    3. Generalized anxiety disorder F41.1 LORazepam (ATIVAN) 0.5 MG tablet   4. Type 2 diabetes mellitus without complication, without long-term current use of insulin (H) E11.9    5. Essential hypertension with goal blood pressure less  "than 140/90 I10    BP     121/78  10/25/2019    Lab Results   Component Value Date     07/22/2019     Lab Results   Component Value Date    A1C 6.2 08/16/2019     Lab Results   Component Value Date    LDL 78 05/17/2019     Lab Results   Component Value Date    MICROL 19 05/17/2019     No results found for: MICROALBUMIN    Working on non-medication ways to treat underlying anxiety  Could benefit from lexapro but does not want to at this time       BMI:   Estimated body mass index is 32.45 kg/m  as calculated from the following:    Height as of this encounter: 1.651 m (5' 5\").    Weight as of this encounter: 88.5 kg (195 lb).   Weight management plan: Discussed healthy diet and exercise guidelines        Regular exercise    No follow-ups on file.    Joseph Alanis MD  [facility]    "

## 2019-10-25 ASSESSMENT — ANXIETY QUESTIONNAIRES: GAD7 TOTAL SCORE: 3

## 2019-11-06 DIAGNOSIS — E11.9 TYPE 2 DIABETES MELLITUS WITHOUT COMPLICATION (H): ICD-10-CM

## 2019-11-06 DIAGNOSIS — E11.9 TYPE 2 DIABETES MELLITUS WITHOUT COMPLICATION, WITH LONG-TERM CURRENT USE OF INSULIN (H): ICD-10-CM

## 2019-11-06 DIAGNOSIS — E66.09 NON MORBID OBESITY DUE TO EXCESS CALORIES: ICD-10-CM

## 2019-11-06 DIAGNOSIS — Z79.4 TYPE 2 DIABETES MELLITUS WITHOUT COMPLICATION, WITH LONG-TERM CURRENT USE OF INSULIN (H): ICD-10-CM

## 2019-11-06 NOTE — TELEPHONE ENCOUNTER
"Requested Prescriptions   Pending Prescriptions Disp Refills     insulin glargine (LANTUS SOLOSTAR) 100 UNIT/ML pen [Pharmacy Med Name: LANTUS SOLOSTAR 100 UNIT/ML]  0     Sig: INJECT 50 UNITS SUBCUTANEOUSLY AT BEDTIME   Last Written Prescription Date:  4/17/19  Last Fill Quantity: 60,  # refills: 0   Last office visit: 3/27/2017 with prescribing provider:     Future Office Visit:        Long Acting Insulin Protocol Passed - 11/6/2019 11:48 AM        Passed - Blood pressure less than 140/90 in past 6 months     BP Readings from Last 3 Encounters:   10/24/19 121/78   08/16/19 (!) 157/94   07/29/19 (!) 146/86                 Passed - LDL on file in past 12 months     Recent Labs   Lab Test 05/17/19  0919   LDL 78             Passed - Microalbumin on file in past 12 months     Recent Labs   Lab Test 05/17/19  0925   MICROL 19   UMALCR 20.80*             Passed - Serum creatinine on file in past 12 months     Recent Labs   Lab Test 07/22/19  1617   CR 0.85             Passed - HgbA1C in past 3 or 6 months     If HgbA1C is 8 or greater, it needs to be on file within the past 3 months.  If less than 8, must be on file within the past 6 months.     Recent Labs   Lab Test 08/16/19  0837   A1C 6.2*             Passed - Medication is active on med list        Passed - Patient is age 18 or older        Passed - Recent (6 mo) or future (30 days) visit within the authorizing provider's specialty     Patient had office visit in the last 6 months or has a visit in the next 30 days with authorizing provider or within the authorizing provider's specialty.  See \"Patient Info\" tab in inbasket, or \"Choose Columns\" in Meds & Orders section of the refill encounter.              "

## 2019-11-06 NOTE — TELEPHONE ENCOUNTER
"Requested Prescriptions   Pending Prescriptions Disp Refills     VICTOZA PEN 18 MG/3ML soln [Pharmacy Med Name: VICTOZA 3-ROSE MARY 18 MG/3 ML PEN]  35     Sig: INJECT 1.8 MG SUBCUTANEOUS DAILY   Last Written Prescription Date:  10-29-18  Last Fill Quantity: 27ml,  # refills: 11   Last office visit: 10/24/2019 with prescribing provider:  10-24-19   Future Office Visit:        GLP-1 Agonists Protocol Passed - 11/6/2019  2:18 AM        Passed - Blood pressure less than 140/90 in past 6 months     BP Readings from Last 3 Encounters:   10/24/19 121/78   08/16/19 (!) 157/94   07/29/19 (!) 146/86                 Passed - LDL on file in past 12 months     Recent Labs   Lab Test 05/17/19  0919   LDL 78             Passed - Microalbumin on file in past 12 months     Recent Labs   Lab Test 05/17/19  0925   MICROL 19   UMALCR 20.80*             Passed - HgbA1C in past 3 or 6 months     If HgbA1C is 8 or greater, it needs to be on file within the past 3 months.  If less than 8, must be on file within the past 6 months.     Recent Labs   Lab Test 08/16/19  0837   A1C 6.2*             Passed - Medication is active on med list        Passed - Patient is age 18 or older        Passed - Normal serum creatinine on file in past 12 months     Recent Labs   Lab Test 07/22/19  1617   CR 0.85             Passed - Recent (6 mo) or future (30 days) visit within the authorizing provider's specialty     Patient had office visit in the last 6 months or has a visit in the next 30 days with authorizing provider.  See \"Patient Info\" tab in inbasket, or \"Choose Columns\" in Meds & Orders section of the refill encounter.              "

## 2019-11-07 NOTE — TELEPHONE ENCOUNTER
Routing refill request to provider for review/approval because:  Prior auth may be needed - see alternative warning          Concha Matute RN, BSN, PHN  [facility]: [place]

## 2019-11-12 RX ORDER — LIRAGLUTIDE 6 MG/ML
INJECTION SUBCUTANEOUS
Qty: 27 ML | Refills: 9 | Status: SHIPPED | OUTPATIENT
Start: 2019-11-12 | End: 2021-01-18

## 2019-11-14 ENCOUNTER — TELEPHONE (OUTPATIENT)
Dept: FAMILY MEDICINE | Facility: CLINIC | Age: 54
End: 2019-11-14

## 2019-11-14 DIAGNOSIS — I10 HTN, GOAL BELOW 140/90: Primary | ICD-10-CM

## 2019-11-14 RX ORDER — HYDROCHLOROTHIAZIDE 25 MG/1
25 TABLET ORAL DAILY
Qty: 90 TABLET | Refills: 3 | Status: SHIPPED | OUTPATIENT
Start: 2019-11-14 | End: 2020-12-23

## 2019-11-14 RX ORDER — LOSARTAN POTASSIUM 100 MG/1
100 TABLET ORAL DAILY
Qty: 90 TABLET | Refills: 3 | Status: SHIPPED | OUTPATIENT
Start: 2019-11-14 | End: 2021-01-14

## 2019-11-14 NOTE — TELEPHONE ENCOUNTER
Reason for Call:  Other prescription    Detailed comments: losartan hydrochlorothiazide 100-25 mg. Product backordered/unavailble     Phone Number Patient can be reached at: Other phone number: [facility] [phone]    Best Time:     Can we leave a detailed message on this number? Not Applicable    Call taken on 11/14/2019 at 8:44 AM by Mariia Walsh

## 2019-11-14 NOTE — TELEPHONE ENCOUNTER
Losartan-hydrochlorothiazide is on back order - pharmacy requesting separate scripts.  Nurse entered scripts and cued pharmacy.    PCP please review scripts for accuracy.      Chacha Iraheta RN  [facility]

## 2019-11-16 DIAGNOSIS — E78.5 HYPERLIPIDEMIA LDL GOAL <100: ICD-10-CM

## 2019-11-18 RX ORDER — ROSUVASTATIN CALCIUM 40 MG/1
TABLET, COATED ORAL
Qty: 30 TABLET | Refills: 4 | Status: SHIPPED | OUTPATIENT
Start: 2019-11-18 | End: 2020-04-16

## 2019-11-18 NOTE — TELEPHONE ENCOUNTER
"Requested Prescriptions   Pending Prescriptions Disp Refills     rosuvastatin (CRESTOR) 40 MG tablet [Pharmacy Med Name: ROSUVASTATIN CALCIUM 40 MG TAB] 30 tablet 2     Sig: TAKE 1 TABLET BY MOUTH EVERY DAY   Last Written Prescription Date:  8/20/19  Last Fill Quantity: 30,  # refills: 2   Last office visit: 3/27/2017 with prescribing provider:     Future Office Visit:        Statins Protocol Passed - 11/16/2019 12:46 AM        Passed - LDL on file in past 12 months     Recent Labs   Lab Test 05/17/19  0919   LDL 78             Passed - No abnormal creatine kinase in past 12 months     No lab results found.             Passed - Recent (12 mo) or future (30 days) visit within the authorizing provider's specialty     Patient has had an office visit with the authorizing provider or a provider within the authorizing providers department within the previous 12 mos or has a future within next 30 days. See \"Patient Info\" tab in inbasket, or \"Choose Columns\" in Meds & Orders section of the refill encounter.              Passed - Medication is active on med list        Passed - Patient is age 18 or older          "

## 2019-11-18 NOTE — TELEPHONE ENCOUNTER
Patient was last seen 10/24/19 by Dr. Alanis.    See plan:    Instructions         Return in about 6 months (around 4/24/2020) for medication management, follow up of condition, Diabetes managment.       Prescription approved per [facility] [place].    Arleth Lane RN  [facility]

## 2019-12-02 DIAGNOSIS — N52.9 ERECTILE DYSFUNCTION, UNSPECIFIED ERECTILE DYSFUNCTION TYPE: ICD-10-CM

## 2019-12-02 RX ORDER — SILDENAFIL 100 MG/1
TABLET, FILM COATED ORAL
Qty: 12 TABLET | Refills: 3 | Status: SHIPPED | OUTPATIENT
Start: 2019-12-02 | End: 2020-08-06

## 2019-12-02 NOTE — TELEPHONE ENCOUNTER
Prescription approved per [facility] Refill Protocol.    Concha Matute, RN, BSN, PHN  [facility]: [place]

## 2019-12-02 NOTE — TELEPHONE ENCOUNTER
"Requested Prescriptions   Pending Prescriptions Disp Refills     sildenafil (VIAGRA) 100 MG tablet [Pharmacy Med Name: VIAGRA 100 MG TABLET] 8 tablet 5     Sig: TAKE 1 TABLET BY MOUTH 30 MINS BEFORE INTERCOURSE   Last Written Prescription Date:  2/9/18  Last Fill Quantity: 12,  # refills: 3   Last office visit: 10/24/2019 with prescribing provider:     Future Office Visit:        Erectile Dysfuction Protocol Passed - 12/2/2019  9:52 AM        Passed - Absence of nitrates on medication list        Passed - Absence of Alpha Blockers on Med list        Passed - Recent (12 mo) or future (30 days) visit within the authorizing provider's specialty     Patient has had an office visit with the authorizing provider or a provider within the authorizing providers department within the previous 12 mos or has a future within next 30 days. See \"Patient Info\" tab in inbasket, or \"Choose Columns\" in Meds & Orders section of the refill encounter.              Passed - Medication is active on med list        Passed - Patient is age 18 or older          "

## 2019-12-05 DIAGNOSIS — F41.1 GENERALIZED ANXIETY DISORDER: ICD-10-CM

## 2019-12-05 RX ORDER — LORAZEPAM 0.5 MG/1
TABLET ORAL
Qty: 45 TABLET | Refills: 0 | Status: SHIPPED | OUTPATIENT
Start: 2019-12-05 | End: 2019-12-19

## 2019-12-05 NOTE — TELEPHONE ENCOUNTER
Requested Prescriptions   Pending Prescriptions Disp Refills     LORazepam (ATIVAN) 0.5 MG tablet [Pharmacy Med Name: LORAZEPAM 0.5 MG TABLET] 45 tablet 0     Sig: TAKE 1 TABLET BY MOUTH EVERY 8 HOURS IF NEEDED       There is no refill protocol information for this order         Last Written Prescription Date:  10/24/19  Last Fill Quantity: 45,   # refills: 0  Last Office Visit: 10/24/19  Future Office visit:       Routing refill request to provider for review/approval because:  Drug not on the G, P or [facility] refill protocol or controlled substance

## 2019-12-18 DIAGNOSIS — F41.1 GENERALIZED ANXIETY DISORDER: ICD-10-CM

## 2019-12-19 DIAGNOSIS — E11.9 TYPE 2 DIABETES MELLITUS WITHOUT COMPLICATION, WITHOUT LONG-TERM CURRENT USE OF INSULIN (H): ICD-10-CM

## 2019-12-19 DIAGNOSIS — E11.9 TYPE 2 DIABETES MELLITUS WITHOUT COMPLICATION (H): ICD-10-CM

## 2019-12-19 RX ORDER — LORAZEPAM 0.5 MG/1
TABLET ORAL
Qty: 45 TABLET | Refills: 0 | Status: SHIPPED | OUTPATIENT
Start: 2019-12-19 | End: 2020-02-14

## 2019-12-19 NOTE — TELEPHONE ENCOUNTER
Prescription approved per [facility] [place].    Concha Matute, RN, BSN, PHN  [facility]: [place]

## 2019-12-19 NOTE — TELEPHONE ENCOUNTER
"Requested Prescriptions   Pending Prescriptions Disp Refills     insulin pen needle (32G X 4 MM) 32G X 4 MM miscellaneous [Pharmacy Med Name: BD UF YANNI PEN NEEDLE [id number]]  Last Written Prescription Date:  6/10/19  Last Fill Quantity: 200,  # refills: 3   Last office visit: 10/24/2019 with prescribing provider:  Zeke   Future Office Visit:     200 each 3     Sig: USE 4 TIMES DAILY AS DIRECTED       Diabetic Supplies Protocol Passed - 12/19/2019 11:07 AM        Passed - Medication is active on med list        Passed - Patient is 18 years of age or older        Passed - Recent (6 mo) or future (30 days) visit within the authorizing provider's specialty     Patient had office visit in the last 6 months or has a visit in the next 30 days with authorizing provider.  See \"Patient Info\" tab in inbasket, or \"Choose Columns\" in Meds & Orders section of the refill encounter.            insulin glargine (LANTUS PEN) 100 UNIT/ML pen [Pharmacy Med Name: LANTUS SOLOSTAR 100 UNIT/ML]  Last Written Prescription Date:  11/7/19  Last Fill Quantity: 45mL,  # refills: 0   Last office visit: 10/24/2019 with prescribing provider:  Zeke   Future Office Visit:     45 mL 0     Sig: INJECT 50 UNITS SUBCUTANEOUSLY AT BEDTIME       Long Acting Insulin Protocol Passed - 12/19/2019 11:07 AM        Passed - Blood pressure less than 140/90 in past 6 months     BP Readings from Last 3 Encounters:   10/24/19 121/78   08/16/19 (!) 157/94   07/29/19 (!) 146/86                 Passed - LDL on file in past 12 months     Recent Labs   Lab Test 05/17/19  0919   LDL 78             Passed - Microalbumin on file in past 12 months     Recent Labs   Lab Test 05/17/19  0925   MICROL 19   UMALCR 20.80*             Passed - Serum creatinine on file in past 12 months     Recent Labs   Lab Test 07/22/19  1617   CR 0.85             Passed - HgbA1C in past 3 or 6 months     If HgbA1C is 8 or greater, it needs to be on file within the past 3 months.  If " "less than 8, must be on file within the past 6 months.     Recent Labs   Lab Test 08/16/19  0837   A1C 6.2*             Passed - Medication is active on med list        Passed - Patient is age 18 or older        Passed - Recent (6 mo) or future (30 days) visit within the authorizing provider's specialty     Patient had office visit in the last 6 months or has a visit in the next 30 days with authorizing provider or within the authorizing provider's specialty.  See \"Patient Info\" tab in inbasket, or \"Choose Columns\" in Meds & Orders section of the refill encounter.              "

## 2020-02-12 DIAGNOSIS — F41.1 GENERALIZED ANXIETY DISORDER: ICD-10-CM

## 2020-02-13 NOTE — TELEPHONE ENCOUNTER
Requested Prescriptions   Pending Prescriptions Disp Refills     LORazepam (ATIVAN) 0.5 MG tablet [Pharmacy Med Name: LORAZEPAM 0.5 MG TABLET] 45 tablet 0     Sig: TAKE 1 TABLET BY MOUTH EVERY 8 HOURS IF NEEDED       There is no refill protocol information for this order         Last Written Prescription Date:  12-19-19  Last Fill Quantity: 45,   # refills: 0  Last Office Visit: 10-24-19  Future Office visit:       Routing refill request to provider for review/approval because:  Drug not on the G, P or [facility] refill protocol or controlled substance

## 2020-02-14 RX ORDER — LORAZEPAM 0.5 MG/1
TABLET ORAL
Qty: 45 TABLET | Refills: 0 | Status: SHIPPED | OUTPATIENT
Start: 2020-02-14 | End: 2020-05-29

## 2020-02-21 DIAGNOSIS — E11.9 TYPE 2 DIABETES MELLITUS WITHOUT COMPLICATION, WITHOUT LONG-TERM CURRENT USE OF INSULIN (H): Primary | ICD-10-CM

## 2020-02-21 RX ORDER — LANCETS
EACH MISCELLANEOUS
Qty: 200 EACH | Refills: 11 | Status: SHIPPED | OUTPATIENT
Start: 2020-02-21 | End: 2020-02-24

## 2020-02-21 NOTE — TELEPHONE ENCOUNTER
Attempt # 1  Called patient at home [phone]  Was call answered?  Yes, patient needs new lancets for his new lancet device. Cued pharmacy and correct amount of lancets.        Chacha Iraheta RN  [facility]

## 2020-02-21 NOTE — TELEPHONE ENCOUNTER
"Requested Prescriptions   Pending Prescriptions Disp Refills     thin XX lancets 200 each 11     Sig: Use with lanceting device. To accompany: Blood Glucose Monitor Brands: per insurance.       Diabetic Supplies Protocol Failed - 2/21/2020  8:54 AM        Failed - Medication is active on med list        Passed - Patient is 18 years of age or older        Passed - Recent (6 mo) or future (30 days) visit within the authorizing provider's specialty     Patient had office visit in the last 6 months or has a visit in the next 30 days with authorizing provider.  See \"Patient Info\" tab in inbasket, or \"Choose Columns\" in Meds & Orders section of the refill encounter.            Lancets are active on medication list - needs a different brand for a new lancet device.     Prescription approved per [facility] [place].      Chacha Iraheta RN  [facility]      "

## 2020-02-24 RX ORDER — LANCETS
EACH MISCELLANEOUS
Qty: 200 EACH | Refills: 11 | Status: SHIPPED | OUTPATIENT
Start: 2020-02-24

## 2020-02-24 NOTE — TELEPHONE ENCOUNTER
Reason for Call:  Other prescription    Detailed comments: patient states that the pharmacy needs to know how many times per day patient tests. Patient states this has been going on for about a month, he is leaving town for work tomorrow and is down to his last lancet.    Please send an updated rx for lancets for patient to test 8x/day    Phone Number Patient can be reached at: Home number on file [phone] (home)    Best Time: anytime    Can we leave a detailed message on this number? YES    Call taken on 2/24/2020 at 11:29 AM by Nicholas Ha

## 2020-02-24 NOTE — TELEPHONE ENCOUNTER
Se below.    Needs update on lancets for 8 x per day.    Please call patient when completed.  Emely Bowman RN,BSN  [facility]

## 2020-03-02 DIAGNOSIS — E11.9 TYPE 2 DIABETES MELLITUS WITHOUT COMPLICATION, WITH LONG-TERM CURRENT USE OF INSULIN (H): ICD-10-CM

## 2020-03-02 DIAGNOSIS — Z79.4 TYPE 2 DIABETES MELLITUS WITHOUT COMPLICATION, WITH LONG-TERM CURRENT USE OF INSULIN (H): ICD-10-CM

## 2020-03-02 RX ORDER — BLOOD SUGAR DIAGNOSTIC
STRIP MISCELLANEOUS
Qty: 250 STRIP | Refills: 4 | Status: SHIPPED | OUTPATIENT
Start: 2020-03-02 | End: 2020-03-12

## 2020-03-02 NOTE — TELEPHONE ENCOUNTER
Prescription approved per [facility] Refill Protocol.  Emely Bowman, RN,BSN  [facility]

## 2020-03-02 NOTE — TELEPHONE ENCOUNTER
"Requested Prescriptions   Pending Prescriptions Disp Refills     ONETOUCH VERIO IQ test strip [Pharmacy Med Name: ONE TOUCH VERIO TEST STRIP] 250 strip 4     Sig: TEST UP TO 8 TIMES DAILY OR AS DIRECTED   Last Written Prescription Date:  9/26/19  Last Fill Quantity: 250,  # refills: 4   Last office visit: 10/24/2019 with prescribing provider:     Future Office Visit:        Diabetic Supplies Protocol Passed - 3/2/2020  2:12 AM        Passed - Medication is active on med list        Passed - Patient is 18 years of age or older        Passed - Recent (6 mo) or future (30 days) visit within the authorizing provider's specialty     Patient had office visit in the last 6 months or has a visit in the next 30 days with authorizing provider.  See \"Patient Info\" tab in inbasket, or \"Choose Columns\" in Meds & Orders section of the refill encounter.              "

## 2020-03-10 DIAGNOSIS — E11.9 TYPE 2 DIABETES MELLITUS WITHOUT COMPLICATION (H): ICD-10-CM

## 2020-03-10 DIAGNOSIS — M54.40 CHRONIC LOW BACK PAIN WITH SCIATICA, SCIATICA LATERALITY UNSPECIFIED, UNSPECIFIED BACK PAIN LATERALITY: ICD-10-CM

## 2020-03-10 DIAGNOSIS — G89.29 CHRONIC LOW BACK PAIN WITH SCIATICA, SCIATICA LATERALITY UNSPECIFIED, UNSPECIFIED BACK PAIN LATERALITY: ICD-10-CM

## 2020-03-10 NOTE — TELEPHONE ENCOUNTER
Requested Prescriptions   Pending Prescriptions Disp Refills     oxyCODONE (ROXICODONE) 5 MG tablet 20 tablet 0     Sig: Take 1 tablet (5 mg) by mouth every 6 hours as needed for severe pain maximum 6 tablet(s) per day       There is no refill protocol information for this order        Last refill 10/24/19 # 20  Last OV 10/24/19    Routing refill request to provider for review/approval because:  Drug not on the [facility] refill protocol   Emely Bowman RN,BSN  [facility]

## 2020-03-10 NOTE — TELEPHONE ENCOUNTER
"Requested Prescriptions   Pending Prescriptions Disp Refills     aspirin (ASA) 81 MG EC tablet [Pharmacy Med Name: ASPIRIN EC 81 MG TABLET] 30 tablet 11     Sig: TAKE 1 TABLET BY MOUTH EVERY DAY   Last Written Prescription Date:  4/23/18  Last Fill Quantity: 90,  # refills: 3   Last office visit: 3/27/2017 with prescribing provider:     Future Office Visit:        Analgesics (Non-Narcotic Tylenol and ASA Only) Passed - 3/10/2020  2:55 AM        Passed - Recent (12 mo) or future (30 days) visit within the authorizing provider's specialty     Patient has had an office visit with the authorizing provider or a provider within the authorizing providers department within the previous 12 mos or has a future within next 30 days. See \"Patient Info\" tab in inbasket, or \"Choose Columns\" in Meds & Orders section of the refill encounter.              Passed - Patient is age 20 years or older     If ASA is flagged for ages under 20 years old. Forward to provider for confirmation Ryes Syndrome is not a concern.              Passed - Medication is active on med list             "

## 2020-03-10 NOTE — TELEPHONE ENCOUNTER
Prescription approved per [facility] Refill Protocol.    Concha Matute, RN, BSN, PHN  [facility]: [place]

## 2020-03-10 NOTE — TELEPHONE ENCOUNTER
Reason for Call:  Medication or medication refill:    Do you use a [facility]?  Name of the pharmacy and phone number for the current request:  [id number] [place], [place] - [address] [place] [address] AT [address]SECTION [address]    Name of the medication requested: oxyCODONE (ROXICODONE) 5 MG tablet     Other request:     Can we leave a detailed message on this number? YES    Phone number patient can be reached at: Cell number on file:    Telephone Information:   Mobile [phone]       Best Time: anytime    Call taken on 3/10/2020 at 9:43 AM by Louise Hastings

## 2020-03-11 RX ORDER — OXYCODONE HYDROCHLORIDE 5 MG/1
5 TABLET ORAL EVERY 6 HOURS PRN
Qty: 20 TABLET | Refills: 0 | Status: SHIPPED | OUTPATIENT
Start: 2020-03-11 | End: 2020-04-02

## 2020-03-12 ENCOUNTER — TELEPHONE (OUTPATIENT)
Dept: FAMILY MEDICINE | Facility: CLINIC | Age: 55
End: 2020-03-12

## 2020-03-12 DIAGNOSIS — E11.9 TYPE 2 DIABETES MELLITUS WITHOUT COMPLICATION, WITH LONG-TERM CURRENT USE OF INSULIN (H): Primary | ICD-10-CM

## 2020-03-12 DIAGNOSIS — Z79.4 TYPE 2 DIABETES MELLITUS WITHOUT COMPLICATION, WITH LONG-TERM CURRENT USE OF INSULIN (H): Primary | ICD-10-CM

## 2020-03-12 RX ORDER — INSULIN LISPRO 100 [IU]/ML
INJECTION, SOLUTION INTRAVENOUS; SUBCUTANEOUS
Qty: 45 ML | Refills: 3 | Status: CANCELLED | OUTPATIENT
Start: 2020-03-12

## 2020-03-12 NOTE — TELEPHONE ENCOUNTER
Reason for Call:  Other prescription    Detailed comments: Patient called stating that he needs a refill for the HUMALOG KWIKPEN 100 UNIT/ML soln  And also needs the  Contour brand instead of the ONETOUCH VERIO IQ test strip. The Insurance won't cover Verio.   He would like these medications to be sent over to:     [id number] [place], [place] - [address] [place] [address] AT [address]CTION [address]         Phone Number Patient can be reached at: Cell number on file:    Telephone Information:   Mobile [phone]       Best Time: As soon as possible.     Can we leave a detailed message on this number? YES    Call taken on 3/12/2020 at 5:11 PM by Conrado Cruz                       250 strip

## 2020-03-13 ENCOUNTER — TELEPHONE (OUTPATIENT)
Dept: FAMILY MEDICINE | Facility: CLINIC | Age: 55
End: 2020-03-13

## 2020-03-13 DIAGNOSIS — E11.9 TYPE 2 DIABETES, HBA1C GOAL < 7% (H): ICD-10-CM

## 2020-03-13 RX ORDER — INSULIN LISPRO 100 [IU]/ML
12-20 INJECTION, SOLUTION INTRAVENOUS; SUBCUTANEOUS
Qty: 54 ML | Refills: 3 | Status: SHIPPED | OUTPATIENT
Start: 2020-03-13 | End: 2021-01-18

## 2020-03-13 NOTE — TELEPHONE ENCOUNTER
"Last humalog Kwikpen Rx in Epic was 12/8/17?    Last visit was 10/24/19, notes indicate is checking blood sugar 4 times daily.  Last Rx for Verio IQ test strips indicates \"up to 8 times daily or as directed\".    Due for follow up 4/24/20.    How often is he testing and what is his current humalog dosing?    Attempted to call patient at mobile number, left message on voicemail; patient was instructed to return call to [facility] clinic RN directly on the RN call back line at [phone]   Arleth Lane, ADAM  [facility]        "

## 2020-03-13 NOTE — TELEPHONE ENCOUNTER
Patient returned call, he uses about 12-20 units humalog pre meals and tests up to 8 times a day (before and after each meal and at bedtime).    I cued up generic Rx for test strips and adjusted the humalog Rx and routed to covering pool to send new/altered Rx's.    Arleth Lane RN  [facility]

## 2020-03-13 NOTE — TELEPHONE ENCOUNTER
[facility] brought in a coupon for a Glucometer.  [facility] stated he would need a prescription for a Generic Glucometer to receive this.   Pharm [id number] - [place], [place] - [address]  Team 3 Coordinator

## 2020-03-18 ENCOUNTER — TELEPHONE (OUTPATIENT)
Dept: FAMILY MEDICINE | Facility: CLINIC | Age: 55
End: 2020-03-18

## 2020-03-18 DIAGNOSIS — E11.9 TYPE 2 DIABETES MELLITUS WITHOUT COMPLICATION, WITH LONG-TERM CURRENT USE OF INSULIN (H): ICD-10-CM

## 2020-03-18 DIAGNOSIS — Z79.4 TYPE 2 DIABETES MELLITUS WITHOUT COMPLICATION, WITH LONG-TERM CURRENT USE OF INSULIN (H): ICD-10-CM

## 2020-03-18 DIAGNOSIS — E11.9 TYPE 2 DIABETES MELLITUS WITHOUT COMPLICATION, WITHOUT LONG-TERM CURRENT USE OF INSULIN (H): Primary | ICD-10-CM

## 2020-03-18 RX ORDER — METFORMIN HCL 500 MG
TABLET, EXTENDED RELEASE 24 HR ORAL
Qty: 120 TABLET | Refills: 5 | Status: SHIPPED | OUTPATIENT
Start: 2020-03-18 | End: 2020-08-13

## 2020-03-18 NOTE — TELEPHONE ENCOUNTER
Reason for Call:  Medication or medication refill:    Do you use a [facility]?  Name of the pharmacy and phone number for the current request:  [id number] - [place], [place] - [address] [place] [address] AT [facility] [address] [phone] (Phone)  [phone] (Fax)       Name of the medication requested: insulin lispro (HUMALOG KWIKPEN) 100 UNIT/ML (1 unit dial) KWIKPENInject 12-20 Units Subcutaneous 3 times daily (before meals).  Humalog is NOT covered by insurance it has to be Novalog same dosage      Other request: insulin needs to be Novalog since Humalog not covered    Can we leave a detailed message on this number? YES    Phone number patient can be reached at: Cell number on file:    Telephone Information:   Mobile [phone]       Best Time: Anytime    Call taken on 3/18/2020 at 5:12 PM by Stephanie Pineda

## 2020-03-19 RX ORDER — FLURBIPROFEN SODIUM 0.3 MG/ML
SOLUTION/ DROPS OPHTHALMIC
Qty: 400 EACH | Refills: 4 | Status: SHIPPED | OUTPATIENT
Start: 2020-03-19 | End: 2021-03-22

## 2020-03-19 RX ORDER — GLUCOSAMINE HCL/CHONDROITIN SU 500-400 MG
CAPSULE ORAL
Qty: 100 EACH | Refills: 3 | Status: SHIPPED | OUTPATIENT
Start: 2020-03-19 | End: 2021-01-18

## 2020-03-19 RX ORDER — INSULIN ASPART 100 [IU]/ML
INJECTION, SOLUTION INTRAVENOUS; SUBCUTANEOUS
Qty: 60 ML | Refills: 3 | Status: SHIPPED | OUTPATIENT
Start: 2020-03-19 | End: 2021-01-18

## 2020-04-02 DIAGNOSIS — M54.40 CHRONIC LOW BACK PAIN WITH SCIATICA, SCIATICA LATERALITY UNSPECIFIED, UNSPECIFIED BACK PAIN LATERALITY: ICD-10-CM

## 2020-04-02 DIAGNOSIS — G89.29 CHRONIC LOW BACK PAIN WITH SCIATICA, SCIATICA LATERALITY UNSPECIFIED, UNSPECIFIED BACK PAIN LATERALITY: ICD-10-CM
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
## 2020-04-02 NOTE — TELEPHONE ENCOUNTER
Reason for Call:  Medication or medication refill:    Do you use a [facility]?  No    Name of the medication requested: oxyCODONE (ROXICODONE)    Other request: Patient stated he only received 20 qty last month. Please send to [facility] - [phone]     Can we leave a detailed message on this number? YES    Phone number patient can be reached at: Cell number on file:    Telephone Information:   Mobile [phone]       Best Time: Anytime    Call taken on 4/2/2020 at 3:42 PM by Muriel Muñiz

## 2020-04-03 RX ORDER — OXYCODONE HYDROCHLORIDE 5 MG/1
5 TABLET ORAL EVERY 6 HOURS PRN
Qty: 30 TABLET | Refills: 0 | Status: SHIPPED | OUTPATIENT
Start: 2020-04-03 | End: 2020-05-04

## 2020-04-16 DIAGNOSIS — E78.5 HYPERLIPIDEMIA LDL GOAL <100: ICD-10-CM

## 2020-04-16 RX ORDER — ROSUVASTATIN CALCIUM 40 MG/1
TABLET, COATED ORAL
Qty: 30 TABLET | Refills: 4 | Status: SHIPPED | OUTPATIENT
Start: 2020-04-16 | End: 2020-08-09

## 2020-04-24 DIAGNOSIS — E11.9 TYPE 2 DIABETES MELLITUS WITHOUT COMPLICATION, WITH LONG-TERM CURRENT USE OF INSULIN (H): ICD-10-CM

## 2020-04-24 DIAGNOSIS — Z79.4 TYPE 2 DIABETES MELLITUS WITHOUT COMPLICATION, WITH LONG-TERM CURRENT USE OF INSULIN (H): ICD-10-CM

## 2020-04-24 NOTE — TELEPHONE ENCOUNTER
"Requested Prescriptions   Pending Prescriptions Disp Refills     insulin glargine (LANTUS PEN) 100 UNIT/ML pen 45 mL 0   Last Written Prescription Date:  12-19-19  Last Fill Quantity: 45ml,  # refills: 0   Last office visit: 10/24/2019 with prescribing provider:  10-24-19   Future Office Visit:      Long Acting Insulin Protocol Failed - 4/24/2020  1:07 PM        Failed - HgbA1C in past 3 or 6 months     If HgbA1C is 8 or greater, it needs to be on file within the past 3 months.  If less than 8, must be on file within the past 6 months.     Recent Labs   Lab Test 08/16/19  0837   A1C 6.2*             Failed - Recent (6 mo) or future (30 days) visit within the authorizing provider's specialty     Patient had office visit in the last 6 months or has a visit in the next 30 days with authorizing provider or within the authorizing provider's specialty.  See \"Patient Info\" tab in inbasket, or \"Choose Columns\" in Meds & Orders section of the refill encounter.            Passed - Serum creatinine on file in past 12 months     Recent Labs   Lab Test 07/22/19  1617   CR 0.85       Ok to refill medication if creatinine is low          Passed - Medication is active on med list        Passed - Patient is age 18 or older           "

## 2020-04-28 ENCOUNTER — TELEPHONE (OUTPATIENT)
Dept: FAMILY MEDICINE | Facility: CLINIC | Age: 55
End: 2020-04-28

## 2020-04-28 NOTE — TELEPHONE ENCOUNTER
Routing refill request to provider for review/approval because:  Labs not current:  A1C and office visit not current.  Sandra Dominguez RN.

## 2020-04-28 NOTE — TELEPHONE ENCOUNTER
Prior Authorization Retail Medication Request    Medication/Dose: Lantus Solostar 100 unit/mL  ICD code (if different than what is on RX):  E11.9  Previously Tried and Failed:    Rationale:      Insurance Name:  [facility]  Insurance ID:  [id number]       Pharmacy Information (if different than what is on RX)  [facility]  Phone:  [phone]     Vidhi Andre MA

## 2020-04-28 NOTE — TELEPHONE ENCOUNTER
Central Prior Authorization Team   Phone: [phone]      PA NOT NEEDED    Medication: Lantus Solostar 100 unit/mL-PA NOT NEEDED  Insurance Company:    Pharmacy Filling the Rx:    Filling Pharmacy Phone:    Filling Pharmacy Fax:    Start Date: 4/28/2020    Called pharmacy to see if they tried running Basaglar since there was a note on the RX that it could be substituted.  Per pharmacy a PA is not needed, lantus went through for a zero copay.  Pharmacy did request a new script for a 90 day supply which they received today.

## 2020-05-04 DIAGNOSIS — M54.40 CHRONIC LOW BACK PAIN WITH SCIATICA, SCIATICA LATERALITY UNSPECIFIED, UNSPECIFIED BACK PAIN LATERALITY: ICD-10-CM

## 2020-05-04 DIAGNOSIS — G89.29 CHRONIC LOW BACK PAIN WITH SCIATICA, SCIATICA LATERALITY UNSPECIFIED, UNSPECIFIED BACK PAIN LATERALITY: ICD-10-CM

## 2020-05-04 RX ORDER — OXYCODONE HYDROCHLORIDE 5 MG/1
5 TABLET ORAL EVERY 6 HOURS PRN
Qty: 30 TABLET | Refills: 0 | Status: SHIPPED | OUTPATIENT
Start: 2020-05-04 | End: 2020-05-29

## 2020-05-04 NOTE — TELEPHONE ENCOUNTER
Oxycodone:    Routing refill request to provider for review/approval because:  Drug not on the [facility] refill protocol     Arleth Lane RN  [facility]

## 2020-05-04 NOTE — TELEPHONE ENCOUNTER
Reason for Call:  Medication or medication refill:    Do you use a [facility]?  No    Name of the medication requested: oxyCODONE (ROXICODONE)    Other request: Please send to [facility] - [phone]    Can we leave a detailed message on this number? YES    Phone number patient can be reached at: Home number on file [phone] (home)    Best Time: Anytime    Call taken on 5/4/2020 at 5:45 PM by Muriel Muñiz

## 2020-05-05 ENCOUNTER — MYC MEDICAL ADVICE (OUTPATIENT)
Dept: FAMILY MEDICINE | Facility: CLINIC | Age: 55
End: 2020-05-05

## 2020-05-05 NOTE — TELEPHONE ENCOUNTER
Okayed refill but advise phone visit with Zeke in the next few weeks    Please inform patient    Jesus Alberto Smith MD

## 2020-05-06 NOTE — TELEPHONE ENCOUNTER
I called and spoke to patient and scheduled him with Dr. Alanis on Monday for a phone visit  Jayde Easton CMA

## 2020-05-11 ENCOUNTER — VIRTUAL VISIT (OUTPATIENT)
Dept: FAMILY MEDICINE | Facility: CLINIC | Age: 55
End: 2020-05-11
Payer: COMMERCIAL

## 2020-05-11 ENCOUNTER — TELEPHONE (OUTPATIENT)
Dept: FAMILY MEDICINE | Facility: CLINIC | Age: 55
End: 2020-05-11

## 2020-05-11 DIAGNOSIS — E78.5 HYPERLIPIDEMIA LDL GOAL <100: ICD-10-CM

## 2020-05-11 DIAGNOSIS — F10.24 ALCOHOL DEPENDENCE WITH ALCOHOL-INDUCED MOOD DISORDER (H): ICD-10-CM

## 2020-05-11 DIAGNOSIS — I10 ESSENTIAL HYPERTENSION WITH GOAL BLOOD PRESSURE LESS THAN 140/90: Primary | ICD-10-CM

## 2020-05-11 DIAGNOSIS — E11.9 TYPE 2 DIABETES MELLITUS WITHOUT COMPLICATION, WITHOUT LONG-TERM CURRENT USE OF INSULIN (H): ICD-10-CM

## 2020-05-11 PROCEDURE — 99214 OFFICE O/P EST MOD 30 MIN: CPT | Mod: 95 | Performed by: INTERNAL MEDICINE

## 2020-05-11 NOTE — TELEPHONE ENCOUNTER
Prior Authorization Not Needed per Insurance    Medication goes through insurance. After talking with the pharmacy the copay is  $2,700 for a 3 month supply. They were reaching out to see if there was a cheaper alternative.     Medication: Victoza  Insurance Company: [employer] [place] - Phone [phone] Fax [phone]  Expected CoPay:      [id number] [place], [place] - [address] [place] [address] AT [address]  Pharmacy Notified: Yes  Patient Notified: No

## 2020-05-11 NOTE — TELEPHONE ENCOUNTER
Central Prior Authorization Team   Phone: [phone]      PA Initiation    Medication: Victoza  Insurance Company: [employer] [place] - Phone [phone] Fax [phone]  [id number] [place] [address] [place] [address] AT INTERSECTION [address] [place] [address]  Filling Pharmacy Phone: [phone]  Filling Pharmacy Fax:    Start Date: 5/11/2020

## 2020-05-11 NOTE — PROGRESS NOTES
"Amadou Bach is a 54 year old male who is being evaluated via a billable telephone visit.      The patient has been notified of following:     \"This telephone visit will be conducted via a call between you and your physician/provider. We have found that certain health care needs can be provided without the need for a physical exam.  This service lets us provide the care you need with a short phone conversation. If a prescription is necessary we can send it directly to your pharmacy. If lab work is needed we can place an order for that and you can then stop by our lab to have the test done at a later time.    Telephone visits are billed at different rates depending on your insurance coverage. During this emergency period, for some insurers they may be billed the same as an in-person visit.  Please reach out to your insurance provider with any questions.    If during the course of the call the physician/provider feels a telephone visit is not appropriate, you will not be charged for this service.\"    Patient has given verbal consent for Telephone visit?  Yes    What phone number would you like to be contacted at? [phone]    How would you like to obtain your AVS? Lia Schreiber     Amadou Bach is a 54 year old male who presents to clinic today for the following health issues:    HPI  Recheck medications; Discuss PA for Victoza and Viagra brand name  Dx of DM for coding   Anxiety and depression ave been working better  Meditation and prayer and staying busy - working still working and doing well at this time   victoza and has blue cross and blue shield  Better is covering all preventative care 100 percent   No deductible  victoza not approved  trulicity did not help  =vicotza convince preventative  byetta or bydureon --- likes the side effects  viagra not as effective -     Oxycodone medication reinjured back and rewrite 30 tablets instead of 20 tablets   Ibuprofen ( tylenol) oxycodone going well " hasn't been able to use the massage  6 weeks ago and alcohol and lorezapam at that time   During a blackout -  Reached out to a counselor from the [facility].  lexapro 10 mg  Tried twice and changed something else at the same time   Same time able to manage without it         Patient Active Problem List   Diagnosis     Erectile dysfunction     Type 2 diabetes mellitus without complication, without long-term current use of insulin (H)     Hyperlipidemia LDL goal <100     Obesity, Class I, BMI 30-34.9     Generalized anxiety disorder     ACP (advance care planning)     History of adenomatous polyp of colon     Essential hypertension with goal blood pressure less than 140/90     Malignant melanoma of skin of trunk (H)     History of melanoma     AK (actinic keratosis)     Multiple benign nevi     Cherry hemangioma     Lentigines     Past Surgical History:   Procedure Laterality Date     BIOPSY OF SKIN LESION       COLONOSCOPY  3/11/2014    Procedure: COMBINED COLONOSCOPY, SINGLE BIOPSY/POLYPECTOMY BY BIOPSY;;  Surgeon: Stephen Amin MD;  Location: [facility] OR     COLONOSCOPY WITH CO2 INSUFFLATION N/A 8/19/2014    Procedure: COLONOSCOPY WITH CO2 INSUFFLATION;  Surgeon: Stephen Amin MD;  Location: [facility] OR     COLONOSCOPY WITH CO2 INSUFFLATION N/A 10/11/2017    Procedure: COLONOSCOPY WITH CO2 INSUFFLATION;  COLON/ REPEAT COLONOSCOPY;  Surgeon: Jeremy Mcqueen DO;  Location: [facility] OR     HC TOOTH EXTRACTION W/FORCEP      wisdom teeth x4       Social History     Tobacco Use     Smoking status: Never Smoker     Smokeless tobacco: Never Used   Substance Use Topics     Alcohol use: Yes     Comment: occ     Family History   Problem Relation Age of Onset     Diabetes Mother      Hypertension Mother      Diabetes Father      Cancer Father         lung     Cancer Other         aunt - skin cancer     Skin Cancer Other      Melanoma No family hx of          Current Outpatient Medications   Medication Sig Dispense Refill      alcohol swab prep pads Use to swab area of injection/luiza as directed. 100 each 3     aspirin (ASA) 81 MG EC tablet TAKE 1 TABLET BY MOUTH EVERY DAY 30 tablet 3     blood glucose (NO BRAND SPECIFIED) test strip Use to test blood sugar up to 8 times daily or as directed. 250 strip 4     Coenzyme Q10 (CO Q 10 PO) Take  by mouth. 2 per day       Cyanocobalamin (VITAMIN B-12 PO) Take  by mouth daily.       FISH OIL CONCENTRATE 1000 MG OR CAPS 2 tablets daily       hydrochlorothiazide (HYDRODIURIL) 25 MG tablet Take 1 tablet (25 mg) by mouth daily 90 tablet 3     insulin glargine (LANTUS PEN) 100 UNIT/ML pen INJECT 50 UNITS SUBCUTANEOUSLY AT BEDTIME 45 mL 3     insulin lispro (HUMALOG KWIKPEN) 100 UNIT/ML (1 unit dial) KWIKPEN Inject 12-20 Units Subcutaneous 3 times daily (before meals) 54 mL 3     insulin pen needle (32G X 4 MM) 32G X 4 MM miscellaneous USE 4 TIMES DAILY AS DIRECTED 200 each 3     insulin pen needle (ULTICARE MINI) 31G X 6 MM miscellaneous Use 4 pen needles daily or as directed. 400 each 4     LORazepam (ATIVAN) 0.5 MG tablet TAKE 1 TABLET BY MOUTH EVERY 8 HOURS IF NEEDED 45 tablet 0     losartan (COZAAR) 100 MG tablet Take 1 tablet (100 mg) by mouth daily 90 tablet 3     metFORMIN (GLUCOPHAGE-XR) 500 MG 24 hr tablet TAKE 2 TABLETS BY MOUTH 2 TIMES DAILY 120 tablet 5     MULTIPLE VITAMINS OR 1000mg daily       order for DME Equipment being ordered: glucometer brand per insurance 1 Device 0     ORDER FOR DME LANCETS 4 x DAILY AND  each 4     oxyCODONE (ROXICODONE) 5 MG tablet Take 1 tablet (5 mg) by mouth every 6 hours as needed for severe pain maximum 6 tablet(s) per day 30 tablet 0     rosuvastatin (CRESTOR) 40 MG tablet TAKE 1 TABLET BY MOUTH EVERY DAY 30 tablet 4     sildenafil (VIAGRA) 100 MG tablet TAKE 1 TABLET BY MOUTH 30 MINS BEFORE INTERCOURSE 12 tablet 3     thin (NO BRAND SPECIFIED) lancets Use with lanceting device. To accompany: Blood Glucose Monitor Brands: per insurance. Test 8 x  per day 200 each 11     VICTOZA PEN 18 MG/3ML soln INJECT 1.8 MG SUBCUTANEOUS DAILY 27 mL 9     VITAMIN D 1000 UNIT OR CAPS Take 1 capsule by mouth daily        insulin aspart (NOVOLOG FLEXPEN) 100 UNIT/ML pen 12-20 Units, subcutaneous TID per scale (Patient not taking: Reported on 5/11/2020) 60 mL 3     Allergies   Allergen Reactions     Cats      Cat dander     Cymbalta      Molds & Smuts      Ms Contin [Morphine]      Ceftazidime Rash     Was also on vanco     Vancomycin Rash     Was also on ceftazidime     Recent Labs   Lab Test 08/16/19  0837 07/22/19  1617 05/17/19  0919 01/17/18  0820  08/12/16  0913 11/13/15  0814  02/06/14  0753   A1C 6.2*  --  6.2* 6.3*   < > 7.3* 7.9*   < > 6.6*   LDL  --   --  78 56  --  101* 138*   < > 63   HDL  --   --  46 53  --  38* 47   < > 35*   TRIG  --   --  172* 75  --  354* 235*   < > 138   ALT  --   --   --  52  --   --  35  --  41   CR  --  0.85 0.74 0.81   < > 0.77 0.70   < > 0.98   GFRESTIMATED  --  >90 >90 >90   < > >90  Non  GFR Calc   >90  Non  GFR Calc     < > 81   GFRESTBLACK  --  >90 >90 >90   < > >90   GFR Calc   >90   GFR Calc     < > >90   POTASSIUM  --  3.8 4.3 4.0   < > 4.2 3.9   < > 4.1   TSH  --  0.78  --  1.64  --   --  1.64   < >  --     < > = values in this interval not displayed.        Reviewed and updated as needed this visit by Provider         Review of Systems   Constitutional, HEENT, cardiovascular, pulmonary, gi and gu systems are negative, except as otherwise noted.       Objective   Reported vitals:  There were no vitals taken for this visit.   healthy, alert and no distress  PSYCH: Alert and oriented times 3; coherent speech, normal   rate and volume, able to articulate logical thoughts, able   to abstract reason, no tangential thoughts, no hallucinations   or delusions  His affect is normal  RESP: No cough, no audible wheezing, able to talk in full sentences  Remainder of exam unable  to be completed due to telephone visits    Diagnostic Test Results:  Labs reviewed in Epic  No results found for any visits on 05/11/20.        Assessment/Plan:  1. Essential hypertension with goal blood pressure less than 140/90      2. Hyperlipidemia LDL goal <100    - Lipid panel reflex to direct LDL Fasting; Future    3. Type 2 diabetes mellitus without complication, without long-term current use of insulin (H)  BP     Data Unavailable  5/12/2020    Lab Results   Component Value Date     07/22/2019     Lab Results   Component Value Date    A1C 6.2 08/16/2019     Lab Results   Component Value Date    LDL 78 05/17/2019     Lab Results   Component Value Date    MICROL 19 05/17/2019     No results found for: MICROALBUMIN    - **A1C FUTURE anytime; Future  - **Basic metabolic panel FUTURE anytime; Future  - Albumin Random Urine Quantitative with Creat Ratio; Future  - **TSH with free T4 reflex FUTURE anytime; Future  - **CBC with platelets differential FUTURE 2mo; Future    ICD-10-CM    1. Essential hypertension with goal blood pressure less than 140/90  I10    2. Hyperlipidemia LDL goal <100  E78.5 Lipid panel reflex to direct LDL Fasting   3. Type 2 diabetes mellitus without complication, without long-term current use of insulin (H)  E11.9 **A1C FUTURE anytime     **Basic metabolic panel FUTURE anytime     Albumin Random Urine Quantitative with Creat Ratio     **TSH with free T4 reflex FUTURE anytime     **CBC with platelets differential FUTURE 2mo   4. Alcohol dependence with alcohol-induced mood disorder (H)  F10.24      We spent 12 minutes reviewing alcohol consumption and intake and concerning behaviors including anxiety, depression, black outs, family members concerned over drinking  Has never missed work.  Does have binge drinking behaviors on the weekends    Resources reviewed while in COVID  Is seeing a counselor through his [employer]    No follow-ups on file.      Phone call duration:  25  minutes    Joseph Alanis MD

## 2020-05-11 NOTE — TELEPHONE ENCOUNTER
Prior Authorization Retail Medication Request    Medication/Dose: Victoza  ICD code (if different than what is on RX):    Previously Tried and Failed:    Rationale: Per Dr. Alanis from 5/11/20 virtual visit: Ok to start PA for victoza .  Has failed metformin, insulin, bydureon and byetta.  With good control of diabetes and weight and heart protection with victoza     Insurance Name:    Insurance ID:       Pharmacy Information (if different than what is on RX)  Name:    Phone:      Claudia Villela MA

## 2020-05-11 NOTE — Clinical Note
Please start PA for VICtoza having failed insulin, metformin, bydureon, and trulicity with good weight loss and blood sugar control on victoza

## 2020-05-19 ENCOUNTER — TRANSFERRED RECORDS (OUTPATIENT)
Dept: HEALTH INFORMATION MANAGEMENT | Facility: CLINIC | Age: 55
End: 2020-05-19

## 2020-05-19 LAB — RETINOPATHY: NEGATIVE

## 2020-05-29 DIAGNOSIS — F41.1 GENERALIZED ANXIETY DISORDER: ICD-10-CM

## 2020-05-29 DIAGNOSIS — M54.40 CHRONIC LOW BACK PAIN WITH SCIATICA, SCIATICA LATERALITY UNSPECIFIED, UNSPECIFIED BACK PAIN LATERALITY: ICD-10-CM

## 2020-05-29 DIAGNOSIS — G89.29 CHRONIC LOW BACK PAIN WITH SCIATICA, SCIATICA LATERALITY UNSPECIFIED, UNSPECIFIED BACK PAIN LATERALITY: ICD-10-CM

## 2020-05-29 DIAGNOSIS — E11.9 TYPE 2 DIABETES MELLITUS WITHOUT COMPLICATION (H): ICD-10-CM

## 2020-05-29 RX ORDER — LORAZEPAM 0.5 MG/1
TABLET ORAL
Qty: 45 TABLET | Refills: 0 | Status: SHIPPED | OUTPATIENT
Start: 2020-05-29 | End: 2020-08-06

## 2020-05-29 RX ORDER — OXYCODONE HYDROCHLORIDE 5 MG/1
5 TABLET ORAL EVERY 6 HOURS PRN
Qty: 30 TABLET | Refills: 0 | Status: SHIPPED | OUTPATIENT
Start: 2020-05-29 | End: 2020-07-06

## 2020-05-29 NOTE — TELEPHONE ENCOUNTER
Requested Prescriptions   Pending Prescriptions Disp Refills     oxyCODONE (ROXICODONE) 5 MG tablet 30 tablet 0     Sig: Take 1 tablet (5 mg) by mouth every 6 hours as needed for severe pain maximum 6 tablet(s) per day       There is no refill protocol information for this order        LORazepam (ATIVAN) 0.5 MG tablet 45 tablet 0       There is no refill protocol information for this order        Last Written Prescription Date:  Lorazepam 2/14/2020, Oxycodone 5/4/2020   Last office visit: 10/24/2019 with prescribing provider:     Future Office Visit:          Routing refill request to provider for review/approval because:  Drug not on the [facility] refill protocol       Chacha Iraheta RN  [facility]

## 2020-05-29 NOTE — TELEPHONE ENCOUNTER
Reason for Call:  Medication or medication refill:    Do you use a [facility]?  Name of the pharmacy and phone number for the current request:  [id number] [place], [place] - [address] [place] [address] AT [facility]    Name of the medication requested:   LORazepam (ATIVAN) 0.5 MG tablet     oxyCODONE (ROXICODONE) 5 MG tablet     Other request:     Can we leave a detailed message on this number? YES    Phone number patient can be reached at: Home number on file [phone] (home)    Best Time: anytime    Call taken on 5/29/2020 at 11:57 AM by Louise Hastings

## 2020-05-29 NOTE — TELEPHONE ENCOUNTER
"Last Written Prescription Date:  3-  Last Fill Quantity: 30,  # refills: 3   Last office visit: 5/11/2020 with prescribing provider:  5-   Future Office Visit:  Requested Prescriptions   Pending Prescriptions Disp Refills     aspirin (ASA) 81 MG EC tablet 30 tablet 3     Sig: Take 1 tablet (81 mg) by mouth daily       Analgesics (Non-Narcotic Tylenol and ASA Only) Passed - 5/29/2020  3:11 PM        Passed - Recent (12 mo) or future (30 days) visit within the authorizing provider's specialty     Patient has had an office visit with the authorizing provider or a provider within the authorizing providers department within the previous 12 mos or has a future within next 30 days. See \"Patient Info\" tab in inbasket, or \"Choose Columns\" in Meds & Orders section of the refill encounter.              Passed - Patient is age 20 years or older     If ASA is flagged for ages under 20 years old. Forward to provider for confirmation Ryes Syndrome is not a concern.              Passed - Medication is active on med list           "

## 2020-06-01 NOTE — TELEPHONE ENCOUNTER
Prescription approved per [facility] Refill Protocol.  Yumiko Jones, PharmD  Medication Therapy Management Pharmacist  [phone]

## 2020-07-06 DIAGNOSIS — G89.29 CHRONIC LOW BACK PAIN WITH SCIATICA, SCIATICA LATERALITY UNSPECIFIED, UNSPECIFIED BACK PAIN LATERALITY: ICD-10-CM

## 2020-07-06 DIAGNOSIS — M54.40 CHRONIC LOW BACK PAIN WITH SCIATICA, SCIATICA LATERALITY UNSPECIFIED, UNSPECIFIED BACK PAIN LATERALITY: ICD-10-CM

## 2020-07-06 RX ORDER — OXYCODONE HYDROCHLORIDE 5 MG/1
5 TABLET ORAL EVERY 6 HOURS PRN
Qty: 30 TABLET | Refills: 0 | Status: SHIPPED | OUTPATIENT
Start: 2020-07-06 | End: 2020-08-06

## 2020-07-06 NOTE — TELEPHONE ENCOUNTER
Requested Prescriptions   Pending Prescriptions Disp Refills     oxyCODONE (ROXICODONE) 5 MG tablet 30 tablet 0     Sig: Take 1 tablet (5 mg) by mouth every 6 hours as needed for severe pain maximum 6 tablet(s) per day       There is no refill protocol information for this order        Routing refill request to provider for review/approval because:  Drug not on the [facility] refill protocol   Emely Bowman RN,BSN  [facility]

## 2020-08-06 ENCOUNTER — TELEPHONE (OUTPATIENT)
Dept: FAMILY MEDICINE | Facility: CLINIC | Age: 55
End: 2020-08-06

## 2020-08-06 DIAGNOSIS — N52.9 ERECTILE DYSFUNCTION, UNSPECIFIED ERECTILE DYSFUNCTION TYPE: ICD-10-CM

## 2020-08-06 DIAGNOSIS — G89.29 CHRONIC LOW BACK PAIN WITH SCIATICA, SCIATICA LATERALITY UNSPECIFIED, UNSPECIFIED BACK PAIN LATERALITY: ICD-10-CM

## 2020-08-06 DIAGNOSIS — M54.40 CHRONIC LOW BACK PAIN WITH SCIATICA, SCIATICA LATERALITY UNSPECIFIED, UNSPECIFIED BACK PAIN LATERALITY: ICD-10-CM

## 2020-08-06 DIAGNOSIS — F41.1 GENERALIZED ANXIETY DISORDER: ICD-10-CM

## 2020-08-06 RX ORDER — LORAZEPAM 0.5 MG/1
TABLET ORAL
Qty: 45 TABLET | Refills: 0 | Status: SHIPPED | OUTPATIENT
Start: 2020-08-06 | End: 2020-09-24

## 2020-08-06 RX ORDER — SILDENAFIL 100 MG/1
TABLET, FILM COATED ORAL
Qty: 20 TABLET | Refills: 3 | Status: SHIPPED | OUTPATIENT
Start: 2020-08-06 | End: 2021-01-18

## 2020-08-06 RX ORDER — OXYCODONE HYDROCHLORIDE 5 MG/1
5 TABLET ORAL EVERY 6 HOURS PRN
Qty: 30 TABLET | Refills: 0 | Status: SHIPPED | OUTPATIENT
Start: 2020-08-06 | End: 2020-09-04

## 2020-08-06 NOTE — TELEPHONE ENCOUNTER
Routed to Dr. Alanis, oxycodone and lorazepam not on RN refill protocol, wants larger dispense sildenafil (requesting 20).    Arleth Lane RN  [facility]

## 2020-08-06 NOTE — TELEPHONE ENCOUNTER
Reason for Call:  Medication or medication refill:    Do you use a [facility]?  No  Name of the pharmacy and phone number for the current request:     [id number] - [place], [place] - [address] [place] [address] AT [address]CTION [address]    Name of the medication requested:  Oxycodone/HCL 5 mg  Lorazepam  Sildenafil 8 tabs per month    Other request: Please send to his pharmacy,  Sildenafil - Patient is requesting an increase in the number of pills.  Qty of 20 or whatever Dr Alanis thinks is safe.  Can we leave a detailed message on this number? YES    Phone number patient can be reached at: Cell number on file:    Telephone Information:   Mobile [phone]       Best Time:  Any    Call taken on 8/6/2020 at 3:13 PM by Puja Mcelroy

## 2020-08-07 DIAGNOSIS — E78.5 HYPERLIPIDEMIA LDL GOAL <100: ICD-10-CM

## 2020-08-09 RX ORDER — ROSUVASTATIN CALCIUM 40 MG/1
40 TABLET, COATED ORAL DAILY
Qty: 90 TABLET | Refills: 3 | Status: SHIPPED | OUTPATIENT
Start: 2020-08-09 | End: 2021-01-18

## 2020-08-12 DIAGNOSIS — Z79.4 TYPE 2 DIABETES MELLITUS WITHOUT COMPLICATION, WITH LONG-TERM CURRENT USE OF INSULIN (H): ICD-10-CM

## 2020-08-12 DIAGNOSIS — E11.9 TYPE 2 DIABETES MELLITUS WITHOUT COMPLICATION, WITH LONG-TERM CURRENT USE OF INSULIN (H): ICD-10-CM

## 2020-08-13 RX ORDER — METFORMIN HCL 500 MG
TABLET, EXTENDED RELEASE 24 HR ORAL
Qty: 120 TABLET | Refills: 5 | Status: SHIPPED | OUTPATIENT
Start: 2020-08-13 | End: 2021-01-18

## 2020-09-03 DIAGNOSIS — G89.29 CHRONIC LOW BACK PAIN WITH SCIATICA, SCIATICA LATERALITY UNSPECIFIED, UNSPECIFIED BACK PAIN LATERALITY: ICD-10-CM

## 2020-09-03 DIAGNOSIS — M54.40 CHRONIC LOW BACK PAIN WITH SCIATICA, SCIATICA LATERALITY UNSPECIFIED, UNSPECIFIED BACK PAIN LATERALITY: ICD-10-CM

## 2020-09-03 NOTE — TELEPHONE ENCOUNTER
Reason for Call:  Medication or medication refill:    Do you use a [facility]?  Name of the pharmacy and phone number for the current request:  [id number] [place], [place] - [address] [phone] (Phone)  [phone] (Fax)       Name of the medication requested: [id number] [place], [place] - [address] [phone] (Phone)  [phone] (Fax)       Other request: None    Can we leave a detailed message on this number? YES    Phone number patient can be reached at: Home number on file [phone] (home)    Best Time: Anytime    Call taken on 9/3/2020 at 5:10 PM by Stephanie Pineda

## 2020-09-03 NOTE — TELEPHONE ENCOUNTER
Routing refill request to provider for review/approval because:  Drug not on the [facility] refill protocol     Arleth aLne RN  [facility]

## 2020-09-03 NOTE — TELEPHONE ENCOUNTER
Correction- Medication requested is oxyCODONE (ROXICODONE) 5 MG tabletTake 1 tablet (5 mg) by mouth every 6 hours as needed for severe pain maximum 6 tablet(s) per day

## 2020-09-04 RX ORDER — OXYCODONE HYDROCHLORIDE 5 MG/1
5 TABLET ORAL EVERY 6 HOURS PRN
Qty: 30 TABLET | Refills: 0 | Status: SHIPPED | OUTPATIENT
Start: 2020-09-04 | End: 2020-10-05

## 2020-09-24 DIAGNOSIS — E11.9 TYPE 2 DIABETES MELLITUS WITHOUT COMPLICATION, WITHOUT LONG-TERM CURRENT USE OF INSULIN (H): ICD-10-CM

## 2020-09-24 DIAGNOSIS — F41.1 GENERALIZED ANXIETY DISORDER: ICD-10-CM

## 2020-09-24 RX ORDER — LORAZEPAM 0.5 MG/1
TABLET ORAL
Qty: 45 TABLET | Refills: 0 | Status: SHIPPED | OUTPATIENT
Start: 2020-09-24 | End: 2020-11-11

## 2020-09-24 NOTE — TELEPHONE ENCOUNTER
Reason for Call:  Medication or medication refill:    Do you use a [facility]?  Name of the pharmacy and phone number for the current request:  [id number] - [place], [place] - [address] [place] [address] AT [facility]     Name of the medication requested: LORazepam (ATIVAN) 0.5 MG tablet     Other request: n/a    Can we leave a detailed message on this number? YES    Phone number patient can be reached at: Work number on file:  [phone] (work)    Best Time: any     Call taken on 9/24/2020 at 11:43 AM by Chata Lehman

## 2020-09-24 NOTE — TELEPHONE ENCOUNTER
Requested Prescriptions   Pending Prescriptions Disp Refills     LORazepam (ATIVAN) 0.5 MG tablet 45 tablet 0     Sig: TAKE 1 TABLET BY MOUTH EVERY 8 HOURS IF NEEDED       There is no refill protocol information for this order        Routing refill request to provider for review/approval because:  Drug not on the [facility] refill protocol       Chacha Iraheta RN  Triage Nurse  [facility]  Appointment line: [phone]  [facility], 24 hour nurse line, available by calling clinic at [phone] and following prompts.

## 2020-09-28 RX ORDER — ASPIRIN 81 MG/1
TABLET, COATED ORAL
Qty: 90 TABLET | Refills: 0 | Status: SHIPPED | OUTPATIENT
Start: 2020-09-28 | End: 2020-12-25

## 2020-09-28 NOTE — TELEPHONE ENCOUNTER
"Requested Prescriptions   Pending Prescriptions Disp Refills     ASPIRIN LOW DOSE 81 MG EC tablet [Pharmacy Med Name: ASPIRIN EC 81 MG TABLET] 90 tablet 0     Sig: TAKE 1 TABLET BY MOUTH EVERY DAY       Analgesics (Non-Narcotic Tylenol and ASA Only) Passed - 9/24/2020 11:33 AM        Passed - Recent (12 mo) or future (30 days) visit within the authorizing provider's specialty     Patient has had an office visit with the authorizing provider or a provider within the authorizing providers department within the previous 12 mos or has a future within next 30 days. See \"Patient Info\" tab in inbasket, or \"Choose Columns\" in Meds & Orders section of the refill encounter.              Passed - Patient is age 20 years or older     If ASA is flagged for ages under 20 years old. Forward to provider for confirmation Ryes Syndrome is not a concern.              Passed - Medication is active on med list           Routing refill request to provider for review/approval because:  Patient needs to be seen because:  Patient was advised on 5/11/20 appointment to follow up in 3 months and has not done so.            "

## 2020-10-05 ENCOUNTER — TELEPHONE (OUTPATIENT)
Dept: FAMILY MEDICINE | Facility: CLINIC | Age: 55
End: 2020-10-05

## 2020-10-05 DIAGNOSIS — M54.40 CHRONIC LOW BACK PAIN WITH SCIATICA, SCIATICA LATERALITY UNSPECIFIED, UNSPECIFIED BACK PAIN LATERALITY: ICD-10-CM

## 2020-10-05 DIAGNOSIS — G89.29 CHRONIC LOW BACK PAIN WITH SCIATICA, SCIATICA LATERALITY UNSPECIFIED, UNSPECIFIED BACK PAIN LATERALITY: ICD-10-CM

## 2020-10-05 RX ORDER — OXYCODONE HYDROCHLORIDE 5 MG/1
5 TABLET ORAL EVERY 6 HOURS PRN
Qty: 30 TABLET | Refills: 0 | Status: SHIPPED | OUTPATIENT
Start: 2020-10-05 | End: 2020-11-10

## 2020-10-05 NOTE — TELEPHONE ENCOUNTER
Reason for Call:  Medication or medication refill:    Do you use a [facility]?  Name of the pharmacy and phone number for the current request:  [id number] - [place], [place] - [address] [place] [address] AT [address]    Name of the medication requested: oxyCODONE (ROXICODONE) 5 MG tablet    Other request: none    Can we leave a detailed message on this number? YES    Phone number patient can be reached at: Cell number on file:    Telephone Information:   Mobile [phone]       Best Time: Anytime    Call taken on 10/5/2020 at 1:55 PM by Maddie Mata

## 2020-11-04 DIAGNOSIS — G89.29 CHRONIC LOW BACK PAIN WITH SCIATICA, SCIATICA LATERALITY UNSPECIFIED, UNSPECIFIED BACK PAIN LATERALITY: ICD-10-CM

## 2020-11-04 DIAGNOSIS — F41.1 GENERALIZED ANXIETY DISORDER: ICD-10-CM

## 2020-11-04 DIAGNOSIS — M54.40 CHRONIC LOW BACK PAIN WITH SCIATICA, SCIATICA LATERALITY UNSPECIFIED, UNSPECIFIED BACK PAIN LATERALITY: ICD-10-CM

## 2020-11-04 NOTE — TELEPHONE ENCOUNTER
Requested Prescriptions   Pending Prescriptions Disp Refills     oxyCODONE (ROXICODONE) 5 MG tablet 30 tablet 0     Sig: Take 1 tablet (5 mg) by mouth every 6 hours as needed for severe pain maximum 6 tablet(s) per day       There is no refill protocol information for this order        Routing refill request to provider for review/approval because:  Drug not on the [facility] refill protocol       Chacha Iraheta RN  Triage Nurse  [facility]  Appointment line: [phone]  [facility], 24 hour nurse line, available by calling clinic at [phone] and following prompts.

## 2020-11-04 NOTE — TELEPHONE ENCOUNTER
Reason for Call:  Medication or medication refill:    Do you use a [facility]?  Name of the pharmacy and phone [facility] for the current request[facility] [place] [facility] [place] [address] [phone]    Name of the medication requested: oxyCODONE (ROXICODONE) / LORazepam (ATIVAN)    Other request: Patient requesting refills on medications listed above.    Can we leave a detailed message on this number? YES    Phone number patient can be reached at: Cell: [phone]    Best Time: Anytime    Call taken on 11/4/2020 at 10:59 AM by Cordelia Andrews

## 2020-11-08 ENCOUNTER — HEALTH MAINTENANCE LETTER (OUTPATIENT)
Age: 55
End: 2020-11-08

## 2020-11-10 RX ORDER — OXYCODONE HYDROCHLORIDE 5 MG/1
5 TABLET ORAL EVERY 6 HOURS PRN
Qty: 30 TABLET | Refills: 0 | Status: SHIPPED | OUTPATIENT
Start: 2020-11-10 | End: 2020-12-09

## 2020-11-11 RX ORDER — LORAZEPAM 0.5 MG/1
TABLET ORAL
Qty: 45 TABLET | Refills: 0 | Status: SHIPPED | OUTPATIENT
Start: 2020-11-11 | End: 2021-01-12

## 2020-12-08 ENCOUNTER — MYC MEDICAL ADVICE (OUTPATIENT)
Dept: FAMILY MEDICINE | Facility: CLINIC | Age: 55
End: 2020-12-08

## 2020-12-08 DIAGNOSIS — G89.29 CHRONIC LOW BACK PAIN WITH SCIATICA, SCIATICA LATERALITY UNSPECIFIED, UNSPECIFIED BACK PAIN LATERALITY: ICD-10-CM

## 2020-12-08 DIAGNOSIS — M54.40 CHRONIC LOW BACK PAIN WITH SCIATICA, SCIATICA LATERALITY UNSPECIFIED, UNSPECIFIED BACK PAIN LATERALITY: ICD-10-CM

## 2020-12-08 NOTE — TELEPHONE ENCOUNTER
Controlled Substance Refill Request for oxyCODONE (ROXICODONE) 5 MG tablet  Problem List Complete:    No     PROVIDER TO CONSIDER COMPLETION OF PROBLEM LIST AND OVERVIEW/CONTROLLED SUBSTANCE AGREEMENT    Last Written Prescription Date:  11/10/2020  Last Fill Quantity: 30,   # refills: 0    THE MOST RECENT OFFICE VISIT MUST BE WITHIN THE PAST 3 MONTHS. AT LEAST ONE FACE TO FACE VISIT MUST OCCUR EVERY 6 MONTHS. ADDITIONAL VISITS CAN BE VIRTUAL.  (THIS STATEMENT SHOULD BE DELETED.)    Last Office Visit with [facility] primary care provider: 5/11/2020    Future Office visit:     Controlled substance agreement:   Encounter-Level CSA - 09/14/2017:    Controlled Substance Agreement - Scan on 9/26/2017  7:07 AM: CONTROLLED SUBSTANCE AGREEMENT     Patient-Level CSA:    There are no patient-level csa.         Last Urine Drug Screen: No results found for: Clint PORTILLO Drug Analysis UR   Date Value Ref Range Status   09/14/2017 FINAL  Final     Comment:     (Note)  ====================================================================  COMPREHENSIVE DRUG ANALYSIS,UR  ====================================================================  Test                             Result       Flag       Units        Drug Present   Lorazepam                      279                     ng/mg creat    Source of lorazepam is a scheduled prescription medication.   Benzoylecgonine                1333                    ng/mg creat    Benzoylecgonine is a metabolite of cocaine; its presence    indicates use of this drug.  Source is most commonly illicit, but    cocaine is present in some topical anesthetic solutions.   Oxycodone                      907                     ng/mg creat   Noroxycodone                   4121                    ng/mg creat    Sources of oxycodone include scheduled prescription medications.    Noroxycodone is an expected metabolite of oxycodone.   Cyclobenzaprine                PRESENT                                Metoprolol                     PRESENT                              ====================================================================  Test                      Result    Flag   Units      Ref Range        Creatinine              89               mg/dL      >=20            ====================================================================  For clinical consultation, please call [phone].  ====================================================================  Analysis performed by [employer] [place] [address]     , No results found for: THC13, PCP13, COC13, MAMP13, OPI13, AMP13, BZO13, TCA13, MTD13, BAR13, OXY13, PPX13, BUP13     Processing:  Rx to be electronically transmitted to pharmacy by provider      [email or web address]       checked in past 3 months?  No, route to RN

## 2020-12-09 RX ORDER — OXYCODONE HYDROCHLORIDE 5 MG/1
5 TABLET ORAL EVERY 6 HOURS PRN
Qty: 30 TABLET | Refills: 0 | Status: SHIPPED | OUTPATIENT
Start: 2020-12-09 | End: 2021-01-14

## 2020-12-17 ENCOUNTER — MYC MEDICAL ADVICE (OUTPATIENT)
Dept: FAMILY MEDICINE | Facility: CLINIC | Age: 55
End: 2020-12-17

## 2020-12-17 DIAGNOSIS — F41.1 GENERALIZED ANXIETY DISORDER: ICD-10-CM

## 2020-12-17 NOTE — TELEPHONE ENCOUNTER
Controlled Substance Refill Request for LORazepam (ATIVAN) 0.5 MG tablet  Problem List Complete:  Yes   checked in past 3 months?  No, route to RN    Generalized anxiety disorder  Problem Detail    Noted:  4/4/2013   Priority:  Medium   Relevant Medications    LORazepam (ATIVAN) 0.5 MG tablet   Last Encounter with Generalized anxiety disorder    Visit Information     Provider Department Center   11/4/2020 Joseph Alanis MD [facility]/Andrea [facility]

## 2020-12-18 RX ORDER — LORAZEPAM 0.5 MG/1
TABLET ORAL
Start: 2020-12-18

## 2020-12-22 DIAGNOSIS — I10 HTN, GOAL BELOW 140/90: ICD-10-CM

## 2020-12-23 DIAGNOSIS — E11.9 TYPE 2 DIABETES MELLITUS WITHOUT COMPLICATION, WITHOUT LONG-TERM CURRENT USE OF INSULIN (H): ICD-10-CM

## 2020-12-23 RX ORDER — HYDROCHLOROTHIAZIDE 25 MG/1
25 TABLET ORAL DAILY
Qty: 90 TABLET | Refills: 3 | Status: SHIPPED | OUTPATIENT
Start: 2020-12-23 | End: 2021-01-18

## 2020-12-23 NOTE — TELEPHONE ENCOUNTER
"Requested Prescriptions   Pending Prescriptions Disp Refills    hydrochlorothiazide (HYDRODIURIL) 25 MG tablet 90 tablet 3     Sig: Take 1 tablet (25 mg) by mouth daily       Diuretics (Including Combos) Protocol Failed - 12/22/2020  2:18 PM        Failed - Blood pressure under 140/90 in past 12 months     BP Readings from Last 3 Encounters:   10/24/19 121/78   08/16/19 (!) 157/94   07/29/19 (!) 146/86                 Failed - Normal serum creatinine on file in past 12 months     Recent Labs   Lab Test 07/22/19  1617   CR 0.85              Failed - Normal serum potassium on file in past 12 months     Recent Labs   Lab Test 07/22/19  1617   POTASSIUM 3.8                    Failed - Normal serum sodium on file in past 12 months     Recent Labs   Lab Test 07/22/19  1617                 Passed - Recent (12 mo) or future (30 days) visit within the authorizing provider's specialty     Patient has had an office visit with the authorizing provider or a provider within the authorizing providers department within the previous 12 mos or has a future within next 30 days. See \"Patient Info\" tab in inbasket, or \"Choose Columns\" in Meds & Orders section of the refill encounter.              Passed - Medication is active on med list        Passed - Patient is age 18 or older           Routing refill request to provider for review/approval because:  Failed protocol.  Emely SÁNCHEZ-RN  [facility]    "

## 2020-12-25 ENCOUNTER — MYC MEDICAL ADVICE (OUTPATIENT)
Dept: NURSING | Facility: CLINIC | Age: 55
End: 2020-12-25

## 2020-12-25 DIAGNOSIS — M54.40 CHRONIC LOW BACK PAIN WITH SCIATICA, SCIATICA LATERALITY UNSPECIFIED, UNSPECIFIED BACK PAIN LATERALITY: ICD-10-CM

## 2020-12-25 DIAGNOSIS — F41.1 GENERALIZED ANXIETY DISORDER: ICD-10-CM

## 2020-12-25 DIAGNOSIS — G89.29 CHRONIC LOW BACK PAIN WITH SCIATICA, SCIATICA LATERALITY UNSPECIFIED, UNSPECIFIED BACK PAIN LATERALITY: ICD-10-CM

## 2020-12-25 NOTE — TELEPHONE ENCOUNTER
Medication is being filled for 1 time refill only due to:  Patient needs to be seen because needs appointment.

## 2021-01-07 ENCOUNTER — TELEPHONE (OUTPATIENT)
Dept: FAMILY MEDICINE | Facility: CLINIC | Age: 56
End: 2021-01-07

## 2021-01-07 DIAGNOSIS — E11.9 TYPE 2 DIABETES MELLITUS WITHOUT COMPLICATION, WITHOUT LONG-TERM CURRENT USE OF INSULIN (H): Primary | ICD-10-CM

## 2021-01-07 DIAGNOSIS — G89.4 CHRONIC PAIN SYNDROME: ICD-10-CM

## 2021-01-07 NOTE — TELEPHONE ENCOUNTER
Reason for Call:  Other Orders    Detailed comments: patient is calling to request PVL orders be put into system so patient can go anywhere with [facility] for upcoming appt with pcp. Please call when put in so patient can schedule. Thank  You.    Phone Number Patient can be reached at: Cell number on file:    Telephone Information:   Mobile [phone]       Best Time:     Can we leave a detailed message on this number? YES    Call taken on 1/7/2021 at 10:44 AM by Elisha Langston

## 2021-01-12 ENCOUNTER — OFFICE VISIT (OUTPATIENT)
Dept: FAMILY MEDICINE | Facility: CLINIC | Age: 56
End: 2021-01-12
Payer: COMMERCIAL

## 2021-01-12 VITALS
HEART RATE: 90 BPM | DIASTOLIC BLOOD PRESSURE: 88 MMHG | SYSTOLIC BLOOD PRESSURE: 156 MMHG | BODY MASS INDEX: 32.37 KG/M2 | WEIGHT: 194.5 LBS | TEMPERATURE: 98 F | OXYGEN SATURATION: 99 %

## 2021-01-12 DIAGNOSIS — F10.932 ALCOHOL WITHDRAWAL SYNDROME WITH PERCEPTUAL DISTURBANCE (H): ICD-10-CM

## 2021-01-12 DIAGNOSIS — R20.2 PARESTHESIAS: Primary | ICD-10-CM

## 2021-01-12 DIAGNOSIS — I10 HTN, GOAL BELOW 140/90: ICD-10-CM

## 2021-01-12 DIAGNOSIS — F41.1 GENERALIZED ANXIETY DISORDER: ICD-10-CM

## 2021-01-12 LAB
BASOPHILS # BLD AUTO: 0 10E9/L (ref 0–0.2)
BASOPHILS NFR BLD AUTO: 0.3 %
DIFFERENTIAL METHOD BLD: NORMAL
EOSINOPHIL # BLD AUTO: 0.2 10E9/L (ref 0–0.7)
EOSINOPHIL NFR BLD AUTO: 1.5 %
ERYTHROCYTE [DISTWIDTH] IN BLOOD BY AUTOMATED COUNT: 13.4 % (ref 10–15)
HCT VFR BLD AUTO: 47.5 % (ref 40–53)
HGB BLD-MCNC: 16.2 G/DL (ref 13.3–17.7)
LYMPHOCYTES # BLD AUTO: 3 10E9/L (ref 0.8–5.3)
LYMPHOCYTES NFR BLD AUTO: 30.2 %
MCH RBC QN AUTO: 29.2 PG (ref 26.5–33)
MCHC RBC AUTO-ENTMCNC: 34.1 G/DL (ref 31.5–36.5)
MCV RBC AUTO: 86 FL (ref 78–100)
MONOCYTES # BLD AUTO: 0.9 10E9/L (ref 0–1.3)
MONOCYTES NFR BLD AUTO: 8.6 %
NEUTROPHILS # BLD AUTO: 6 10E9/L (ref 1.6–8.3)
NEUTROPHILS NFR BLD AUTO: 59.4 %
PLATELET # BLD AUTO: 259 10E9/L (ref 150–450)
RBC # BLD AUTO: 5.54 10E12/L (ref 4.4–5.9)
WBC # BLD AUTO: 10 10E9/L (ref 4–11)

## 2021-01-12 PROCEDURE — 99000 SPECIMEN HANDLING OFFICE-LAB: CPT | Performed by: NURSE PRACTITIONER

## 2021-01-12 PROCEDURE — 99215 OFFICE O/P EST HI 40 MIN: CPT | Performed by: NURSE PRACTITIONER

## 2021-01-12 PROCEDURE — 82746 ASSAY OF FOLIC ACID SERUM: CPT | Performed by: NURSE PRACTITIONER

## 2021-01-12 PROCEDURE — 36415 COLL VENOUS BLD VENIPUNCTURE: CPT | Performed by: NURSE PRACTITIONER

## 2021-01-12 PROCEDURE — 84425 ASSAY OF VITAMIN B-1: CPT | Mod: 90 | Performed by: NURSE PRACTITIONER

## 2021-01-12 PROCEDURE — 85025 COMPLETE CBC W/AUTO DIFF WBC: CPT | Performed by: NURSE PRACTITIONER

## 2021-01-12 PROCEDURE — 80053 COMPREHEN METABOLIC PANEL: CPT | Performed by: NURSE PRACTITIONER

## 2021-01-12 PROCEDURE — 82607 VITAMIN B-12: CPT | Performed by: NURSE PRACTITIONER

## 2021-01-12 RX ORDER — LORAZEPAM 0.5 MG/1
TABLET ORAL
Qty: 21 TABLET | Refills: 0 | Status: SHIPPED | OUTPATIENT
Start: 2021-01-12 | End: 2021-01-18

## 2021-01-12 RX ORDER — LANOLIN ALCOHOL/MO/W.PET/CERES
100 CREAM (GRAM) TOPICAL DAILY
Qty: 90 TABLET | Refills: 1 | Status: SHIPPED | OUTPATIENT
Start: 2021-01-12 | End: 2021-08-16

## 2021-01-12 RX ORDER — LORAZEPAM 0.5 MG/1
TABLET ORAL
Qty: 45 TABLET | Refills: 0 | Status: CANCELLED | OUTPATIENT
Start: 2021-01-12

## 2021-01-12 NOTE — PROGRESS NOTES
"  Assessment & Plan     Paresthesias  Possibly related to alcohol abuse.  Patient to make sure he is taking B12 1000 mcg daily.  Consider injection pending results.  Black lesion to fingers is a small hematoma.  Patient reassured.  - Vitamin B12    Alcohol withdrawal syndrome with perceptual disturbance (H)  I advised ED follow-up given patient's symptoms and concern for alcohol withdrawal seizures given his episodes of passing out.  Patient declined.  Will check labs as below and have patient start thiamine as well.  Patient to use lorazepam for symptoms.  Patient declined a chemical dependency program.  - CBC with platelets and differential  - Comprehensive metabolic panel (BMP + Alb, Alk Phos, ALT, AST, Total. Bili, TP)  - Folate  - Vitamin B1 whole blood  - thiamine (B-1) 100 MG tablet; Take 1 tablet (100 mg) by mouth daily  - LORazepam (ATIVAN) 0.5 MG tablet; TAKE 1 TABLET BY MOUTH EVERY 8 HOURS IF NEEDED    Generalized anxiety disorder    - LORazepam (ATIVAN) 0.5 MG tablet; TAKE 1 TABLET BY MOUTH EVERY 8 HOURS IF NEEDED        Discussion of management or test interpretation with external physician/other qualified healthcare professional/appropriate source - Discussed case with MD colleague  Diagnosis or treatment significantly limited by social determinants of health - alcohol abuse                   Return in about 6 days (around 1/18/2021) for previously scheduled appointment with PCP..    LOTUS Sarah CNP  [facility] DIMA Tobar is a 55 year old who presents to clinic today for the following health issues     HPI         Concern - Pain in hands, finger, feet and toes  Onset: One week ago  Description: numb, tingling, trouble moving them. Sharp pain.  Progression of Symptoms:  Same  Accompanying Signs & Symptoms:  Increase in Anxiety also having new symptoms of the following \"Fainting, weakness in arms and legs, blurred vision, auditory and visual hallucinations, " brain fog, cognitive impairment, vertigo, loss of equilibrium, irrational thoughts, extreme nightmare and dissociative experiences such as not being able to discern between wakefulness and dreaming.  Previous history of similar problem: anxiety in the past.   Therapies tried and outcome: massage and heat seems to help a little.       Patient developed extreme pain to his bilateral hands and feet.  He notes that he had Covid-19 in early December.  He notes his anxiety has ramped up.  Pain to hands and feet are numb and burning at the same time.  He notes black spots to his distal fingers.  He notes weakness to his arms and legs and has collapsed twice.  He did lose consciousness once.  He is unsure if he had a seizure.  Patient denies chest pain, palpitations.  He notes blurred vision intermittent.  He notes episodes of confusion, where things don't look familiar to him anymore.  It happens once per day.  He notes auditory and visual hallucinations.  He feels sensitive to light and sound.  He notes paranoid thoughts and feel he can't determine between wakefulness and sleep.  Patient is concerned that symptoms are related to Covid-19.    Patient is a diabetic- his blood sugars have been significantly elevated since Covid-19.  Blood sugars have been in 200-300.  He increased his insulin.  He is now taking 60 units of lantus at night and is taking 12 units per meal or more.    Patient notes that was drinking more than normal and stopped drinking completely on Friday.  He was previously drinking 4 drinks per day.  He notes hand symptoms started prior to quitting drinking and the other symptoms started after quitting drinking.  He does not feel these symptoms are improving.  He denies any other drug use.         Review of Systems   Constitutional, HEENT, cardiovascular, pulmonary, gi and gu systems are negative, except as otherwise noted.      Objective    BP (!) 156/88   Pulse 90   Temp 98  F (36.7  C)   Wt 88.2 kg  (194 lb 8 oz)   SpO2 99%   BMI 32.37 kg/m    Body mass index is 32.37 kg/m .  Physical Exam   GENERAL: healthy, alert and no distress  EYES: Eyes grossly normal to inspection, PERRL and conjunctivae and sclerae normal  RESP: lungs clear to auscultation - no rales, rhonchi or wheezes  CV: regular rate and rhythm, normal S1 S2, no S3 or S4, no murmur, click or rub, no peripheral edema and peripheral pulses strong  MS: no gross musculoskeletal defects noted, no edema  NEURO: Normal strength and tone, sensory exam grossly normal, proprioception normal, mentation intact, speech normal and cranial nerves 2-12 intact  PSYCH: mentation appears normal, anxious, judgement and insight intact and appearance well groomed

## 2021-01-12 NOTE — RESULT ENCOUNTER NOTE
Dear Amadou,    Your recent test results are attached.      No anemia.    If you have any questions please feel free to contact [phone] or myself via [place].    Sincerely,  Shruthi Farris, CNP

## 2021-01-13 LAB
ALBUMIN SERPL-MCNC: 4.1 G/DL (ref 3.4–5)
ALP SERPL-CCNC: 68 U/L (ref 40–150)
ALT SERPL W P-5'-P-CCNC: 74 U/L (ref 0–70)
ANION GAP SERPL CALCULATED.3IONS-SCNC: 4 MMOL/L (ref 3–14)
AST SERPL W P-5'-P-CCNC: 27 U/L (ref 0–45)
BILIRUB SERPL-MCNC: 0.4 MG/DL (ref 0.2–1.3)
BUN SERPL-MCNC: 14 MG/DL (ref 7–30)
CALCIUM SERPL-MCNC: 10 MG/DL (ref 8.5–10.1)
CHLORIDE SERPL-SCNC: 105 MMOL/L (ref 94–109)
CO2 SERPL-SCNC: 29 MMOL/L (ref 20–32)
CREAT SERPL-MCNC: 0.8 MG/DL (ref 0.66–1.25)
FOLATE SERPL-MCNC: 32 NG/ML
GFR SERPL CREATININE-BSD FRML MDRD: >90 ML/MIN/{1.73_M2}
GLUCOSE SERPL-MCNC: 211 MG/DL (ref 70–99)
POTASSIUM SERPL-SCNC: 3.8 MMOL/L (ref 3.4–5.3)
PROT SERPL-MCNC: 7.5 G/DL (ref 6.8–8.8)
SODIUM SERPL-SCNC: 138 MMOL/L (ref 133–144)
VIT B12 SERPL-MCNC: 876 PG/ML (ref 193–986)

## 2021-01-13 NOTE — RESULT ENCOUNTER NOTE
Dear Amadou,    Your recent test results are attached.      Normal kidney function and electrolytes.      If you have any questions please feel free to contact [phone] or myself via [email or web address].    Sincerely,  Shruthi Farris, CNP

## 2021-01-13 NOTE — TELEPHONE ENCOUNTER
Routing refill request to provider for review/approval because:  BP Readings from Last 3 Encounters:   01/12/21 (!) 156/88   10/24/19 121/78   08/16/19 (!) 157/94

## 2021-01-14 RX ORDER — OXYCODONE HYDROCHLORIDE 5 MG/1
5 TABLET ORAL EVERY 6 HOURS PRN
Qty: 30 TABLET | Refills: 0 | Status: SHIPPED | OUTPATIENT
Start: 2021-01-14 | End: 2021-04-02

## 2021-01-14 RX ORDER — LOSARTAN POTASSIUM 100 MG/1
100 TABLET ORAL DAILY
Qty: 90 TABLET | Refills: 3 | Status: SHIPPED | OUTPATIENT
Start: 2021-01-14 | End: 2022-01-16

## 2021-01-15 LAB — VIT B1 BLD-MCNC: 173 NMOL/L (ref 70–180)

## 2021-01-16 NOTE — PROGRESS NOTES
Assessment & Plan   Problem List Items Addressed This Visit     Erectile dysfunction    Relevant Medications    sildenafil (VIAGRA) 100 MG tablet    Malignant melanoma of skin of trunk (H)    Type 2 diabetes mellitus without complication, without long-term current use of insulin (H)    Relevant Medications    alcohol swab prep pads    insulin aspart (NOVOLOG FLEXPEN) 100 UNIT/ML pen    insulin glargine (LANTUS PEN) 100 UNIT/ML pen    insulin lispro (HUMALOG KWIKPEN) 100 UNIT/ML (1 unit dial) KWIKPEN    metFORMIN (GLUCOPHAGE-XR) 500 MG 24 hr tablet    liraglutide (VICTOZA PEN) 18 MG/3ML solution    Other Relevant Orders    Albumin Random Urine Quantitative with Creat Ratio (Completed)      Other Visit Diagnoses     Screen for colon cancer    -  Primary    HTN, goal below 140/90        Relevant Medications    hydrochlorothiazide (HYDRODIURIL) 25 MG tablet    Type 2 diabetes mellitus without complication, with long-term current use of insulin (H)        Relevant Medications    insulin aspart (NOVOLOG FLEXPEN) 100 UNIT/ML pen    insulin glargine (LANTUS PEN) 100 UNIT/ML pen    insulin lispro (HUMALOG KWIKPEN) 100 UNIT/ML (1 unit dial) KWIKPEN    metFORMIN (GLUCOPHAGE-XR) 500 MG 24 hr tablet    liraglutide (VICTOZA PEN) 18 MG/3ML solution    Hyperlipidemia LDL goal <100        Relevant Medications    rosuvastatin (CRESTOR) 40 MG tablet    Other Relevant Orders    Lipid panel reflex to direct LDL Fasting (Completed)    Chronic pain syndrome        Relevant Orders    Drug Abuse Screen Panel 13, Urine (Pain Care Package) (Completed)    Non morbid obesity due to excess calories        Relevant Medications    insulin aspart (NOVOLOG FLEXPEN) 100 UNIT/ML pen    insulin glargine (LANTUS PEN) 100 UNIT/ML pen    insulin lispro (HUMALOG KWIKPEN) 100 UNIT/ML (1 unit dial) KWIKPEN    metFORMIN (GLUCOPHAGE-XR) 500 MG 24 hr tablet    liraglutide (VICTOZA PEN) 18 MG/3ML solution    Nystagmus associated with central vestibular  "disorder        Relevant Orders    MR Brain w/o Contrast    Primary insomnia        Relevant Medications    QUEtiapine (SEROQUEL) 25 MG tablet    Alcohol dependence with other alcohol-induced disorder (H)        Hallucinations            Complex neurologic case with acute worsening of cognition, onset of hallucinations, unsteady gait, anxiety and new nystagmus    Previous history of stimulant abuse  Recent increase in alcohol consumption  Multi drug effects   recent covid 19 with severe symptoms  Anxiety disorder  History of malignant melanoma      Recommendations - stop all alcohol now 7 days into this   Was drinking light beer but 7- 10 12 ounce doses per day    Stop lorazepam.  Minimize oxycodone  Start seroquel at night and one more 25 mg dose prn for the times he would take lorezapam    Await labs and imaging   May need neurology    Consider lyme with current swelling in the hands and feet    No other signs of endocarditis but one concering lesion on the finger    Seems to have acutely worsened with covid infection        Review of external notes as documented above     Independent interpretation of a test performed by another physician/other qualified health care professional (not separately reported) - previous work up            50 minutes spent on the date of the encounter doing chart review, history and exam, documentation and further activities as noted above         BMI:   Estimated body mass index is 31.6 kg/m  as calculated from the following:    Height as of this encounter: 1.676 m (5' 6\").    Weight as of this encounter: 88.8 kg (195 lb 12.8 oz).             No follow-ups on file.    Joseph Alanis MD  [facility] DIMA Tobar is a 55 year old who presents to clinic today for the following health issues     HPI       Diabetes Follow-up    How often are you checking your blood sugar? Four or more times daily  Blood sugar testing frequency justification:  Uncontrolled " diabetes  What time of day are you checking your blood sugars (select all that apply)?  Before and after meals  Have you had any blood sugars above 200?  yes  Have you had any blood sugars below 70?  Yes     What symptoms do you notice when your blood sugar is low?  Shaky, Dizzy and Weak    What concerns do you have today about your diabetes? None     Do you have any of these symptoms? (Select all that apply)  Numbness in feet and Burning in feet  Was off victoza.     trulicity   Pills and pens  Hard to lantus - long term insulin   Covid 19 had a really bad day and then over covid    Thinking about long term covid  Neurological in nature.  Concern over alcohol.    Still continued  None for 7 days   With covid and job is different now , working from home and all the time and stress levels.  Every day and 4-6 drinks per day starting in the morning and by self.  4-6 beers  Due to the symptoms had stopped drikning - within drawn 4-5 days  Symptoms preceeded    Nerve pain fingers toes and feet and toes   Needles - concern over diabetic neuropathy  Pain and numbness all over     Hands stiff and rigid  Post-covid  Vision is another one post-covid  Vision became so poor  Loss of consciousness once - Saturday before coming in  Memory is   Unsteady and vertigo, dizziness  Waking from dreaming is the problem  Perceptions different - not on drugs - concern about relapse  Auditory and visual off and perception is off    BP Readings from Last 2 Encounters:   01/18/21 (!) 146/82   01/12/21 (!) 156/88     Hemoglobin A1C (%)   Date Value   01/18/2021 8.2 (H)   08/16/2019 6.2 (H)     LDL Cholesterol Calculated (mg/dL)   Date Value   01/18/2021 84   05/17/2019 78               Anxiety Follow-Up    How are you doing with your anxiety since your last visit? Worsened, post covid symptoms perhaps    Are you having other symptoms that might be associated with anxiety? Yes:  disturbance in sleep    Have you had a significant life event?  Health Concerns     Are you feeling depressed? No    Do you have any concerns with your use of alcohol or other drugs? No    Social History     Tobacco Use     Smoking status: Never Smoker     Smokeless tobacco: Never Used   Substance Use Topics     Alcohol use: Yes     Comment: occ     Drug use: No     MEGAN-7 SCORE 5/17/2019 10/24/2019 1/18/2021   Total Score - - -   Total Score 10 3 15     PHQ 5/17/2019 10/24/2019 1/18/2021   PHQ-9 Total Score 11 4 6   Q9: Thoughts of better off dead/self-harm past 2 weeks Not at all Not at all Not at all     Last PHQ-9 1/18/2021   1.  Little interest or pleasure in doing things 0   2.  Feeling down, depressed, or hopeless 0   3.  Trouble falling or staying asleep, or sleeping too much 2   4.  Feeling tired or having little energy 1   5.  Poor appetite or overeating 1   6.  Feeling bad about yourself 0   7.  Trouble concentrating 0   8.  Moving slowly or restless 2   Q9: Thoughts of better off dead/self-harm past 2 weeks 0   PHQ-9 Total Score 6   Difficulty at work, home, or with people Very difficult     MEGAN-7  1/18/2021   1. Feeling nervous, anxious, or on edge 3   2. Not being able to stop or control worrying 2   3. Worrying too much about different things 2   4. Trouble relaxing 3   5. Being so restless that it is hard to sit still 2   6. Becoming easily annoyed or irritable 2   7. Feeling afraid, as if something awful might happen 1   MEGAN-7 Total Score 15   If you checked any problems, how difficult have they made it for you to do your work, take care of things at home, or get along with other people? Very difficult         How many servings of fruits and vegetables do you eat daily?  2-3    On average, how many sweetened beverages do you drink each day (Examples: soda, juice, sweet tea, etc.  Do NOT count diet or artificially sweetened beverages)?   2    How many days per week do you exercise enough to make your heart beat faster? 5    How many minutes a day do you exercise  "enough to make your heart beat faster? 20 - 29    How many days per week do you miss taking your medication? 0      Review of Systems   Constitutional, HEENT, cardiovascular, pulmonary, gi and gu systems are negative, except as otherwise noted.      Objective    BP (!) 146/82   Pulse 102   Temp 98.7  F (37.1  C) (Oral)   Resp 16   Ht 1.676 m (5' 6\")   Wt 88.8 kg (195 lb 12.8 oz)   SpO2 99%   BMI 31.60 kg/m    Body mass index is 31.6 kg/m .  Physical Exam   GENERAL: patient sweating and having low sugar reaction at the beginning  Having pressured speech, anxious    EYES: lateral beat nystagmus when looking right to left    NECK: no adenopathy, no asymmetry, masses, or scars and thyroid normal to palpation  RESP: lungs clear to auscultation - no rales, rhonchi or wheezes  CV: regular rate and rhythm, normal S1 S2, no S3 or S4, no murmur, click or rub, no peripheral edema and peripheral pulses strong  ABDOMEN: soft, nontender, no hepatosplenomegaly, no masses and bowel sounds normal  MS: swollen dusky hands with one small black blister tip of left 3rd finger  No other embolic looking areas - unclear  - not true splinter hemorrage  NEURO: Normal strength and tone, tremor fine intention tremor, light touch and pinprick normal, proprioception abnormal, abnormal mental status - anxious, speech normal, cranial nerves 2-12 intact, DTR's normal and symmetric  and gait abnormal: unsteady general    Results for orders placed or performed in visit on 01/18/21   **A1C FUTURE anytime     Status: Abnormal   Result Value Ref Range    Hemoglobin A1C 8.2 (H) 0 - 5.6 %   Lipid panel reflex to direct LDL Fasting     Status: Abnormal   Result Value Ref Range    Cholesterol 185 <200 mg/dL    Triglycerides 231 (H) <150 mg/dL    HDL Cholesterol 55 >39 mg/dL    LDL Cholesterol Calculated 84 <100 mg/dL    Non HDL Cholesterol 130 (H) <130 mg/dL   Albumin Random Urine Quantitative with Creat Ratio     Status: Abnormal   Result Value Ref " Range    Creatinine Urine 37 mg/dL    Albumin Urine mg/L 19 mg/L    Albumin Urine mg/g Cr 50.67 (H) 0 - 17 mg/g Cr   Drug Abuse Screen Panel 13, Urine (Pain Care Package)     Status: None   Result Value Ref Range    Cannabinoids (16-cwe-0-carboxy-9-THC) Not Detected NDET^Not Detected ng/mL    Phencyclidine (Phencyclidine) Not Detected NDET^Not Detected ng/mL    Cocaine (Benzoylecgonine) Not Detected NDET^Not Detected ng/mL    Methamphetamine (d-Methamphetamine) Not Detected NDET^Not Detected ng/mL    Opiates (Morphine) Not Detected NDET^Not Detected ng/mL    Amphetamine (d-Amphetamine) Not Detected NDET^Not Detected ng/mL    Benzodiazepines (Nordiazepam) Not Detected NDET^Not Detected ng/mL    Tricyclic Antidepressants (Desipramine) Not Detected NDET^Not Detected ng/mL    Methadone (Methadone) Not Detected NDET^Not Detected ng/mL    Barbiturates (Butalbital) Not Detected NDET^Not Detected ng/mL    Oxycodone (Oxycodone) Not Detected NDET^Not Detected ng/mL    Propoxyphene (Norpropoxyphene) Not Detected NDET^Not Detected ng/mL    Buprenorphine (Buprenorphine) Not Detected NDET^Not Detected ng/mL

## 2021-01-18 ENCOUNTER — OFFICE VISIT (OUTPATIENT)
Dept: FAMILY MEDICINE | Facility: CLINIC | Age: 56
End: 2021-01-18
Payer: COMMERCIAL

## 2021-01-18 VITALS
SYSTOLIC BLOOD PRESSURE: 146 MMHG | BODY MASS INDEX: 31.47 KG/M2 | HEIGHT: 66 IN | OXYGEN SATURATION: 99 % | TEMPERATURE: 98.7 F | HEART RATE: 102 BPM | DIASTOLIC BLOOD PRESSURE: 82 MMHG | WEIGHT: 195.8 LBS | RESPIRATION RATE: 16 BRPM

## 2021-01-18 DIAGNOSIS — E78.5 HYPERLIPIDEMIA LDL GOAL <100: ICD-10-CM

## 2021-01-18 DIAGNOSIS — Z79.4 TYPE 2 DIABETES MELLITUS WITHOUT COMPLICATION, WITH LONG-TERM CURRENT USE OF INSULIN (H): ICD-10-CM

## 2021-01-18 DIAGNOSIS — E66.09 NON MORBID OBESITY DUE TO EXCESS CALORIES: ICD-10-CM

## 2021-01-18 DIAGNOSIS — E11.9 TYPE 2 DIABETES MELLITUS WITHOUT COMPLICATION, WITH LONG-TERM CURRENT USE OF INSULIN (H): ICD-10-CM

## 2021-01-18 DIAGNOSIS — Z12.11 SCREEN FOR COLON CANCER: Primary | ICD-10-CM

## 2021-01-18 DIAGNOSIS — H55.09 NYSTAGMUS ASSOCIATED WITH CENTRAL VESTIBULAR DISORDER: ICD-10-CM

## 2021-01-18 DIAGNOSIS — F10.288 ALCOHOL DEPENDENCE WITH OTHER ALCOHOL-INDUCED DISORDER (H): ICD-10-CM

## 2021-01-18 DIAGNOSIS — G89.4 CHRONIC PAIN SYNDROME: ICD-10-CM

## 2021-01-18 DIAGNOSIS — C43.59 MALIGNANT MELANOMA OF SKIN OF TRUNK (H): ICD-10-CM

## 2021-01-18 DIAGNOSIS — E11.9 TYPE 2 DIABETES MELLITUS WITHOUT COMPLICATION, WITHOUT LONG-TERM CURRENT USE OF INSULIN (H): ICD-10-CM

## 2021-01-18 DIAGNOSIS — I10 HTN, GOAL BELOW 140/90: ICD-10-CM

## 2021-01-18 DIAGNOSIS — F51.01 PRIMARY INSOMNIA: ICD-10-CM

## 2021-01-18 DIAGNOSIS — N52.9 ERECTILE DYSFUNCTION, UNSPECIFIED ERECTILE DYSFUNCTION TYPE: ICD-10-CM

## 2021-01-18 DIAGNOSIS — R44.3 HALLUCINATIONS: ICD-10-CM

## 2021-01-18 LAB
AMPHETAMINES UR QL: NOT DETECTED NG/ML
BARBITURATES UR QL SCN: NOT DETECTED NG/ML
BENZODIAZ UR QL SCN: NOT DETECTED NG/ML
BUPRENORPHINE UR QL: NOT DETECTED NG/ML
CANNABINOIDS UR QL: NOT DETECTED NG/ML
CHOLEST SERPL-MCNC: 185 MG/DL
COCAINE UR QL SCN: NOT DETECTED NG/ML
CREAT UR-MCNC: 37 MG/DL
D-METHAMPHET UR QL: NOT DETECTED NG/ML
HBA1C MFR BLD: 8.2 % (ref 0–5.6)
HDLC SERPL-MCNC: 55 MG/DL
LDLC SERPL CALC-MCNC: 84 MG/DL
METHADONE UR QL SCN: NOT DETECTED NG/ML
MICROALBUMIN UR-MCNC: 19 MG/L
MICROALBUMIN/CREAT UR: 50.67 MG/G CR (ref 0–17)
NONHDLC SERPL-MCNC: 130 MG/DL
OPIATES UR QL SCN: NOT DETECTED NG/ML
OXYCODONE UR QL SCN: NOT DETECTED NG/ML
PCP UR QL SCN: NOT DETECTED NG/ML
PROPOXYPH UR QL: NOT DETECTED NG/ML
TRICYCLICS UR QL SCN: NOT DETECTED NG/ML
TRIGL SERPL-MCNC: 231 MG/DL

## 2021-01-18 PROCEDURE — 36415 COLL VENOUS BLD VENIPUNCTURE: CPT | Performed by: INTERNAL MEDICINE

## 2021-01-18 PROCEDURE — 82043 UR ALBUMIN QUANTITATIVE: CPT | Performed by: INTERNAL MEDICINE

## 2021-01-18 PROCEDURE — 80061 LIPID PANEL: CPT | Performed by: INTERNAL MEDICINE

## 2021-01-18 PROCEDURE — 80306 DRUG TEST PRSMV INSTRMNT: CPT | Performed by: INTERNAL MEDICINE

## 2021-01-18 PROCEDURE — 86618 LYME DISEASE ANTIBODY: CPT | Performed by: INTERNAL MEDICINE

## 2021-01-18 PROCEDURE — 99215 OFFICE O/P EST HI 40 MIN: CPT | Performed by: INTERNAL MEDICINE

## 2021-01-18 PROCEDURE — 83036 HEMOGLOBIN GLYCOSYLATED A1C: CPT | Performed by: INTERNAL MEDICINE

## 2021-01-18 RX ORDER — INSULIN LISPRO 100 [IU]/ML
12-20 INJECTION, SOLUTION INTRAVENOUS; SUBCUTANEOUS
Qty: 54 ML | Refills: 3 | Status: SHIPPED | OUTPATIENT
Start: 2021-01-18

## 2021-01-18 RX ORDER — SILDENAFIL 100 MG/1
TABLET, FILM COATED ORAL
Qty: 20 TABLET | Refills: 3 | Status: SHIPPED | OUTPATIENT
Start: 2021-01-18 | End: 2022-01-25

## 2021-01-18 RX ORDER — HYDROCHLOROTHIAZIDE 25 MG/1
25 TABLET ORAL DAILY
Qty: 90 TABLET | Refills: 3 | Status: SHIPPED | OUTPATIENT
Start: 2021-01-18 | End: 2023-06-07

## 2021-01-18 RX ORDER — METFORMIN HCL 500 MG
TABLET, EXTENDED RELEASE 24 HR ORAL
Qty: 120 TABLET | Refills: 5 | Status: SHIPPED | OUTPATIENT
Start: 2021-01-18 | End: 2021-08-19

## 2021-01-18 RX ORDER — LIRAGLUTIDE 6 MG/ML
INJECTION SUBCUTANEOUS
Qty: 27 ML | Refills: 9 | Status: SHIPPED | OUTPATIENT
Start: 2021-01-18 | End: 2022-01-21

## 2021-01-18 RX ORDER — GLUCOSAMINE HCL/CHONDROITIN SU 500-400 MG
CAPSULE ORAL
Qty: 100 EACH | Refills: 3 | Status: SHIPPED | OUTPATIENT
Start: 2021-01-18

## 2021-01-18 RX ORDER — INSULIN ASPART 100 [IU]/ML
INJECTION, SOLUTION INTRAVENOUS; SUBCUTANEOUS
Qty: 60 ML | Refills: 3 | Status: SHIPPED | OUTPATIENT
Start: 2021-01-18

## 2021-01-18 RX ORDER — QUETIAPINE FUMARATE 25 MG/1
25-50 TABLET, FILM COATED ORAL AT BEDTIME
Qty: 62 TABLET | Refills: 3 | Status: SHIPPED | OUTPATIENT
Start: 2021-01-18 | End: 2021-04-02

## 2021-01-18 RX ORDER — ROSUVASTATIN CALCIUM 40 MG/1
40 TABLET, COATED ORAL DAILY
Qty: 90 TABLET | Refills: 3 | Status: SHIPPED | OUTPATIENT
Start: 2021-01-18 | End: 2022-02-18

## 2021-01-18 ASSESSMENT — ANXIETY QUESTIONNAIRES
3. WORRYING TOO MUCH ABOUT DIFFERENT THINGS: MORE THAN HALF THE DAYS
6. BECOMING EASILY ANNOYED OR IRRITABLE: MORE THAN HALF THE DAYS
IF YOU CHECKED OFF ANY PROBLEMS ON THIS QUESTIONNAIRE, HOW DIFFICULT HAVE THESE PROBLEMS MADE IT FOR YOU TO DO YOUR WORK, TAKE CARE OF THINGS AT HOME, OR GET ALONG WITH OTHER PEOPLE: VERY DIFFICULT
2. NOT BEING ABLE TO STOP OR CONTROL WORRYING: MORE THAN HALF THE DAYS
GAD7 TOTAL SCORE: 15
1. FEELING NERVOUS, ANXIOUS, OR ON EDGE: NEARLY EVERY DAY
5. BEING SO RESTLESS THAT IT IS HARD TO SIT STILL: MORE THAN HALF THE DAYS
7. FEELING AFRAID AS IF SOMETHING AWFUL MIGHT HAPPEN: SEVERAL DAYS

## 2021-01-18 ASSESSMENT — PATIENT HEALTH QUESTIONNAIRE - PHQ9
5. POOR APPETITE OR OVEREATING: NEARLY EVERY DAY
SUM OF ALL RESPONSES TO PHQ QUESTIONS 1-9: 6

## 2021-01-18 ASSESSMENT — MIFFLIN-ST. JEOR: SCORE: 1655.89

## 2021-01-19 LAB — B BURGDOR IGG+IGM SER QL: 0.31 (ref 0–0.89)

## 2021-01-19 ASSESSMENT — ANXIETY QUESTIONNAIRES: GAD7 TOTAL SCORE: 15

## 2021-01-21 ENCOUNTER — HOSPITAL ENCOUNTER (OUTPATIENT)
Dept: MRI IMAGING | Facility: CLINIC | Age: 56
Discharge: HOME | End: 2021-01-21
Attending: INTERNAL MEDICINE | Admitting: INTERNAL MEDICINE
Payer: COMMERCIAL

## 2021-01-21 DIAGNOSIS — H55.09 NYSTAGMUS ASSOCIATED WITH CENTRAL VESTIBULAR DISORDER: ICD-10-CM

## 2021-01-21 PROCEDURE — 70551 MRI BRAIN STEM W/O DYE: CPT

## 2021-01-27 DIAGNOSIS — E11.9 TYPE 2 DIABETES MELLITUS WITHOUT COMPLICATION, WITHOUT LONG-TERM CURRENT USE OF INSULIN (H): ICD-10-CM

## 2021-03-20 DIAGNOSIS — E11.9 TYPE 2 DIABETES MELLITUS WITHOUT COMPLICATION, WITHOUT LONG-TERM CURRENT USE OF INSULIN (H): ICD-10-CM

## 2021-03-22 RX ORDER — FLURBIPROFEN SODIUM 0.3 MG/ML
SOLUTION/ DROPS OPHTHALMIC
Qty: 400 EACH | Refills: 1 | Status: SHIPPED | OUTPATIENT
Start: 2021-03-22 | End: 2021-09-29

## 2021-04-02 ENCOUNTER — OFFICE VISIT (OUTPATIENT)
Dept: FAMILY MEDICINE | Facility: CLINIC | Age: 56
End: 2021-04-02
Payer: COMMERCIAL

## 2021-04-02 VITALS
TEMPERATURE: 97.6 F | OXYGEN SATURATION: 98 % | WEIGHT: 179 LBS | BODY MASS INDEX: 28.89 KG/M2 | SYSTOLIC BLOOD PRESSURE: 141 MMHG | DIASTOLIC BLOOD PRESSURE: 86 MMHG | HEART RATE: 100 BPM

## 2021-04-02 DIAGNOSIS — M54.40 CHRONIC LOW BACK PAIN WITH SCIATICA, SCIATICA LATERALITY UNSPECIFIED, UNSPECIFIED BACK PAIN LATERALITY: ICD-10-CM

## 2021-04-02 DIAGNOSIS — F43.25 ADJUSTMENT DISORDER WITH MIXED DISTURBANCE OF EMOTIONS AND CONDUCT: ICD-10-CM

## 2021-04-02 DIAGNOSIS — F51.01 PRIMARY INSOMNIA: ICD-10-CM

## 2021-04-02 DIAGNOSIS — G89.29 CHRONIC LOW BACK PAIN WITH SCIATICA, SCIATICA LATERALITY UNSPECIFIED, UNSPECIFIED BACK PAIN LATERALITY: ICD-10-CM

## 2021-04-02 DIAGNOSIS — M75.42 IMPINGEMENT SYNDROME OF SHOULDER REGION, LEFT: ICD-10-CM

## 2021-04-02 DIAGNOSIS — E11.9 TYPE 2 DIABETES MELLITUS WITHOUT COMPLICATION, WITHOUT LONG-TERM CURRENT USE OF INSULIN (H): ICD-10-CM

## 2021-04-02 DIAGNOSIS — Z12.11 SCREEN FOR COLON CANCER: Primary | ICD-10-CM

## 2021-04-02 PROCEDURE — 99214 OFFICE O/P EST MOD 30 MIN: CPT | Performed by: INTERNAL MEDICINE

## 2021-04-02 RX ORDER — OXYCODONE HYDROCHLORIDE 5 MG/1
5 TABLET ORAL EVERY 6 HOURS PRN
Qty: 30 TABLET | Refills: 0 | Status: SHIPPED | OUTPATIENT
Start: 2021-04-02 | End: 2021-06-05

## 2021-04-02 RX ORDER — QUETIAPINE FUMARATE 25 MG/1
25-100 TABLET, FILM COATED ORAL
Qty: 90 TABLET | Refills: 3 | Status: SHIPPED | OUTPATIENT
Start: 2021-04-02 | End: 2021-06-28

## 2021-04-02 NOTE — PROGRESS NOTES
"    Assessment & Plan   Problem List Items Addressed This Visit     Adjustment disorder with mixed disturbance of emotions and conduct    Type 2 diabetes mellitus without complication, without long-term current use of insulin (H)      Other Visit Diagnoses     Screen for colon cancer    -  Primary    Primary insomnia        Relevant Medications    QUEtiapine (SEROQUEL) 25 MG tablet    Chronic low back pain with sciatica, sciatica laterality unspecified, unspecified back pain laterality        Relevant Medications    QUEtiapine (SEROQUEL) 25 MG tablet    oxyCODONE (ROXICODONE) 5 MG tablet    Impingement syndrome of shoulder region, left        Relevant Orders    [facility]       BP     141/86  4/5/2021    Lab Results   Component Value Date     01/12/2021     Lab Results   Component Value Date    A1C 8.2 01/18/2021     Lab Results   Component Value Date    LDL 84 01/18/2021     Lab Results   Component Value Date    MICROL 19 01/18/2021     No results found for: MICROALBUMIN    Patient with very pressured speech, not sleeping   Denies feeling anxious   But has intusive thoughts, racing mind, no sleep,   In the past has relied on marajuana and alcohol and other substances to \"slow his mind\"  Does have times when he crashes as well  Has come off the xanax  Won't start daily meds    Will try low doses of seroquel at bedtime to try to calm the mind and encourage sleep - however, patient has some bipolar like manic activity   Cycles pretty rapidly however      0956}     BMI:   Estimated body mass index is 28.89 kg/m  as calculated from the following:    Height as of 1/18/21: 1.676 m (5' 6\").    Weight as of this encounter: 81.2 kg (179 lb).   Weight management plan: Discussed healthy diet and exercise guidelines    Work on weight loss  Regular exercise    No follow-ups on file.    Joseph Alanis MD  [facility] DIMA Swanson is a 55 year old who presents for the following " health issues  HPI       L Shoulder and back pain   Onset/Duration: 3 weeks   Description  Location: L shoulder and back   Joint Swelling: no  Redness: no  Pain: YES  Warmth: no  Intensity:  5 /10 shoulder and back   Progression of Symptoms:  worsening  Accompanying signs and symptoms:   Fevers: no  Numbness/tingling/weakness: no  History  Trauma to the area: YES was lifting,cleaning   Recent illness:  no  Previous similar problem: no  Previous evaluation:  no  Precipitating or alleviating factors:  Aggravating factors include: when using   Therapies tried and outcome: CBD cream, Ibuprofen     Post Covid symptoms discussion and check R ear   Left shoulder and lower back and concern at night time   Retaining fluids  victoza - back to   195-179   Sugars a few times in the 400 s  Take medications and carbs  100-120 at the most   Starting to keep steady with travel.  Reductions 1-2 drinks per day   15 in a week  gummies ( THC) in them.  Few sleep not restorative sleep            Review of Systems   Constitutional, HEENT, cardiovascular, pulmonary, gi and gu systems are negative, except as otherwise noted.      Objective    BP (!) 141/86   Pulse 100   Temp 97.6  F (36.4  C) (Tympanic)   Wt 81.2 kg (179 lb)   SpO2 98%   BMI 28.89 kg/m    Body mass index is 28.89 kg/m .  Physical Exam   GENERAL: healthy, alert and no distress  EYES: Eyes grossly normal to inspection, PERRL and conjunctivae and sclerae normal  HENT: ear canals and TM's normal, nose and mouth without ulcers or lesions  NECK: no adenopathy, no asymmetry, masses, or scars and thyroid normal to palpation  RESP: lungs clear to auscultation - no rales, rhonchi or wheezes  CV: regular rate and rhythm, normal S1 S2, no S3 or S4, no murmur, click or rub, no peripheral edema and peripheral pulses strong  ABDOMEN: soft, nontender, no hepatosplenomegaly, no masses and bowel sounds normal  MS: left shoulder bursitis without rotator cuff tear  Low back with mild  pain    SKIN: no suspicious lesions or rashes  NEURO: Normal strength and tone, mentation intact and speech normal  BACK: no CVA tenderness, no paralumbar tenderness  PSYCH: mentation appears normal, affect normal/bright    No results found for any visits on 04/02/21.  No results found for this or any previous visit (from the past 24 hour(s)).

## 2021-04-05 PROBLEM — F43.25 ADJUSTMENT DISORDER WITH MIXED DISTURBANCE OF EMOTIONS AND CONDUCT: Status: ACTIVE | Noted: 2021-04-05

## 2021-04-08 ENCOUNTER — THERAPY VISIT (OUTPATIENT)
Dept: PHYSICAL THERAPY | Facility: CLINIC | Age: 56
End: 2021-04-08
Attending: INTERNAL MEDICINE
Payer: COMMERCIAL

## 2021-04-08 DIAGNOSIS — M25.512 ACUTE PAIN OF LEFT SHOULDER: ICD-10-CM

## 2021-04-08 DIAGNOSIS — M75.42 IMPINGEMENT SYNDROME OF SHOULDER REGION, LEFT: ICD-10-CM

## 2021-04-08 DIAGNOSIS — M54.42 ACUTE LEFT-SIDED LOW BACK PAIN WITH LEFT-SIDED SCIATICA: ICD-10-CM

## 2021-04-08 PROCEDURE — 97110 THERAPEUTIC EXERCISES: CPT | Mod: GP | Performed by: PHYSICAL THERAPIST

## 2021-04-08 PROCEDURE — 97162 PT EVAL MOD COMPLEX 30 MIN: CPT | Mod: GP | Performed by: PHYSICAL THERAPIST

## 2021-04-08 NOTE — PROGRESS NOTES
[facility] Physical Therapy Initial Evaluation  4/8/2021     Precautions/Restrictions/MD instructions: Evaluate and Treat for L shoulder pain and LBP    Therapist Assessment: Amadou Bach is a 55 year old male patient presenting to Physical Therapy with L shoulder pain and L-sided LBP. Patient demonstrates decreased and painful L shoulder elevation mobility, painful arc of motion with flexion, abduction, and IR, as well as pain with lumbar flexion positions. Signs and symptoms are consistent with likely rotator cuff impingement of L shoulder and posterolateral disc derangement of lumbar spine. These impairments limit their ability to reach overhead and behind back, lift/carry heavier items, and perform tasks around his home. Skilled PT services are necessary in order to reduce impairments and improve independent function.    Subjective History    Injury/Condition Details:  Presenting Complaint L shoulder pain and LBP   Onset Timing/Date Both L shoulder and low back began to hurt more about 1 month ago; however, low back had been painful prior; MD referral from 4/2/2021    Mechanism Pt notes an increase in low back and L shoulder pain after lifting heavier items while organizing his garage.     Symptom Behavior Details    Primary Symptoms Constant symptoms; worsen with activity, pain (Location: side of L shoulder and L low back/buttock, Quality: Sharp and Aching/Throbbing), stiffness, catching/locking in shoulder, weakness; some numbness/tingling into B hands/fingers; reports occasional bouts of radiating pain into L hip/thigh   Worst Pain 8/10   Symptom Provocators Overhead reaching, bending over, lifting/carrying, laying on L shoulder, reaching behind back   Best Pain 2/10    Symptom Relievers CBD cream, heat   Time of day dependent? Worse in evening after activity   Recent symptom change? symptoms worsening     Prior Testing/Intervention for current condition:  Prior Tests None recently   Prior  Treatment Injections: previous epidural injections to low back (quite helpful)     Lifestyle & General Medical History:  Employment Sales   Usual physical activities  (within past year) Computer work, lifting/carrying, prolonged sitting, driving   Orthopaedic History  Previous neck and back pain; per pt report, hx of neck fracture with no issues   Medication  HTN, pain, insulin   Notable medical history See Epic Chart; DM II, HTN   Patient goals Decrease pain and improve function   Patient Reported Health good     Red Flags: (Bold when present) - reviewed the following and denies  Malaise, unexplained weight loss, night pain, fever    OBJECTIVE    Observation/Posture: mildly rounded shoulders and forward head posture    Cervical Screen: extension recreates pain into L shoulder; limited L rotation; mild limitation and stretch w/ side bending B    Shoulder: * if reproductive of patient's primary complaint   PROM L PROM R AROM L AROM R MMT L MMT R   Flex   110* WNL 4+* 5   Abd   110* WNL 4+* 5   IR    WNL 5 5   ER   45 (standing, elbow at side) WNL 4+ 5   Ext/IR   Belt line* T12     Lower trap         Middle trap           Scapulothoraic Rhythm: mild increase in medial and inferior border protrusion on L; decreased scapular upward rotation on L with active shoulder abduction    Special tests:   L R   Impingement     Neer's +    Hawkin's-Silvio +    Cross-over test +    Painful Arc of Motion +    Internal impingement test     Pain with resisted ER -    Pull Test     Labral     Bradford's     Crank     Instability     Apprehension (anterior)     Relocation (anterior)     Anterior Load & Shift     Posterior Load & Shift     Multi-Directional Instability      Sulcus     Biceps      Speed's     Rotator Cuff Tear     Drop Arm -    Belly Press     Lift off        Palpation: TTP of L rotator cuff insertion to humeral head    LUMBAR EVALUATION  Movement Loss:   Elton Mod Min Nil Pain   Flexion  X (fignertips to shins   Yes, more  of a stretch   Extension   x  Yes   Side Bend R   x  No    Side Bend L   x  No      Repeated Movements:  Flexion in standing: no change in symptoms  Extension in standing: mild shift to R as pt extended; first few repetitions were painful, but improved with repeated extension  Extension in lying (JUAN): no discomfort noted    Palpation: TTP of lower L lumbar paraspinals and upper glute musculature near L SIJ    ASSESSMENT/PLAN  Patient is a 55 year old male with lumbar and L shoulder complaints.    Patient has the following significant findings with corresponding treatment plan.                Diagnosis 1:  L-sided LBP and L shoulder pain; signs and symptoms consistent with likely L shoulder rotator cuff impingement and L-sided posterolateral disc derangement of lumbar spine     Pain -  hot/cold therapy, US, electric stimulation, mechanical traction, manual therapy, splint/taping/bracing/orthotics, self management, education, directional preference exercise and home program  Decreased ROM/flexibility - manual therapy, therapeutic exercise and home program  Decreased strength - therapeutic exercise, therapeutic activities and home program  Inflammation - cold therapy, US, electric stimulation and self management/home program  Impaired muscle performance - neuro re-education and home program  Decreased function - therapeutic activities and home program  Impaired posture - neuro re-education and home program    Therapy Evaluation Codes:   1) History comprised of:   Personal factors that impact the plan of care:      Anxiety and Past/current experiences.    Comorbidity factors that impact the plan of care are:      Diabetes, High blood pressure and Numbness/tingling.     Medications impacting care: High blood pressure, Pain and insulin.  2) Examination of Body Systems comprised of:   Body structures and functions that impact the plan of care:      Lumbar spine and Shoulder.   Activity limitations that impact the plan of  care are:      Bathing, Bending, Dressing, Lifting, Squatting/kneeling, Walking, Working, Sleeping and Laying down.  3) Clinical presentation characteristics are:   Evolving/Changing.  4) Decision-Making    Moderate complexity using standardized patient assessment instrument and/or measureable assessment of functional outcome.  Cumulative Therapy Evaluation is: Moderate complexity.    Previous and current functional limitations:  (See Goal Flow Sheet for this information)    Short term and Long term goals: (See Goal Flow Sheet for this information)     Communication ability:  Patient appears to be able to clearly communicate and understand verbal and written communication and follow directions correctly.  Treatment Explanation - The following has been discussed with the patient:   RX ordered/plan of care  Anticipated outcomes  Possible risks and side effects  This patient would benefit from PT intervention to resume normal activities.   Rehab potential is good.    Frequency:  1 X week, once daily  Duration:  for 4 weeks, tapering to 2x/month for 2 months  Discharge Plan:  Achieve all LTG.  Independent in home treatment program.  Reach maximal therapeutic benefit.    Please refer to the daily flowsheet for treatment today, total treatment time and time spent performing 1:1 timed codes.

## 2021-04-08 NOTE — LETTER
[facility]  [address]  [place] [address]  [phone]    2021    Re: Amadou Bach   :   1965  MRN:  [id number]   REFERRING PHYSICIAN:   [facility]    Date of Initial Evaluation:  21  Visits:  Rxs Used: 1  Reason for Referral:     Impingement syndrome of shoulder region, left  Acute left-sided low back pain with left-sided sciatica  Acute pain of left shoulder    [facility] Initial Evaluation  2021     Precautions/Restrictions/MD instructions: Evaluate and Treat for L shoulder pain and LBP    Therapist Assessment: Amadou Bach is a 55 year old male patient presenting to Physical Therapy with L shoulder pain and L-sided LBP. Patient demonstrates decreased and painful L shoulder elevation mobility, painful arc of motion with flexion, abduction, and IR, as well as pain with lumbar flexion positions. Signs and symptoms are consistent with likely rotator cuff impingement of L shoulder and posterolateral disc derangement of lumbar spine. These impairments limit their ability to reach overhead and behind back, lift/carry heavier items, and perform tasks around his home. Skilled PT services are necessary in order to reduce impairments and improve independent function.    Subjective History    Injury/Condition Details:  Presenting Complaint L shoulder pain and LBP   Onset Timing/Date Both L shoulder and low back began to hurt more about 1 month ago; however, low back had been painful prior; MD referral from 2021    Mechanism Pt notes an increase in low back and L shoulder pain after lifting heavier items while organizing his garage.     Symptom Behavior Details    Primary Symptoms Constant symptoms; worsen with activity, pain (Location: side of L shoulder and L low back/buttock, Quality: Sharp and Aching/Throbbing), stiffness, catching/locking in  shoulder, weakness; some numbness/tingling into B hands/fingers; reports occasional bouts of radiating pain into L hip/thigh   Worst Pain 8/10   Symptom Provocators Overhead reaching, bending over, lifting/carrying, laying on L shoulder, reaching behind back   Best Pain 2/10    Symptom Relievers CBD cream, heat   Time of day dependent? Worse in evening after activity   Recent symptom change? symptoms worsening     Prior Testing/Intervention for current condition:  Prior Tests None recently   Prior Treatment Injections: previous epidural injections to low back (quite helpful)     Lifestyle & General Medical History:  Employment Sales   Usual physical activities  (within past year) Computer work, lifting/carrying, prolonged sitting, driving   Orthopaedic History  Previous neck and back pain; per pt report, hx of neck fracture with no issues   Medication  HTN, pain, insulin   Notable medical history See Epic Chart; DM II, HTN   Patient goals Decrease pain and improve function   Patient Reported Health good     Red Flags: (Bold when present) - reviewed the following and denies  Malaise, unexplained weight loss, night pain, fever    OBJECTIVE    Observation/Posture: mildly rounded shoulders and forward head posture    Cervical Screen: extension recreates pain into L shoulder; limited L rotation; mild limitation and stretch w/ side bending B    Shoulder: * if reproductive of patient's primary complaint   PROM L PROM R AROM L AROM R MMT L MMT R   Flex   110* WNL 4+* 5   Abd   110* WNL 4+* 5   IR    WNL 5 5   ER   45 (standing, elbow at side) WNL 4+ 5   Ext/IR   Belt line* T12     Lower trap         Middle trap           Scapulothoraic Rhythm: mild increase in medial and inferior border protrusion on L; decreased scapular upward rotation on L with active shoulder abduction    Special tests:   L R   Impingement     Neer's +    Hawkin's-Silvio +    Cross-over test +    Painful Arc of Motion +    Internal impingement test      Pain with resisted ER -    Pull Test     Labral     Enoree's     Crank     Instability     Apprehension (anterior)     Relocation (anterior)     Anterior Load & Shift     Posterior Load & Shift     Multi-Directional Instability      Sulcus     Biceps      Speed's     Rotator Cuff Tear     Drop Arm -    Belly Press     Lift off        Palpation: TTP of L rotator cuff insertion to humeral head    LUMBAR EVALUATION  Movement Loss:   Elton Mod Min Nil Pain   Flexion  X (fignertips to shins   Yes, more of a stretch   Extension   x  Yes   Side Bend R   x  No    Side Bend L   x  No      Repeated Movements:  Flexion in standing: no change in symptoms  Extension in standing: mild shift to R as pt extended; first few repetitions were painful, but improved with repeated extension  Extension in lying (JUAN): no discomfort noted    Palpation: TTP of lower L lumbar paraspinals and upper glute musculature near L SIJ    ASSESSMENT/PLAN  Patient is a 55 year old male with lumbar and L shoulder complaints.    Patient has the following significant findings with corresponding treatment plan.                Diagnosis 1:  L-sided LBP and L shoulder pain; signs and symptoms consistent with likely L shoulder rotator cuff impingement and L-sided posterolateral disc derangement of lumbar spine   Pain -  hot/cold therapy, US, electric stimulation, mechanical traction, manual therapy, splint/taping/bracing/orthotics, self management, education, directional preference exercise and home program  Decreased ROM/flexibility - manual therapy, therapeutic exercise and home program  Decreased strength - therapeutic exercise, therapeutic activities and home program  Inflammation - cold therapy, US, electric stimulation and self management/home program  Impaired muscle performance - neuro re-education and home program  Decreased function - therapeutic activities and home program  Impaired posture - neuro re-education and home program    Therapy  Evaluation Codes:   1) History comprised of:   Personal factors that impact the plan of care:      Anxiety and Past/current experiences.    Comorbidity factors that impact the plan of care are:      Diabetes, High blood pressure and Numbness/tingling.     Medications impacting care: High blood pressure, Pain and insulin.  2) Examination of Body Systems comprised of:   Body structures and functions that impact the plan of care:      Lumbar spine and Shoulder.   Activity limitations that impact the plan of care are:      Bathing, Bending, Dressing, Lifting, Squatting/kneeling, Walking, Working, Sleeping and Laying down.  3) Clinical presentation characteristics are:   Evolving/Changing.  4) Decision-Making    Moderate complexity using standardized patient assessment instrument and/or measureable assessment of functional outcome.    Cumulative Therapy Evaluation is: Moderate complexity.  Previous and current functional limitations:  (See Goal Flow Sheet for this information)    Short term and Long term goals: (See Goal Flow Sheet for this information)   Communication ability:  Patient appears to be able to clearly communicate and understand verbal and written communication and follow directions correctly.  Treatment Explanation - The following has been discussed with the patient:   RX ordered/plan of care  Anticipated outcomes  Possible risks and side effects  This patient would benefit from PT intervention to resume normal activities.   Rehab potential is good.    Frequency:  1 X week, once daily  Duration:  for 4 weeks, tapering to 2x/month for 2 months  Discharge Plan:  Achieve all LTG.  Independent in home treatment program.  Reach maximal therapeutic benefit.    Thank you for your referral.    INQUIRIES  Therapist: Roger Santiago DPT  [facility] [place] [facility] [place]  [address]  [place] [address]  Phone: [phone]  Fax: [phone]

## 2021-04-29 ENCOUNTER — TRANSFERRED RECORDS (OUTPATIENT)
Dept: HEALTH INFORMATION MANAGEMENT | Facility: CLINIC | Age: 56
End: 2021-04-29

## 2021-04-29 LAB — RETINOPATHY: NEGATIVE

## 2021-05-25 DIAGNOSIS — E11.9 TYPE 2 DIABETES MELLITUS WITHOUT COMPLICATION, WITH LONG-TERM CURRENT USE OF INSULIN (H): ICD-10-CM

## 2021-05-25 DIAGNOSIS — Z79.4 TYPE 2 DIABETES MELLITUS WITHOUT COMPLICATION, WITH LONG-TERM CURRENT USE OF INSULIN (H): ICD-10-CM

## 2021-06-05 ENCOUNTER — MYC REFILL (OUTPATIENT)
Dept: FAMILY MEDICINE | Facility: CLINIC | Age: 56
End: 2021-06-05

## 2021-06-05 DIAGNOSIS — G89.29 CHRONIC LOW BACK PAIN WITH SCIATICA, SCIATICA LATERALITY UNSPECIFIED, UNSPECIFIED BACK PAIN LATERALITY: ICD-10-CM

## 2021-06-05 DIAGNOSIS — M54.40 CHRONIC LOW BACK PAIN WITH SCIATICA, SCIATICA LATERALITY UNSPECIFIED, UNSPECIFIED BACK PAIN LATERALITY: ICD-10-CM

## 2021-06-07 RX ORDER — OXYCODONE HYDROCHLORIDE 5 MG/1
5 TABLET ORAL EVERY 6 HOURS PRN
Qty: 30 TABLET | Refills: 0 | Status: SHIPPED | OUTPATIENT
Start: 2021-06-07 | End: 2021-07-09

## 2021-06-07 NOTE — TELEPHONE ENCOUNTER
Controlled Substance Refill Request for oxyCODONE (ROXICODONE) 5 MG tablet  Problem List Complete:  Yes   checked in past 3 months?  No, route to RN

## 2021-06-11 PROBLEM — M25.512 ACUTE PAIN OF LEFT SHOULDER: Status: RESOLVED | Noted: 2021-04-08 | Resolved: 2021-06-11

## 2021-06-11 PROBLEM — M54.42 ACUTE LEFT-SIDED LOW BACK PAIN WITH LEFT-SIDED SCIATICA: Status: RESOLVED | Noted: 2021-04-08 | Resolved: 2021-06-11

## 2021-06-11 PROBLEM — M75.42 IMPINGEMENT SYNDROME OF SHOULDER REGION, LEFT: Status: RESOLVED | Noted: 2021-04-08 | Resolved: 2021-06-11

## 2021-06-25 DIAGNOSIS — F51.01 PRIMARY INSOMNIA: ICD-10-CM

## 2021-06-27 NOTE — TELEPHONE ENCOUNTER
Routing refill request to provider for review/approval because:  BP Readings from Last 3 Encounters:   04/02/21 (!) 141/86   01/18/21 (!) 146/82   01/12/21 (!) 156/88

## 2021-06-28 RX ORDER — QUETIAPINE FUMARATE 25 MG/1
25-100 TABLET, FILM COATED ORAL
Qty: 270 TABLET | Refills: 1 | Status: SHIPPED | OUTPATIENT
Start: 2021-06-28 | End: 2021-11-15

## 2021-07-09 ENCOUNTER — MYC REFILL (OUTPATIENT)
Dept: FAMILY MEDICINE | Facility: CLINIC | Age: 56
End: 2021-07-09

## 2021-07-09 DIAGNOSIS — G89.29 CHRONIC LOW BACK PAIN WITH SCIATICA, SCIATICA LATERALITY UNSPECIFIED, UNSPECIFIED BACK PAIN LATERALITY: ICD-10-CM

## 2021-07-09 DIAGNOSIS — M54.40 CHRONIC LOW BACK PAIN WITH SCIATICA, SCIATICA LATERALITY UNSPECIFIED, UNSPECIFIED BACK PAIN LATERALITY: ICD-10-CM

## 2021-07-09 RX ORDER — OXYCODONE HYDROCHLORIDE 5 MG/1
5 TABLET ORAL EVERY 6 HOURS PRN
Qty: 30 TABLET | Refills: 0 | Status: SHIPPED | OUTPATIENT
Start: 2021-07-09 | End: 2021-08-04

## 2021-07-26 ENCOUNTER — MYC MEDICAL ADVICE (OUTPATIENT)
Dept: FAMILY MEDICINE | Facility: CLINIC | Age: 56
End: 2021-07-26

## 2021-07-26 ENCOUNTER — THERAPY VISIT (OUTPATIENT)
Dept: PHYSICAL THERAPY | Facility: CLINIC | Age: 56
End: 2021-07-26
Payer: COMMERCIAL

## 2021-07-26 ENCOUNTER — OFFICE VISIT (OUTPATIENT)
Dept: FAMILY MEDICINE | Facility: CLINIC | Age: 56
End: 2021-07-26
Payer: COMMERCIAL

## 2021-07-26 VITALS
RESPIRATION RATE: 16 BRPM | SYSTOLIC BLOOD PRESSURE: 155 MMHG | OXYGEN SATURATION: 98 % | TEMPERATURE: 98.1 F | DIASTOLIC BLOOD PRESSURE: 91 MMHG | HEART RATE: 95 BPM

## 2021-07-26 DIAGNOSIS — M25.512 ACUTE PAIN OF LEFT SHOULDER: ICD-10-CM
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
## 2021-07-26 DIAGNOSIS — M75.42 IMPINGEMENT SYNDROME OF SHOULDER REGION, LEFT: Primary | ICD-10-CM

## 2021-07-26 PROCEDURE — 97110 THERAPEUTIC EXERCISES: CPT | Mod: GP | Performed by: PHYSICAL THERAPIST

## 2021-07-26 PROCEDURE — 97010 HOT OR COLD PACKS THERAPY: CPT | Mod: GP | Performed by: PHYSICAL THERAPIST

## 2021-07-26 PROCEDURE — 99213 OFFICE O/P EST LOW 20 MIN: CPT | Performed by: STUDENT IN AN ORGANIZED HEALTH CARE EDUCATION/TRAINING PROGRAM

## 2021-07-26 NOTE — PATIENT INSTRUCTIONS
Patient Education     Tick Bite (No Antibiotics)     You have been bitten by a tick. Ticks are small arachnids that feed on the blood of rodents, rabbits, birds, deer, dogs, and people. A tick bite may cause redness, itching, and mild swelling at the site. Sometimes you may have no reaction where the tick bit you.  Most tick bites are harmless. But some ticks carry diseases, such as Lyme disease or Kumar Mountain spotted fever. These can be passed to people at the time of the bite. Lyme disease is of greatest concern. Right now you have no symptoms of Lyme disease or other serious reaction to the bite. It's important to watch for the warning signs, which could appear weeks or months after the tick bite. If you are concerned, many states have agencies that will test the tick that bit you for disease. Check with your local officials to see if this service is offered in your area.  Home care  The following will help you care for your bite at home:    If itching is a problem, don't wear tight clothing. And avoid anything that heats up your skin. This includes hot showers or baths and direct sunlight. This will tend to make the itching worse.    An ice pack will reduce local areas of redness and itching. Make your own ice pack by putting ice cubes in a plastic bag that seals at the top. Wrap the bag in a clean, thin towel. Never put ice or an ice pack directly on the skin. Creams containing benzocaine will reduce itching. These creams are available over the counter.    You can use an antihistamine with diphenhydramine if your healthcare provider did not give you another antihistamine. This medicine may be used to reduce itching and inflammation. Be aware it may cause drowsiness. It is available at pharmacies and grocery stores. If symptoms continue, talk with your provider or pharmacist about other medicines that may reduce symptoms.  Follow-up care  Follow up with your healthcare provider, or as advised.  When to get  medical advice  Call your healthcare provider right away if any of these occur:  Signs of local infection. Watch for these during the next few days:    Increasing redness around the bite site    Increased pain or swelling    Fever over 100.4 F (38.0 C), or as directed by your healthcare provider    Fluid draining from the bite area  Get medical care right away for signs of tick-related disease. Watch for these during the next few weeks or months:    Circular, red, ring-like rash appears at the bite area in 1 to 3 weeks    A non-itchy red rash develops on your wrists or ankles and spreads. It may become purple (petechiae).    Red eyes    Tiredness, fever or chills, nausea or vomiting    Neck pain or stiffness, headache, or confusion    Muscle or bone aches    Irregular or fast heartbeat    Joint pain or swelling, especially in the knee    Numbness, tingling, or weakness in the arms or legs    Weakness on one side of the face  [employer] last reviewed this educational content on 8/1/2019 2000-2021 [employer]. All rights reserved. This information is not intended as a substitute for professional medical care. Always follow your healthcare professional's instructions.

## 2021-07-26 NOTE — PROGRESS NOTES
Subjective:  HPI  Physical Exam                    Objective:  System    Physical Exam    General     ROS    Assessment/Plan:    PROGRESS  REPORT    Progress reporting period is from 4/8/21 to 7/26/21.       SUBJECTIVE  Subjective changes noted by patient: Sky returns to PT 3+ months after his initial PT visit.  He reports his back pain resolved with the exercises he was given then.  However, his shoulder pain has increased over time since starting the exercises.  Currently he has pain whenever he reaches away from his body or behind his back with his left arm.  He notes he has found ways to adjust his movement to avoid pain, which has caused nerve pain to shoot from his shoulder to his jaw when the shoulder catches.    Current pain level is 8/10.     Previous pain level was 8/10.   Changes in function:  Yes, see above.  Adverse reaction to treatment or activity: None    OBJECTIVE  Changes noted in objective findings:  Yes  Left shoulder AROM:    flexion 120 (painful arc)    abduction 100 (limited by pain)     ER 85     IR/EXT limited to left buttock.    Left shoulder strength:    ER 4+/5 with pain    flexion 4+/5 with pain    abduction 4+/5 with pain     IR 5/5    Repeated passive extension resolves painful arc in flexion and restores full abduction     ASSESSMENT/PLAN  Updated problem list and treatment plan: Diagnosis 1:  Left shoulder pain    Pain -  hot/cold therapy, US, manual therapy, self management, education and home program  Decreased ROM/flexibility - manual therapy, therapeutic exercise and home program  Decreased strength - therapeutic exercise, therapeutic activities and home program  Decreased proprioception - neuro re-education, therapeutic activities and home program  Inflammation - cold therapy, US and self management/home program  Decreased function - therapeutic activities and home program  STG/LTGs have been met or progress has been made towards goals:  Yes, LBP goals met, shoulder goals not  met  Assessment of Progress: The patient's condition has potential to improve.  Self Management Plans:  Patient has been instructed in a home treatment program.  Patient  has been instructed in self management of symptoms.    Amadou continues to require the following intervention to meet STG and LTG's:  PT    Recommendations:  This patient would benefit from continued therapy.     Frequency:  1 X week, once daily  Duration:  for 6 weeks        Please refer to the daily flowsheet for treatment today, total treatment time and time spent performing 1:1 timed codes.

## 2021-07-26 NOTE — PROGRESS NOTES
There are no exam notes on file for this visit.  Chief Complaint   Patient presents with     Insect Bites     tick bite on Rt buttock on thursday, developed a rash around area, would like a lyme test done     Blood pressure (!) 155/91, pulse 95, temperature 98.1  F (36.7  C), temperature source Oral, resp. rate 16, SpO2 98 %.           MENA Swanson is a 55 year old  male with a PMH significant for:     Patient Active Problem List   Diagnosis     Erectile dysfunction     Type 2 diabetes mellitus without complication, without long-term current use of insulin (H)     Obesity, Class I, BMI 30-34.9     Generalized anxiety disorder     ACP (advance care planning)     History of adenomatous polyp of colon     Essential hypertension with goal blood pressure less than 140/90     Malignant melanoma of skin of trunk (H)     History of melanoma     AK (actinic keratosis)     Adjustment disorder with mixed disturbance of emotions and conduct     He presents with concerns for possible Lyme's disease after finding a tick bite on his right buttock last Thursday.  Does not believe that the tick was there for over 36 hours.  Since then, he is to follow-up erythematous rash at the tick bite site.  Denies any fevers, chills, joint pain, palpitations, nausea, vomiting.  He has not tried anything for symptoms yet.  Did bring in the tick, which appears most like a male would check.        OBJECTIVE     Vitals:    07/26/21 1440   BP: (!) 155/91   Pulse: 95   Resp: 16   Temp: 98.1  F (36.7  C)   TempSrc: Oral   SpO2: 98%     There is no height or weight on file to calculate BMI.    Constitutional: Awake, alert, cooperative, no acute distress, and appears stated age.  Neurologic: A&Ox3.  Cranial nerves II-XII are grossly intact.  Sensory is intact and gait is normal.  Neuropsychiatric: Normal affect, mood, orientation, memory and insight.  Skin: Erythematous lesion of the right buttock site of tick bite measuring approximately 1.5  cm in diameter without any discharge.      ASSESSMENT AND PLAN     Amadou was seen today for insect bites.    Diagnoses and all orders for this visit:    Tick bite, subsequent encounter  Patient does not meet criteria for prophylactic treatment of Lyme's disease.  Offered Lyme disease test, to which he declines.  Recommend symptomatic management and monitor symptoms at this time.  Return precautions provided.          Return if symptoms worsen or fail to improve.    Virgil Sanchez MD  7/26/2021

## 2021-08-04 ENCOUNTER — MYC REFILL (OUTPATIENT)
Dept: FAMILY MEDICINE | Facility: CLINIC | Age: 56
End: 2021-08-04

## 2021-08-04 DIAGNOSIS — M54.40 CHRONIC LOW BACK PAIN WITH SCIATICA, SCIATICA LATERALITY UNSPECIFIED, UNSPECIFIED BACK PAIN LATERALITY: ICD-10-CM

## 2021-08-04 DIAGNOSIS — G89.29 CHRONIC LOW BACK PAIN WITH SCIATICA, SCIATICA LATERALITY UNSPECIFIED, UNSPECIFIED BACK PAIN LATERALITY: ICD-10-CM

## 2021-08-04 RX ORDER — OXYCODONE HYDROCHLORIDE 5 MG/1
5 TABLET ORAL EVERY 6 HOURS PRN
Qty: 30 TABLET | Refills: 0 | Status: SHIPPED | OUTPATIENT
Start: 2021-08-04 | End: 2021-09-01

## 2021-08-04 NOTE — TELEPHONE ENCOUNTER
Routing refill request to provider for review/approval because:  Drug not on the [facility] refill protocol     Last refill: 7/9/21  Last ov: 7/26/21  Joycelyn Fry RN

## 2021-08-05 ENCOUNTER — THERAPY VISIT (OUTPATIENT)
Dept: PHYSICAL THERAPY | Facility: CLINIC | Age: 56
End: 2021-08-05
Payer: COMMERCIAL

## 2021-08-05 DIAGNOSIS — M75.42 IMPINGEMENT SYNDROME OF SHOULDER REGION, LEFT: Primary | ICD-10-CM

## 2021-08-05 DIAGNOSIS — M25.512 ACUTE PAIN OF LEFT SHOULDER: ICD-10-CM

## 2021-08-05 PROCEDURE — 97110 THERAPEUTIC EXERCISES: CPT | Mod: GP | Performed by: PHYSICAL THERAPIST

## 2021-08-05 PROCEDURE — 97010 HOT OR COLD PACKS THERAPY: CPT | Mod: GP | Performed by: PHYSICAL THERAPIST

## 2021-08-14 DIAGNOSIS — F10.932 ALCOHOL WITHDRAWAL SYNDROME WITH PERCEPTUAL DISTURBANCE (H): ICD-10-CM

## 2021-08-16 DIAGNOSIS — E11.9 TYPE 2 DIABETES MELLITUS WITHOUT COMPLICATION, WITH LONG-TERM CURRENT USE OF INSULIN (H): ICD-10-CM

## 2021-08-16 DIAGNOSIS — Z79.4 TYPE 2 DIABETES MELLITUS WITHOUT COMPLICATION, WITH LONG-TERM CURRENT USE OF INSULIN (H): ICD-10-CM

## 2021-08-16 RX ORDER — LANOLIN ALCOHOL/MO/W.PET/CERES
CREAM (GRAM) TOPICAL
Qty: 90 TABLET | Refills: 1 | Status: SHIPPED | OUTPATIENT
Start: 2021-08-16

## 2021-08-16 NOTE — TELEPHONE ENCOUNTER
Routing refill request to provider for review/approval because:  Drug not on the [facility] refill protocol       Requested Prescriptions   Pending Prescriptions Disp Refills     thiamine (B-1) 100 MG tablet [Pharmacy Med Name: VITAMIN B-1 100 MG TABLET] 90 tablet 1     Sig: TAKE 1 TABLET BY MOUTH EVERY DAY       There is no refill protocol information for this order        Jayde Bell RN on 8/16/2021 at 9:46 AM

## 2021-08-18 ENCOUNTER — THERAPY VISIT (OUTPATIENT)
Dept: PHYSICAL THERAPY | Facility: CLINIC | Age: 56
End: 2021-08-18
Payer: COMMERCIAL

## 2021-08-18 DIAGNOSIS — M75.42 IMPINGEMENT SYNDROME OF SHOULDER REGION, LEFT: Primary | ICD-10-CM

## 2021-08-18 DIAGNOSIS — M25.512 ACUTE PAIN OF LEFT SHOULDER: ICD-10-CM

## 2021-08-18 PROCEDURE — 97110 THERAPEUTIC EXERCISES: CPT | Mod: GP | Performed by: PHYSICAL THERAPIST

## 2021-08-18 PROCEDURE — 97010 HOT OR COLD PACKS THERAPY: CPT | Mod: GP | Performed by: PHYSICAL THERAPIST

## 2021-08-18 PROCEDURE — 97112 NEUROMUSCULAR REEDUCATION: CPT | Mod: GP | Performed by: PHYSICAL THERAPIST

## 2021-08-19 RX ORDER — METFORMIN HCL 500 MG
TABLET, EXTENDED RELEASE 24 HR ORAL
Qty: 120 TABLET | Refills: 5 | Status: SHIPPED | OUTPATIENT
Start: 2021-08-19 | End: 2022-02-17

## 2021-08-19 NOTE — TELEPHONE ENCOUNTER
Routing refill request to provider for review/approval because:  Labs out of range:    Lab Results   Component Value Date    A1C 8.2 01/18/2021    A1C 6.2 08/16/2019    A1C 6.2 05/17/2019    A1C 6.3 01/17/2018    A1C 8.2 03/27/2017

## 2021-09-01 ENCOUNTER — MYC REFILL (OUTPATIENT)
Dept: FAMILY MEDICINE | Facility: CLINIC | Age: 56
End: 2021-09-01

## 2021-09-01 DIAGNOSIS — G89.29 CHRONIC LOW BACK PAIN WITH SCIATICA, SCIATICA LATERALITY UNSPECIFIED, UNSPECIFIED BACK PAIN LATERALITY: ICD-10-CM

## 2021-09-01 DIAGNOSIS — M54.40 CHRONIC LOW BACK PAIN WITH SCIATICA, SCIATICA LATERALITY UNSPECIFIED, UNSPECIFIED BACK PAIN LATERALITY: ICD-10-CM

## 2021-09-02 NOTE — TELEPHONE ENCOUNTER
Controlled Substance Refill Request for oxyCODONE (ROXICODONE) 5 MG tablet  Problem List Complete:    No    checked in past 3 months?  No, route to RN

## 2021-09-03 RX ORDER — OXYCODONE HYDROCHLORIDE 5 MG/1
5 TABLET ORAL EVERY 6 HOURS PRN
Qty: 30 TABLET | Refills: 0 | Status: SHIPPED | OUTPATIENT
Start: 2021-09-03 | End: 2021-11-11

## 2021-09-11 ENCOUNTER — HEALTH MAINTENANCE LETTER (OUTPATIENT)
Age: 56
End: 2021-09-11

## 2021-09-13 ENCOUNTER — THERAPY VISIT (OUTPATIENT)
Dept: PHYSICAL THERAPY | Facility: CLINIC | Age: 56
End: 2021-09-13
Payer: COMMERCIAL

## 2021-09-13 DIAGNOSIS — M75.42 IMPINGEMENT SYNDROME OF SHOULDER REGION, LEFT: Primary | ICD-10-CM

## 2021-09-13 DIAGNOSIS — M25.512 ACUTE PAIN OF LEFT SHOULDER: ICD-10-CM

## 2021-09-13 PROCEDURE — 97110 THERAPEUTIC EXERCISES: CPT | Mod: GP | Performed by: PHYSICAL THERAPIST

## 2021-09-21 ENCOUNTER — OFFICE VISIT (OUTPATIENT)
Dept: URGENT CARE | Facility: URGENT CARE | Age: 56
End: 2021-09-21
Payer: COMMERCIAL

## 2021-09-21 VITALS
DIASTOLIC BLOOD PRESSURE: 89 MMHG | TEMPERATURE: 97.1 F | HEART RATE: 102 BPM | SYSTOLIC BLOOD PRESSURE: 153 MMHG | OXYGEN SATURATION: 100 %

## 2021-09-21 DIAGNOSIS — I10 ESSENTIAL HYPERTENSION WITH GOAL BLOOD PRESSURE LESS THAN 140/90: ICD-10-CM

## 2021-09-21 DIAGNOSIS — H60.391 INFECTIVE OTITIS EXTERNA, RIGHT: Primary | ICD-10-CM

## 2021-09-21 PROCEDURE — 99213 OFFICE O/P EST LOW 20 MIN: CPT | Performed by: STUDENT IN AN ORGANIZED HEALTH CARE EDUCATION/TRAINING PROGRAM

## 2021-09-21 RX ORDER — NEOMYCIN SULFATE, POLYMYXIN B SULFATE, HYDROCORTISONE 3.5; 10000; 1 MG/ML; [USP'U]/ML; MG/ML
3 SOLUTION/ DROPS AURICULAR (OTIC) 4 TIMES DAILY
Qty: 5 ML | Refills: 0 | Status: SHIPPED | OUTPATIENT
Start: 2021-09-21 | End: 2021-09-28

## 2021-09-21 RX ORDER — AMOXICILLIN 500 MG/1
500 CAPSULE ORAL 2 TIMES DAILY
Qty: 14 CAPSULE | Refills: 0 | Status: SHIPPED | OUTPATIENT
Start: 2021-09-21 | End: 2021-09-28

## 2021-09-21 NOTE — PROGRESS NOTES
Chief Complaint:    Chief Complaint   Patient presents with     Ear Problem     Ear pain started about 1 wk ago. Painful left ear, hard time hearing feeling full of water, ear canal swollen, whole side of face is tender.       Medical Decision Making:    Vital signs reviewed by MATILDA CASTRO MD  BP (!) 153/89   Pulse 102   Temp 97.1  F (36.2  C) (Tympanic)   SpO2 100%        ASSESSMENT & PLAN:  1. Infective otitis externa, right    2. Essential hypertension with goal blood pressure less than 140/90    Limited exam of the patient due to pain and intense swelling of the left ear canal, will treat with Cortisporin but also will treat with amoxicillin.  Would like to have the patient follow-up with his PCP for the next 1 to 2 weeks to discuss his elevated blood pressure as well as to repeat ear exam.    Patient instructed to follow up with PCP in 1 week if symptoms are not improving.  Sooner if symptoms worsen.  Worrisome symptoms discussed with instructions to go to the ED.  The patient's questions were answered to their satisfaction and the patient agreed with the plan moving forward.  Patient verbalized understanding and agreed with this plan.    Labs:     No results found for any visits on 09/21/21.    Current Meds:    Current Outpatient Medications:      amoxicillin (AMOXIL) 500 MG capsule, Take 1 capsule (500 mg) by mouth 2 times daily for 7 days, Disp: 14 capsule, Rfl: 0     aspirin (ASA) 81 MG EC tablet, Take 1 tablet (81 mg) by mouth daily, Disp: 100 tablet, Rfl: 3     Coenzyme Q10 (CO Q 10 PO), Take  by mouth. 2 per day, Disp: , Rfl:      Cyanocobalamin (VITAMIN B-12 PO), Take  by mouth daily., Disp: , Rfl:      FISH OIL CONCENTRATE 1000 MG OR CAPS, 2 tablets daily, Disp: , Rfl:      hydrochlorothiazide (HYDRODIURIL) 25 MG tablet, Take 1 tablet (25 mg) by mouth daily, Disp: 90 tablet, Rfl: 3     insulin aspart (NOVOLOG FLEXPEN) 100 UNIT/ML pen, 12-20 Units, subcutaneous TID per scale, Disp: 60 mL,  Rfl: 3     insulin glargine (LANTUS PEN) 100 UNIT/ML pen, INJECT 50 UNITS SUBCUTANEOUSLY AT BEDTIME, Disp: 45 mL, Rfl: 3     insulin lispro (HUMALOG KWIKPEN) 100 UNIT/ML (1 unit dial) KWIKPEN, Inject 12-20 Units Subcutaneous 3 times daily (before meals), Disp: 54 mL, Rfl: 3     liraglutide (VICTOZA PEN) 18 MG/3ML solution, INJECT 1.8 MG SUBCUTANEOUS DAILY, Disp: 27 mL, Rfl: 9     losartan (COZAAR) 100 MG tablet, Take 1 tablet (100 mg) by mouth daily, Disp: 90 tablet, Rfl: 3     metFORMIN (GLUCOPHAGE-XR) 500 MG 24 hr tablet, TAKE 2 TABLETS BY MOUTH 2 TIMES DAILY, Disp: 120 tablet, Rfl: 5     MULTIPLE VITAMINS OR, 1000mg daily, Disp: , Rfl:      neomycin-polymyxin-hydrocortisone (CORTISPORIN) 3.5-26764-4 otic solution, Place 3 drops Into the left ear 4 times daily for 7 days, Disp: 5 mL, Rfl: 0     oxyCODONE (ROXICODONE) 5 MG tablet, Take 1 tablet (5 mg) by mouth every 6 hours as needed for severe pain maximum 6 tablet(s) per day, Disp: 30 tablet, Rfl: 0     rosuvastatin (CRESTOR) 40 MG tablet, Take 1 tablet (40 mg) by mouth daily, Disp: 90 tablet, Rfl: 3     sildenafil (VIAGRA) 100 MG tablet, TAKE 1 TABLET BY MOUTH 30 MINS BEFORE INTERCOURSE, Disp: 20 tablet, Rfl: 3     VITAMIN D 1000 UNIT OR CAPS, Take 1 capsule by mouth daily , Disp: , Rfl:      alcohol swab prep pads, Use to swab area of injection/luiza as directed., Disp: 100 each, Rfl: 3     CONTOUR NEXT TEST test strip, USE TO TEST BLOOD SUGAR UP TO 8 TIMES DAILY OR AS DIRECTED., Disp: 200 strip, Rfl: 6     insulin pen needle (BD YANNI U/F) 32G X 4 MM miscellaneous, USE 4 TIMES DAILY AS DIRECTED, Disp: 400 each, Rfl: 0     order for DME, Equipment being ordered: glucometer brand per insurance, Disp: 1 Device, Rfl: 0     ORDER FOR DME, LANCETS 4 x DAILY AND PRN, Disp: 400 each, Rfl: 4     QUEtiapine (SEROQUEL) 25 MG tablet, TAKE 1-4 TABLETS ( MG) BY MOUTH NIGHTLY AS NEEDED (FOR SLEEP) (Patient not taking: Reported on 9/21/2021), Disp: 270 tablet, Rfl: 1      thiamine (B-1) 100 MG tablet, TAKE 1 TABLET BY MOUTH EVERY DAY (Patient not taking: Reported on 9/21/2021), Disp: 90 tablet, Rfl: 1     thin (NO BRAND SPECIFIED) lancets, Use with lanceting device. To accompany: Blood Glucose Monitor Brands: per insurance. Test 8 x per day, Disp: 200 each, Rfl: 11     ULTICARE MINI 31G X 6 MM insulin pen needle, USE 4 PEN NEEDLES DAILY OR AS DIRECTED., Disp: 400 each, Rfl: 1    Allergies:  Allergies   Allergen Reactions     Cats      Cat dander     Cymbalta      Molds & Smuts      Ms Contin [Morphine]      Ceftazidime Rash     Was also on vanco     Vancomycin Rash     Was also on ceftazidime       SUBJECTIVE    HPI: Amadou Bach is an 55 year old male who presents for evaluation and treatment of patient complains of 1 weeks worth of left ear pain, he states he has been using a Q-tip a lot because he has been feeling like he is trying to get a lump of earwax out of it.  He also states he has been progressing his finger in his ear canal a lot lately.  Now he states he can hardly even get a Q-tip in his ear.  Denies any fevers or chills.  He also thinks he may have got some dirt or debris in it when he was working on his vehicle the other day.  No other complaints or concerns at this time.    ROS:      Review of Systems  All other ROS noted in HPI    Family History   Family History   Problem Relation Age of Onset     Diabetes Mother      Hypertension Mother      Diabetes Father      Cancer Father         lung     Cancer Other         aunt - skin cancer     Skin Cancer Other      Melanoma No family hx of      Lung Cancer Mother      Lung Cancer Father      Coronary Artery Disease Father      Cerebrovascular Disease Father        Social History  Social History     Socioeconomic History     Marital status:      Spouse name: Not on file     Number of children: Not on file     Years of education: Not on file     Highest education level: Not on file   Occupational History     Not  on file   Tobacco Use     Smoking status: Never Smoker     Smokeless tobacco: Never Used   Substance and Sexual Activity     Alcohol use: Yes     Comment: occ     Drug use: No     Sexual activity: Yes     Partners: Female     Comment:    Other Topics Concern     Parent/sibling w/ CABG, MI or angioplasty before 65F 55M? Not Asked   Social History Narrative     Not on file     Social Determinants of Health     Financial Resource Strain:      Difficulty of Paying Living Expenses:    Food Insecurity:      Worried About Running Out of Food in the Last Year:      Ran Out of Food in the Last Year:    Transportation Needs:      Lack of Transportation (Medical):      Lack of Transportation (Non-Medical):    Physical Activity:      Days of Exercise per Week:      Minutes of Exercise per Session:    Stress:      Feeling of Stress :    Social Connections:      Frequency of Communication with Friends and Family:      Frequency of Social Gatherings with Friends and Family:      Attends Alevism Services:      Active Member of Clubs or Organizations:      Attends Club or Organization Meetings:      Marital Status:    Intimate Partner Violence:      Fear of Current or Ex-Partner:      Emotionally Abused:      Physically Abused:      Sexually Abused:         Surgical History:  Past Surgical History:   Procedure Laterality Date     BIOPSY OF SKIN LESION       COLONOSCOPY  3/11/2014    Procedure: COMBINED COLONOSCOPY, SINGLE BIOPSY/POLYPECTOMY BY BIOPSY;;  Surgeon: Stephen Amin MD;  Location: [facility] OR     COLONOSCOPY WITH CO2 INSUFFLATION N/A 8/19/2014    Procedure: COLONOSCOPY WITH CO2 INSUFFLATION;  Surgeon: Stephen Amin MD;  Location: [facility] OR     COLONOSCOPY WITH CO2 INSUFFLATION N/A 10/11/2017    Procedure: COLONOSCOPY WITH CO2 INSUFFLATION;  COLON/ REPEAT COLONOSCOPY;  Surgeon: Jeremy Mcqueen DO;  Location: [facility] OR     HC TOOTH EXTRACTION W/FORCEP      wisdom teeth x4     WISDOM TOOTH EXTRACTION           Problem List:  Patient Active Problem List   Diagnosis     Erectile dysfunction     Type 2 diabetes mellitus without complication, without long-term current use of insulin (H)     Obesity, Class I, BMI 30-34.9     Generalized anxiety disorder     ACP (advance care planning)     History of adenomatous polyp of colon     Essential hypertension with goal blood pressure less than 140/90     Malignant melanoma of skin of trunk (H)     History of melanoma     AK (actinic keratosis)     Adjustment disorder with mixed disturbance of emotions and conduct           OBJECTIVE:     Vital signs noted and reviewed by MATILDA CASTRO MD  BP (!) 153/89   Pulse 102   Temp 97.1  F (36.2  C) (Tympanic)   SpO2 100%      PEFR:    Physical Exam  Vitals and nursing note reviewed.   Constitutional:       General: He is not in acute distress.     Appearance: Normal appearance. He is not ill-appearing, toxic-appearing or diaphoretic.   HENT:      Head: Normocephalic.      Right Ear: Tympanic membrane, ear canal and external ear normal. There is no impacted cerumen.      Ears:      Comments: Pinna is tender with movement, difficult to see TM and canal appropriately due to pain and swelling.  Canal fairly swollen.  Fairly warm to touch and erythematous.     Nose: No rhinorrhea.   Eyes:      General:         Right eye: No discharge.         Left eye: No discharge.      Extraocular Movements: Extraocular movements intact.      Conjunctiva/sclera: Conjunctivae normal.      Pupils: Pupils are equal, round, and reactive to light.   Pulmonary:      Effort: Pulmonary effort is normal. No respiratory distress.   Musculoskeletal:         General: Normal range of motion.      Cervical back: Normal range of motion.   Lymphadenopathy:      Cervical: Cervical adenopathy present.   Neurological:      Mental Status: He is alert and oriented to person, place, and time.   Psychiatric:         Mood and Affect: Mood normal.         Behavior: Behavior  normal.             MATILDA CASTRO MD  9/21/2021, 6:34 PM

## 2021-09-23 ENCOUNTER — TELEPHONE (OUTPATIENT)
Dept: FAMILY MEDICINE | Facility: CLINIC | Age: 56
End: 2021-09-23

## 2021-09-23 RX ORDER — OXYCODONE HYDROCHLORIDE 5 MG/1
5 TABLET ORAL EVERY 6 HOURS PRN
Qty: 30 TABLET | Refills: 0 | Status: SHIPPED | OUTPATIENT
Start: 2021-09-23 | End: 2021-09-26

## 2021-09-23 NOTE — TELEPHONE ENCOUNTER
Patient was seen [facility] on 09/21/21 for ear pain was put on Amoxicillin and a steroid drop not better worse today in extreme pain was wanting to know if he should see ENT or PCP or ER.Please advise.  Ashley Becker,

## 2021-09-23 NOTE — TELEPHONE ENCOUNTER
Please advise:    Spoke with patient. Pain and swelling of left ear worsened yesterday. Today symptoms are not as bad as yesterday but worse than when he went to urgent care. Yesterday he had a very hard time swallowing. He is swallowing better today but it is still painful.  Pain is going up the side of his head and into his neck. His ear is red. No discharge from ear. He pits steroid drops in and lays on side for 30 minutes, fluid runs out. Worried ear canal is closed and medication is not getting where it is supposed to.     Current temp is 97.2    Ibuprofen did not help much    Patient's biggest concern is his hearing. He is not able to hear out of his left ear. Feels like he has an ear plug in.       DUNG, Glucose has been up 300 yesterday. Had to use Novalog

## 2021-11-10 ENCOUNTER — TELEPHONE (OUTPATIENT)
Dept: FAMILY MEDICINE | Facility: CLINIC | Age: 56
End: 2021-11-10
Payer: COMMERCIAL

## 2021-11-10 DIAGNOSIS — M25.512 ACUTE PAIN OF LEFT SHOULDER: ICD-10-CM

## 2021-11-10 DIAGNOSIS — M54.40 CHRONIC LOW BACK PAIN WITH SCIATICA, SCIATICA LATERALITY UNSPECIFIED, UNSPECIFIED BACK PAIN LATERALITY: ICD-10-CM

## 2021-11-10 DIAGNOSIS — G89.29 CHRONIC LOW BACK PAIN WITH SCIATICA, SCIATICA LATERALITY UNSPECIFIED, UNSPECIFIED BACK PAIN LATERALITY: ICD-10-CM

## 2021-11-10 DIAGNOSIS — M25.511 ACUTE PAIN OF RIGHT SHOULDER: Primary | ICD-10-CM

## 2021-11-10 NOTE — TELEPHONE ENCOUNTER
Patient calling to report recent fall off ladder onto already problematic left shoulder. Patient reports that ED suggested ortho surgeon - shoulder specialist referral. RN discussed setting up follow up appointment from ED. RN also let patient know he has lab tests ordered. Patient will make hospital visit follow up and lab visit. Patient also requested refill of oxycodone.   Jayde Bell RN on 11/10/2021 at 11:52 AM

## 2021-11-11 RX ORDER — OXYCODONE HYDROCHLORIDE 5 MG/1
5 TABLET ORAL EVERY 6 HOURS PRN
Qty: 30 TABLET | Refills: 0 | Status: SHIPPED | OUTPATIENT
Start: 2021-11-11 | End: 2021-12-06

## 2021-11-11 NOTE — TELEPHONE ENCOUNTER
Patient notified of provider message as written. Patient verbalized understanding.     Joycelyn Fry RN

## 2021-11-11 NOTE — TELEPHONE ENCOUNTER
I sent a short course of oxycodone and referal to orthopedics    Patient will norma to see me for any additional pain medications

## 2021-11-15 ENCOUNTER — OFFICE VISIT (OUTPATIENT)
Dept: FAMILY MEDICINE | Facility: CLINIC | Age: 56
End: 2021-11-15
Payer: COMMERCIAL

## 2021-11-15 VITALS
DIASTOLIC BLOOD PRESSURE: 88 MMHG | OXYGEN SATURATION: 99 % | WEIGHT: 177 LBS | SYSTOLIC BLOOD PRESSURE: 146 MMHG | HEART RATE: 96 BPM | BODY MASS INDEX: 28.57 KG/M2

## 2021-11-15 DIAGNOSIS — E11.9 TYPE 2 DIABETES MELLITUS WITHOUT COMPLICATION, WITHOUT LONG-TERM CURRENT USE OF INSULIN (H): ICD-10-CM

## 2021-11-15 DIAGNOSIS — N18.1 CHRONIC KIDNEY DISEASE, STAGE 1: ICD-10-CM

## 2021-11-15 DIAGNOSIS — Z12.11 SCREEN FOR COLON CANCER: Primary | ICD-10-CM

## 2021-11-15 DIAGNOSIS — S49.92XA SHOULDER INJURY, LEFT, INITIAL ENCOUNTER: ICD-10-CM

## 2021-11-15 LAB — HBA1C MFR BLD: 11.4 % (ref 0–5.6)

## 2021-11-15 PROCEDURE — 83036 HEMOGLOBIN GLYCOSYLATED A1C: CPT | Performed by: INTERNAL MEDICINE

## 2021-11-15 PROCEDURE — 99214 OFFICE O/P EST MOD 30 MIN: CPT | Performed by: INTERNAL MEDICINE

## 2021-11-15 PROCEDURE — 36415 COLL VENOUS BLD VENIPUNCTURE: CPT | Performed by: INTERNAL MEDICINE

## 2021-11-15 PROCEDURE — 80048 BASIC METABOLIC PNL TOTAL CA: CPT | Performed by: INTERNAL MEDICINE

## 2021-11-15 PROCEDURE — 84460 ALANINE AMINO (ALT) (SGPT): CPT | Performed by: INTERNAL MEDICINE

## 2021-11-15 NOTE — PROGRESS NOTES
"  Assessment & Plan   Problem List Items Addressed This Visit     Chronic kidney disease, stage 1    Type 2 diabetes mellitus without complication, without long-term current use of insulin (H)    Relevant Orders    HEMOGLOBIN A1C (Completed)    Basic metabolic panel  (Ca, Cl, CO2, Creat, Gluc, K, Na, BUN) (Completed)    ALT (Completed)      Other Visit Diagnoses     Screen for colon cancer    -  Primary    Shoulder injury, left, initial encounter        Relevant Orders    MR Shoulder Left w/o Contrast         BP     146/88  11/17/2021    Lab Results   Component Value Date     11/15/2021     01/12/2021     Lab Results   Component Value Date    A1C 11.4 11/15/2021    A1C 8.2 01/18/2021     Lab Results   Component Value Date    LDL 84 01/18/2021     Lab Results   Component Value Date    MICROL 19 01/18/2021     No results found for: MICROALBUMIN         BMI:   Estimated body mass index is 28.57 kg/m  as calculated from the following:    Height as of 1/18/21: 1.676 m (5' 6\").    Weight as of this encounter: 80.3 kg (177 lb).   Weight management plan: Discussed healthy diet and exercise guidelines    Work on weight loss  Regular exercise    No follow-ups on file.    Joseph Alanis MD  [facility] DIMA Swanson is a 55 year old who presents for the following health issues     HPI     ED/UC Followup:    Facility:  [facility]   Date of visit: 11/7/2021  Reason for visit: Fall; left shoulder injury   Current Status:      Woke up one year ago and had trouble lifting the arm and physical  Therapy at [place]   Some helpful and some causing pain   Fell off the ladder last weekend   Bump around the left shoulder   xrays   Orthopedics at [place]  Anxiety issues and panic   seroquel ( woke up in the middle )  Oxycodone ( stopped taking afterwards )         Review of Systems   Constitutional, HEENT, cardiovascular, pulmonary, gi and gu systems are negative, except as otherwise " noted.      Objective    BP (!) 146/88 (BP Location: Right arm, Patient Position: Chair, Cuff Size: Adult Regular)   Pulse 96   Wt 80.3 kg (177 lb)   SpO2 99%   BMI 28.57 kg/m    Body mass index is 28.57 kg/m .  Physical Exam   GENERAL: healthy, alert and no distress  EYES: Eyes grossly normal to inspection, PERRL and conjunctivae and sclerae normal  HENT: ear canals and TM's normal, nose and mouth without ulcers or lesions  NECK: no adenopathy, no asymmetry, masses, or scars and thyroid normal to palpation  RESP: lungs clear to auscultation - no rales, rhonchi or wheezes  CV: regular rate and rhythm, normal S1 S2, no S3 or S4, no murmur, click or rub, no peripheral edema and peripheral pulses strong  ABDOMEN: soft, nontender, no hepatosplenomegaly, no masses and bowel sounds normal  MS: no gross musculoskeletal defects noted, no edema  SKIN: no suspicious lesions or rashes  NEURO: Normal strength and tone, mentation intact and speech normal  BACK: no CVA tenderness, no paralumbar tenderness  PSYCH: mentation appears normal, affect normal/bright  LYMPH: no cervical, supraclavicular, axillary, or inguinal adenopathy    Results for orders placed or performed in visit on 11/15/21   ALT     Status: Normal   Result Value Ref Range    ALT 47 0 - 70 U/L   Basic metabolic panel  (Ca, Cl, CO2, Creat, Gluc, K, Na, BUN)     Status: Abnormal   Result Value Ref Range    Sodium 134 133 - 144 mmol/L    Potassium 4.0 3.4 - 5.3 mmol/L    Chloride 102 94 - 109 mmol/L    Carbon Dioxide (CO2) 27 20 - 32 mmol/L    Anion Gap 5 3 - 14 mmol/L    Urea Nitrogen 20 7 - 30 mg/dL    Creatinine 0.79 0.66 - 1.25 mg/dL    Calcium 9.4 8.5 - 10.1 mg/dL    Glucose 452 (HH) 70 - 99 mg/dL    GFR Estimate >90 >60 mL/min/1.73m2   HEMOGLOBIN A1C     Status: Abnormal   Result Value Ref Range    Hemoglobin A1C 11.4 (H) 0.0 - 5.6 %

## 2021-11-16 LAB
ALT SERPL W P-5'-P-CCNC: 47 U/L (ref 0–70)
ANION GAP SERPL CALCULATED.3IONS-SCNC: 5 MMOL/L (ref 3–14)
BUN SERPL-MCNC: 20 MG/DL (ref 7–30)
CALCIUM SERPL-MCNC: 9.4 MG/DL (ref 8.5–10.1)
CHLORIDE BLD-SCNC: 102 MMOL/L (ref 94–109)
CO2 SERPL-SCNC: 27 MMOL/L (ref 20–32)
CREAT SERPL-MCNC: 0.79 MG/DL (ref 0.66–1.25)
GFR SERPL CREATININE-BSD FRML MDRD: >90 ML/MIN/1.73M2
GLUCOSE BLD-MCNC: 452 MG/DL (ref 70–99)
POTASSIUM BLD-SCNC: 4 MMOL/L (ref 3.4–5.3)
SODIUM SERPL-SCNC: 134 MMOL/L (ref 133–144)

## 2021-11-17 ENCOUNTER — MYC MEDICAL ADVICE (OUTPATIENT)
Dept: FAMILY MEDICINE | Facility: CLINIC | Age: 56
End: 2021-11-17
Payer: COMMERCIAL

## 2021-11-17 NOTE — TELEPHONE ENCOUNTER
Patient is asking about colonoscopy, looks like he is due. No orders in chart. Please advise. Order pended, if appropriate. Please review.     Joycelyn Fry RN

## 2021-11-21 ENCOUNTER — MYC MEDICAL ADVICE (OUTPATIENT)
Dept: FAMILY MEDICINE | Facility: CLINIC | Age: 56
End: 2021-11-21

## 2021-11-21 ENCOUNTER — HOSPITAL ENCOUNTER (OUTPATIENT)
Dept: MRI IMAGING | Facility: CLINIC | Age: 56
Discharge: HOME | End: 2021-11-21
Attending: INTERNAL MEDICINE | Admitting: INTERNAL MEDICINE
Payer: COMMERCIAL

## 2021-11-21 DIAGNOSIS — S49.92XA SHOULDER INJURY, LEFT, INITIAL ENCOUNTER: ICD-10-CM

## 2021-11-21 PROCEDURE — 73221 MRI JOINT UPR EXTREM W/O DYE: CPT | Mod: 26 | Performed by: RADIOLOGY

## 2021-11-21 PROCEDURE — 73221 MRI JOINT UPR EXTREM W/O DYE: CPT | Mod: LT

## 2021-11-22 NOTE — TELEPHONE ENCOUNTER
Routing MyChart to PCP.  Patient is giving you an update regarding his L shoulder injury.    Luis Yee RN

## 2021-11-22 NOTE — PROGRESS NOTES
"CHIEF COMPLAINT:   Chief Complaint   Patient presents with     Left Shoulder - Pain     DOI 11/7/21, 2.5 wk s/p. Patient notes he has had pain prior to that about 9 months. Patient was painting and fell off a ladder, approximately 5 feet high. Pain is on the top/posterior of the shoulder and can radiate up the neck into his jaw.      Shoulder Pain     If he lifts his arm it will catch like and send zingers posterior shoulder and up the neck. He has had PT.      Amadou Bach is seen today in the [facility] for evaluation of left shoulder pain at the request of Dr. Joseph Alanis         HISTORY:  Amadou Bach is a 55 year old male, right  -hand dominant, who is seen in consultation at the request of Dr. Alanis for ongoing left shoulder pain ~9 months without injury. Just woke up one day with pain, never really went away. Worsened after an injury that occurred on 11/7/2021, falling off a ladder ~5ft. He had been dealing with left shoulder pain prior to then, having been doing Physical Therapy. Got a little better with Physical Therapy but some exercises made it worse. Locates pain lateral shoulder and can cause \"zingers\" up the shoulder to the neck and into his jaw, aggravated with certain movements. Throbbing/aching at rest, worse at night. Treatment Physical Therapy, oxycodone as needed on occasion, and over the counter medications.    Has numbness and tingling down the arm. Has neck pain.    Patient is diabetic, A1C=11.4 on 11/15/2021.    Reports history of 2 back injections 2017, developed a spine infection from contamination requiring iv antibiotics.    Onset: ~9 months without incident, but did have recent injury  Symptoms have been waxing and waning since that time.  Aggrevated by: reaching, lifting  Relieved by: rest, pain medications.  Present symptoms: pain with ADL's (dressing),  pain with overhead activities,  pain reaching behind back,  pain reaching out or " "away from body (flexion/ abduction),  positional night pain,  Pain location: lateral shoulder, deltoid and upper arm, superior and anterior  Pain severity: 6/10  Pain quality: sharp and shooting  Frequency of symptoms: episodic  Associated symptoms: neck pain, radiating pain to his jaw, numbness/tingling down the arm, which seem to the patient to be related to the shoulder symptoms    Treatment up to this point:Tylenol, NSAIDS and PT  Has not tried: Corticosteroid injection   Prior history of related problems: nothing other than \"sleeping on it wrong\"    Usual level of work activity: sales    Other PMH:  has a past medical history of gerd, Hayfever, hx of asthma, Other and unspecified hyperlipidemia, Type II or unspecified type diabetes mellitus without mention of complication, not stated as uncontrolled, Unspecified essential hypertension, and Vitamin D deficiency.  Patient Active Problem List   Diagnosis     Erectile dysfunction     Type 2 diabetes mellitus without complication, without long-term current use of insulin (H)     Obesity, Class I, BMI 30-34.9     Generalized anxiety disorder     ACP (advance care planning)     History of adenomatous polyp of colon     Essential hypertension with goal blood pressure less than 140/90     Malignant melanoma of skin of trunk (H)     History of melanoma     AK (actinic keratosis)     Adjustment disorder with mixed disturbance of emotions and conduct     Chronic kidney disease, stage 1       Surgical Hx:  has a past surgical history that includes TOOTH EXTRACTION W/FORCEP; Colonoscopy (3/11/2014); Colonoscopy with CO2 insufflation (N/A, 8/19/2014); biopsy of skin lesion; Colonoscopy with CO2 insufflation (N/A, 10/11/2017); and [place] Tooth Extraction.    Medications:   Current Outpatient Medications:      alcohol swab prep pads, Use to swab area of injection/luiza as directed., Disp: 100 each, Rfl: 3     aspirin (ASA) 81 MG EC tablet, Take 1 tablet (81 mg) by mouth daily, " Disp: 100 tablet, Rfl: 3     Coenzyme Q10 (CO Q 10 PO), Take  by mouth. 2 per day, Disp: , Rfl:      CONTOUR NEXT TEST test strip, USE TO TEST BLOOD SUGAR UP TO 8 TIMES DAILY OR AS DIRECTED., Disp: 200 strip, Rfl: 6     Cyanocobalamin (VITAMIN B-12 PO), Take  by mouth daily., Disp: , Rfl:      FISH OIL CONCENTRATE 1000 MG OR CAPS, 2 tablets daily, Disp: , Rfl:      hydrochlorothiazide (HYDRODIURIL) 25 MG tablet, Take 1 tablet (25 mg) by mouth daily, Disp: 90 tablet, Rfl: 3     insulin aspart (NOVOLOG FLEXPEN) 100 UNIT/ML pen, 12-20 Units, subcutaneous TID per scale, Disp: 60 mL, Rfl: 3     insulin glargine (LANTUS PEN) 100 UNIT/ML pen, INJECT 50 UNITS SUBCUTANEOUSLY AT BEDTIME, Disp: 45 mL, Rfl: 3     insulin lispro (HUMALOG KWIKPEN) 100 UNIT/ML (1 unit dial) KWIKPEN, Inject 12-20 Units Subcutaneous 3 times daily (before meals), Disp: 54 mL, Rfl: 3     insulin pen needle (BD YANNI U/F) 32G X 4 MM miscellaneous, USE 4 TIMES DAILY AS DIRECTED, Disp: 400 each, Rfl: 0     liraglutide (VICTOZA PEN) 18 MG/3ML solution, INJECT 1.8 MG SUBCUTANEOUS DAILY, Disp: 27 mL, Rfl: 9     losartan (COZAAR) 100 MG tablet, Take 1 tablet (100 mg) by mouth daily, Disp: 90 tablet, Rfl: 3     metFORMIN (GLUCOPHAGE-XR) 500 MG 24 hr tablet, TAKE 2 TABLETS BY MOUTH 2 TIMES DAILY, Disp: 120 tablet, Rfl: 5     MULTIPLE VITAMINS OR, 1000mg daily, Disp: , Rfl:      order for DME, Equipment being ordered: glucometer brand per insurance, Disp: 1 Device, Rfl: 0     ORDER FOR DME, LANCETS 4 x DAILY AND PRN, Disp: 400 each, Rfl: 4     oxyCODONE (ROXICODONE) 5 MG tablet, Take 1 tablet (5 mg) by mouth every 6 hours as needed for severe pain maximum 6 tablet(s) per day, Disp: 30 tablet, Rfl: 0     rosuvastatin (CRESTOR) 40 MG tablet, Take 1 tablet (40 mg) by mouth daily, Disp: 90 tablet, Rfl: 3     sildenafil (VIAGRA) 100 MG tablet, TAKE 1 TABLET BY MOUTH 30 MINS BEFORE INTERCOURSE, Disp: 20 tablet, Rfl: 3     thiamine (B-1) 100 MG tablet, TAKE 1 TABLET BY  "MOUTH EVERY DAY, Disp: 90 tablet, Rfl: 1     thin (NO BRAND SPECIFIED) lancets, Use with lanceting device. To accompany: Blood Glucose Monitor Brands: per insurance. Test 8 x per day, Disp: 200 each, Rfl: 11     ULTICARE MINI 31G X 6 MM insulin pen needle, USE 4 PEN NEEDLES DAILY OR AS DIRECTED., Disp: 400 each, Rfl: 1     VITAMIN D 1000 UNIT OR CAPS, Take 1 capsule by mouth daily , Disp: , Rfl:     Allergies:   Allergies   Allergen Reactions     Cats      Cat dander     Cymbalta      Molds & Smuts      Ms Contin [Morphine]      Ceftazidime Rash     Was also on vanco     Vancomycin Rash     Was also on ceftazidime       Social Hx: sales.   reports that he has never smoked. He has never used smokeless tobacco. He reports current alcohol use. He reports that he does not use drugs.    Family Hx: family history includes Cancer in his father and another family member; Cerebrovascular Disease in his father; Coronary Artery Disease in his father; Diabetes in his father and mother; Hypertension in his mother; Lung Cancer in his father and mother; Skin Cancer in an other family member..    REVIEW OF SYSTEMS: 10 point ROS neg other than the symptoms noted above in the HPI and PMH. Notables include  CONSTITUTIONAL:NEGATIVE for fever, chills, change in weight  INTEGUMENTARY/SKIN: NEGATIVE for worrisome rashes, moles or lesions  MUSCULOSKELETAL:See HPI above  NEURO: NEGATIVE for weakness, dizziness or paresthesias    PHYSICAL EXAM:  BP (!) 158/90   Pulse 103   Ht 1.676 m (5' 6\")   Wt 82.8 kg (182 lb 9.6 oz)   SpO2 99%   BMI 29.47 kg/m     GENERAL APPEARANCE: healthy, alert, no distress  SKIN: no suspicious lesions or rashes  NEURO: Normal strength and tone, mentation intact and speech normal  PSYCH:  mentation appears normal and affect normal, not anxious  RESPIRATORY: No increased work of breathing.  VASCULAR: Radial pulses 2+ and brisk cappillary refill   LYMPH: no palpable axillary lymphadenopathy or cervical neck " lymphadenopathy.      MUSCULOSKELETAL:    NECK:  Cervical range of motion: decreased extension and turning/leaning to the left; causes pain in the neck and to the top of the shoulder   Posterior cervical spine nontender to palpation over midline bony prominences  There is mild-moderate tender to palpation along neck paraspinals and trapezius muscles      RIGHT UPPER EXTREMITY:  Sensation intact to light touch in median, radial, ulnar and axillary nerve distributions  Palpable 2+ radial pulse, brisk capillary refill to all fingers, wwp  Intact epl fpl fdp edc wrist flexion/extension biceps triceps deltoid    RIGHT SHOULDER:  Shoulder Inspection: no swelling, bruising, discoloration, there is AC joint prominence.  Tender: AC joint  Non-tender: proximal bicep tendon, greater tuberosity, proximal humerus, supraspinatus  and infraspinatus  Range of Motion:   Active:forward flexion 170+ degrees, external rotation  70 degrees, internal rotation  L4  Strength: forward flexion 5/5, External rotation 5/5  Liftoff: Able  Impingement: equivocal.  Special tests: Belly Press: Negative  Empty Can: Negative    LEFT UPPER EXTREMITY:  Sensation intact to light touch in median, radial, ulnar and axillary nerve distributions  Palpable 2+ radial pulse, brisk capillary refill to all fingers, wwp  Intact epl fpl fdp edc wrist flexion/extension biceps triceps deltoid    LEFT SHOULDER:  Shoulder Inspection: no swelling, bruising, discoloration, there is notable AC prominence  Tender: AC joint, acromion, anterior capsule, proximal bicep tendon, greater tuberosity, upper trapezius muscle and rhomboids  Non-tender: supraspinatus  and infraspinatus  Range of Motion:   Active:forward flexion 170 degrees, external rotation  70 degrees, internal rotation  sacrum  Strength: forward flexion 5-/5, painful, limited by pain, External rotation 5-/5, painful, limited by pain  Liftoff: Able  Impingement: all grade 2 positive  Special tests: Spurling's:  Negative  Belly Press: Negative  Empty Can: Negative      X-RAY INTERPRETATION: 7 views left  shoulder obtained 11/7/2021 ([facility]) were reviewed personally in clinic today with the patient. On my review, No obvious fracture or dislocation. No obvious bony abnormality or lesion. Moderate acromio-clavicular degenerative changes. Mild gleno-humeral degenerative changes.      MRI left  shoulder:  11/21/2021  1. Moderate to severe osteoarthrosis of the left shoulder  acromioclavicular joint with a focal area of fluid superior to the  joint as well as subchondral cystic changes centered at this joint.  2. Focal area of moderate to high-grade partial-thickness tearing of  the anterior to mid fibers of the left shoulder supraspinatus tendon  at the footprint, on a background of tendinosis. No full-thickness  tear or tendon retraction.  3. Tendinosis of the left shoulder infraspinatus tendon.  4. Tendinosis of the left shoulder subscapularis tendon with a focal  area of low-grade articular stent partial-thickness tearing.  5. Normal muscle bulk of the left shoulder rotator cuff musculature.  6. Slight medial subluxation of the biceps tendon at the level of the  bicipital pulley, with tendinosis of the intra-articular biceps  tendon.      ASSESSMENT: Amadou Bach is a 55 year old male, right  -hand dominant with chronic left shoulder pain, rotator cuff tendinosis with  partial thickness rotator cuff tear, AC arthrosis with cyst, impingement syndrome      PLAN:   * Reviewed imaging studies with patient. Also, clinical exam findings. Consistent with rotator cuff tendinosis with partial tearing, AC osteoarthritis with an associated cyst.  * would recommend nonsurgical management at this time.  * patient needs to get better control of diabetes prior to surgery, currently A1C=11.4. Puts patient at increased risk of wound healing and infection problems, continued pain, less surgical  success.    Treatment:    * Rest  * Activity modification - avoid activities that aggravate symptoms or started symptoms at onset.  * NSAIDS - regular use for inflammation, with food, as long as no contra-indications. Please discuss with pcp if needed.  * Ice twice daily to three times daily, 15-20 minutes at a time  * heat may be beneficial prior to exercising  * Physical Therapy for strengthening, stretching and range of motion exercises of rotator cuff and periscapular stabilization.  * Tylenol as needed for pain  * Injections: cortisone injections may be beneficial to help decrease swelling and inflammation within the shoulder or bursa, and decrease pain. With decreased pain, Physical Therapy and exercises will be more effective and efficient. Patient elected to proceed.  * Return to clinic as needed. Consider AC joint cyst aspiration/injection in future as needed.  * consider arthroscopic versus open surgery (for example: subacromial decompression, distal clavicle excision, rotator cuff repair versus debridement, biceps tenodesis versus tenotomy, etc.) in future if symptoms continue despite continued nonoperative treatment. However, 90% of patients with shoulder pain will respond to nonoperative treatment, as described above, and may never need surgery.    Large Joint Injection/Arthocentesis: L subacromial bursa    Date/Time: 11/24/2021 9:09 AM  Performed by: Milan Carrizales MD  Authorized by: Milan Carrizales MD     Indications:  Pain  Needle Size:  22 G  Guidance: landmark guided    Location:  Shoulder      Site:  L subacromial bursa  Medications:  80 mg triamcinolone 40 MG/ML  Medications comment:  4 ml 0.25% bupivicaine 75 mg per 30 mL  NDC:13464-268-86  Lot: [id number]  7/30/22      Outcome:  Tolerated well, no immediate complications  Procedure discussed: discussed risks, benefits, and alternatives    Consent Given by:  Patient  Timeout: timeout called immediately prior to procedure    Prep: patient  was prepped and draped in usual sterile fashion            Milan Carrizales M.D., M.S.  Dept. of Orthopaedic Surgery  [facility]

## 2021-11-24 ENCOUNTER — OFFICE VISIT (OUTPATIENT)
Dept: ORTHOPEDICS | Facility: CLINIC | Age: 56
End: 2021-11-24
Attending: INTERNAL MEDICINE
Payer: COMMERCIAL

## 2021-11-24 VITALS
SYSTOLIC BLOOD PRESSURE: 158 MMHG | DIASTOLIC BLOOD PRESSURE: 90 MMHG | HEART RATE: 103 BPM | HEIGHT: 66 IN | BODY MASS INDEX: 29.35 KG/M2 | WEIGHT: 182.6 LBS | OXYGEN SATURATION: 99 %

## 2021-11-24 DIAGNOSIS — M25.512 CHRONIC LEFT SHOULDER PAIN: Primary | ICD-10-CM

## 2021-11-24 DIAGNOSIS — M75.42 IMPINGEMENT SYNDROME OF SHOULDER, LEFT: ICD-10-CM

## 2021-11-24 DIAGNOSIS — G89.29 CHRONIC LEFT SHOULDER PAIN: Primary | ICD-10-CM

## 2021-11-24 DIAGNOSIS — M75.112 NONTRAUMATIC INCOMPLETE TEAR OF LEFT ROTATOR CUFF: ICD-10-CM

## 2021-11-24 DIAGNOSIS — M19.012 ARTHRITIS OF LEFT ACROMIOCLAVICULAR JOINT: ICD-10-CM

## 2021-11-24 PROCEDURE — 20610 DRAIN/INJ JOINT/BURSA W/O US: CPT | Mod: LT | Performed by: ORTHOPAEDIC SURGERY

## 2021-11-24 PROCEDURE — 99243 OFF/OP CNSLTJ NEW/EST LOW 30: CPT | Mod: 25 | Performed by: ORTHOPAEDIC SURGERY

## 2021-11-24 RX ORDER — TRIAMCINOLONE ACETONIDE 40 MG/ML
80 INJECTION, SUSPENSION INTRA-ARTICULAR; INTRAMUSCULAR
Status: SHIPPED | OUTPATIENT
Start: 2021-11-24

## 2021-11-24 RX ADMIN — TRIAMCINOLONE ACETONIDE 80 MG: 40 INJECTION, SUSPENSION INTRA-ARTICULAR; INTRAMUSCULAR at 09:09

## 2021-11-24 ASSESSMENT — PAIN SCALES - GENERAL: PAINLEVEL: MODERATE PAIN (4)

## 2021-11-24 ASSESSMENT — MIFFLIN-ST. JEOR: SCORE: 1596.02

## 2021-11-24 NOTE — LETTER
"    11/24/2021         RE: Amadou Bach  [id number] [facility] [id number]        Dear Colleague,    Thank you for referring your patient, Amadou Bach, to the [facility] [place]. Please see a copy of my visit note below.    CHIEF COMPLAINT:   Chief Complaint   Patient presents with     Left Shoulder - Pain     DOI 11/7/21, 2.5 wk s/p. Patient notes he has had pain prior to that about 9 months. Patient was painting and fell off a ladder, approximately 5 feet high. Pain is on the top/posterior of the shoulder and can radiate up the neck into his jaw.      Shoulder Pain     If he lifts his arm it will catch like and send zingers posterior shoulder and up the neck. He has had PT.      Amadou Bach is seen today in the [facility] for evaluation of left shoulder pain at the request of Dr. Joseph Alanis         HISTORY:  Amadou Bach is a 55 year old male, right  -hand dominant, who is seen in consultation at the request of Dr. Alanis for ongoing left shoulder pain ~9 months without injury. Just woke up one day with pain, never really went away. Worsened after an injury that occurred on 11/7/2021, falling off a ladder ~5ft. He had been dealing with left shoulder pain prior to then, having been doing Physical Therapy. Got a little better with Physical Therapy but some exercises made it worse. Locates pain lateral shoulder and can cause \"zingers\" up the shoulder to the neck and into his jaw, aggravated with certain movements. Throbbing/aching at rest, worse at night. Treatment Physical Therapy, oxycodone as needed on occasion, and over the counter medications.    Has numbness and tingling down the arm. Has neck pain.    Patient is diabetic, A1C=11.4 on 11/15/2021.    Reports history of 2 back injections 2017, developed a spine infection from contamination requiring iv antibiotics.    Onset: ~9 months without incident, but did have " "recent injury  Symptoms have been waxing and waning since that time.  Aggrevated by: reaching, lifting  Relieved by: rest, pain medications.  Present symptoms: pain with ADL's (dressing),  pain with overhead activities,  pain reaching behind back,  pain reaching out or away from body (flexion/ abduction),  positional night pain,  Pain location: lateral shoulder, deltoid and upper arm, superior and anterior  Pain severity: 6/10  Pain quality: sharp and shooting  Frequency of symptoms: episodic  Associated symptoms: neck pain, radiating pain to his jaw, numbness/tingling down the arm, which seem to the patient to be related to the shoulder symptoms    Treatment up to this point:Tylenol, NSAIDS and PT  Has not tried: Corticosteroid injection   Prior history of related problems: nothing other than \"sleeping on it wrong\"    Usual level of work activity: sales    Other PMH:  has a past medical history of gerd, Hayfever, hx of asthma, Other and unspecified hyperlipidemia, Type II or unspecified type diabetes mellitus without mention of complication, not stated as uncontrolled, Unspecified essential hypertension, and Vitamin D deficiency.  Patient Active Problem List   Diagnosis     Erectile dysfunction     Type 2 diabetes mellitus without complication, without long-term current use of insulin (H)     Obesity, Class I, BMI 30-34.9     Generalized anxiety disorder     ACP (advance care planning)     History of adenomatous polyp of colon     Essential hypertension with goal blood pressure less than 140/90     Malignant melanoma of skin of trunk (H)     History of melanoma     AK (actinic keratosis)     Adjustment disorder with mixed disturbance of emotions and conduct     Chronic kidney disease, stage 1       Surgical Hx:  has a past surgical history that includes TOOTH EXTRACTION W/FORCEP; Colonoscopy (3/11/2014); Colonoscopy with CO2 insufflation (N/A, 8/19/2014); biopsy of skin lesion; Colonoscopy with CO2 insufflation " (N/A, 10/11/2017); and [place] Tooth Extraction.    Medications:   Current Outpatient Medications:      alcohol swab prep pads, Use to swab area of injection/luiza as directed., Disp: 100 each, Rfl: 3     aspirin (ASA) 81 MG EC tablet, Take 1 tablet (81 mg) by mouth daily, Disp: 100 tablet, Rfl: 3     Coenzyme Q10 (CO Q 10 PO), Take  by mouth. 2 per day, Disp: , Rfl:      CONTOUR NEXT TEST test strip, USE TO TEST BLOOD SUGAR UP TO 8 TIMES DAILY OR AS DIRECTED., Disp: 200 strip, Rfl: 6     Cyanocobalamin (VITAMIN B-12 PO), Take  by mouth daily., Disp: , Rfl:      FISH OIL CONCENTRATE 1000 MG OR CAPS, 2 tablets daily, Disp: , Rfl:      hydrochlorothiazide (HYDRODIURIL) 25 MG tablet, Take 1 tablet (25 mg) by mouth daily, Disp: 90 tablet, Rfl: 3     insulin aspart (NOVOLOG FLEXPEN) 100 UNIT/ML pen, 12-20 Units, subcutaneous TID per scale, Disp: 60 mL, Rfl: 3     insulin glargine (LANTUS PEN) 100 UNIT/ML pen, INJECT 50 UNITS SUBCUTANEOUSLY AT BEDTIME, Disp: 45 mL, Rfl: 3     insulin lispro (HUMALOG KWIKPEN) 100 UNIT/ML (1 unit dial) KWIKPEN, Inject 12-20 Units Subcutaneous 3 times daily (before meals), Disp: 54 mL, Rfl: 3     insulin pen needle (BD YANNI U/F) 32G X 4 MM miscellaneous, USE 4 TIMES DAILY AS DIRECTED, Disp: 400 each, Rfl: 0     liraglutide (VICTOZA PEN) 18 MG/3ML solution, INJECT 1.8 MG SUBCUTANEOUS DAILY, Disp: 27 mL, Rfl: 9     losartan (COZAAR) 100 MG tablet, Take 1 tablet (100 mg) by mouth daily, Disp: 90 tablet, Rfl: 3     metFORMIN (GLUCOPHAGE-XR) 500 MG 24 hr tablet, TAKE 2 TABLETS BY MOUTH 2 TIMES DAILY, Disp: 120 tablet, Rfl: 5     MULTIPLE VITAMINS OR, 1000mg daily, Disp: , Rfl:      order for DME, Equipment being ordered: glucometer brand per insurance, Disp: 1 Device, Rfl: 0     ORDER FOR DME, LANCETS 4 x DAILY AND PRN, Disp: 400 each, Rfl: 4     oxyCODONE (ROXICODONE) 5 MG tablet, Take 1 tablet (5 mg) by mouth every 6 hours as needed for severe pain maximum 6 tablet(s) per day, Disp: 30 tablet,  "Rfl: 0     rosuvastatin (CRESTOR) 40 MG tablet, Take 1 tablet (40 mg) by mouth daily, Disp: 90 tablet, Rfl: 3     sildenafil (VIAGRA) 100 MG tablet, TAKE 1 TABLET BY MOUTH 30 MINS BEFORE INTERCOURSE, Disp: 20 tablet, Rfl: 3     thiamine (B-1) 100 MG tablet, TAKE 1 TABLET BY MOUTH EVERY DAY, Disp: 90 tablet, Rfl: 1     thin (NO BRAND SPECIFIED) lancets, Use with lanceting device. To accompany: Blood Glucose Monitor Brands: per insurance. Test 8 x per day, Disp: 200 each, Rfl: 11     ULTICARE MINI 31G X 6 MM insulin pen needle, USE 4 PEN NEEDLES DAILY OR AS DIRECTED., Disp: 400 each, Rfl: 1     VITAMIN D 1000 UNIT OR CAPS, Take 1 capsule by mouth daily , Disp: , Rfl:     Allergies:   Allergies   Allergen Reactions     Cats      Cat dander     Cymbalta      Molds & Smuts      Ms Contin [Morphine]      Ceftazidime Rash     Was also on vanco     Vancomycin Rash     Was also on ceftazidime       Social Hx: sales.   reports that he has never smoked. He has never used smokeless tobacco. He reports current alcohol use. He reports that he does not use drugs.    Family Hx: family history includes Cancer in his father and another family member; Cerebrovascular Disease in his father; Coronary Artery Disease in his father; Diabetes in his father and mother; Hypertension in his mother; Lung Cancer in his father and mother; Skin Cancer in an other family member..    REVIEW OF SYSTEMS: 10 point ROS neg other than the symptoms noted above in the HPI and PMH. Notables include  CONSTITUTIONAL:NEGATIVE for fever, chills, change in weight  INTEGUMENTARY/SKIN: NEGATIVE for worrisome rashes, moles or lesions  MUSCULOSKELETAL:See HPI above  NEURO: NEGATIVE for weakness, dizziness or paresthesias    PHYSICAL EXAM:  BP (!) 158/90   Pulse 103   Ht 1.676 m (5' 6\")   Wt 82.8 kg (182 lb 9.6 oz)   SpO2 99%   BMI 29.47 kg/m     GENERAL APPEARANCE: healthy, alert, no distress  SKIN: no suspicious lesions or rashes  NEURO: Normal strength and " tone, mentation intact and speech normal  PSYCH:  mentation appears normal and affect normal, not anxious  RESPIRATORY: No increased work of breathing.  VASCULAR: Radial pulses 2+ and brisk cappillary refill   LYMPH: no palpable axillary lymphadenopathy or cervical neck lymphadenopathy.      MUSCULOSKELETAL:    NECK:  Cervical range of motion: decreased extension and turning/leaning to the left; causes pain in the neck and to the top of the shoulder   Posterior cervical spine nontender to palpation over midline bony prominences  There is mild-moderate tender to palpation along neck paraspinals and trapezius muscles      RIGHT UPPER EXTREMITY:  Sensation intact to light touch in median, radial, ulnar and axillary nerve distributions  Palpable 2+ radial pulse, brisk capillary refill to all fingers, wwp  Intact epl fpl fdp edc wrist flexion/extension biceps triceps deltoid    RIGHT SHOULDER:  Shoulder Inspection: no swelling, bruising, discoloration, there is AC joint prominence.  Tender: AC joint  Non-tender: proximal bicep tendon, greater tuberosity, proximal humerus, supraspinatus  and infraspinatus  Range of Motion:   Active:forward flexion 170+ degrees, external rotation  70 degrees, internal rotation  L4  Strength: forward flexion 5/5, External rotation 5/5  Liftoff: Able  Impingement: equivocal.  Special tests: Belly Press: Negative  Empty Can: Negative    LEFT UPPER EXTREMITY:  Sensation intact to light touch in median, radial, ulnar and axillary nerve distributions  Palpable 2+ radial pulse, brisk capillary refill to all fingers, wwp  Intact epl fpl fdp edc wrist flexion/extension biceps triceps deltoid    LEFT SHOULDER:  Shoulder Inspection: no swelling, bruising, discoloration, there is notable AC prominence  Tender: AC joint, acromion, anterior capsule, proximal bicep tendon, greater tuberosity, upper trapezius muscle and rhomboids  Non-tender: supraspinatus  and infraspinatus  Range of  Motion:   Active:forward flexion 170 degrees, external rotation  70 degrees, internal rotation  sacrum  Strength: forward flexion 5-/5, painful, limited by pain, External rotation 5-/5, painful, limited by pain  Liftoff: Able  Impingement: all grade 2 positive  Special tests: Spurling's: Negative  Belly Press: Negative  Empty Can: Negative      X-RAY INTERPRETATION: 7 views left  shoulder obtained 11/7/2021 ([facility]) were reviewed personally in clinic today with the patient. On my review, No obvious fracture or dislocation. No obvious bony abnormality or lesion. Moderate acromio-clavicular degenerative changes. Mild gleno-humeral degenerative changes.      MRI left  shoulder:  11/21/2021  1. Moderate to severe osteoarthrosis of the left shoulder  acromioclavicular joint with a focal area of fluid superior to the  joint as well as subchondral cystic changes centered at this joint.  2. Focal area of moderate to high-grade partial-thickness tearing of  the anterior to mid fibers of the left shoulder supraspinatus tendon  at the footprint, on a background of tendinosis. No full-thickness  tear or tendon retraction.  3. Tendinosis of the left shoulder infraspinatus tendon.  4. Tendinosis of the left shoulder subscapularis tendon with a focal  area of low-grade articular stent partial-thickness tearing.  5. Normal muscle bulk of the left shoulder rotator cuff musculature.  6. Slight medial subluxation of the biceps tendon at the level of the  bicipital pulley, with tendinosis of the intra-articular biceps  tendon.      ASSESSMENT: Amadou Bach is a 55 year old male, right  -hand dominant with chronic left shoulder pain, rotator cuff tendinosis with  partial thickness rotator cuff tear, AC arthrosis with cyst, impingement syndrome      PLAN:   * Reviewed imaging studies with patient. Also, clinical exam findings. Consistent with rotator cuff tendinosis with partial tearing, AC  osteoarthritis with an associated cyst.  * would recommend nonsurgical management at this time.  * patient needs to get better control of diabetes prior to surgery, currently A1C=11.4. Puts patient at increased risk of wound healing and infection problems, continued pain, less surgical success.    Treatment:    * Rest  * Activity modification - avoid activities that aggravate symptoms or started symptoms at onset.  * NSAIDS - regular use for inflammation, with food, as long as no contra-indications. Please discuss with pcp if needed.  * Ice twice daily to three times daily, 15-20 minutes at a time  * heat may be beneficial prior to exercising  * Physical Therapy for strengthening, stretching and range of motion exercises of rotator cuff and periscapular stabilization.  * Tylenol as needed for pain  * Injections: cortisone injections may be beneficial to help decrease swelling and inflammation within the shoulder or bursa, and decrease pain. With decreased pain, Physical Therapy and exercises will be more effective and efficient. Patient elected to proceed.  * Return to clinic as needed. Consider AC joint cyst aspiration/injection in future as needed.  * consider arthroscopic versus open surgery (for example: subacromial decompression, distal clavicle excision, rotator cuff repair versus debridement, biceps tenodesis versus tenotomy, etc.) in future if symptoms continue despite continued nonoperative treatment. However, 90% of patients with shoulder pain will respond to nonoperative treatment, as described above, and may never need surgery.    Large Joint Injection/Arthocentesis: L subacromial bursa    Date/Time: 11/24/2021 9:09 AM  Performed by: Milan Carrizales MD  Authorized by: Milan Carrizales MD     Indications:  Pain  Needle Size:  22 G  Guidance: landmark guided    Location:  Shoulder      Site:  L subacromial bursa  Medications:  80 mg triamcinolone 40 MG/ML  Medications comment:  4 ml 0.25% bupivicaine  75 mg per 30 mL  NDC:81514-055-93  Lot: [id number]  7/30/22      Outcome:  Tolerated well, no immediate complications  Procedure discussed: discussed risks, benefits, and alternatives    Consent Given by:  Patient  Timeout: timeout called immediately prior to procedure    Prep: patient was prepped and draped in usual sterile fashion            Milan Carrizales M.D., M.S.  Dept. of Orthopaedic Surgery  [facility]      Again, thank you for allowing me to participate in the care of your patient.        Sincerely,        Milan Carrizales MD

## 2021-12-05 ENCOUNTER — TRANSFERRED RECORDS (OUTPATIENT)
Dept: HEALTH INFORMATION MANAGEMENT | Facility: CLINIC | Age: 56
End: 2021-12-05
Payer: COMMERCIAL

## 2021-12-05 LAB — RETINOPATHY: NEGATIVE

## 2021-12-06 ENCOUNTER — MYC MEDICAL ADVICE (OUTPATIENT)
Dept: FAMILY MEDICINE | Facility: CLINIC | Age: 56
End: 2021-12-06
Payer: COMMERCIAL

## 2021-12-06 ENCOUNTER — MYC REFILL (OUTPATIENT)
Dept: FAMILY MEDICINE | Facility: CLINIC | Age: 56
End: 2021-12-06
Payer: COMMERCIAL

## 2021-12-06 DIAGNOSIS — G89.29 CHRONIC LOW BACK PAIN WITH SCIATICA, SCIATICA LATERALITY UNSPECIFIED, UNSPECIFIED BACK PAIN LATERALITY: ICD-10-CM

## 2021-12-06 DIAGNOSIS — M25.511 ACUTE PAIN OF RIGHT SHOULDER: ICD-10-CM

## 2021-12-06 DIAGNOSIS — M54.40 CHRONIC LOW BACK PAIN WITH SCIATICA, SCIATICA LATERALITY UNSPECIFIED, UNSPECIFIED BACK PAIN LATERALITY: ICD-10-CM

## 2021-12-06 RX ORDER — OXYCODONE HYDROCHLORIDE 5 MG/1
5 TABLET ORAL EVERY 6 HOURS PRN
Qty: 30 TABLET | Refills: 0 | Status: SHIPPED | OUTPATIENT
Start: 2021-12-06 | End: 2022-01-19

## 2021-12-06 NOTE — TELEPHONE ENCOUNTER
Controlled Substance Refill Request for  oxyCODONE (ROXICODONE) 5 MG tablet     Problem List Complete:    No     PROVIDER TO CONSIDER COMPLETION OF PROBLEM LIST AND OVERVIEW/CONTROLLED SUBSTANCE AGREEMENT    Last Written Prescription Date:  11/11/2021  Last Fill Quantity: 30,   # refills: 0    THE MOST RECENT OFFICE VISIT MUST BE WITHIN THE PAST 3 MONTHS. AT LEAST ONE FACE TO FACE VISIT MUST OCCUR EVERY 6 MONTHS. ADDITIONAL VISITS CAN BE VIRTUAL.  (THIS STATEMENT SHOULD BE DELETED.)    [place] [facility] [place]: 11/15/2021    Future Office visit:     Controlled substance agreement:   CSA -- Encounter Level on 09/14/2017:    Controlled Substance Agreement - Scan on 9/26/2017  7:07 AM: CONTROLLED SUBSTANCE AGREEMENT     CSA -- Patient Level:    CSA: None found at the patient level.         Last Urine Drug Screen: No results found for: Clint PORTILLO Drug Analysis UR   Date Value Ref Range Status   09/14/2017 FINAL  Final     Comment:     (Note)  ====================================================================  COMPREHENSIVE DRUG ANALYSIS,UR  ====================================================================  Test                             Result       Flag       Units        Drug Present   Lorazepam                      279                     ng/mg creat    Source of lorazepam is a scheduled prescription medication.   Benzoylecgonine                1333                    ng/mg creat    Benzoylecgonine is a metabolite of cocaine; its presence    indicates use of this drug.  Source is most commonly illicit, but    cocaine is present in some topical anesthetic solutions.   Oxycodone                      907                     ng/mg creat   Noroxycodone                   4121                    ng/mg creat    Sources of oxycodone include scheduled prescription medications.    Noroxycodone is an expected metabolite of oxycodone.   Cyclobenzaprine                PRESENT                                Metoprolol                     PRESENT                              ====================================================================  Test                      Result    Flag   Units      Ref Range        Creatinine              89               mg/dL      >=20            ====================================================================  For clinical consultation, please call [phone].  ====================================================================  Analysis performed by "[employer] [place] [address]     ,   Cannabinoids (69-tic-0-carboxy-9-THC)   Date Value Ref Range Status   01/18/2021 Not Detected NDET^Not Detected ng/mL Final     Comment:     Cutoff for a negative cannabinoid is 50 ng/mL or less.     Phencyclidine (Phencyclidine)   Date Value Ref Range Status   01/18/2021 Not Detected NDET^Not Detected ng/mL Final     Comment:     Cutoff for a negative PCP is 25 ng/mL or less.     Cocaine (Benzoylecgonine)   Date Value Ref Range Status   01/18/2021 Not Detected NDET^Not Detected ng/mL Final     Comment:     Cutoff for a negative cocaine is 150 ng/ml or less.     Methamphetamine (d-Methamphetamine)   Date Value Ref Range Status   01/18/2021 Not Detected NDET^Not Detected ng/mL Final     Comment:     Cutoff for a negative methamphetamine is 500 ng/ml or less.     Opiates (Morphine)   Date Value Ref Range Status   01/18/2021 Not Detected NDET^Not Detected ng/mL Final     Comment:     Cutoff for a negative opiate is 100 ng/ml or less.     Amphetamine (d-Amphetamine)   Date Value Ref Range Status   01/18/2021 Not Detected NDET^Not Detected ng/mL Final     Comment:     Cutoff for a negative amphetamine is 500 ng/mL or less.     Benzodiazepines (Nordiazepam)   Date Value Ref Range Status   01/18/2021 Not Detected NDET^Not Detected ng/mL Final     Comment:     Cutoff for a negative benzodiazepine is 150 ng/ml or less.     Tricyclic Antidepressants (Desipramine)   Date  Value Ref Range Status   01/18/2021 Not Detected NDET^Not Detected ng/mL Final     Comment:     Cutoff for a negative tricyclic antidepressant is 300 ng/ml or less.     Methadone (Methadone)   Date Value Ref Range Status   01/18/2021 Not Detected NDET^Not Detected ng/mL Final     Comment:     Cutoff for a negative methadone is 200 ng/ml or less.     Barbiturates (Butalbital)   Date Value Ref Range Status   01/18/2021 Not Detected NDET^Not Detected ng/mL Final     Comment:     Cutoff for a negative barbituate is 200 ng/ml or less.     Oxycodone (Oxycodone)   Date Value Ref Range Status   01/18/2021 Not Detected NDET^Not Detected ng/mL Final     Comment:     Cutoff for a negative Oxycodone is 100 ng/mL or less.     Propoxyphene (Norpropoxyphene)   Date Value Ref Range Status   01/18/2021 Not Detected NDET^Not Detected ng/mL Final     Comment:     Cutoff for a negative propoxyphene is 300 ng/ml or less     Buprenorphine (Buprenorphine)   Date Value Ref Range Status   01/18/2021 Not Detected NDET^Not Detected ng/mL Final     Comment:     Cutoff for a negative buprenorphine is 10 ng/ml or less        Processing:  Rx to be electronically transmitted to pharmacy by provider      [email or web address]       checked in past 3 months?  No, route to RN

## 2021-12-06 NOTE — TELEPHONE ENCOUNTER
The form [place] [employer] Insulin-Treated Diabetes Mellitus Report has been started for the provider and placed at their desk. Astrid Galvez,     Fax: [phone]    **Please send the encounter and the form back to the care team when completed.

## 2021-12-13 NOTE — TELEPHONE ENCOUNTER
The Form has been completed by the provider, confirmed faxed to the fax number on the form and listed below and a copy has been sent to be added to the chart. Astrid Galvez,

## 2021-12-23 DIAGNOSIS — Z79.4 TYPE 2 DIABETES MELLITUS WITHOUT COMPLICATION, WITH LONG-TERM CURRENT USE OF INSULIN (H): ICD-10-CM

## 2021-12-23 DIAGNOSIS — E11.9 TYPE 2 DIABETES MELLITUS WITHOUT COMPLICATION, WITH LONG-TERM CURRENT USE OF INSULIN (H): ICD-10-CM

## 2022-01-01 ENCOUNTER — HEALTH MAINTENANCE LETTER (OUTPATIENT)
Age: 57
End: 2022-01-01

## 2022-01-13 DIAGNOSIS — I10 HTN, GOAL BELOW 140/90: ICD-10-CM

## 2022-01-15 NOTE — TELEPHONE ENCOUNTER
Routing refill request to provider for review/approval because:  Drug not on the [facility] refill protocol   BP Readings from Last 3 Encounters:   11/24/21 (!) 158/90   11/15/21 (!) 146/88   09/21/21 (!) 153/89

## 2022-01-16 RX ORDER — LOSARTAN POTASSIUM 100 MG/1
TABLET ORAL
Qty: 90 TABLET | Refills: 3 | Status: SHIPPED | OUTPATIENT
Start: 2022-01-16

## 2022-01-19 ENCOUNTER — MYC REFILL (OUTPATIENT)
Dept: FAMILY MEDICINE | Facility: CLINIC | Age: 57
End: 2022-01-19
Payer: COMMERCIAL

## 2022-01-19 DIAGNOSIS — G89.29 CHRONIC LOW BACK PAIN WITH SCIATICA, SCIATICA LATERALITY UNSPECIFIED, UNSPECIFIED BACK PAIN LATERALITY: ICD-10-CM

## 2022-01-19 DIAGNOSIS — M54.40 CHRONIC LOW BACK PAIN WITH SCIATICA, SCIATICA LATERALITY UNSPECIFIED, UNSPECIFIED BACK PAIN LATERALITY: ICD-10-CM

## 2022-01-19 DIAGNOSIS — M25.511 ACUTE PAIN OF RIGHT SHOULDER: ICD-10-CM

## 2022-01-19 RX ORDER — OXYCODONE HYDROCHLORIDE 5 MG/1
5 TABLET ORAL EVERY 6 HOURS PRN
Qty: 30 TABLET | Refills: 0 | Status: SHIPPED | OUTPATIENT
Start: 2022-01-19 | End: 2022-03-04

## 2022-01-21 DIAGNOSIS — N52.9 ERECTILE DYSFUNCTION, UNSPECIFIED ERECTILE DYSFUNCTION TYPE: ICD-10-CM

## 2022-01-21 NOTE — TELEPHONE ENCOUNTER
Reason for call:  Medication   If this is a refill request, has the caller requested the refill from the pharmacy already? Yes  Will the patient be using a [facility]? No  Name of the pharmacy and phone number for the current request:    [id number] - [place], [place] - [phone]    Name of the medication requested: sildenafil (VIAGRA) 100 MG tablet    Other request: Pt calling asking for refill on medication. Pt is also asking for increase in quantity. States he has only been able to receive 8 tablets at a time. Informed pt that we would put in the request for higher quantity, but to also check with insurance on this situation as well.    Phone number to reach patient:  Cell number on file:    Telephone Information:   Mobile [phone]       Best Time:  anytime    Can we leave a detailed message on this number?  YES    Travel screening: Not Applicable

## 2022-01-25 RX ORDER — SILDENAFIL 100 MG/1
TABLET, FILM COATED ORAL
Qty: 20 TABLET | Refills: 1 | Status: SHIPPED | OUTPATIENT
Start: 2022-01-25 | End: 2022-07-06

## 2022-02-15 DIAGNOSIS — E11.9 TYPE 2 DIABETES MELLITUS WITHOUT COMPLICATION, WITH LONG-TERM CURRENT USE OF INSULIN (H): ICD-10-CM

## 2022-02-15 DIAGNOSIS — Z79.4 TYPE 2 DIABETES MELLITUS WITHOUT COMPLICATION, WITH LONG-TERM CURRENT USE OF INSULIN (H): ICD-10-CM

## 2022-02-17 RX ORDER — METFORMIN HCL 500 MG
TABLET, EXTENDED RELEASE 24 HR ORAL
Qty: 360 TABLET | Refills: 1 | Status: SHIPPED | OUTPATIENT
Start: 2022-02-17 | End: 2022-08-15

## 2022-02-17 NOTE — TELEPHONE ENCOUNTER
Routing refill request to provider for review/approval because:  Labs out of range:    Lab Results   Component Value Date    A1C 11.4 11/15/2021    A1C 8.2 01/18/2021    A1C 6.2 08/16/2019    A1C 6.2 05/17/2019    A1C 6.3 01/17/2018    A1C 8.2 03/27/2017

## 2022-02-18 DIAGNOSIS — E78.5 HYPERLIPIDEMIA LDL GOAL <100: ICD-10-CM

## 2022-02-18 RX ORDER — ROSUVASTATIN CALCIUM 40 MG/1
40 TABLET, COATED ORAL DAILY
Qty: 90 TABLET | Refills: 0 | Status: SHIPPED | OUTPATIENT
Start: 2022-02-18 | End: 2022-05-19

## 2022-03-04 ENCOUNTER — MYC REFILL (OUTPATIENT)
Dept: FAMILY MEDICINE | Facility: CLINIC | Age: 57
End: 2022-03-04
Payer: COMMERCIAL

## 2022-03-04 DIAGNOSIS — G89.29 CHRONIC LOW BACK PAIN WITH SCIATICA, SCIATICA LATERALITY UNSPECIFIED, UNSPECIFIED BACK PAIN LATERALITY: ICD-10-CM

## 2022-03-04 DIAGNOSIS — M25.511 ACUTE PAIN OF RIGHT SHOULDER: ICD-10-CM

## 2022-03-04 DIAGNOSIS — M54.40 CHRONIC LOW BACK PAIN WITH SCIATICA, SCIATICA LATERALITY UNSPECIFIED, UNSPECIFIED BACK PAIN LATERALITY: ICD-10-CM

## 2022-03-04 RX ORDER — OXYCODONE HYDROCHLORIDE 5 MG/1
5 TABLET ORAL EVERY 6 HOURS PRN
Qty: 30 TABLET | Refills: 0 | Status: SHIPPED | OUTPATIENT
Start: 2022-03-04 | End: 2022-04-14

## 2022-03-15 NOTE — TELEPHONE ENCOUNTER
Reason for Call:  Other prescription    Detailed comments: patient would like to talk with a nurse about getting a new prescription for test strips and lancets     Phone Number Patient can be reached at: Other phone number:  [phone]    Best Time: any    Can we leave a detailed message on this number? YES    Call taken on 2/21/2020 at 8:12 AM by Jena Slaughter     Cimzia Counseling:  I discussed with the patient the risks of Cimzia including but not limited to immunosuppression, allergic reactions and infections.  The patient understands that monitoring is required including a PPD at baseline and must alert us or the primary physician if symptoms of infection or other concerning signs are noted.

## 2022-04-14 ENCOUNTER — MYC REFILL (OUTPATIENT)
Dept: FAMILY MEDICINE | Facility: CLINIC | Age: 57
End: 2022-04-14
Payer: COMMERCIAL

## 2022-04-14 DIAGNOSIS — M25.511 ACUTE PAIN OF RIGHT SHOULDER: ICD-10-CM

## 2022-04-14 DIAGNOSIS — M54.40 CHRONIC LOW BACK PAIN WITH SCIATICA, SCIATICA LATERALITY UNSPECIFIED, UNSPECIFIED BACK PAIN LATERALITY: ICD-10-CM

## 2022-04-14 DIAGNOSIS — G89.29 CHRONIC LOW BACK PAIN WITH SCIATICA, SCIATICA LATERALITY UNSPECIFIED, UNSPECIFIED BACK PAIN LATERALITY: ICD-10-CM

## 2022-04-14 NOTE — TELEPHONE ENCOUNTER
Initial SW/CM Assessment/Plan of Care Note     Baseline Assessment  79 year old admitted 4/13/2022 as Inpatient with a diagnosis of UTI, sepsis.   Prior to admission patient was living with Alone and residing at House.  Patient’s Primary Care Provider is Debora Sarah DO.     Medical History  Past Medical History:   Diagnosis Date   • Chest pain    • COPD (chronic obstructive pulmonary disease) (CMS/[facility])    • Depression    • Diabetes mellitus (CMS/[facility])    • DVT (deep vein thrombosis) in pregnancy    • Essential (primary) hypertension    • Factor V Leiden (CMS/[facility])    • Morbid obesity (CMS/[facility])    • Multiple pulmonary emboli (CMS/[facility])    • Sleep apnea     on CPAP    • SOB (shortness of breath)        Prior to Admission Status  Verified Living arrangments as above.       Agency/Support  Type of Services Prior to Hospitalization: None  Support Systems: Children  Home Devices/Equipment: Mobility assist device  Mobility Assist Devices: Front-wheeled walker  Sensory Support Devices: Eyeglasses    Current Status  Current Mental Status:    Stressors:      Insurance  Primary: [employer] MEDICARE  Secondary: N/A    Barriers to Discharge  Identified Barriers to Discharge/Transition Planning: Assessment/stabilization in progress    Progress Note  Pt states she lives in a house alone and uses a walker for mobility. Pt uses CPAP at night but no home O2. Pt on 3L now. Pt is not getting any home health PTA. Pt is okay with [facility] if needed. Monitor for changes. Watch for O2 needs.    Plan  SW/CM - Recommendations for Discharge:    Therapy-recommendations for Discharge:   PT Recommendation:       OT Recommendation:       Anticipate patient may need post-hospital services. Necessary services are available.  Anticipate patient can return to the environment from which patient entered the hospital.   Anticipate patient may provide self-care at discharge.    Refer to SW/CM Flowsheet for Goals and objective data.    Reason for Call:  Medication or medication refill:    Do you use a [facility]?  Name of the pharmacy and phone number for the current request:  [facility] [place] /[address] [place] [address] [phone]    Name of the medication requested: oxyCODONE (ROXICODONE)    Other request: Please call patient if any questions.    Can we leave a detailed message on this number? YES    Phone number patient can be reached at: Cell: [phone]    Best Time: Anytime    Call taken on 7/6/2020 at 1:18 PM by Cordelia Andrews

## 2022-04-18 RX ORDER — OXYCODONE HYDROCHLORIDE 5 MG/1
5 TABLET ORAL EVERY 6 HOURS PRN
Qty: 30 TABLET | Refills: 0 | Status: SHIPPED | OUTPATIENT
Start: 2022-04-18 | End: 2022-05-14

## 2022-05-02 ENCOUNTER — MYC MEDICAL ADVICE (OUTPATIENT)
Dept: FAMILY MEDICINE | Facility: CLINIC | Age: 57
End: 2022-05-02
Payer: COMMERCIAL

## 2022-05-02 DIAGNOSIS — M25.512 CHRONIC LEFT SHOULDER PAIN: Primary | ICD-10-CM

## 2022-05-02 DIAGNOSIS — G89.29 CHRONIC LEFT SHOULDER PAIN: Primary | ICD-10-CM

## 2022-05-14 ENCOUNTER — MYC REFILL (OUTPATIENT)
Dept: FAMILY MEDICINE | Facility: CLINIC | Age: 57
End: 2022-05-14
Payer: COMMERCIAL

## 2022-05-14 DIAGNOSIS — M25.511 ACUTE PAIN OF RIGHT SHOULDER: ICD-10-CM

## 2022-05-14 DIAGNOSIS — M54.40 CHRONIC LOW BACK PAIN WITH SCIATICA, SCIATICA LATERALITY UNSPECIFIED, UNSPECIFIED BACK PAIN LATERALITY: ICD-10-CM

## 2022-05-14 DIAGNOSIS — G89.29 CHRONIC LOW BACK PAIN WITH SCIATICA, SCIATICA LATERALITY UNSPECIFIED, UNSPECIFIED BACK PAIN LATERALITY: ICD-10-CM

## 2022-05-16 RX ORDER — OXYCODONE HYDROCHLORIDE 5 MG/1
5 TABLET ORAL EVERY 6 HOURS PRN
Qty: 30 TABLET | Refills: 0 | Status: SHIPPED | OUTPATIENT
Start: 2022-05-16 | End: 2022-06-17

## 2022-05-18 DIAGNOSIS — E78.5 HYPERLIPIDEMIA LDL GOAL <100: ICD-10-CM

## 2022-05-18 NOTE — TELEPHONE ENCOUNTER
"Requested Prescriptions   Pending Prescriptions Disp Refills     rosuvastatin (CRESTOR) 40 MG tablet [Pharmacy Med Name: ROSUVASTATIN CALCIUM 40 MG TAB] 90 tablet 0     Sig: TAKE 1 TABLET BY MOUTH EVERY DAY       Statins Protocol Failed - 5/18/2022 12:22 AM        Failed - LDL on file in past 12 months     Recent Labs   Lab Test 01/18/21  0936   LDL 84             Passed - No abnormal creatine kinase in past 12 months     No lab results found.             Passed - Recent (12 mo) or future (30 days) visit within the authorizing provider's specialty     Patient has had an office visit with the authorizing provider or a provider within the authorizing providers department within the previous 12 mos or has a future within next 30 days. See \"Patient Info\" tab in inbasket, or \"Choose Columns\" in Meds & Orders section of the refill encounter.              Passed - Medication is active on med list        Passed - Patient is age 18 or older           Routing refill request to provider for review/approval because:  Labs not current:  LDL    Kena Salter RN  [facility]           "

## 2022-05-19 RX ORDER — ROSUVASTATIN CALCIUM 40 MG/1
TABLET, COATED ORAL
Qty: 90 TABLET | Refills: 0 | Status: SHIPPED | OUTPATIENT
Start: 2022-05-19 | End: 2022-08-15

## 2022-05-28 ENCOUNTER — E-VISIT (OUTPATIENT)
Dept: FAMILY MEDICINE | Facility: CLINIC | Age: 57
End: 2022-05-28
Payer: COMMERCIAL

## 2022-05-28 DIAGNOSIS — E11.9 TYPE 2 DIABETES MELLITUS WITHOUT COMPLICATION, WITHOUT LONG-TERM CURRENT USE OF INSULIN (H): Primary | ICD-10-CM

## 2022-05-28 PROCEDURE — 99421 OL DIG E/M SVC 5-10 MIN: CPT | Performed by: INTERNAL MEDICINE

## 2022-05-31 NOTE — PATIENT INSTRUCTIONS
Thank you for choosing us for your care. Given your symptoms, I would like you to do a lab-only visit to determine what is causing them.  I have placed the orders.  Please schedule an appointment with the lab right here in [place], or call [phone].  I will let you know when the results are back and next steps to take.

## 2022-06-16 ENCOUNTER — LAB (OUTPATIENT)
Dept: LAB | Facility: CLINIC | Age: 57
End: 2022-06-16
Payer: COMMERCIAL

## 2022-06-16 DIAGNOSIS — R53.82 CHRONIC FATIGUE: ICD-10-CM

## 2022-06-16 DIAGNOSIS — Z13.220 SCREENING FOR HYPERLIPIDEMIA: ICD-10-CM

## 2022-06-16 DIAGNOSIS — E55.9 VITAMIN D DEFICIENCY: ICD-10-CM

## 2022-06-16 DIAGNOSIS — E11.9 TYPE 2 DIABETES MELLITUS WITHOUT COMPLICATION, WITHOUT LONG-TERM CURRENT USE OF INSULIN (H): ICD-10-CM

## 2022-06-16 LAB
CHOLEST SERPL-MCNC: 232 MG/DL
CREAT UR-MCNC: 62 MG/DL
FASTING STATUS PATIENT QL REPORTED: YES
HBA1C MFR BLD: 9.9 % (ref 0–5.6)
HDLC SERPL-MCNC: 45 MG/DL
LDLC SERPL CALC-MCNC: 122 MG/DL
LDLC SERPL CALC-MCNC: ABNORMAL MG/DL
MICROALBUMIN UR-MCNC: 30 MG/L
MICROALBUMIN/CREAT UR: 48.39 MG/G CR (ref 0–17)
NONHDLC SERPL-MCNC: 187 MG/DL
TRIGL SERPL-MCNC: 529 MG/DL

## 2022-06-16 PROCEDURE — 80061 LIPID PANEL: CPT

## 2022-06-16 PROCEDURE — 36415 COLL VENOUS BLD VENIPUNCTURE: CPT

## 2022-06-16 PROCEDURE — 82043 UR ALBUMIN QUANTITATIVE: CPT

## 2022-06-16 PROCEDURE — 82607 VITAMIN B-12: CPT

## 2022-06-16 PROCEDURE — 82306 VITAMIN D 25 HYDROXY: CPT

## 2022-06-16 PROCEDURE — 83721 ASSAY OF BLOOD LIPOPROTEIN: CPT | Mod: 59

## 2022-06-16 PROCEDURE — 84443 ASSAY THYROID STIM HORMONE: CPT

## 2022-06-16 PROCEDURE — 83036 HEMOGLOBIN GLYCOSYLATED A1C: CPT

## 2022-06-17 ENCOUNTER — OFFICE VISIT (OUTPATIENT)
Dept: FAMILY MEDICINE | Facility: CLINIC | Age: 57
End: 2022-06-17
Payer: COMMERCIAL

## 2022-06-17 VITALS
OXYGEN SATURATION: 98 % | SYSTOLIC BLOOD PRESSURE: 138 MMHG | RESPIRATION RATE: 20 BRPM | BODY MASS INDEX: 27.29 KG/M2 | WEIGHT: 169.8 LBS | HEIGHT: 66 IN | TEMPERATURE: 98.1 F | DIASTOLIC BLOOD PRESSURE: 84 MMHG | HEART RATE: 99 BPM

## 2022-06-17 DIAGNOSIS — R53.82 CHRONIC FATIGUE: ICD-10-CM

## 2022-06-17 DIAGNOSIS — M54.40 CHRONIC LOW BACK PAIN WITH SCIATICA, SCIATICA LATERALITY UNSPECIFIED, UNSPECIFIED BACK PAIN LATERALITY: ICD-10-CM

## 2022-06-17 DIAGNOSIS — E55.9 VITAMIN D DEFICIENCY: Primary | ICD-10-CM

## 2022-06-17 DIAGNOSIS — E11.9 TYPE 2 DIABETES MELLITUS WITHOUT COMPLICATION, WITHOUT LONG-TERM CURRENT USE OF INSULIN (H): ICD-10-CM

## 2022-06-17 DIAGNOSIS — M25.511 ACUTE PAIN OF RIGHT SHOULDER: ICD-10-CM

## 2022-06-17 DIAGNOSIS — G89.29 CHRONIC LOW BACK PAIN WITH SCIATICA, SCIATICA LATERALITY UNSPECIFIED, UNSPECIFIED BACK PAIN LATERALITY: ICD-10-CM

## 2022-06-17 DIAGNOSIS — Z79.899 ENCOUNTER FOR LONG-TERM (CURRENT) USE OF MEDICATIONS: ICD-10-CM

## 2022-06-17 DIAGNOSIS — Z12.11 SCREEN FOR COLON CANCER: ICD-10-CM

## 2022-06-17 LAB
AMPHETAMINES UR QL: DETECTED
BARBITURATES UR QL SCN: NOT DETECTED
BENZODIAZ UR QL SCN: NOT DETECTED
BUPRENORPHINE UR QL: NOT DETECTED
CANNABINOIDS UR QL: NOT DETECTED
COCAINE UR QL SCN: DETECTED
D-METHAMPHET UR QL: DETECTED
DEPRECATED CALCIDIOL+CALCIFEROL SERPL-MC: 46 UG/L (ref 20–75)
METHADONE UR QL SCN: NOT DETECTED
OPIATES UR QL SCN: NOT DETECTED
OXYCODONE UR QL SCN: NOT DETECTED
PCP UR QL SCN: NOT DETECTED
PROPOXYPH UR QL: NOT DETECTED
TRICYCLICS UR QL SCN: NOT DETECTED
TSH SERPL DL<=0.005 MIU/L-ACNC: 2.05 MU/L (ref 0.4–4)
VIT B12 SERPL-MCNC: 484 PG/ML (ref 193–986)

## 2022-06-17 PROCEDURE — 80306 DRUG TEST PRSMV INSTRMNT: CPT | Performed by: INTERNAL MEDICINE

## 2022-06-17 PROCEDURE — 99214 OFFICE O/P EST MOD 30 MIN: CPT | Performed by: INTERNAL MEDICINE

## 2022-06-17 RX ORDER — OXYCODONE HYDROCHLORIDE 5 MG/1
5 TABLET ORAL EVERY 6 HOURS PRN
Qty: 30 TABLET | Refills: 0 | Status: SHIPPED | OUTPATIENT
Start: 2022-06-17 | End: 2023-08-07

## 2022-06-17 NOTE — PROGRESS NOTES
"  Assessment & Plan   Problem List Items Addressed This Visit     Type 2 diabetes mellitus without complication, without long-term current use of insulin (H)    Relevant Orders    TSH with free T4 reflex (Completed)      Other Visit Diagnoses     Vitamin D deficiency    -  Primary    Relevant Orders    Vitamin D Deficiency (Completed)    Screen for colon cancer        Encounter for long-term (current) use of medications        Chronic fatigue        Relevant Orders    Vitamin B12 (Completed)    Chronic low back pain with sciatica, sciatica laterality unspecified, unspecified back pain laterality        Relevant Medications    oxyCODONE (ROXICODONE) 5 MG tablet    Other Relevant Orders    Drug Abuse Screen Panel 13, Urine (Pain Care Package) - lab collect (Completed)    Acute pain of right shoulder        Relevant Medications    oxyCODONE (ROXICODONE) 5 MG tablet           Patient has stopped all consumption of alcohol.  Patient will likely notice improvement in blood sugars and other things without alcohol   Has had trouble controlling blood sugars recently likely from the high consumption of alcohol sugars    Keep the insulin at the same doses          BMI:   Estimated body mass index is 27.41 kg/m  as calculated from the following:    Height as of this encounter: 1.676 m (5' 6\").    Weight as of this encounter: 77 kg (169 lb 12.8 oz).   Weight management plan: Discussed healthy diet and exercise guidelines        No follow-ups on file.    Joseph Alanis MD  [facility] DIMA Swanson is a 56 year old presenting for the following health issues:  Recheck Medication and Diabetes    Weight loss 13# in past 6 months.     Eye exam - [facility] in December 2021,  no retinopathy - SENT TO [facility]      History of Present Illness       Diabetes:   He presents for follow up of diabetes.  He is checking home blood glucose four or more times daily. He checks blood glucose before and after " "meals and at bedtime.  Blood glucose is sometimes over 200 and never under 70. He is aware of hypoglycemia symptoms including shakiness, dizziness and weakness. He is concerned about blood sugar frequently over 200.  He is having numbness in feet, redness, sores, or blisters on feet, blurry vision and weight loss. The patient has had a diabetic eye exam in the last 12 months. Eye exam performed on December 2021. Location of last eye exam [facility] Dr. Natarajan.        He eats 4 or more servings of fruits and vegetables daily.He consumes 0 sweetened beverage(s) daily.He exercises with enough effort to increase his heart rate 9 or less minutes per day.  He exercises with enough effort to increase his heart rate 3 or less days per week. He is missing 2 dose(s) of medications per week.     10 days ago felt ill and went to [place]   Rapid antigen negative   Aches and pain and coughing flem out of the lungs and exhaustion  PCR test - came back negative .  Bronchial lung   doxycycline   Puffiness and inflammation ( 5 days of prednisone)   Feels fatigued   Blood sugars high   1 month ago completely stopped alcohol   Outpatient treatment and received DWI   Working on outpatient --    When booked into the retirement sugars 360.   Acetone? Make a note trouble controlling         Review of Systems   Constitutional, HEENT, cardiovascular, pulmonary, gi and gu systems are negative, except as otherwise noted.      Objective    /84 (Patient Position: Sitting, Cuff Size: Adult Regular)   Pulse 99   Temp 98.1  F (36.7  C) (Temporal)   Resp 20   Ht 1.676 m (5' 6\")   Wt 77 kg (169 lb 12.8 oz)   SpO2 98%   BMI 27.41 kg/m    Body mass index is 27.41 kg/m .  Physical Exam   GENERAL: healthy, alert and no distress  EYES: Eyes grossly normal to inspection, PERRL and conjunctivae and sclerae normal  HENT: ear canals and TM's normal, nose and mouth without ulcers or lesions  NECK: no adenopathy, no asymmetry, masses, or scars and " thyroid normal to palpation  RESP: lungs clear to auscultation - no rales, rhonchi or wheezes  CV: regular rate and rhythm, normal S1 S2, no S3 or S4, no murmur, click or rub, no peripheral edema and peripheral pulses strong  ABDOMEN: soft, nontender, no hepatosplenomegaly, no masses and bowel sounds normal  MS: no gross musculoskeletal defects noted, no edema  SKIN: no suspicious lesions or rashes  NEURO: Normal strength and tone, mentation intact and speech normal  BACK: no CVA tenderness, no paralumbar tenderness  PSYCH: mentation appears normal, affect normal/bright    Results for orders placed or performed in visit on 06/17/22   Drug Abuse Screen Panel 13, Urine (Pain Care Package) - lab collect     Status: Abnormal   Result Value Ref Range    Cannabinoids (32-gzr-0-carboxy-9-THC) Not Detected Not Detected, Indeterminate    Phencyclidine Not Detected Not Detected, Indeterminate    Cocaine (Benzoylecgonine) Detected (A) Not Detected, Indeterminate    Methamphetamine (d-Methamphetamine) Detected (A) Not Detected, Indeterminate    Opiates (Morphine) Not Detected Not Detected, Indeterminate    Amphetamine (d-Amphetamine) Detected (A) Not Detected, Indeterminate    Benzodiazepines (Nordiazepam) Not Detected Not Detected, Indeterminate    Tricyclic Antidepressants (Desipramine) Not Detected Not Detected, Indeterminate    Methadone Not Detected Not Detected, Indeterminate    Barbiturates (Butalbital) Not Detected Not Detected, Indeterminate    Oxycodone Not Detected Not Detected, Indeterminate    Propoxyphene (Norpropoxyphene) Not Detected Not Detected, Indeterminate    Buprenorphine Not Detected Not Detected, Indeterminate                   .  ..

## 2022-06-17 NOTE — LETTER
Amadou Avaloselvin Bach  June 17, 2022     This is to document that Amadou has been having difficulty with blood sugar control over the past 9 months .  His sugars routinely are measured over 200 and have been resistant to multiple modes of treatment.  Last hgb a 1 c measured at 9.9.              CHERELLE OCHOA MD        June 17, 2022

## 2022-07-06 DIAGNOSIS — N52.9 ERECTILE DYSFUNCTION, UNSPECIFIED ERECTILE DYSFUNCTION TYPE: ICD-10-CM

## 2022-07-06 RX ORDER — SILDENAFIL 100 MG/1
TABLET, FILM COATED ORAL
Qty: 6 TABLET | Refills: 6 | Status: SHIPPED | OUTPATIENT
Start: 2022-07-06 | End: 2023-03-02

## 2022-07-06 NOTE — TELEPHONE ENCOUNTER
"Requested Prescriptions   Signed Prescriptions Disp Refills    sildenafil (VIAGRA) 100 MG tablet 6 tablet 6     Sig: TAKE 1 TABLET BY MOUTH 30 MINS BEFORE INTERCOURSE       Erectile Dysfuction Protocol Passed - 7/6/2022  1:45 AM        Passed - Absence of nitrates on medication list        Passed - Absence of Alpha Blockers on Med list        Passed - Recent (12 mo) or future (30 days) visit within the authorizing provider's specialty     Patient has had an office visit with the authorizing provider or a provider within the authorizing providers department within the previous 12 mos or has a future within next 30 days. See \"Patient Info\" tab in inbasket, or \"Choose Columns\" in Meds & Orders section of the refill encounter.              Passed - Medication is active on med list        Passed - Patient is age 18 or older           Kena Salter RN  [facility]     "

## 2022-07-28 ENCOUNTER — MYC MEDICAL ADVICE (OUTPATIENT)
Dept: FAMILY MEDICINE | Facility: CLINIC | Age: 57
End: 2022-07-28

## 2022-08-15 ENCOUNTER — TRANSFERRED RECORDS (OUTPATIENT)
Dept: FAMILY MEDICINE | Facility: CLINIC | Age: 57
End: 2022-08-15

## 2022-08-15 LAB — RETINOPATHY: NEGATIVE

## 2022-08-19 DIAGNOSIS — Z79.4 TYPE 2 DIABETES MELLITUS WITHOUT COMPLICATION, WITH LONG-TERM CURRENT USE OF INSULIN (H): ICD-10-CM

## 2022-08-19 DIAGNOSIS — E11.9 TYPE 2 DIABETES MELLITUS WITHOUT COMPLICATION, WITH LONG-TERM CURRENT USE OF INSULIN (H): ICD-10-CM

## 2022-08-19 NOTE — TELEPHONE ENCOUNTER
Prescription approved per [facility] Refill Protocol.    Nicolasa Acosta, RN, BSN

## 2022-08-21 DIAGNOSIS — E11.9 TYPE 2 DIABETES MELLITUS WITHOUT COMPLICATION, WITH LONG-TERM CURRENT USE OF INSULIN (H): ICD-10-CM

## 2022-08-21 DIAGNOSIS — Z79.4 TYPE 2 DIABETES MELLITUS WITHOUT COMPLICATION, WITH LONG-TERM CURRENT USE OF INSULIN (H): ICD-10-CM

## 2022-08-23 NOTE — TELEPHONE ENCOUNTER
Routing refill request to provider for review/approval because:  Labs out of range:    Lab Results   Component Value Date    A1C 9.9 06/16/2022    A1C 11.4 11/15/2021    A1C 8.2 01/18/2021    A1C 6.2 08/16/2019    A1C 6.2 05/17/2019    A1C 6.3 01/17/2018    A1C 8.2 03/27/2017

## 2022-08-26 ENCOUNTER — MYC MEDICAL ADVICE (OUTPATIENT)
Dept: FAMILY MEDICINE | Facility: CLINIC | Age: 57
End: 2022-08-26

## 2022-08-26 DIAGNOSIS — E11.9 TYPE 2 DIABETES MELLITUS WITHOUT COMPLICATION, WITHOUT LONG-TERM CURRENT USE OF INSULIN (H): Primary | ICD-10-CM

## 2022-08-26 DIAGNOSIS — E11.9 TYPE 2 DIABETES MELLITUS WITHOUT COMPLICATION, WITH LONG-TERM CURRENT USE OF INSULIN (H): ICD-10-CM

## 2022-08-26 DIAGNOSIS — Z79.4 TYPE 2 DIABETES MELLITUS WITHOUT COMPLICATION, WITH LONG-TERM CURRENT USE OF INSULIN (H): ICD-10-CM

## 2022-08-26 RX ORDER — INSULIN GLARGINE 100 [IU]/ML
INJECTION, SOLUTION SUBCUTANEOUS
Qty: 45 ML | Refills: 3 | Status: SHIPPED | OUTPATIENT
Start: 2022-08-26 | End: 2022-08-29

## 2022-08-29 RX ORDER — INSULIN GLARGINE 100 [IU]/ML
INJECTION, SOLUTION SUBCUTANEOUS
Qty: 45 ML | Refills: 3 | Status: SHIPPED | OUTPATIENT
Start: 2022-08-29 | End: 2023-11-19

## 2022-08-29 NOTE — TELEPHONE ENCOUNTER
Routing refill request to provider for review/approval because:  Failed protocol.  See pharmacy message about alternative     Faith Mc RN

## 2022-08-30 RX ORDER — FLASH GLUCOSE SENSOR
1 KIT MISCELLANEOUS
Qty: 2 EACH | Refills: 5 | Status: SHIPPED | OUTPATIENT
Start: 2022-08-30 | End: 2023-06-07

## 2022-08-30 RX ORDER — FLASH GLUCOSE SCANNING READER
1 EACH MISCELLANEOUS ONCE
Qty: 1 EACH | Refills: 0 | Status: SHIPPED | OUTPATIENT
Start: 2022-08-30 | End: 2022-08-30

## 2022-10-30 ENCOUNTER — HEALTH MAINTENANCE LETTER (OUTPATIENT)
Age: 57
End: 2022-10-30

## 2023-02-26 ENCOUNTER — MYC MEDICAL ADVICE (OUTPATIENT)
Dept: FAMILY MEDICINE | Facility: CLINIC | Age: 58
End: 2023-02-26
Payer: COMMERCIAL

## 2023-02-26 DIAGNOSIS — G89.29 CHRONIC LEFT SHOULDER PAIN: Primary | ICD-10-CM

## 2023-02-26 DIAGNOSIS — M25.512 CHRONIC LEFT SHOULDER PAIN: Primary | ICD-10-CM

## 2023-03-01 RX ORDER — GABAPENTIN 100 MG/1
100-600 CAPSULE ORAL AT BEDTIME
Qty: 180 CAPSULE | Refills: 4 | Status: SHIPPED | OUTPATIENT
Start: 2023-03-01 | End: 2023-08-07

## 2023-04-08 ENCOUNTER — HEALTH MAINTENANCE LETTER (OUTPATIENT)
Age: 58
End: 2023-04-08

## 2023-05-22 DIAGNOSIS — N52.9 ERECTILE DYSFUNCTION, UNSPECIFIED ERECTILE DYSFUNCTION TYPE: ICD-10-CM

## 2023-05-22 RX ORDER — SILDENAFIL 100 MG/1
TABLET, FILM COATED ORAL
Qty: 12 TABLET | Refills: 1 | Status: SHIPPED | OUTPATIENT
Start: 2023-05-22 | End: 2023-08-07

## 2023-06-07 ENCOUNTER — OFFICE VISIT (OUTPATIENT)
Dept: FAMILY MEDICINE | Facility: CLINIC | Age: 58
End: 2023-06-07

## 2023-06-07 VITALS
TEMPERATURE: 98.8 F | OXYGEN SATURATION: 99 % | HEART RATE: 115 BPM | RESPIRATION RATE: 18 BRPM | HEIGHT: 65 IN | DIASTOLIC BLOOD PRESSURE: 84 MMHG | SYSTOLIC BLOOD PRESSURE: 138 MMHG | BODY MASS INDEX: 26.21 KG/M2 | WEIGHT: 157.31 LBS

## 2023-06-07 DIAGNOSIS — H60.393 INFECTIVE OTITIS EXTERNA, BILATERAL: Primary | ICD-10-CM

## 2023-06-07 DIAGNOSIS — E11.9 TYPE 2 DIABETES MELLITUS WITHOUT COMPLICATION, WITH LONG-TERM CURRENT USE OF INSULIN (H): ICD-10-CM

## 2023-06-07 DIAGNOSIS — R09.81 NASAL CONGESTION: ICD-10-CM

## 2023-06-07 DIAGNOSIS — Z79.4 TYPE 2 DIABETES MELLITUS WITHOUT COMPLICATION, WITH LONG-TERM CURRENT USE OF INSULIN (H): ICD-10-CM

## 2023-06-07 PROCEDURE — 99214 OFFICE O/P EST MOD 30 MIN: CPT | Performed by: FAMILY MEDICINE

## 2023-06-07 RX ORDER — FLUTICASONE PROPIONATE 50 MCG
2 SPRAY, SUSPENSION (ML) NASAL DAILY
Qty: 16 G | Refills: 1 | Status: SHIPPED | OUTPATIENT
Start: 2023-06-07 | End: 2023-09-11

## 2023-06-07 RX ORDER — NEOMYCIN SULFATE, POLYMYXIN B SULFATE AND HYDROCORTISONE 10; 3.5; 1 MG/ML; MG/ML; [USP'U]/ML
4 SUSPENSION/ DROPS AURICULAR (OTIC) 4 TIMES DAILY
Qty: 6 ML | Refills: 0 | Status: SHIPPED | OUTPATIENT
Start: 2023-06-07 | End: 2023-06-14

## 2023-06-07 ASSESSMENT — PAIN SCALES - GENERAL: PAINLEVEL: SEVERE PAIN (7)

## 2023-06-07 NOTE — PROGRESS NOTES
"  Assessment & Plan     Infective otitis externa, bilateral  Bilateral otitis externa.  No evidence of otitis media but there is some fluid present.  At this point we will treat with topical antibiotics and hold off on oral antibiotics.  However, we reviewed monitoring for improvement and certainly close follow-up if symptoms are worsening or not improving over time.  - neomycin-polymyxin-hydrocortisone (CORTISPORIN) 3.5-34248-6 otic suspension; Place 4 drops into both ears 4 times daily for 7 days    Nasal congestion  Suspect there could be some allergic rhinitis at play.  We will trial Flonase  - fluticasone (FLONASE) 50 MCG/ACT nasal spray; Spray 2 sprays into both nostrils daily    DM-glucose is a little higher in the 150s in the morning and his baseline 120s but generally has been controlled.  We will continue to monitor     BMI:   Estimated body mass index is 26.18 kg/m  as calculated from the following:    Height as of this encounter: 1.651 m (5' 5\").    Weight as of this encounter: 71.4 kg (157 lb 5 oz).           Kristin Amato MD  [facility] PATRICIA Swanson is a 57 year old, presenting for the following health issues:  Ear Problem        6/7/2023     4:21 PM   Additional Questions   Roomed by Cristiana JOHNSON   Accompanied by Self         6/7/2023     4:21 PM   Patient Reported Additional Medications   Patient reports taking the following new medications None     History of Present Illness       Reason for visit:  Ear infection  Symptom onset:  3-7 days ago  Symptoms include:  Pain and hearing loss  Symptom intensity:  Severe  Symptom progression:  Staying the same  Had these symptoms before:  Yes  Has tried/received treatment for these symptoms:  Yes  Previous treatment was successful:  Yes  Prior treatment description:  Antibiotics    He eats 2-3 servings of fruits and vegetables daily.He consumes 0 sweetened beverage(s) daily.He exercises with enough effort to increase " "his heart rate 30 to 60 minutes per day.  He exercises with enough effort to increase his heart rate 4 days per week. He is missing 1 dose(s) of medications per week.     Left ear canal swollen and painful for the last week.   Also now feeling pain in the right now.     Has had this in the past and was treated with oral and topical abx.     [place] swimming.   Has been outside at dusk and wonders if insect bites could be contributing.     Hearing feels more muffled. (water in ears feeling).   Crackling with chewing. R>L     Sniffly from pollen/grass right now. Mild congestion.   Some post-nasal drainage     Mild feeling of balance being off. Not sure if above or starting gabapentin recently  No fever, chills.     [place] on [place]. Thinks why pulse and bp is up today  Also working outside and likely needs to drink more fluids    fbg 120-150          Objective    BP (!) 151/88 (BP Location: Right arm, Patient Position: Right side, Cuff Size: Adult Regular)   Pulse 115   Temp 98.8  F (37.1  C) (Oral)   Resp 18   Ht 1.651 m (5' 5\")   Wt 71.4 kg (157 lb 5 oz)   SpO2 99%   BMI 26.18 kg/m    Body mass index is 26.18 kg/m .  Physical Exam   GENERAL: healthy, alert and no distress  HENT: Bilateral pinnas appear normal.  Mild tenderness to tragus palpation bilaterally.  Right ear: Canal appears erythematous and slightly swollen.  TM with clear fluid behind a bulging membrane., left ear: Also with tragus tenderness, clear effusion and canal appears normal, nasal mucosa edematous , oropharynx clear and oral mucous membranes moist  NECK: no adenopathy, no asymmetry, masses, or scars and thyroid normal to palpation  RESP: lungs clear to auscultation - no rales, rhonchi or wheezes  CV: Mildly tachycardic but otherwise regular, normal S1 S2, no S3 or S4, no murmur, click or rub, no peripheral edema and peripheral pulses strong  MS: no gross musculoskeletal defects noted, no edema  PSYCH: mentation appears normal, affect " normal/bright

## 2023-06-11 ENCOUNTER — OFFICE VISIT (OUTPATIENT)
Dept: URGENT CARE | Facility: URGENT CARE | Age: 58
End: 2023-06-11

## 2023-06-11 VITALS
TEMPERATURE: 97.8 F | DIASTOLIC BLOOD PRESSURE: 80 MMHG | SYSTOLIC BLOOD PRESSURE: 131 MMHG | WEIGHT: 159 LBS | RESPIRATION RATE: 12 BRPM | HEART RATE: 90 BPM | OXYGEN SATURATION: 99 % | BODY MASS INDEX: 26.46 KG/M2

## 2023-06-11 DIAGNOSIS — H65.91 OME (OTITIS MEDIA WITH EFFUSION), RIGHT: Primary | ICD-10-CM

## 2023-06-11 PROCEDURE — 99213 OFFICE O/P EST LOW 20 MIN: CPT

## 2023-06-11 RX ORDER — AMOXICILLIN 500 MG/1
1000 CAPSULE ORAL 3 TIMES DAILY
Qty: 42 CAPSULE | Refills: 0 | Status: SHIPPED | OUTPATIENT
Start: 2023-06-11 | End: 2023-06-18

## 2023-06-11 NOTE — PATIENT INSTRUCTIONS
Take the antibiotic as prescribed and finish the full course even if symptoms improve.  Try yogurt with active cultures or probiotics such as Culturelle daily to help prevent diarrhea while using antibiotics.  Get plenty of rest and drink fluids.  Can use Tylenol as needed for pain.  Maximum dose of Tylenol is 4000mg in a 24 hour period of time.

## 2023-06-11 NOTE — LETTER
June 11, 2023      Amadou Avaloselvin Bach  [address] [address] [facility] [address] [facility] [address]        To Whom It May Concern:    Amadou Bach  was seen on June 11, 2023.  Airline travel currently not recommended until June 18th due to illness.        Sincerely,        LOTUS Vidales CNP

## 2023-06-11 NOTE — PROGRESS NOTES
ASSESSMENT:  (H65.91) OME (otitis media with effusion), right  (primary encounter diagnosis)  Plan: amoxicillin (AMOXIL) 500 MG capsule    PLAN:  Otitis media antibiotic patient instructions discussed and provided.  Informed the patient to do the antibiotic as prescribed and finish the full course even if symptoms improve.  We discussed trying yogurt with active cultures or probiotics such as Culturelle daily to help with diarrhea while taking antibiotic.  Informed the patient of the need to get plenty rest, drink fluids and use Tylenol as needed for pain with maximum dose being 4000 mg in a 24-hour period of time.  Work note provided.  Discussed the need to return to clinic with any new or worsening symptoms.  Patient acknowledged his understanding of the above plan.    The use of Dragon/PowerMic dictation services may have been used to construct the content in this note; any grammatical or spelling errors are non-intentional. Please contact the author of this note directly if you are in need of any clarification.      LOTUS Vidales CNP    SUBJECTIVE:  Amadou Bach is a 57 year old male who presents with right ear pain for 1 day.   Severity: severe   Additional symptoms include sore throat.    Treatment: Flonase and prescription ear drops    ROS:  Negative except noted above.      OBJECTIVE:  /80   Pulse 90   Temp 97.8  F (36.6  C) (Oral)   Resp 12   Wt 72.1 kg (159 lb)   SpO2 99%   BMI 26.46 kg/m     GENERAL: no acute distress  EYES: EOMI,  PERRL, conjunctiva clear  The right TM is bulging, erythematous and mild effusion     The right auditory canal is erythematous  The left TM is normal: no effusions, no erythema, and normal landmarks  The left auditory canal is normal and without drainage, edema or erythema  Oropharynx exam is normal: no lesions, erythema, adenopathy or exudate.  NECK: supple, non-tender to palpation, no adenopathy noted  RESP: lungs clear to auscultation - no  rales, rhonchi or wheezes  CV: regular rates and rhythm, normal  SKIN: no suspicious lesions or rashes

## 2023-07-12 NOTE — LETTER
July 10, 2019    Amadou Bach  [address] [address] [facility] [address]    Dear Amadou    We care about your health and have reviewed your health plan. We have reviewed your medical conditions, medication list, and lab results and are making recommendations based on this review, to better manage your health.    You are in particular need of attention regarding:  - Your High Blood Pressure, Please call to schedule an appointment with your provider or nurse      Here is a list of Health Maintenance topics that are due now or due soon:  Health Maintenance Due   Topic Date Due     ZOSTER IMMUNIZATION (1 of 2) 12/21/2013     ADVANCE CARE PLANNING  07/09/2018     PREVENTIVE CARE VISIT  01/29/2019     URINE DRUG SCREEN  04/23/2019     We will be calling you in the next couple of weeks to help you schedule any appointments that are needed.  Please call us at [phone] (or use [employer]) to address the above recommendations.     Thank you for trusting [facility] and we appreciate the opportunity to serve you.  We look forward to supporting your healthcare needs in the future.    Healthy Regards,    Your Health Care Team  JPB/EN                     Bed/Stretcher in lowest position, wheels locked, appropriate side rails in place/Call bell, personal items and telephone in reach/Instruct patient to call for assistance before getting out of bed/chair/stretcher/Non-slip footwear applied when patient is off stretcher/Start to call system/Physically safe environment - no spills, clutter or unnecessary equipment/Purposeful proactive rounding/Room/bathroom lighting operational, light cord in reach

## 2023-08-07 ENCOUNTER — OFFICE VISIT (OUTPATIENT)
Dept: FAMILY MEDICINE | Facility: CLINIC | Age: 58
End: 2023-08-07
Payer: COMMERCIAL

## 2023-08-07 VITALS
WEIGHT: 161.8 LBS | BODY MASS INDEX: 26.96 KG/M2 | RESPIRATION RATE: 19 BRPM | OXYGEN SATURATION: 99 % | DIASTOLIC BLOOD PRESSURE: 78 MMHG | SYSTOLIC BLOOD PRESSURE: 136 MMHG | HEART RATE: 92 BPM | HEIGHT: 65 IN

## 2023-08-07 DIAGNOSIS — N52.9 ERECTILE DYSFUNCTION, UNSPECIFIED ERECTILE DYSFUNCTION TYPE: ICD-10-CM

## 2023-08-07 DIAGNOSIS — E11.9 TYPE 2 DIABETES MELLITUS WITHOUT COMPLICATION, WITHOUT LONG-TERM CURRENT USE OF INSULIN (H): ICD-10-CM

## 2023-08-07 DIAGNOSIS — E11.43 DIABETIC AUTONOMIC NEUROPATHY ASSOCIATED WITH TYPE 2 DIABETES MELLITUS (H): ICD-10-CM

## 2023-08-07 DIAGNOSIS — N18.1 CHRONIC KIDNEY DISEASE, STAGE 1: ICD-10-CM

## 2023-08-07 DIAGNOSIS — M54.40 CHRONIC LOW BACK PAIN WITH SCIATICA, SCIATICA LATERALITY UNSPECIFIED, UNSPECIFIED BACK PAIN LATERALITY: ICD-10-CM

## 2023-08-07 DIAGNOSIS — M25.512 CHRONIC LEFT SHOULDER PAIN: ICD-10-CM

## 2023-08-07 DIAGNOSIS — G89.29 CHRONIC LEFT SHOULDER PAIN: ICD-10-CM

## 2023-08-07 DIAGNOSIS — Z12.11 SCREEN FOR COLON CANCER: Primary | ICD-10-CM

## 2023-08-07 DIAGNOSIS — G89.29 CHRONIC LOW BACK PAIN WITH SCIATICA, SCIATICA LATERALITY UNSPECIFIED, UNSPECIFIED BACK PAIN LATERALITY: ICD-10-CM

## 2023-08-07 DIAGNOSIS — M25.511 ACUTE PAIN OF RIGHT SHOULDER: ICD-10-CM

## 2023-08-07 LAB — HBA1C MFR BLD: 12.7 % (ref 0–5.6)

## 2023-08-07 PROCEDURE — 82043 UR ALBUMIN QUANTITATIVE: CPT | Performed by: INTERNAL MEDICINE

## 2023-08-07 PROCEDURE — 99396 PREV VISIT EST AGE 40-64: CPT | Performed by: INTERNAL MEDICINE

## 2023-08-07 PROCEDURE — 36415 COLL VENOUS BLD VENIPUNCTURE: CPT | Performed by: INTERNAL MEDICINE

## 2023-08-07 PROCEDURE — 80061 LIPID PANEL: CPT | Performed by: INTERNAL MEDICINE

## 2023-08-07 PROCEDURE — 99207 PR FOOT EXAM NO CHARGE: CPT | Mod: 25 | Performed by: INTERNAL MEDICINE

## 2023-08-07 PROCEDURE — 80048 BASIC METABOLIC PNL TOTAL CA: CPT | Performed by: INTERNAL MEDICINE

## 2023-08-07 PROCEDURE — 82570 ASSAY OF URINE CREATININE: CPT | Performed by: INTERNAL MEDICINE

## 2023-08-07 PROCEDURE — 83036 HEMOGLOBIN GLYCOSYLATED A1C: CPT | Performed by: INTERNAL MEDICINE

## 2023-08-07 RX ORDER — HYDROCHLOROTHIAZIDE 25 MG/1
25 TABLET ORAL DAILY
Qty: 90 TABLET | Refills: 4 | COMMUNITY
Start: 2023-08-07 | End: 2023-11-08

## 2023-08-07 RX ORDER — FLASH GLUCOSE SCANNING READER
1 EACH MISCELLANEOUS ONCE
Qty: 1 EACH | Refills: 0 | Status: SHIPPED | OUTPATIENT
Start: 2023-08-07 | End: 2023-08-07

## 2023-08-07 RX ORDER — SILDENAFIL 100 MG/1
TABLET, FILM COATED ORAL
Qty: 12 TABLET | Refills: 1 | Status: SHIPPED | OUTPATIENT
Start: 2023-08-07 | End: 2023-12-18

## 2023-08-07 RX ORDER — GABAPENTIN 300 MG/1
900 CAPSULE ORAL AT BEDTIME
Qty: 270 CAPSULE | Refills: 4 | Status: SHIPPED | OUTPATIENT
Start: 2023-08-07

## 2023-08-07 RX ORDER — FLASH GLUCOSE SENSOR
1 KIT MISCELLANEOUS
Qty: 2 EACH | Refills: 5 | Status: SHIPPED | OUTPATIENT
Start: 2023-08-07

## 2023-08-07 ASSESSMENT — PAIN SCALES - GENERAL: PAINLEVEL: NO PAIN (0)

## 2023-08-07 ASSESSMENT — ENCOUNTER SYMPTOMS
ARTHRALGIAS: 0
DYSURIA: 0
SORE THROAT: 0
WEAKNESS: 0
PARESTHESIAS: 1
ABDOMINAL PAIN: 0
PALPITATIONS: 0
EYE PAIN: 0
HEARTBURN: 0
HEMATURIA: 0
MYALGIAS: 0
COUGH: 0
HEADACHES: 0
FEVER: 0
NERVOUS/ANXIOUS: 0
SHORTNESS OF BREATH: 0
DIZZINESS: 0
JOINT SWELLING: 0
CONSTIPATION: 0
DIARRHEA: 0
CHILLS: 0
HEMATOCHEZIA: 0
FREQUENCY: 1
NAUSEA: 0

## 2023-08-07 NOTE — PROGRESS NOTES
SUBJECTIVE:   CC: Sky is an 57 year old who presents for preventative health visit.       8/7/2023    10:21 AM   Additional Questions   Roomed by Deanna LARIOS   Accompanied by self     Healthy Habits:     Getting at least 3 servings of Calcium per day:  Yes    Bi-annual eye exam:  Yes    Dental care twice a year:  Yes    Sleep apnea or symptoms of sleep apnea:  None    Diet:  Diabetic    Frequency of exercise:  2-3 days/week    Duration of exercise:  45-60 minutes    Taking medications regularly:  Yes    Medication side effects:  None    Additional concerns today:  No    Today's PHQ-2 Score:       8/7/2023    10:03 AM   PHQ-2 ( 1999 [employer])   Q1: Little interest or pleasure in doing things 0   Q2: Feeling down, depressed or hopeless 0   PHQ-2 Score 0   Q1: Little interest or pleasure in doing things Not at all   Q2: Feeling down, depressed or hopeless Not at all   PHQ-2 Score 0     Alcohol and cocaine - clean and sober for 1 year     Blood sugars     New job, selling pharmaceutical -    Lantus ( slow acting) every night     With a meal - Humalog 3 doses insulin     Frequency and urgency   Super beta prostate   Early release   On 4 doses insulin  working on control               Have you ever done Advance Care Planning? (For example, a Health Directive, POLST, or a discussion with a medical provider or your loved ones about your wishes): Yes, advance care planning is on file.    Social History     Tobacco Use    Smoking status: Never    Smokeless tobacco: Never   Substance Use Topics    Alcohol use: Yes     Comment: occ             8/7/2023    10:02 AM   Alcohol Use   Prescreen: >3 drinks/day or >7 drinks/week? Not Applicable         1/29/2018    11:01 AM   AUDIT - Alcohol Use Disorders Identification Test - Reproduced from the World Health Organization Audit 2001 (Second Edition)   1.  How often do you have a drink containing alcohol? 2 to 4 times a month   2.  How many drinks containing alcohol do you have on a  typical day when you are drinking? 5 or 6   3.  How often do you have five or more drinks on one occasion? Monthly   4.  How often during the last year have you found that you were not able to stop drinking once you had started? Never   5.  How often during the last year have you failed to do what was normally expected of you because of drinking? Never   6.  How often during the last year have you needed a first drink in the morning to get yourself going after a heavy drinking session? Never   7.  How often during the last year have you had a feeling of guilt or remorse after drinking? Never   8.  How often during the last year have you been unable to remember what happened the night before because of your drinking? Never   9.  Have you or someone else been injured because of your drinking? No   10. Has a relative, friend, doctor or other health care worker been concerned about your drinking or suggested you cut down? Yes, during the last year   TOTAL SCORE 10       Last PSA: No results found for: PSA    Reviewed orders with patient. Reviewed health maintenance and updated orders accordingly - Yes  Lab work is in process  Labs reviewed in EPIC  BP Readings from Last 3 Encounters:   08/07/23 136/78   06/11/23 131/80   06/07/23 138/84    Wt Readings from Last 3 Encounters:   08/07/23 73.4 kg (161 lb 12.8 oz)   06/11/23 72.1 kg (159 lb)   06/07/23 71.4 kg (157 lb 5 oz)                  Patient Active Problem List   Diagnosis    Erectile dysfunction    Type 2 diabetes mellitus without complication, without long-term current use of insulin (H)    Obesity, Class I, BMI 30-34.9    Generalized anxiety disorder    ACP (advance care planning)    History of adenomatous polyp of colon    Essential hypertension with goal blood pressure less than 140/90    Malignant melanoma of skin of trunk (H)    History of melanoma    AK (actinic keratosis)    Adjustment disorder with mixed disturbance of emotions and conduct    Chronic kidney  disease, stage 1     Past Surgical History:   Procedure Laterality Date    BIOPSY OF SKIN LESION      COLONOSCOPY  3/11/2014    Procedure: COMBINED COLONOSCOPY, SINGLE BIOPSY/POLYPECTOMY BY BIOPSY;;  Surgeon: Stephen Amin MD;  Location: [facility] OR    COLONOSCOPY WITH CO2 INSUFFLATION N/A 8/19/2014    Procedure: COLONOSCOPY WITH CO2 INSUFFLATION;  Surgeon: Stephen Amin MD;  Location: [facility] OR    COLONOSCOPY WITH CO2 INSUFFLATION N/A 10/11/2017    Procedure: COLONOSCOPY WITH CO2 INSUFFLATION;  COLON/ REPEAT COLONOSCOPY;  Surgeon: Jeremy Mcqueen DO;  Location: [facility] OR    HC TOOTH EXTRACTION W/FORCEP      wisdom teeth x4    WISDOM TOOTH EXTRACTION         Social History     Tobacco Use    Smoking status: Never    Smokeless tobacco: Never   Substance Use Topics    Alcohol use: Yes     Comment: occ     Family History   Problem Relation Age of Onset    Diabetes Mother     Hypertension Mother     Diabetes Father     Cancer Father         lung    Cancer Other         aunt - skin cancer    Skin Cancer Other     Melanoma No family hx of     Lung Cancer Mother     Lung Cancer Father     Coronary Artery Disease Father     Cerebrovascular Disease Father          Current Outpatient Medications   Medication Sig Dispense Refill    alcohol swab prep pads Use to swab area of injection/luiza as directed. 100 each 3    ASPIRIN LOW DOSE 81 MG EC tablet TAKE 1 TABLET BY MOUTH EVERY DAY 90 tablet 4    Coenzyme Q10 (CO Q 10 PO) 2 per day      Continuous Blood Gluc Sensor (FREESTYLE ALEXUS 14 DAY SENSOR) [facility] 1 each every 14 days Use 1 Sensor every 14 days. Use to read blood sugars per 's instructions. 2 each 5    CONTOUR NEXT TEST test strip USE TO TEST BLOOD SUGAR UP TO 8 TIMES DAILY OR AS DIRECTED. 200 strip 6    Cyanocobalamin (VITAMIN B-12 PO) Take  by mouth daily.      FISH OIL CONCENTRATE 1000 MG OR CAPS 2 tablets daily      fluticasone (FLONASE) 50 MCG/ACT nasal spray Spray 2 sprays into both nostrils daily 16 g  1    gabapentin (NEURONTIN) 300 MG capsule Take 3 capsules (900 mg) by mouth At Bedtime 270 capsule 4    hydrochlorothiazide (HYDRODIURIL) 25 MG tablet Take 1 tablet (25 mg) by mouth daily 90 tablet 4    insulin aspart (NOVOLOG FLEXPEN) 100 UNIT/ML pen 12-20 Units, subcutaneous TID per scale 60 mL 3    insulin lispro (HUMALOG KWIKPEN) 100 UNIT/ML (1 unit dial) KWIKPEN Inject 12-20 Units Subcutaneous 3 times daily (before meals) 54 mL 3    insulin pen needle (BD YANNI U/F) 32G X 4 MM miscellaneous USE 4 TIMES DAILY AS DIRECTED 400 each 0    LANTUS SOLOSTAR 100 UNIT/ML soln INJECT 50 UNITS SUBCUTANEOUSLY AT BEDTIME 45 mL 3    liraglutide (VICTOZA PEN) 18 MG/3ML solution INJECT 1.8 MG UNDER THE SKIN ONCE DAILY 27 mL 9    losartan (COZAAR) 100 MG tablet TAKE 1 TABLET BY MOUTH EVERY DAY 90 tablet 3    metFORMIN (GLUCOPHAGE XR) 500 MG 24 hr tablet TAKE 2 TABLETS BY MOUTH TWICE A  tablet 3    MULTIPLE VITAMINS OR 1000mg daily      order for DME Equipment being ordered: glucometer brand per insurance 1 Device 0    ORDER FOR DME LANCETS 4 x DAILY AND  each 4    rosuvastatin (CRESTOR) 40 MG tablet TAKE 1 TABLET BY MOUTH EVERY DAY 90 tablet 3    sildenafil (VIAGRA) 100 MG tablet TAKE 1 TABLET BY MOUTH 30 MINS BEFORE INTERCOURSE 12 tablet 1    thiamine (B-1) 100 MG tablet TAKE 1 TABLET BY MOUTH EVERY DAY 90 tablet 1    thin (NO BRAND SPECIFIED) lancets Use with lanceting device. To accompany: Blood Glucose Monitor Brands: per insurance. Test 8 x per day 200 each 11    [employer] MINI 31G X 6 MM insulin pen needle USE 4 PEN NEEDLES DAILY OR AS DIRECTED. 400 each 1    VITAMIN D 1000 UNIT OR CAPS Take 1 capsule by mouth daily        Allergies   Allergen Reactions    Cats      Cat dander    Duloxetine Hcl     Molds & Smuts     Ms Contin [Morphine]     Ceftazidime Rash     Was also on vanco    Vancomycin Rash     Was also on ceftazidime       Reviewed and updated as needed this visit by clinical staff   Tobacco   Allergies  Meds              Reviewed and updated as needed this visit by Provider                 Past Medical History:   Diagnosis Date    gerd     occas.    Hayfever     hx of asthma     in childhood    Other and unspecified hyperlipidemia     Type II or unspecified type diabetes mellitus without mention of complication, not stated as uncontrolled     Unspecified essential hypertension     Vitamin D deficiency         Review of Systems   Constitutional:  Negative for chills and fever.   HENT:  Positive for congestion. Negative for ear pain, hearing loss and sore throat.    Eyes:  Negative for pain and visual disturbance.   Respiratory:  Negative for cough and shortness of breath.    Cardiovascular:  Negative for chest pain, palpitations and peripheral edema.   Gastrointestinal:  Negative for abdominal pain, constipation, diarrhea, heartburn, hematochezia and nausea.   Genitourinary:  Positive for frequency and urgency. Negative for dysuria, genital sores, hematuria, impotence and penile discharge.   Musculoskeletal:  Negative for arthralgias, joint swelling and myalgias.   Skin:  Negative for rash.   Neurological:  Positive for paresthesias. Negative for dizziness, weakness and headaches.   Psychiatric/Behavioral:  Negative for mood changes. The patient is not nervous/anxious.      CONSTITUTIONAL: NEGATIVE for fever, chills, change in weight  INTEGUMENTARY/SKIN: NEGATIVE for worrisome rashes, moles or lesions  EYES: NEGATIVE for vision changes or irritation  ENT: NEGATIVE for ear, mouth and throat problems  RESP: NEGATIVE for significant cough or SOB  CV: NEGATIVE for chest pain, palpitations or peripheral edema  GI: NEGATIVE for nausea, abdominal pain, heartburn, or change in bowel habits   male: negative for dysuria, hematuria, decreased urinary stream, erectile dysfunction, urethral discharge  MUSCULOSKELETAL: NEGATIVE for significant arthralgias or myalgia  NEURO: NEGATIVE for weakness, dizziness or  "paresthesias  PSYCHIATRIC: NEGATIVE for changes in mood or affect    OBJECTIVE:   BP (!) 160/93 (BP Location: Right arm, Cuff Size: Adult Regular)   Pulse 92   Resp 19   Ht 1.645 m (5' 4.76\")   Wt 73.4 kg (161 lb 12.8 oz)   SpO2 99%   BMI 27.12 kg/m      Physical Exam  GENERAL: healthy, alert and no distress  EYES: Eyes grossly normal to inspection, PERRL and conjunctivae and sclerae normal  HENT: ear canals and TM's normal, nose and mouth without ulcers or lesions  NECK: no adenopathy, no asymmetry, masses, or scars and thyroid normal to palpation  RESP: lungs clear to auscultation - no rales, rhonchi or wheezes  CV: regular rate and rhythm, normal S1 S2, no S3 or S4, no murmur, click or rub, no peripheral edema and peripheral pulses strong  ABDOMEN: soft, nontender, no hepatosplenomegaly, no masses and bowel sounds normal  MS: no gross musculoskeletal defects noted, no edema  SKIN: no suspicious lesions or rashes  NEURO: Normal strength and tone, mentation intact and speech normal  BACK: no CVA tenderness, no paralumbar tenderness  PSYCH: mentation appears normal, affect normal/bright  LYMPH: no cervical, supraclavicular, axillary, or inguinal adenopathy    Diagnostic Test Results:  Labs reviewed in Epic  Results for orders placed or performed in visit on 08/07/23   BASIC METABOLIC PANEL     Status: Abnormal   Result Value Ref Range    Sodium 135 (L) 136 - 145 mmol/L    Potassium 4.0 3.4 - 5.3 mmol/L    Chloride 97 (L) 98 - 107 mmol/L    Carbon Dioxide (CO2) 28 22 - 29 mmol/L    Anion Gap 10 7 - 15 mmol/L    Urea Nitrogen 10.0 8.0 - 23.0 mg/dL    Creatinine 0.61 (L) 0.67 - 1.17 mg/dL    Calcium 9.5 8.6 - 10.0 mg/dL    Glucose 339 (H) 70 - 99 mg/dL    GFR Estimate >90 >60 mL/min/1.73m2   HEMOGLOBIN A1C     Status: Abnormal   Result Value Ref Range    Hemoglobin A1C 12.7 (H) 0.0 - 5.6 %   Lipid panel reflex to direct LDL Non-fasting     Status: Abnormal   Result Value Ref Range    Cholesterol 212 (H) <200 " mg/dL    Triglycerides 134 <150 mg/dL    Direct Measure HDL 64 >=40 mg/dL    LDL Cholesterol Calculated 121 (H) <=100 mg/dL    Non HDL Cholesterol 148 (H) <130 mg/dL    Narrative    Cholesterol  Desirable:  <200 mg/dL    Triglycerides  Normal:  Less than 150 mg/dL  Borderline High:  150-199 mg/dL  High:  200-499 mg/dL  Very High:  Greater than or equal to 500 mg/dL    Direct Measure HDL  Female:  Greater than or equal to 50 mg/dL   Male:  Greater than or equal to 40 mg/dL    LDL Cholesterol  Desirable:  <100mg/dL  Above Desirable:  100-129 mg/dL   Borderline High:  130-159 mg/dL   High:  160-189 mg/dL   Very High:  >= 190 mg/dL    Non HDL Cholesterol  Desirable:  130 mg/dL  Above Desirable:  130-159 mg/dL  Borderline High:  160-189 mg/dL  High:  190-219 mg/dL  Very High:  Greater than or equal to 220 mg/dL   Albumin Random Urine Quantitative with Creat Ratio     Status: Abnormal   Result Value Ref Range    Creatinine Urine mg/dL 33.1 mg/dL    Albumin Urine mg/L 12.9 mg/L    Albumin Urine mg/g Cr 38.97 (H) 0.00 - 17.00 mg/g Cr       ASSESSMENT/PLAN:   Amadou was seen today for physical.    Diagnoses and all orders for this visit:    Screen for colon cancer  -     Colonoscopy Screening  Referral; Future    Chronic kidney disease, stage 1  -     BASIC METABOLIC PANEL; Future  -     Albumin Random Urine Quantitative with Creat Ratio; Future  -     BASIC METABOLIC PANEL  -     Albumin Random Urine Quantitative with Creat Ratio    Type 2 diabetes mellitus without complication, without long-term current use of insulin (H)  -     HEMOGLOBIN A1C; Future  -     Lipid panel reflex to direct LDL Non-fasting; Future  -     Adult Eye  Referral; Future  -     FOOT EXAM  -     Continuous Blood Gluc  (FREESTYLE ALEXUS 14 DAY READER) SARA; 1 each once for 1 dose Use to read blood sugars per 's instructions.  -     Continuous Blood Gluc Sensor (FREESTYLE ALEXUS 14 DAY SENSOR) MISC; 1 each every 14  "days Use 1 Sensor every 14 days. Use to read blood sugars per 's instructions.  -     HEMOGLOBIN A1C  -     Lipid panel reflex to direct LDL Non-fasting    Chronic left shoulder pain    Acute pain of right shoulder    Chronic low back pain with sciatica, sciatica laterality unspecified, unspecified back pain laterality    Erectile dysfunction, unspecified erectile dysfunction type  -     sildenafil (VIAGRA) 100 MG tablet; TAKE 1 TABLET BY MOUTH 30 MINS BEFORE INTERCOURSE    Diabetic autonomic neuropathy associated with type 2 diabetes mellitus (H)  -     REVIEW OF HEALTH MAINTENANCE PROTOCOL ORDERS  -     gabapentin (NEURONTIN) 300 MG capsule; Take 3 capsules (900 mg) by mouth At Bedtime        Patient has been advised of split billing requirements and indicates understanding: No      COUNSELING:   Reviewed preventive health counseling, as reflected in patient instructions       Regular exercise       Healthy diet/nutrition       Vision screening       Hearing screening       Colorectal cancer screening       Prostate cancer screening      BMI:   Estimated body mass index is 27.12 kg/m  as calculated from the following:    Height as of this encounter: 1.645 m (5' 4.76\").    Weight as of this encounter: 73.4 kg (161 lb 12.8 oz).   Weight management plan: Discussed healthy diet and exercise guidelines      He reports that he has never smoked. He has never used smokeless tobacco.            Joseph Alanis MD  [facility]  "

## 2023-08-08 LAB
ANION GAP SERPL CALCULATED.3IONS-SCNC: 10 MMOL/L (ref 7–15)
BUN SERPL-MCNC: 10 MG/DL (ref 8–23)
CALCIUM SERPL-MCNC: 9.5 MG/DL (ref 8.6–10)
CHLORIDE SERPL-SCNC: 97 MMOL/L (ref 98–107)
CHOLEST SERPL-MCNC: 212 MG/DL
CREAT SERPL-MCNC: 0.61 MG/DL (ref 0.67–1.17)
CREAT UR-MCNC: 33.1 MG/DL
DEPRECATED HCO3 PLAS-SCNC: 28 MMOL/L (ref 22–29)
GFR SERPL CREATININE-BSD FRML MDRD: >90 ML/MIN/1.73M2
GLUCOSE SERPL-MCNC: 339 MG/DL (ref 70–99)
HDLC SERPL-MCNC: 64 MG/DL
LDLC SERPL CALC-MCNC: 121 MG/DL
MICROALBUMIN UR-MCNC: 12.9 MG/L
MICROALBUMIN/CREAT UR: 38.97 MG/G CR (ref 0–17)
NONHDLC SERPL-MCNC: 148 MG/DL
POTASSIUM SERPL-SCNC: 4 MMOL/L (ref 3.4–5.3)
SODIUM SERPL-SCNC: 135 MMOL/L (ref 136–145)
TRIGL SERPL-MCNC: 134 MG/DL

## 2023-08-09 ENCOUNTER — TELEPHONE (OUTPATIENT)
Dept: FAMILY MEDICINE | Facility: CLINIC | Age: 58
End: 2023-08-09
Payer: COMMERCIAL

## 2023-08-09 ENCOUNTER — TELEPHONE (OUTPATIENT)
Dept: FAMILY MEDICINE | Facility: CLINIC | Age: 58
End: 2023-08-09

## 2023-08-09 NOTE — TELEPHONE ENCOUNTER
PRIOR AUTHORIZATION DENIED    Medication: FREESTYLE ALEXUS 14 DAY READER SARA  Insurance Company: [employer] - Phone [phone] Fax [phone]  Denial Date: 8/8/2023  Denial Rational:     Appeal Information:     Patient Notified: No

## 2023-08-14 ENCOUNTER — MYC MEDICAL ADVICE (OUTPATIENT)
Dept: FAMILY MEDICINE | Facility: CLINIC | Age: 58
End: 2023-08-14
Payer: COMMERCIAL

## 2023-08-14 DIAGNOSIS — E11.9 TYPE 2 DIABETES MELLITUS WITHOUT COMPLICATION, WITHOUT LONG-TERM CURRENT USE OF INSULIN (H): Primary | ICD-10-CM

## 2023-08-14 RX ORDER — PROCHLORPERAZINE 25 MG/1
SUPPOSITORY RECTAL
Qty: 3 EACH | Refills: 5 | Status: SHIPPED | OUTPATIENT
Start: 2023-08-14

## 2023-08-14 RX ORDER — PROCHLORPERAZINE 25 MG/1
SUPPOSITORY RECTAL
Qty: 1 EACH | Refills: 0 | Status: SHIPPED | OUTPATIENT
Start: 2023-08-14

## 2023-08-14 RX ORDER — FLASH GLUCOSE SENSOR
KIT MISCELLANEOUS
Qty: 2 EACH | Refills: 5 | Status: SHIPPED | OUTPATIENT
Start: 2023-08-14

## 2023-08-14 NOTE — TELEPHONE ENCOUNTER
Central Prior Authorization Team   Phone: [phone]      Closing this encounter as provider did change to the formulary preferred:

## 2023-08-14 NOTE — TELEPHONE ENCOUNTER
Central Prior Authorization Team   Phone: [phone]      PRIOR AUTHORIZATION DENIED    Medication: [facility] ALEXUS [facility]  Insurance Company: [employer] - Phone [phone] Fax [phone]  Denial Date: 8/14/2023  Denial Rational:           Appeal Information:           Patient Notified: No

## 2023-08-17 NOTE — TELEPHONE ENCOUNTER
Called and schedule pt for 4/23 at 11:20 am with [facility] for a BP check up.    Vidhi Andre MA    
Have him come in for BP check in the next 1-2 months    
nonweight-bearing

## 2023-09-11 ENCOUNTER — OFFICE VISIT (OUTPATIENT)
Dept: FAMILY MEDICINE | Facility: CLINIC | Age: 58
End: 2023-09-11
Payer: COMMERCIAL

## 2023-09-11 VITALS
OXYGEN SATURATION: 99 % | SYSTOLIC BLOOD PRESSURE: 133 MMHG | HEIGHT: 65 IN | RESPIRATION RATE: 18 BRPM | DIASTOLIC BLOOD PRESSURE: 85 MMHG | BODY MASS INDEX: 28.02 KG/M2 | HEART RATE: 90 BPM | TEMPERATURE: 98.1 F | WEIGHT: 168.2 LBS

## 2023-09-11 DIAGNOSIS — Z12.11 SCREEN FOR COLON CANCER: Primary | ICD-10-CM

## 2023-09-11 DIAGNOSIS — K21.00 GASTROESOPHAGEAL REFLUX DISEASE WITH ESOPHAGITIS WITHOUT HEMORRHAGE: ICD-10-CM

## 2023-09-11 DIAGNOSIS — S83.241A TEAR OF MEDIAL MENISCUS OF RIGHT KNEE, CURRENT, UNSPECIFIED TEAR TYPE, INITIAL ENCOUNTER: ICD-10-CM

## 2023-09-11 DIAGNOSIS — L98.9 SKIN LESION: ICD-10-CM

## 2023-09-11 DIAGNOSIS — E55.9 VITAMIN D DEFICIENCY: ICD-10-CM

## 2023-09-11 DIAGNOSIS — Z00.00 ROUTINE GENERAL MEDICAL EXAMINATION AT A HEALTH CARE FACILITY: ICD-10-CM

## 2023-09-11 DIAGNOSIS — E11.9 TYPE 2 DIABETES MELLITUS WITHOUT COMPLICATION, WITHOUT LONG-TERM CURRENT USE OF INSULIN (H): ICD-10-CM

## 2023-09-11 PROCEDURE — 99214 OFFICE O/P EST MOD 30 MIN: CPT | Performed by: INTERNAL MEDICINE

## 2023-09-11 RX ORDER — LANCING DEVICE/LANCETS
KIT MISCELLANEOUS
Qty: 1 EACH | Refills: 0 | Status: SHIPPED | OUTPATIENT
Start: 2023-09-11

## 2023-09-11 RX ORDER — GLUCOSAMINE HCL/CHONDROITIN SU 500-400 MG
CAPSULE ORAL
Qty: 100 EACH | Refills: 6 | Status: SHIPPED | OUTPATIENT
Start: 2023-09-11

## 2023-09-11 RX ORDER — LANCETS
EACH MISCELLANEOUS
Qty: 102 EACH | Refills: 11 | Status: SHIPPED | OUTPATIENT
Start: 2023-09-11

## 2023-09-11 RX ORDER — FAMOTIDINE 20 MG/1
20 TABLET, FILM COATED ORAL 2 TIMES DAILY PRN
Qty: 180 TABLET | Refills: 4 | Status: SHIPPED | OUTPATIENT
Start: 2023-09-11

## 2023-09-11 ASSESSMENT — ENCOUNTER SYMPTOMS
SHORTNESS OF BREATH: 0
HEARTBURN: 1
FEVER: 0
JOINT SWELLING: 1
ARTHRALGIAS: 1
DIZZINESS: 0
CONSTIPATION: 0
SORE THROAT: 0
HEADACHES: 0
DIARRHEA: 0
HEMATURIA: 0
EYE PAIN: 0
PARESTHESIAS: 0
FREQUENCY: 0
ABDOMINAL PAIN: 0
NERVOUS/ANXIOUS: 0
NAUSEA: 0
COUGH: 0
WEAKNESS: 0
HEMATOCHEZIA: 0
PALPITATIONS: 0
CHILLS: 0
MYALGIAS: 1
DYSURIA: 0

## 2023-09-11 ASSESSMENT — PAIN SCALES - GENERAL: PAINLEVEL: MODERATE PAIN (5)

## 2023-09-11 NOTE — PROGRESS NOTES
Assessment & Plan   Problem List Items Addressed This Visit       Type 2 diabetes mellitus without complication, without long-term current use of insulin (H)    Relevant Medications    blood glucose (ACCU-CHEK FASTCLIX) lancing device    blood glucose monitoring (ACCU-CHEK FASTCLIX) lancets    alcohol swab prep pads    Other Relevant Orders    Hemoglobin A1c     Other Visit Diagnoses       Screen for colon cancer    -  Primary    Routine general medical examination at a health care facility        Gastroesophageal reflux disease with esophagitis without hemorrhage        Relevant Medications    famotidine (PEPCID) 20 MG tablet    Vitamin D deficiency        Relevant Orders    Vitamin D Deficiency    Skin lesion        Relevant Orders    Adult Dermatology Referral    Tear of medial meniscus of right knee, current, unspecified tear type, initial encounter        Relevant Orders    Physical Therapy Referral           BP     133/85  9/12/2023    Lab Results   Component Value Date     08/07/2023     11/15/2021     01/12/2021     Lab Results   Component Value Date    A1C 12.7 08/07/2023    A1C 8.2 01/18/2021     Lab Results   Component Value Date     08/07/2023     06/16/2022    LDL 84 01/18/2021     Lab Results   Component Value Date    MICROL 12.9 08/07/2023    MICROL 30 06/16/2022    MICROL 19 01/18/2021     No results found for: MICROALBUMIN  BP     133/85  9/12/2023    Lab Results   Component Value Date     08/07/2023     11/15/2021     01/12/2021     Lab Results   Component Value Date    A1C 12.7 08/07/2023    A1C 8.2 01/18/2021     Lab Results   Component Value Date     08/07/2023     06/16/2022    LDL 84 01/18/2021     Lab Results   Component Value Date    MICROL 12.9 08/07/2023    MICROL 30 06/16/2022    MICROL 19 01/18/2021     No results found for: MICROALBUMIN             Work on weight loss  Regular exercise    MD AYLA Bach  [facility] DIMA Swanson is a 57 year old, presenting for the following health issues:  Diabetes (/)  30 points higher on the       HPI     Diabetes Follow-up    How often are you checking your blood sugar? Four or more times daily  Blood sugar testing frequency justification:  Uncontrolled diabetes  What time of day are you checking your blood sugars (select all that apply)?  Before and after meals  Have you had any blood sugars above 200?  Yes   Have you had any blood sugars below 70?  Yes   What symptoms do you notice when your blood sugar is low?  Shaky, Dizzy, and Weak  What concerns do you have today about your diabetes? Other: Abnormal blood sugar readings    Do you have any of these symptoms? (Select all that apply)  Burning in feet and Redness, sores, or blisters on feet  Have you had a diabetic eye exam in the last 12 months? No, has an appointment on 9/12/2023.    Continuous blood monitor   More aggressive   Right foot gets sores and cracks  Right knee and leg pain   Right knee pain - nothing preceding     Vision     2000 mg in metformin ( victoza )     BP Readings from Last 2 Encounters:   08/07/23 136/78   06/11/23 131/80     Hemoglobin A1C (%)   Date Value   08/07/2023 12.7 (H)   06/16/2022 9.9 (H)   01/18/2021 8.2 (H)   08/16/2019 6.2 (H)     LDL Cholesterol Calculated   Date Value   08/07/2023 121 mg/dL (H)   06/16/2022      Comment:     Cannot estimate LDL when triglyceride exceeds 400 mg/dL   01/18/2021 84 mg/dL   05/17/2019 78 mg/dL     LDL Cholesterol Direct (mg/dL)   Date Value   06/16/2022 122 (H)         How many servings of fruits and vegetables do you eat daily?  2-3  On average, how many sweetened beverages do you drink each day (Examples: soda, juice, sweet tea, etc.  Do NOT count diet or artificially sweetened beverages)?   1  How many days per week do you exercise enough to make your heart beat faster? 0  How many minutes a day do you exercise enough to make  "your heart beat faster? 0  How many days per week do you miss taking your medication? 0        Review of Systems   Constitutional, HEENT, cardiovascular, pulmonary, gi and gu systems are negative, except as otherwise noted.      Objective    Resp 18   Ht 1.645 m (5' 4.76\")   Wt 76.3 kg (168 lb 3.2 oz)   BMI 28.20 kg/m    Body mass index is 28.2 kg/m .  Physical Exam   GENERAL: healthy, alert and no distress  EYES: Eyes grossly normal to inspection, PERRL and conjunctivae and sclerae normal  HENT: ear canals and TM's normal, nose and mouth without ulcers or lesions  NECK: no adenopathy, no asymmetry, masses, or scars and thyroid normal to palpation  RESP: lungs clear to auscultation - no rales, rhonchi or wheezes  CV: regular rate and rhythm, normal S1 S2, no S3 or S4, no murmur, click or rub, no peripheral edema and peripheral pulses strong  ABDOMEN: soft, nontender, no hepatosplenomegaly, no masses and bowel sounds normal  MS: no gross musculoskeletal defects noted, no edema  SKIN: no suspicious lesions or rashes  NEURO: Normal strength and tone, mentation intact and speech normal  BACK: no CVA tenderness, no paralumbar tenderness  PSYCH: mentation appears normal, affect normal/bright  LYMPH: no cervical, supraclavicular, axillary, or inguinal adenopathy  Diabetic foot exam: normal DP and PT pulses, no trophic changes or ulcerative lesions, and normal sensory exam     Some dryness, small wart base left great toe    No results found for any visits on 09/11/23.                  "

## 2023-09-11 NOTE — PROGRESS NOTES
SUBJECTIVE:   CC: Sky is an 57 year old who presents for preventative health visit.   {(!) Visit Details have not yet been documented.  Please enter Visit Details and then use this list to pull in documentation. (Optional):488565}    Healthy Habits:     Getting at least 3 servings of Calcium per day:  Yes    Bi-annual eye exam:  Yes    Dental care twice a year:  Yes    Sleep apnea or symptoms of sleep apnea:  None    Diet:  Diabetic    Duration of exercise:  15-30 minutes    Taking medications regularly:  Yes    Medication side effects:  None    Additional concerns today:  No        Diabetes Follow-up    How often are you checking your blood sugar? { :862704}  What concerns do you have today about your diabetes? { :397983}   Do you have any of these symptoms? (Select all that apply)  { :408597}  Have you had a diabetic eye exam in the last 12 months? { :212635}        BP Readings from Last 2 Encounters:   08/07/23 136/78   06/11/23 131/80     Hemoglobin A1C (%)   Date Value   08/07/2023 12.7 (H)   06/16/2022 9.9 (H)   01/18/2021 8.2 (H)   08/16/2019 6.2 (H)     LDL Cholesterol Calculated   Date Value   08/07/2023 121 mg/dL (H)   06/16/2022      Comment:     Cannot estimate LDL when triglyceride exceeds 400 mg/dL   01/18/2021 84 mg/dL   05/17/2019 78 mg/dL     LDL Cholesterol Direct (mg/dL)   Date Value   06/16/2022 122 (H)       {Reference  Diabetes Management Resources  Blood Glucose Log - 3 weeks  Blood Glucose Log with Food and Insulin Record :785734}      Social History     Tobacco Use    Smoking status: Never    Smokeless tobacco: Never   Substance Use Topics    Alcohol use: Yes     Comment: occ         8/7/2023    10:02 AM   Alcohol Use   Prescreen: >3 drinks/day or >7 drinks/week? Not Applicable       Last PSA: No results found for: PSA    Reviewed orders with patient. Reviewed health maintenance and updated orders accordingly - { :489875}  {Chronicprobdata (optional):410409}    Reviewed and updated as  "needed this visit by clinical staff                  Reviewed and updated as needed this visit by Provider                 {HISTORY OPTIONS (Optional):883363}    Review of Systems   Constitutional:  Negative for chills and fever.   HENT:  Negative for congestion, ear pain, hearing loss and sore throat.    Eyes:  Positive for visual disturbance. Negative for pain.   Respiratory:  Negative for cough and shortness of breath.    Cardiovascular:  Positive for peripheral edema. Negative for chest pain and palpitations.   Gastrointestinal:  Positive for heartburn. Negative for abdominal pain, constipation, diarrhea, hematochezia and nausea.   Genitourinary:  Positive for impotence and urgency. Negative for dysuria, frequency, genital sores, hematuria and penile discharge.   Musculoskeletal:  Positive for arthralgias, joint swelling and myalgias.   Skin:  Negative for rash.   Neurological:  Negative for dizziness, weakness, headaches and paresthesias.   Psychiatric/Behavioral:  Negative for mood changes. The patient is not nervous/anxious.    {MALE ROS (Optional):097749}    OBJECTIVE:   There were no vitals taken for this visit.    Physical Exam  {Exam Choices (Optional):648581}    {Diagnostic Test Results (Optional):554021}    ASSESSMENT/PLAN:   {Diag Picklist:559737}    {Patient advised of split billing (Optional):271671}      COUNSELING:   {MALE COUNSELING MESSAGES:783704::\"Reviewed preventive health counseling, as reflected in patient instructions\"}        He reports that he has never smoked. He has never used smokeless tobacco.      {Counseling Resources  [place] Preventive Services Task Force  Cholesterol Screening  Health diet/nutrition  Pooled Cohorts Equation Calculator  USDA's MyPlate  ASA Prophylaxis  Lung CA Screening  Osteoporosis prevention/bone health :696938}  {Prostate Cancer Screening  Consider for men 55-69 per guidance from USPSTF :908870}    Joseph Alanis MD  [facility]  "

## 2023-09-11 NOTE — PATIENT INSTRUCTIONS
AM-LACTIN daily lotion 12 percent lactic  Aquaphor            Preventive Health Recommendations  Male Ages 50 - 64    Yearly exam:             See your health care provider every year in order to  o   Review health changes.   o   Discuss preventive care.    o   Review your medicines if your doctor has prescribed any.   Have a cholesterol test every 5 years, or more frequently if you are at risk for high cholesterol/heart disease.   Have a diabetes test (fasting glucose) every three years. If you are at risk for diabetes, you should have this test more often.   Have a colonoscopy at age 50, or have a yearly FIT test (stool test). These exams will check for colon cancer.    Talk with your health care provider about whether or not a prostate cancer screening test (PSA) is right for you.  You should be tested each year for STDs (sexually transmitted diseases), if you re at risk.     Shots: Get a flu shot each year. Get a tetanus shot every 10 years.     Nutrition:  Eat at least 5 servings of fruits and vegetables daily.   Eat whole-grain bread, whole-wheat pasta and brown rice instead of white grains and rice.   Get adequate Calcium and Vitamin D.     Lifestyle  Exercise for at least 150 minutes a week (30 minutes a day, 5 days a week). This will help you control your weight and prevent disease.   Limit alcohol to one drink per day.   No smoking.   Wear sunscreen to prevent skin cancer.   See your dentist every six months for an exam and cleaning.   See your eye doctor every 1 to 2 years.

## 2023-09-12 ENCOUNTER — TRANSFERRED RECORDS (OUTPATIENT)
Dept: HEALTH INFORMATION MANAGEMENT | Facility: CLINIC | Age: 58
End: 2023-09-12
Payer: COMMERCIAL

## 2023-09-12 LAB — RETINOPATHY: NEGATIVE

## 2023-09-27 ENCOUNTER — HOSPITAL ENCOUNTER (OUTPATIENT)
Facility: AMBULATORY SURGERY CENTER | Age: 58
End: 2023-09-27
Attending: STUDENT IN AN ORGANIZED HEALTH CARE EDUCATION/TRAINING PROGRAM
Payer: COMMERCIAL

## 2023-09-27 ENCOUNTER — TELEPHONE (OUTPATIENT)
Dept: GASTROENTEROLOGY | Facility: CLINIC | Age: 58
End: 2023-09-27
Payer: COMMERCIAL

## 2023-09-27 NOTE — TELEPHONE ENCOUNTER
"Endoscopy Scheduling Screen    Have you had a positive Covid test in the last 14 days?  No    Are you active on MyChart?   Yes    What insurance is in the chart?  Other:  [employer]    Ordering/Referring Provider:     CHERELLE OCHOA      (If ordering provider performs procedure, schedule with ordering provider unless otherwise instructed. )    BMI: Estimated body mass index is 28.2 kg/m  as calculated from the following:    Height as of 9/11/23: 1.645 m (5' 4.76\").    Weight as of 9/11/23: 76.3 kg (168 lb 3.2 oz).     Sedation Ordered  moderate sedation.   If patient BMI > 50 do not schedule in [facility].    If patient BMI > 45 do not schedule at [facility].    Are you taking methadone or Suboxone?  No    Are you taking any prescription medications for pain 3 or more times per week?   No    Do you have a history of malignant hyperthermia or adverse reaction to anesthesia?  No    (Females) Are you currently pregnant?   No     Have you been diagnosed or told you have pulmonary hypertension?   No    Do you have an LVAD?  No    Have you been told you have moderate to severe sleep apnea?  No    Have you been told you have COPD, asthma, or any other lung disease?  No    Do you have any heart conditions?  No     Have you ever had an organ transplant?   No    Have you ever had or are you awaiting a heart or lung transplant?   No    Have you had a stroke or transient ischemic attack (TIA aka \"mini stroke\" in the last 6 months?   No    Have you been diagnosed with or been told you have cirrhosis of the liver?   No    Are you currently on dialysis?   No    Do you need assistance transferring?   No    BMI: Estimated body mass index is 28.2 kg/m  as calculated from the following:    Height as of 9/11/23: 1.645 m (5' 4.76\").    Weight as of 9/11/23: 76.3 kg (168 lb 3.2 oz).     Is patients BMI > 40 and scheduling location [facility]?  No    Do you take an injectable medication for weight loss or diabetes (excluding insulin)?  No    Do you take the " medication Naltrexone?  No    Do you take blood thinners?  No       Prep   Are you currently on dialysis or do you have chronic kidney disease?  Yes (Golytely Prep)    Do you have a diagnosis of diabetes?  Yes (Golytely Prep)    Do you have a diagnosis of cystic fibrosis (CF)?  No    On a regular basis do you go 3 -5 days between bowel movements?  No    BMI > 40?  No    Preferred Pharmacy:    [facility]/pharmacy #[id number] - [place] - [address]  [address]  [place] [address]  Phone: [phone] Fax: [phone]    Final Scheduling Details   Colonoscopy prep sent?  Standard Golytely    Procedure scheduled  Colonoscopy    Surgeon:  GLORIA     Date of procedure:  11/22/2023     Pre-OP / PAC:   No - Not required for this site.    Location  [facility] - [facility] - Per order.    Sedation   Moderate Sedation - Per order.      Patient Reminders:   You will receive a call from a Nurse to review instructions and health history.  This assessment must be completed prior to your procedure.  Failure to complete the Nurse assessment may result in the procedure being cancelled.      On the day of your procedure, please designate an adult(s) who can drive you home stay with you for the next 24 hours. The medicines used in the exam will make you sleepy. You will not be able to drive.      You cannot take public transportation, ride share services, or non-medical taxi service without a responsible caregiver.  Medical transport services are allowed with the requirement that a responsible caregiver will receive you at your destination.  We require that drivers and caregivers are confirmed prior to your procedure.

## 2023-10-30 ENCOUNTER — OFFICE VISIT (OUTPATIENT)
Dept: FAMILY MEDICINE | Facility: CLINIC | Age: 58
End: 2023-10-30
Payer: COMMERCIAL

## 2023-10-30 VITALS
SYSTOLIC BLOOD PRESSURE: 132 MMHG | DIASTOLIC BLOOD PRESSURE: 78 MMHG | TEMPERATURE: 98 F | HEIGHT: 66 IN | BODY MASS INDEX: 27.9 KG/M2 | OXYGEN SATURATION: 99 % | WEIGHT: 173.6 LBS | HEART RATE: 83 BPM | RESPIRATION RATE: 20 BRPM

## 2023-10-30 DIAGNOSIS — H69.91 DYSFUNCTION OF RIGHT EUSTACHIAN TUBE: Primary | ICD-10-CM

## 2023-10-30 DIAGNOSIS — L03.211 CELLULITIS OF FACE: ICD-10-CM

## 2023-10-30 DIAGNOSIS — H60.501 ACUTE OTITIS EXTERNA OF RIGHT EAR, UNSPECIFIED TYPE: ICD-10-CM

## 2023-10-30 PROCEDURE — 99213 OFFICE O/P EST LOW 20 MIN: CPT | Performed by: PHYSICIAN ASSISTANT

## 2023-10-30 ASSESSMENT — PAIN SCALES - GENERAL: PAINLEVEL: SEVERE PAIN (7)

## 2023-10-30 NOTE — PROGRESS NOTES
Assessment & Plan     Dysfunction of right eustachian tube  Acute otitis externa of right ear, unspecified type  Cellulitis of face  Patient is a 57-year-old male who presents to clinic due to right ear pain ongoing for the last few days.  History of otitis externa.  Vital signs normal.  Physical exam findings are most consistent with mild cellulitis extending to tragus and slightly anterior.  No surrounding lymphadenopathy.  No signs of 0M.  Patient also notes a feeling of pressure in the ear and inability to pop the ear after recent air travel.  The symptoms are more consistent with eustachian tube dysfunction possibly due to swelling.  Patient prescribed Augmentin for treatment of cellulitis.  Discussed pain relief with Tylenol/ibuprofen.  Return urgent follow-up precautions provided.  - amoxicillin-clavulanate (AUGMENTIN) 875-125 MG tablet; Take 1 tablet by mouth 2 times daily for 7 days         See Patient Instructions    JESUS Ernst [facility] [place] is a 57 year old, presenting for the following health issues:  Otalgia        10/30/2023     3:29 PM   Additional Questions   Roomed by Jennifer FONSECA MA   Accompanied by No one         10/30/2023     3:29 PM   Patient Reported Additional Medications   Patient reports taking the following new medications None       History of Present Illness       Reason for visit:  Right ear pain  Symptom onset:  1-3 days ago  Symptoms include:  Loss of hearing and pain in right ear  Symptom intensity:  Severe  Symptom progression:  Worsening  Had these symptoms before:  Yes  Has tried/received treatment for these symptoms:  Yes  Previous treatment was successful:  Yes  Prior treatment description:  Oral and ear drop antibiotics    He eats 4 or more servings of fruits and vegetables daily.He consumes 1 sweetened beverage(s) daily.He exercises with enough effort to increase his heart rate 9 or less minutes per day.  He exercises with enough  "effort to increase his heart rate 3 or less days per week. He is missing 1 dose(s) of medications per week.       Patient stated he had a ear infection like 6 months ago and it feels just like it did then. It feels like there is water in his ear. Patient stated that every time he swallows there is a clicking. He was given oral antibiotics and ear drops and it cleared it right up.  Patient notes ear pain starting within the last few days on the right side that is tender to palpation.  He also notes a pressure in the ear and a feeling of inability to pop the ear.  Patient did travel via air recently.  No fevers.  No hearing loss.      Review of Systems   Constitutional:  Negative for fever.   HENT:  Positive for ear pain. Negative for congestion, ear discharge, hearing loss, sore throat and tinnitus.    Respiratory:  Negative for cough and shortness of breath.    Skin:  Negative for rash.            Objective    /78   Pulse 83   Temp 98  F (36.7  C) (Temporal)   Resp 20   Ht 1.67 m (5' 5.75\")   Wt 78.7 kg (173 lb 9.6 oz)   SpO2 99%   BMI 28.23 kg/m    Body mass index is 28.23 kg/m .  Physical Exam  Vitals and nursing note reviewed.   Constitutional:       General: He is not in acute distress.     Appearance: Normal appearance.   HENT:      Head: Normocephalic and atraumatic.      Right Ear: Tympanic membrane normal. There is no impacted cerumen.      Left Ear: Tympanic membrane, ear canal and external ear normal. There is no impacted cerumen.      Ears:      Comments: Right ear with erythema and swelling of distal canal, tragus, and anterior to tragus.  No surrounding lymphadenopathy.     Nose: No congestion.      Mouth/Throat:      Mouth: Mucous membranes are moist.      Pharynx: Oropharynx is clear. No oropharyngeal exudate or posterior oropharyngeal erythema.   Eyes:      Extraocular Movements: Extraocular movements intact.      Pupils: Pupils are equal, round, and reactive to light.   Cardiovascular: "      Rate and Rhythm: Normal rate and regular rhythm.      Heart sounds: Normal heart sounds.   Pulmonary:      Effort: Pulmonary effort is normal.      Breath sounds: Normal breath sounds.   Musculoskeletal:         General: Normal range of motion.      Cervical back: Normal range of motion.   Skin:     General: Skin is warm and dry.   Neurological:      General: No focal deficit present.      Mental Status: He is alert.   Psychiatric:         Mood and Affect: Mood normal.         Behavior: Behavior normal.

## 2023-10-31 ASSESSMENT — ENCOUNTER SYMPTOMS
FEVER: 0
SHORTNESS OF BREATH: 0
COUGH: 0
SORE THROAT: 0

## 2023-10-31 NOTE — PATIENT INSTRUCTIONS
Your symptoms are concerning for a mild soft tissue infection at the outside of the ear canal as well as the front aspect of the ear.  For treatment you have been prescribed an antibiotic, Augmentin.  For discomfort you can use Tylenol/ibuprofen.    The pressure in your ear and feeling of inability to pop your ear is typically called eustachian tube dysfunction.  This may be due to mild swelling around the tube and should improve with treatment of the infection as well as ibuprofen.    Please reach out with any questions or concerns.  Follow-up in clinic for new or worsening symptoms.

## 2023-11-01 ENCOUNTER — OFFICE VISIT (OUTPATIENT)
Dept: ORTHOPEDICS | Facility: CLINIC | Age: 58
End: 2023-11-01
Payer: COMMERCIAL

## 2023-11-01 ENCOUNTER — THERAPY VISIT (OUTPATIENT)
Dept: PHYSICAL THERAPY | Facility: CLINIC | Age: 58
End: 2023-11-01
Attending: INTERNAL MEDICINE
Payer: COMMERCIAL

## 2023-11-01 ENCOUNTER — ANCILLARY PROCEDURE (OUTPATIENT)
Dept: GENERAL RADIOLOGY | Facility: CLINIC | Age: 58
End: 2023-11-01
Attending: PEDIATRICS
Payer: COMMERCIAL

## 2023-11-01 VITALS — WEIGHT: 191 LBS | BODY MASS INDEX: 31.06 KG/M2 | DIASTOLIC BLOOD PRESSURE: 76 MMHG | SYSTOLIC BLOOD PRESSURE: 122 MMHG

## 2023-11-01 DIAGNOSIS — S49.91XA RIGHT SHOULDER INJURY, INITIAL ENCOUNTER: Primary | ICD-10-CM

## 2023-11-01 DIAGNOSIS — S49.91XA RIGHT SHOULDER INJURY, INITIAL ENCOUNTER: ICD-10-CM

## 2023-11-01 DIAGNOSIS — S83.241A TEAR OF MEDIAL MENISCUS OF RIGHT KNEE, CURRENT, UNSPECIFIED TEAR TYPE, INITIAL ENCOUNTER: ICD-10-CM

## 2023-11-01 PROCEDURE — 97110 THERAPEUTIC EXERCISES: CPT | Mod: GP | Performed by: PHYSICAL THERAPIST

## 2023-11-01 PROCEDURE — 73030 X-RAY EXAM OF SHOULDER: CPT | Mod: TC | Performed by: RADIOLOGY

## 2023-11-01 PROCEDURE — 97161 PT EVAL LOW COMPLEX 20 MIN: CPT | Mod: GP | Performed by: PHYSICAL THERAPIST

## 2023-11-01 PROCEDURE — 99204 OFFICE O/P NEW MOD 45 MIN: CPT | Performed by: PEDIATRICS

## 2023-11-01 ASSESSMENT — ACTIVITIES OF DAILY LIVING (ADL)
GO UP STAIRS: ACTIVITY IS VERY DIFFICULT
HOW_WOULD_YOU_RATE_THE_CURRENT_FUNCTION_OF_YOUR_KNEE_DURING_YOUR_USUAL_DAILY_ACTIVITIES_ON_A_SCALE_FROM_0_TO_100_WITH_100_BEING_YOUR_LEVEL_OF_KNEE_FUNCTION_PRIOR_TO_YOUR_INJURY_AND_0_BEING_THE_INABILITY_TO_PERFORM_ANY_OF_YOUR_USUAL_DAILY_ACTIVITIES?: 50
RISE FROM A CHAIR: ACTIVITY IS SOMEWHAT DIFFICULT
WHEN_LYING_ON_THE_INVOLVED_SIDE: 8
GIVING WAY, BUCKLING OR SHIFTING OF KNEE: THE SYMPTOM AFFECTS MY ACTIVITY SEVERELY
STIFFNESS: THE SYMPTOM AFFECTS MY ACTIVITY SEVERELY
WEAKNESS: THE SYMPTOM AFFECTS MY ACTIVITY SLIGHTLY
PLEASE_INDICATE_YOR_PRIMARY_REASON_FOR_REFERRAL_TO_THERAPY:: SHOULDER
KNEEL ON THE FRONT OF YOUR KNEE: ACTIVITY IS FAIRLY DIFFICULT
STAND: ACTIVITY IS MINIMALLY DIFFICULT
PLEASE_INDICATE_YOR_PRIMARY_REASON_FOR_REFERRAL_TO_THERAPY:: KNEE
KNEE_ACTIVITY_OF_DAILY_LIVING_SUM: 29
PUTTING_ON_A_SHIRT_THAT_BUTTONS_DOWN_THE_FRONT: 6
STAND: ACTIVITY IS MINIMALLY DIFFICULT
PAIN: THE SYMPTOM AFFECTS MY ACTIVITY SEVERELY
KNEEL ON THE FRONT OF YOUR KNEE: ACTIVITY IS FAIRLY DIFFICULT
SQUAT: ACTIVITY IS FAIRLY DIFFICULT
SIT WITH YOUR KNEE BENT: ACTIVITY IS MINIMALLY DIFFICULT
WALK: ACTIVITY IS FAIRLY DIFFICULT
AT_ITS_WORST?: 7
RAW_SCORE: 29
REACHING_FOR_SOMETHING_ON_A_HIGH_SHELF: 9
CARRYING_A_HEAVY_OBJECT_OF_10_POUNDS: 8
PAIN: THE SYMPTOM AFFECTS MY ACTIVITY SEVERELY
PUSHING_WITH_THE_INVOLVED_ARM: 8
LIMPING: THE SYMPTOM AFFECTS MY ACTIVITY MODERATELY
WALK: ACTIVITY IS FAIRLY DIFFICULT
HOW_WOULD_YOU_RATE_THE_OVERALL_FUNCTION_OF_YOUR_KNEE_DURING_YOUR_USUAL_DAILY_ACTIVITIES?: ABNORMAL
GIVING WAY, BUCKLING OR SHIFTING OF KNEE: THE SYMPTOM AFFECTS MY ACTIVITY SEVERELY
HOW_WOULD_YOU_RATE_THE_CURRENT_FUNCTION_OF_YOUR_KNEE_DURING_YOUR_USUAL_DAILY_ACTIVITIES_ON_A_SCALE_FROM_0_TO_100_WITH_100_BEING_YOUR_LEVEL_OF_KNEE_FUNCTION_PRIOR_TO_YOUR_INJURY_AND_0_BEING_THE_INABILITY_TO_PERFORM_ANY_OF_YOUR_USUAL_DAILY_ACTIVITIES?: 50
PLACING_AN_OBJECT_ON_A_HIGH_SHELF: 9
WASHING_YOUR_HAIR?: 6
AS_A_RESULT_OF_YOUR_KNEE_INJURY,_HOW_WOULD_YOU_RATE_YOUR_CURRENT_LEVEL_OF_DAILY_ACTIVITY?: ABNORMAL
TOUCHING_THE_BACK_OF_YOUR_NECK: 8
WEAKNESS: THE SYMPTOM AFFECTS MY ACTIVITY SLIGHTLY
SIT WITH YOUR KNEE BENT: ACTIVITY IS MINIMALLY DIFFICULT
GO UP STAIRS: ACTIVITY IS VERY DIFFICULT
KNEE_ACTIVITY_OF_DAILY_LIVING_SCORE: 41.43
SWELLING: THE SYMPTOM AFFECTS MY ACTIVITY MODERATELY
LIMPING: THE SYMPTOM AFFECTS MY ACTIVITY MODERATELY
HOW_WOULD_YOU_RATE_THE_OVERALL_FUNCTION_OF_YOUR_KNEE_DURING_YOUR_USUAL_DAILY_ACTIVITIES?: ABNORMAL
SWELLING: THE SYMPTOM AFFECTS MY ACTIVITY MODERATELY
REMOVING_SOMETHING_FROM_YOUR_BACK_POCKET: 9
RISE FROM A CHAIR: ACTIVITY IS SOMEWHAT DIFFICULT
GO DOWN STAIRS: ACTIVITY IS VERY DIFFICULT
WASHING_YOUR_BACK: 8
PUTTING_ON_AN_UNDERSHIRT_OR_A_PULLOVER_SWEATER: 7
PUTTING_ON_YOUR_PANTS: 6
GO DOWN STAIRS: ACTIVITY IS VERY DIFFICULT
STIFFNESS: THE SYMPTOM AFFECTS MY ACTIVITY SEVERELY
SQUAT: ACTIVITY IS FAIRLY DIFFICULT
AS_A_RESULT_OF_YOUR_KNEE_INJURY,_HOW_WOULD_YOU_RATE_YOUR_CURRENT_LEVEL_OF_DAILY_ACTIVITY?: ABNORMAL

## 2023-11-01 ASSESSMENT — PAIN SCALES - GENERAL: PAINLEVEL: MODERATE PAIN (5)

## 2023-11-01 NOTE — PROGRESS NOTES
ASSESSMENT & PLAN    Sky was seen today for pain and injury.    Diagnoses and all orders for this visit:    Right shoulder injury, initial encounter  -     XR Shoulder Right G/E 3 Views; Future  -     MR Shoulder Right w/o Contrast; Future      Undiagnosed new problem, unclear prognosis.  MRI next.  Questions answered. Discussed signs and symptoms that may indicate more serious issues; the patient was instructed to seek appropriate care if noted. Sky indicates understanding of these issues and agrees with the plan.      See Patient Instructions  Patient Instructions   Concern for rotator cuff pathology in the right shoulder, following injury.  Plan to get MRI of the right shoulder next.  From there, considerations could include physical therapy, steroid injection, possibly referral on to discuss further with orthopedic surgery.    Advanced imaging is done by appointment. Please call [facility] ([facility]/[place]) [phone] , [facility] ([facility]/[place]) [phone] , or [facility] ([facility]) [phone]  to schedule your MRI.     Some insurance companies may require a prior authorization to be completed which can delay the time until you are able to schedule your appointment.       If you are active on [facility], you may have access to your test results before your provider is able to review the study and advise on next steps.      The clinic will plan to contact you with results. If you have not heard from the clinic within 2-3 days following your MRI, please contact us at the number listed below.  Alternatively, if interested in further discussion with me about MRI findings and next steps, feel free to schedule a telephone visit, video visit, or recheck appointment in clinic.        If you have any further questions for your physician or physician s care team you can contact them thru [facility] or by calling  [phone].      DO AYLA Beyer [facility] [place]    -----  Chief Complaint   Patient presents with    Right Shoulder - Pain       SUBJECTIVE  Amadou Brandon Bach is a/an 57 year old male who is seen as a WALK IN patient for evaluation of right shoulder.     The patient is seen by themselves.  The patient is Right handed    Onset: 2 week(s) ago. Patient describes injury as diggging holes, felt & heard pop and pain   Location of Pain: right anterior shoulder  Worsened by: extending, reaching, lifting  Better with: heat  Treatments tried: ice, heat, Tylenol, ibuprofen, physical therapy (1 visits), and CBD cream  Associated symptoms: zinging with specific arc of motion through flexion    Orthopedic/Surgical history: YES - Date: left shoulder 2021 (had MRI, did PT, and did steroid injection)  Social History/Occupation: sales, golf  **  Above information per rooming staff.  Additional history:  Was digging with shovel, right hand on upper portion of handle. As tried to lift noted pain and pop in right shoulder, more anterior.  Notes limited active motion in right shoulder, including flexion, abduction. Can do active assisted motion.  Pain is more lateral to anterior shoulder/upper arm; can get zinging pain into right shoulder, upper shoulder, to neck.          REVIEW OF SYSTEMS:  Review of Systems    OBJECTIVE:  /76   Wt 86.6 kg (191 lb)   BMI 31.06 kg/m             Right Shoulder exam    ROM:      forward flexion ~90, pain        abduction ~45, pain       internal rotation thumb near waistline, pain       external rotation tightness at end range, not as much pain  Active assisted motion nearly full flexion, abduction    Strength:      abduction 4/5, pain       internal rotation 5/5       external rotation 4/5, pain    Skin:      no visible deformities       well perfused       capillary refill brisk    Sensation:      normal sensation over shoulder and upper extremity        RADIOLOGY:  Final results and radiologist's interpretation, available in the [facility] health record.  Images were reviewed with the patient in the office today.  My personal interpretation of the performed imaging: some AC joint arthrosis. Spurring/calcification inferior to glenoid appears chronic, likely related to previous injury or degenerative change. No clear acute bony abnormality noted.        Recent Results (from the past 24 hour(s))   XR Shoulder Right G/E 3 Views    Narrative    RIGHT SHOULDER THREE OR MORE VIEWS 11/1/2023 12:22 PM    INDICATION: Right shoulder injury, initial encounter.    COMPARISON: None available.       Impression    IMPRESSION: Anatomic alignment right shoulder. No acute displaced  right shoulder fracture is identified. Moderate acromioclavicular and  mild glenohumeral joint osteoarthritis. Mineralization is seen along  the inferior right glenoid. Preserved subacromial space. Visualized  right lung is grossly clear. Degenerative change in the  cervicothoracic spine.    JOSÉ BURNS MD         SYSTEM ID:  [id number]

## 2023-11-01 NOTE — PATIENT INSTRUCTIONS
Concern for rotator cuff pathology in the right shoulder, following injury.  Plan to get MRI of the right shoulder next.  From there, considerations could include physical therapy, steroid injection, possibly referral on to discuss further with orthopedic surgery.    Advanced imaging is done by appointment. Please call [facility] ([facility]/[place]) [phone] , [facility] ([facility]/[place]) [phone] , or [facility] ([facility]) [phone]  to schedule your MRI.     Some insurance companies may require a prior authorization to be completed which can delay the time until you are able to schedule your appointment.       If you are active on [facility], you may have access to your test results before your provider is able to review the study and advise on next steps.      The clinic will plan to contact you with results. If you have not heard from the clinic within 2-3 days following your MRI, please contact us at the number listed below.  Alternatively, if interested in further discussion with me about MRI findings and next steps, feel free to schedule a telephone visit, video visit, or recheck appointment in clinic.        If you have any further questions for your physician or physician s care team you can contact them thru [facility] or by calling [phone].

## 2023-11-01 NOTE — PROGRESS NOTES
"PHYSICAL THERAPY EVALUATION  Type of Visit: Evaluation    See electronic medical record for Abuse and Falls Screening details.    Subjective       Presenting condition or subjective complaint: right knee & right shoulder pain, lack of mobility & range of motion. Has had right knee pain for the last few years, intermittent issues with pain or noise during particularly cutting/pivoting. Started becoming more painful after jumping onto a shovel when doing some yard work around 9/4/23. Pain is constant, dull, nagging, whole knee, rated 5/10 at rest, worst 8/10. Pain is increased with pivoting, stepping wrong, stairs, squatting, ladders; decreased with CBD cream, ibuprofen, heating pad, rest.   Right shoulder has been painful for the last 2 weeks, started while digging some holes/throwing dirt with a shovel. Pain is constant, dull/throbbing/exhausting. Worse with reaching, lifting, pushing, notes a \"hitch\" when he moves his arm to reach overhead. Better with helping the arm with the other arm, CBD, ibuprofen, hot pack, rest. Having trouble sleeping and completing work tasks because he has to travel and put bags in overhead compartment.  Date of onset: 09/11/23    Relevant medical history: Cancer; Diabetes; High blood pressure; Neck injury; Overweight; Sleep disorder like apnea   Dates & types of surgery:  melanoma removed     Prior diagnostic imaging/testing results:       Prior therapy history for the same diagnosis, illness or injury: No      Prior Level of Function  Transfers:   Ambulation:   ADL:   IADL:     Living Environment  Social support: With a significant other or spouse   Type of home: House; Basement   Stairs to enter the home: Yes 5 Is there a railing: Yes   Ramp: No   Stairs inside the home: Yes 20 Is there a railing: Yes   Help at home: Self Cares (home health aide/personal care attendant, family, etc); Home management tasks (cooking, cleaning); Home and Yard maintenance tasks  Equipment owned:   "     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    Pain assessment: See objective evaluation for additional pain details     Objective     KNEE EVALUATION  INTEGUMENTARY (edema, incisions): Sweep Test: 1+  POSTURE: WNL  GAIT:  Weightbearing Status: WBAT  Assistive Device(s): None  Gait Deviations: Knee extension decreased R; pt reports jt noise with slower/self selected speed and giving way/pain with faster speeds  BALANCE/PROPRIOCEPTION: Single Leg Stance Eyes Open (seconds): 10 L, 6 R with increased knee flexion and increased LOB  WEIGHTBEARING ALIGNMENT: Knee WB Alignment:Genu varus R  NON-WEIGHTBEARING ALIGNMENT: Patellar lateral tilt R  ROM:   (Degrees) Left AROM Left PROM  Right AROM Right PROM   Knee Flexion 140 145 130 132 with medial/anterior pain   Knee Extension 0 0 18 15 with anterior and posterior knee pain   Pain:   End feel:     STRENGTH:   Pain: - none + mild ++ moderate +++ severe  Strength Scale: 0-5/5 Left Right   Knee Flexion 5 5   Knee Extension 5 4, ++ (mod)   Quad Set good Fair; decreased quad muscle bulk and decreased patellar superior translation     FLEXIBILITY:   SPECIAL TESTS:   FUNCTIONAL TESTS: Double Leg Squat: Anterior knee translation and offweighting of R LE, audible crepitus, pt reports feeling of giving way at end range of squat ~80 degrees knee flexion.  PALPATION:   JOINT MOBILITY:    Left Right   Tib-Fib Proximal     Patellofemoral Medial WNL Hypomobile   Patellofemoral Lateral WNL Hypomobile   Patellofemoral Superior WNL Hypomobile   Patellofemoral Inferior WNL Hypomobile       Assessment & Plan   CLINICAL IMPRESSIONS  Medical Diagnosis: Tear of medial meniscus of right knee, current, unspecified tear type, initial encounter    Treatment Diagnosis: R  knee pain with mobility deficits, signs of knee OA   Impression/Assessment: Patient is a 57 year old male with knee complaints.  The following significant findings have been identified: Pain, Decreased ROM/flexibility, Decreased joint mobility, Decreased strength, Impaired balance, Edema, Impaired gait, Impaired muscle performance, and Decreased activity tolerance. These impairments interfere with their ability to perform self care tasks, work tasks, recreational activities, household chores, household mobility, and community mobility as compared to previous level of function.     Clinical Decision Making (Complexity):  Clinical Presentation: Stable/Uncomplicated  Clinical Presentation Rationale: based on medical and personal factors listed in PT evaluation  Clinical Decision Making (Complexity): Low complexity    PLAN OF CARE  Treatment Interventions:  Interventions: Manual Therapy, Neuromuscular Re-education, Therapeutic Activity, Therapeutic Exercise        Long Term Goals     PT Goal 1  Goal Identifier: ambulation  Goal Description: Patient will be able to walk as needed for work/travel tasks, including direction changes, with pain 3/10 or less  Rationale: to maximize safety and independence within the community  Goal Progress: Patient reports sharp pain 8/10 with direction changes  Target Date: 01/24/24      Frequency of Treatment: 1 X week  Duration of Treatment: 6 weeks tapering to every other week for 6 weeks    Recommended Referrals to Other Professionals:  Orthopedic walk-in    **Given more acute nature of shoulder symptoms, specific onset with physical activity, presence of aberrant movements - recommended consult with walk in non-surgical orthopedic physician today.  Education Assessment:   Learner/Method: Patient  Education Comments: eval findings, role of PT, POC and expectations for PT    Risks and benefits of evaluation/treatment have been explained.   Patient/Family/caregiver agrees with Plan of  Care.     Evaluation Time:     PT [place]ra, Low Complexity Minutes (15518): 20       Signing Clinician: Nicholas Mcintosh PT

## 2023-11-01 NOTE — LETTER
11/1/2023         RE: Amadou Bach  [address] [facility] [id number]        Dear Colleague,    Thank you for referring your patient, Amadou Bach, to the [facility]. Please see a copy of my visit note below.    ASSESSMENT & PLAN    Sky was seen today for pain and injury.    Diagnoses and all orders for this visit:    Right shoulder injury, initial encounter  -     XR Shoulder Right G/E 3 Views; Future  -     MR Shoulder Right w/o Contrast; Future      Undiagnosed new problem, unclear prognosis.  MRI next.  Questions answered. Discussed signs and symptoms that may indicate more serious issues; the patient was instructed to seek appropriate care if noted. Sky indicates understanding of these issues and agrees with the plan.      See Patient Instructions  Patient Instructions   Concern for rotator cuff pathology in the right shoulder, following injury.  Plan to get MRI of the right shoulder next.  From there, considerations could include physical therapy, steroid injection, possibly referral on to discuss further with orthopedic surgery.    Advanced imaging is done by appointment. Please call [facility] ([facility]/[place]) [phone] , [facility] ([facility]/[place]) [phone] , or [facility] ([facility]) [phone]  to schedule your MRI.     Some insurance companies may require a prior authorization to be completed which can delay the time until you are able to schedule your appointment.       If you are active on [employer], you may have access to your test results before your provider is able to review the study and advise on next steps.      The clinic will plan to contact you with results. If you have not heard from the clinic within 2-3 days following your MRI, please contact us at the number listed below.  Alternatively, if interested in further discussion with me  about MRI findings and next steps, feel free to schedule a telephone visit, video visit, or recheck appointment in clinic.        If you have any further questions for your physician or physician s care team you can contact them thru MyChart or by calling [phone].      Efra Meeks DO  [facility]    -----  Chief Complaint   Patient presents with     Right Shoulder - Pain       SUBJECTIVE  Amadou Bach is a/an 57 year old male who is seen as a WALK IN patient for evaluation of right shoulder.     The patient is seen by themselves.  The patient is Right handed    Onset: 2 week(s) ago. Patient describes injury as diggging holes, felt & heard pop and pain   Location of Pain: right anterior shoulder  Worsened by: extending, reaching, lifting  Better with: heat  Treatments tried: ice, heat, Tylenol, ibuprofen, physical therapy (1 visits), and CBD cream  Associated symptoms: zinging with specific arc of motion through flexion    Orthopedic/Surgical history: YES - Date: left shoulder 2021 (had MRI, did PT, and did steroid injection)  Social History/Occupation: sales, golf  **  Above information per rooming staff.  Additional history:  Was digging with shovel, right hand on upper portion of handle. As tried to lift noted pain and pop in right shoulder, more anterior.  Notes limited active motion in right shoulder, including flexion, abduction. Can do active assisted motion.  Pain is more lateral to anterior shoulder/upper arm; can get zinging pain into right shoulder, upper shoulder, to neck.          REVIEW OF SYSTEMS:  Review of Systems    OBJECTIVE:  /76   Wt 86.6 kg (191 lb)   BMI 31.06 kg/m             Right Shoulder exam    ROM:      forward flexion ~90, pain        abduction ~45, pain       internal rotation thumb near waistline, pain       external rotation tightness at end range, not as much pain  Active assisted motion nearly full flexion,  abduction    Strength:      abduction 4/5, pain       internal rotation 5/5       external rotation 4/5, pain    Skin:      no visible deformities       well perfused       capillary refill brisk    Sensation:      normal sensation over shoulder and upper extremity       RADIOLOGY:  Final results and radiologist's interpretation, available in the [facility] health record.  Images were reviewed with the patient in the office today.  My personal interpretation of the performed imaging: some AC joint arthrosis. Spurring/calcification inferior to glenoid appears chronic, likely related to previous injury or degenerative change. No clear acute bony abnormality noted.        Recent Results (from the past 24 hour(s))   XR Shoulder Right G/E 3 Views    Narrative    RIGHT SHOULDER THREE OR MORE VIEWS 11/1/2023 12:22 PM    INDICATION: Right shoulder injury, initial encounter.    COMPARISON: None available.       Impression    IMPRESSION: Anatomic alignment right shoulder. No acute displaced  right shoulder fracture is identified. Moderate acromioclavicular and  mild glenohumeral joint osteoarthritis. Mineralization is seen along  the inferior right glenoid. Preserved subacromial space. Visualized  right lung is grossly clear. Degenerative change in the  cervicothoracic spine.    JOSÉ BURNS MD         SYSTEM ID:  [id number]             Again, thank you for allowing me to participate in the care of your patient.        Sincerely,        Efra Meeks DO

## 2023-11-08 ENCOUNTER — TELEPHONE (OUTPATIENT)
Dept: GASTROENTEROLOGY | Facility: CLINIC | Age: 58
End: 2023-11-08
Payer: COMMERCIAL

## 2023-11-08 DIAGNOSIS — I10 HYPERTENSION, UNSPECIFIED TYPE: Primary | ICD-10-CM

## 2023-11-08 RX ORDER — HYDROCHLOROTHIAZIDE 25 MG/1
25 TABLET ORAL DAILY
Qty: 90 TABLET | Refills: 4 | Status: SHIPPED | OUTPATIENT
Start: 2023-11-08

## 2023-11-08 NOTE — TELEPHONE ENCOUNTER
Caller: Writer to patient  Reason for Reschedule/Cancellation (please be detailed, any staff messages or encounters to note?): Needs hospital setting per RN review.       Prior to reschedule please review:  Ordering Provider: Joseph Alanis MD   Sedation per order: Moderate  Does patient have any ASC Exclusions, please identify?: Y - A1C out of range for [facility]      Notes on Cancelled Procedure:  Procedure: Lower Endoscopy [Colonoscopy]   Date: 11/22/2023  Location: [facility] [place]; [address] [place] [address] [address], [address] Floor, [place] [address]  Surgeon: Oneil      Rescheduled: No, patient only wants [facility] and will call back to reschedule once his A1C is below 10.

## 2023-11-08 NOTE — TELEPHONE ENCOUNTER
----- Message from Petrona Domingo RN sent at 11/8/2023  8:00 AM CST -----  Regarding: Please reschedule at [facility],    Patient is scheduled for a colonoscopy at  on 11/22/23 - he will need to be rescheduled to a hospital setting d/t most recent Hemoglobin A1C lab is greater than 10, indicating uncontrolled diabetes.     Thank you,  Petrona Domingo RN  Endoscopy Procedure Pre-Assessment RN

## 2023-11-08 NOTE — TELEPHONE ENCOUNTER
"Pre Assessment RN Review    Focused Assessments    Most recent Hgb A1C result 12.7, indicating uncontrolled diabetes. Will need hospital setting.    BARBARA Hx  Estimated body mass index is 31.06 kg/m  as calculated from the following:    Height as of 10/30/23: 1.67 m (5' 5.75\").    Weight as of 11/1/23: 86.6 kg (191 lb).     Patient has documented history BARBARA per chart review    Severity Assessment    Sleep Study: 11/17/10  Diagnosis/Severity: Moderate  AHI: 25.3    \"The patient had a polysomnogram at [facility] [place] on 11/17/10. Sleep latency was 6.5 minutes, sleep efficiency was 66.3 % and the total duration of sleep was 303 minutes. Snoring was loud. Apnea-Hypopnea Index was 25.3, and the Respiratory Disturbance Index was 56.8. Oxygen desaturation was noted to low of 83%. All but three obstructive events occurred in the supine position. Periodic Leg Movements were occasionally noted with a PLM index of 5.7.  Impression: Moderate and positioned dependent sleep apnea in a person with diabetes mellitus and hypersomnia.\"   per 3/8/11 office visit [facility] via [employer].      Scheduling Status & Recommendations    Sedation: Moderate Sedation - Per order.  Location Type: Hospital - Per RN assessment.  Prep: Standard [place] - Per exclusion criteria.      Message sent to endo scheduling to reschedule the patient to hospital setting.   "

## 2023-11-08 NOTE — TELEPHONE ENCOUNTER
Patients APPROVED location: [facility] - [facility]  Approved for [facility] only if Hgb A1C is under 10 per Bryon Us MD with [facility].      - please consult with nursing staff prior to scheduling at [facility] to ensure Hgb A1C is under 10.

## 2023-11-08 NOTE — TELEPHONE ENCOUNTER
During reschedule, patient indicated to  that he only wants to be scheduled at [facility] so he will call back to reschedule once his A1C is under 10. However it also noted that patient has hx of BARBARA so writer will send for anesthesia review to confirm OK for [facility] once the A1C is under 10.      Scheduling Status & Recommendations    Delay scheduling, awaiting clinical input. Message sent to [facility]      Addendum: Petrona Domingo RN on 11/8/2023 at 11:20 AM  Patients APPROVED location: [facility] - [facility]  Approved for [facility] only if Hgb A1C is under 10 per Bryon Us MD with [facility].      - please consult with nursing staff prior to scheduling at [facility] to ensure Hgb A1C is under 10.      Petrona Domingo RN  Endoscopy Procedure Pre-Assessment RN

## 2023-11-17 DIAGNOSIS — E11.9 TYPE 2 DIABETES MELLITUS WITHOUT COMPLICATION, WITH LONG-TERM CURRENT USE OF INSULIN (H): ICD-10-CM

## 2023-11-17 DIAGNOSIS — Z79.4 TYPE 2 DIABETES MELLITUS WITHOUT COMPLICATION, WITH LONG-TERM CURRENT USE OF INSULIN (H): ICD-10-CM

## 2023-11-19 RX ORDER — INSULIN GLARGINE 100 [IU]/ML
INJECTION, SOLUTION SUBCUTANEOUS
Qty: 60 ML | Refills: 3 | Status: SHIPPED | OUTPATIENT
Start: 2023-11-19 | End: 2023-11-20

## 2023-11-20 RX ORDER — INSULIN GLARGINE 100 [IU]/ML
INJECTION, SOLUTION SUBCUTANEOUS
Qty: 60 ML | Refills: 3 | Status: SHIPPED | OUTPATIENT
Start: 2023-11-20 | End: 2024-01-24

## 2023-11-20 NOTE — TELEPHONE ENCOUNTER
Disp Refills Start End    insulin glargine (LANTUS SOLOSTAR) 100 UNIT/ML pen 60 mL 3 11/19/2023     Sig: INJECT 50 UNITS SUBCUTANEOUSLY AT BEDTIME    Sent to pharmacy as: Lantus SoloStar 100 UNIT/ML Subcutaneous Solution Pen-injector (insulin glargine)    Class: E-Prescribe    Notes to Pharmacy: CUST REQUEST REFILLS - If Lantus is not covered by insurance, may substitute Basaglar or Semglee or other insulin glargine product per insurance preference at same dose and frequency.      E-Prescribing Status: Transmission to pharmacy failed (11/19/2023  7:11 PM CST)        Prior authorization: Waiting for Payer Response

## 2023-12-01 ENCOUNTER — HOSPITAL ENCOUNTER (OUTPATIENT)
Dept: MRI IMAGING | Facility: CLINIC | Age: 58
Discharge: HOME | End: 2023-12-01
Attending: PEDIATRICS | Admitting: PEDIATRICS
Payer: COMMERCIAL

## 2023-12-01 DIAGNOSIS — S49.91XA RIGHT SHOULDER INJURY, INITIAL ENCOUNTER: ICD-10-CM

## 2023-12-01 PROCEDURE — 73221 MRI JOINT UPR EXTREM W/O DYE: CPT | Mod: 26 | Performed by: RADIOLOGY

## 2023-12-01 PROCEDURE — 73221 MRI JOINT UPR EXTREM W/O DYE: CPT | Mod: RT

## 2023-12-07 ENCOUNTER — MYC MEDICAL ADVICE (OUTPATIENT)
Dept: FAMILY MEDICINE | Facility: CLINIC | Age: 58
End: 2023-12-07

## 2023-12-07 ENCOUNTER — THERAPY VISIT (OUTPATIENT)
Dept: PHYSICAL THERAPY | Facility: CLINIC | Age: 58
End: 2023-12-07
Payer: COMMERCIAL

## 2023-12-07 DIAGNOSIS — S83.241A TEAR OF MEDIAL MENISCUS OF RIGHT KNEE, CURRENT, UNSPECIFIED TEAR TYPE, INITIAL ENCOUNTER: Primary | ICD-10-CM

## 2023-12-07 PROCEDURE — 97530 THERAPEUTIC ACTIVITIES: CPT | Mod: GP | Performed by: PHYSICAL THERAPIST

## 2023-12-07 NOTE — Clinical Note
Although Ossur wasn't mentioned in original referral, we did do trial today and got nice symptomatic response.  Pt's greater concern, however, is at shoulder right now and is just awaiting interpretation of MRI.  Currently scheduled for PT 12/14 and 12/21.  Continue at knee?  Start at shoulder (no orders there currently)?  What do you think?  -- Anand

## 2023-12-07 NOTE — PROGRESS NOTES
"    Ossur Knee  Brace Trial:     Pain Rating    Affected Joint: Right Knee    Without  brace:    At rest: \"different from the left\"  Walking: \"aware of it\"    Squattin/10   Stairs: 7/10      Wearing  brace:    At rest 0/10  Walking: \"much less\"    Squattin/10   Stairs: 0/10      Brace and Measurements:    Brace trialed:     Type - Ossur  One  Size - Medium.  Used medium in clinic but consider trial of small size as superior and inferior straps were slightly long.  Side - Medial  Affected Knee - Right Knee    Circumference 6 inches below mid patella: 15 3/4 inches (Ossur )    Other Comments:  See PT evaluation in Epic for any additional information including joint special testing/ligament testing       "

## 2023-12-07 NOTE — PROGRESS NOTES
"   12/07/23 0500   Appointment Info   Signing clinician's name / credentials Anand Bernardo, PT   Total/Authorized Visits 9 plan   Visits Used 2   Medical Diagnosis Tear of medial meniscus of right knee, current, unspecified tear type, initial encounter   PT Tx Diagnosis R knee pain with mobility deficits, signs of knee OA   Progress Note/Certification   Onset of illness/injury or Date of Surgery 09/11/23   Therapy Frequency 1 X week   Predicted Duration 6 weeks tapering to every other week for 6 weeks   Progress Note Completed Date 11/01/23   PT Goal 1   Goal Identifier ambulation   Goal Description Patient will be able to walk as needed for work/travel tasks, including direction changes, with pain 3/10 or less   Rationale to maximize safety and independence within the community   Goal Progress Patient reports sharp pain 8/10 with direction changes   Target Date 01/24/24   Subjective Report   Subjective Report Pt reports no great change at the knee.  Still doesn't \"trust it\" and can get an \"ice pick\" of pain, especially on stairs or walking.  Also curious about plan since he has seen MRI results at shoulder but not had interpretation.   Objective Measures   Objective Measures Objective Measure 1   Objective Measure 1   Details Pt ambulates without device.  Tender to palpation R knee medial joint line.  No discomfort resisted knee flexion and extension.  Positive varus for mild laxity but not painful.   Treatment Interventions (PT)   Interventions Therapeutic Activity   Therapeutic Procedure/Exercise   Therapeutic Procedures: strength, endurance, ROM, flexibillity minutes (82424) 25   Skilled Intervention See above   Patient Response/Progress See above   Ther Proc 1 Walking, squatting, stairs   Ther Proc 1 - Details See note re: performance in clinic with vs without brace.  For stairs, observed pt on descent without brace turning to the side and preferring not to use reciprocal pattern.  With brace on ascent, noted " "reciprocal pattern without hesitation and able to fully bear weight in R stance.   Plan   Plan for next session See note.   Total Session Time   Timed Code Treatment Minutes 25   Total Treatment Time (sum of timed and untimed services) 25         Ossur Knee  Brace Trial:     Pain Rating     Affected Joint: Right Knee     Without  brace:     At rest: \"different from the left\"  Walking: \"aware of it\"    Squattin10   Stairs: 7/10      Wearing  brace:     At rest 0/10  Walking: \"much less\"    Squattin/10   Stairs: 0/10      Brace and Measurements:     Brace trialed:      Type - Ossur  One  Size - Medium.  Used medium in clinic but consider trial of small size as superior and inferior straps were slightly long.  Side - Medial  Affected Knee - Right Knee     Circumference 6 inches below mid patella: 15 3/4 inches (Ossur )     Other Comments:  See PT evaluation in Epic for any additional information including joint special testing/ligament testing         PLAN  Pt returns to PT today for first time since 2023.  He notes his shoulder is more problematic than his knee and is awaiting direction from referring physician on that.  Did trial Ossur in clinic today and could also consider pursuit there given positive symptomatic response.    Beginning/End Dates of Progress Note Reporting Period:  23 to 2023    Referring Provider:  No ref. provider found    "

## 2023-12-07 NOTE — Clinical Note
Thanks for the follow up on the shoulder MRI and plans there.  Looks like he is headed for injection. What did you think about the knee?  Given the symptomatic results in clinic with an , would you be OK going down that road or pursue other avenues first?  If you'd like, I can jan up an order.  -- Anand

## 2023-12-08 ENCOUNTER — TELEPHONE (OUTPATIENT)
Dept: ORTHOPEDICS | Facility: CLINIC | Age: 58
End: 2023-12-08

## 2023-12-08 DIAGNOSIS — E11.9 TYPE 2 DIABETES MELLITUS WITHOUT COMPLICATION, WITHOUT LONG-TERM CURRENT USE OF INSULIN (H): ICD-10-CM

## 2023-12-08 RX ORDER — FLURBIPROFEN SODIUM 0.3 MG/ML
SOLUTION/ DROPS OPHTHALMIC
Qty: 400 EACH | Refills: 1 | Status: SHIPPED | OUTPATIENT
Start: 2023-12-08

## 2023-12-08 NOTE — TELEPHONE ENCOUNTER
Please notify of MRI results. May be able to send via [facility] message attached to this encounter.  There is rotator cuff tendinosis (tendon wearing change) with area of at least partial thickness tearing. Also with bursitis (inflammation, fluid in the shoulder, likely related to the rotator cuff findings). Additionally there are prominent AC joint degenerative changes (arthritis), and prominent long head biceps tendinosis but without high grade tear. See report for specifics.  Some options: 1) physical therapy (please place referral, pt already doing PT for knee), 2) trial steroid injection (favor subacromial based on MRI findings and last visit, can schedule appointment for this), 3) referral on to discuss further with ortho surgery (I don't think anything needs to be fixed surgically imminently, so would suggest other treatment first, but is an option).  I would be happy to have a visit with the patient (in person, by video, or by phone) to discuss further if that would be helpful.  Thanks.  Efra Meeks DO, CAQ          MR Shoulder Right w/o Contrast    Narrative    EXAM: MR right shoulder without  contrast 12/1/2023 2:11 PM    TECHNIQUE: Multiplanar, multisequence imaging of the right shoulder  were obtained without administration of intravenous or intra-articular  gadolinium contrast using routine protocol.    History: evaluate rotator cuff; Right shoulder injury, initial  encounter    Comparison: 11/1/2023    Findings:    ROTATOR CUFF and ASSOCIATED STRUCTURES  Rotator cuff: On a background of severe tendinosis, small focal  moderate grade intrasubstance tear of the supraspinatus anterior  fibers at the footprint with possible bursal sided communication  channel extension posteriorly along the posterior fibers of the  supraspinatus adjacent to the footprint. Associated subcortical  cystlike change. Infraspinatus tendinosis.     On a background of tendinosis, 1.9 cm mediolateral dimension sentinel  cyst  formation along the subscapularis myotendinous junction with  associated tiny delamination tear along the subscapularis middle  fibers, possibly communicating to bursal side (image 13 through 21  sagittal sequence/series 8). Teres minor tendon is intact.    Bursa: Small-to-moderate subacromial/subdeltoid bursal fluid with mild  synovial proliferation, consistent with bursitis.    Musculature: Muscle bulk of rotator cuff is preserved.  Deltoid muscle  bulk is also preserved.     Acromioclavicular joint  There are severe degenerative changes of the acromioclavicular joint  with mass effect on the underlying supraspinatus myotendinous  junction. Acromion is type 2 in sagittal morphology.  Coracoacromial  ligament is not thickened.    OSSEOUS STRUCTURES  No fracture, marrow contusion or marrow infiltration. Cystic change of  greater tuberosity. Hypointensity in the humeral head, likely bone  island.    LONG BICIPITAL TENDON  The long head of the biceps tendon is normally situated within the  bicipital groove. Severe intra-articular segment tendinosis,  especially bicipital pulley segment. No complete or partial biceps  tendon tear is present.    GLENOHUMERAL JOINT  Joint fluid: Physiologic amount of joint fluid is  present.    Cartilage and subarticular bone:  No focal hyaline cartilage defects  are noted. No Hill-Sachs, reverse Hill-Sachs, or bony Bankart lesions  are seen.    Labrum: Limited assessment on this study with relative lack of joint  distention shows signal heterogeneity of the posterosuperior labrum,  may be related to magic angle artifact or alternatively degenerative  tearing. Otherwise, grossly in tact    ANCILLARY FINDINGS:      Impression    Impression:  1. Partial rotator cuff tendon tearing without high grade or full  thickness tear:   a.  On a background of severe tendinosis, small focal moderate grade  intrasubstance tear of the supraspinatus anterior fibers at the  footprint with possible bursal  sided communication channel extension  posteriorly along the posterior fibers of the supraspinatus adjacent  to the footprint.    b. Tiny delamination tear along the subscapularis middle fibers with  sentinel cyst along myotendinous junction.   c. No muscle atrophy.  2. Severe acromioclavicular joint degenerative change.  3. Severe long head of biceps tendinosis intra-articular segment.  4. Signal heterogeneity of the posterosuperior labrum, may be related  to magic angle artifact or alternatively degenerative tearing.    [email or web address]         SYSTEM ID:  [id number]

## 2023-12-14 ENCOUNTER — THERAPY VISIT (OUTPATIENT)
Dept: PHYSICAL THERAPY | Facility: CLINIC | Age: 58
End: 2023-12-14
Payer: COMMERCIAL

## 2023-12-14 DIAGNOSIS — S83.241A TEAR OF MEDIAL MENISCUS OF RIGHT KNEE, CURRENT, UNSPECIFIED TEAR TYPE, INITIAL ENCOUNTER: Primary | ICD-10-CM

## 2023-12-14 PROCEDURE — 97110 THERAPEUTIC EXERCISES: CPT | Mod: GP | Performed by: PHYSICAL THERAPIST

## 2023-12-14 NOTE — PROGRESS NOTES
12/14/23 0500   Appointment Info   Signing clinician's name / credentials Anand Bernardo PT   Total/Authorized Visits 9 plan   Visits Used 3   Medical Diagnosis Tear of medial meniscus of right knee, current, unspecified tear type, initial encounter   PT Tx Diagnosis R knee pain with mobility deficits, signs of knee OA   Progress Note/Certification   Onset of illness/injury or Date of Surgery 09/11/23   Therapy Frequency 1 X week   Predicted Duration 6 weeks tapering to every other week for 6 weeks   Progress Note Completed Date 11/01/23   PT Goal 1   Goal Identifier ambulation   Goal Description Patient will be able to walk as needed for work/travel tasks, including direction changes, with pain 3/10 or less   Rationale to maximize safety and independence within the community   Goal Progress Patient reports sharp pain 8/10 with direction changes   Target Date 01/24/24   Subjective Report   Subjective Report Pt reports shoulder is unchanged and has injection scheduled for next week; would like to establish a baseline for ROM there in case we start PT afterward.  Knee also unchanged and pt acknowledges not having done HEP given that he has been so focused at the shoulder.   Objective Measure 1   Details AROM L shoulder flexion 170, extension 71, abduction 149, IR L1, ER 84.  AROM R shoulder flexion 164 with pain above 85, extension 65 negative, abduction 158 with pain above 80, IR L5 positive, ER 62 positive.  Pt ambulates without device, genu varum.   Therapeutic Procedure/Exercise   Therapeutic Procedures: strength, endurance, ROM, flexibillity minutes (08837) 35   PTRx Ther Proc 1 Passive Knee Extension Stretch   PTRx Ther Proc 1 - Details 12/14 Pt had not been doing this at all.  He noted performance of it in clinic actually felt like a comfortable stretch especially if he kept toes pointed upward.   PTRx Ther Proc 2 Isometric Quad   PTRx Ther Proc 2 - Details 12/14 Pt had not been doing this at all.  Encourage  as small a towel as needed underneath for simply contact feedback.  No pain throughout.   PTRx Ther Proc 3 Hip Flexion Straight Leg Raise   PTRx Ther Proc 3 - Details attempted X 5 - pt reports sharp pain under kneecap   PTRx Ther Proc 4 Standing Hip Flexion Straight Leg Raise   PTRx Ther Proc 4 - Details 12/14 Reviewed and pt will now start doing this.  Cues to not let momentum pull the leg forward to the point of entering posterior pelvic tilt or lumbar flexion.   PTRx Ther Proc 5 Clamshell Feet together   PTRx Ther Proc 5 - Details 12/14 Cues to not rotate whole body.  Pt noted this to be more fatiguing than he thought it'd be.   PTRx Ther Proc 6 Hip Abduction Straight Leg Raise   PTRx Ther Proc 6 - Details 12/14 Pt tended to rotate body backward for this.  He noted it to be more fatiguing with good technique.   Skilled Intervention Extensive update of HEP since he had not been doing it at all.   Patient Response/Progress See above.         PLAN  Not progressing at knee to date but pt has not been at all consistent with HEP and his focus has been at the shoulder.  Shoulder injection scheduled for 12/18; consider initiate PT following that if orders in place.  Encourage consistency with knee HEP and consider also incorporation of  bracing.    Beginning/End Dates of Progress Note Reporting Period:  11/01/23 to 12/14/2023    Referring Provider:  No ref. provider found

## 2023-12-14 NOTE — Clinical Note
Sees you for shoulder injection on 12/18.  Consider PT for shoulder following that?  (No orders in place for that currently.)  Would like to see greater consistency at knee.  Did well with  trial and pt interested in pursuing that, although he also mentioned potentially wanting further imaging there.  Suspect PT and bracing to be indicated first but would defer to his time with you on Monday for further discussion.  -- Anand

## 2023-12-17 DIAGNOSIS — N52.9 ERECTILE DYSFUNCTION, UNSPECIFIED ERECTILE DYSFUNCTION TYPE: ICD-10-CM

## 2023-12-18 ENCOUNTER — ANCILLARY PROCEDURE (OUTPATIENT)
Dept: GENERAL RADIOLOGY | Facility: CLINIC | Age: 58
End: 2023-12-18
Attending: PEDIATRICS
Payer: COMMERCIAL

## 2023-12-18 ENCOUNTER — OFFICE VISIT (OUTPATIENT)
Dept: ORTHOPEDICS | Facility: CLINIC | Age: 58
End: 2023-12-18
Payer: COMMERCIAL

## 2023-12-18 VITALS — HEIGHT: 66 IN | BODY MASS INDEX: 30.7 KG/M2 | WEIGHT: 191 LBS

## 2023-12-18 DIAGNOSIS — S49.91XD RIGHT SHOULDER INJURY, SUBSEQUENT ENCOUNTER: ICD-10-CM

## 2023-12-18 DIAGNOSIS — S89.91XA INJURY OF RIGHT KNEE, INITIAL ENCOUNTER: Primary | ICD-10-CM

## 2023-12-18 DIAGNOSIS — S89.91XA INJURY OF RIGHT KNEE, INITIAL ENCOUNTER: ICD-10-CM

## 2023-12-18 DIAGNOSIS — S46.011D TRAUMATIC INCOMPLETE TEAR OF RIGHT ROTATOR CUFF, SUBSEQUENT ENCOUNTER: ICD-10-CM

## 2023-12-18 DIAGNOSIS — M17.11 PRIMARY OSTEOARTHRITIS OF RIGHT KNEE: ICD-10-CM

## 2023-12-18 PROCEDURE — 99214 OFFICE O/P EST MOD 30 MIN: CPT | Mod: 25 | Performed by: PEDIATRICS

## 2023-12-18 PROCEDURE — 73562 X-RAY EXAM OF KNEE 3: CPT | Mod: TC | Performed by: RADIOLOGY

## 2023-12-18 PROCEDURE — 20610 DRAIN/INJ JOINT/BURSA W/O US: CPT | Mod: RT | Performed by: PEDIATRICS

## 2023-12-18 RX ORDER — SILDENAFIL 100 MG/1
TABLET, FILM COATED ORAL
Qty: 12 TABLET | Refills: 1 | Status: SHIPPED | OUTPATIENT
Start: 2023-12-18 | End: 2024-01-22

## 2023-12-18 RX ADMIN — LIDOCAINE HYDROCHLORIDE 2 ML: 10 INJECTION, SOLUTION INFILTRATION; PERINEURAL at 17:54

## 2023-12-18 RX ADMIN — TRIAMCINOLONE ACETONIDE 40 MG: 40 INJECTION, SUSPENSION INTRA-ARTICULAR; INTRAMUSCULAR at 17:54

## 2023-12-18 NOTE — LETTER
"    12/18/2023         RE: Amadou Bach  [address] [facility] [id number]        Dear Colleague,    Thank you for referring your patient, Amadou Bach, to the [facility] [place]. Please see a copy of my visit note below.    ASSESSMENT & PLAN    Sky was seen today for follow up.    Diagnoses and all orders for this visit:    Injury of right knee, initial encounter  -     XR Knee Standing AP [place] Bil[place] Lat Right; Future  -     MR Knee Right w/o Contrast; Future    Right shoulder injury, subsequent encounter  -     Large Joint Injection/Arthocentesis: R subacromial bursa    Primary osteoarthritis of right knee    Traumatic incomplete tear of right rotator cuff, subsequent encounter  -     Large Joint Injection/Arthocentesis: R subacromial bursa      Right knee with degenerative change on x-ray. Favor symptoms coming from OA, but he is interested in additional imaging given the painful pop, sense of instability.    See below.    Questions answered. Discussed signs and symptoms that may indicate more serious issues; the patient was instructed to seek appropriate care if noted. Sky indicates understanding of these issues and agrees with the plan.    See Patient Instructions  Patient Instructions   Reviewed the right shoulder MRI, demonstrating rotator cuff changes, including areas of at least partial thickness tearing.  For this, we discussed continuing physical therapy, also addition of subacromial steroid injection, done today.  If ongoing issues despite therapy and injection, consider referral on to discuss further with orthopedic surgery.    We also discussed the right knee.  X-rays from today demonstrate underlying degenerative change, primarily in the medial compartment, or some \"arthritis.\"  For this, we discussed continuing with PT, proceeding with  brace, trial of injection, also potential for additional imaging with MRI given signs of an " injury.  Following discussion, can obtain MRI next.    Advanced imaging is done by appointment. Please call [facility] ([facility]/[place]) [phone] , [facility] ([facility]/[place]) [phone] , or [facility] ([facility]) [phone]  to schedule your MRI.     Some insurance companies may require a prior authorization to be completed which can delay the time until you are able to schedule your appointment.       If you are active on [employer], you may have access to your test results before your provider is able to review the study and advise on next steps.      Please make a follow up appointment in the clinic no sooner than 2 days following your MRI by calling [phone].  Alternatively, if interested in phone or [employer] message with results and potential next steps, contact clinic after study has been completed.        If you have any further questions for your physician or physician s care team you can contact them thru [employer] or by calling [phone].      Efra Meeks, [facility] [place]    SUBJECTIVE- Interim History December 18, 2023    Chief Complaint   Patient presents with     Right Shoulder - Follow Up       Amadou Taylor Isreal is a 57 year old male who is seen in f/u up for Right shoulder injury, subsequent encounter. Since last visit on 11/1/2023 patient has had increase in pain in the right shoulder. Notes that the pain limits his reaching ability. Reports pain is a constant dull throbbing pain.    The patient is seen by themselves.  The patient is Right handed    Orthopedic/Surgical history: YES - Date: left shoulder 2021 (had MRI, did PT, and did steroid injection)  Social History/Occupation: sales, golf      Right knee:  Reports that he had a pop in the knee 2 days ago while going up stairs. Notes buckling in the knee and went down.  Is able to ambulate with a limp.  No  "previous orthopedic or surgical history.  Seen Dr. Alanis for the right knee and has completed 3 PT visits for the knee.          REVIEW OF SYSTEMS:  Review of Systems    OBJECTIVE:  Ht 1.67 m (5' 5.75\")   Wt 86.6 kg (191 lb)   BMI 31.06 kg/m             Right knee:  Medial joint line tenderness present, some pain with AROM medially  + effusion      RADIOLOGY:  Final results and radiologist's interpretation, available in the [facility] health record.  Images were reviewed with the patient in the office today.  My personal interpretation of the performed imaging: right knee with medial compartment narrowing.  MRI shoulder with at least partial thickness cuff tearing, tendinopathy.      EXAM: XR KNEE STANDING AP [place] BILAT LAT RIGHT  LOCATION: [facility] [place]  DATE: 12/18/2023     INDICATION: Knee pain. Suspect meniscus tear.  COMPARISON: None available.                                                                      IMPRESSION:   RIGHT KNEE: Tricompartmental degenerative arthrosis of the right knee with hypertrophic changes and moderate narrowing of the medial compartment. No acute fracture. Small joint effusion.     LEFT KNEE: Mild tricompartmental degenerative arthrosis of the left knee with hypertrophic change.      =======================      EXAM: MR right shoulder without  contrast 12/1/2023 2:11 PM     TECHNIQUE: Multiplanar, multisequence imaging of the right shoulder  were obtained without administration of intravenous or intra-articular  gadolinium contrast using routine protocol.     History: evaluate rotator cuff; Right shoulder injury, initial  encounter     Comparison: 11/1/2023     Findings:     ROTATOR CUFF and ASSOCIATED STRUCTURES  Rotator cuff: On a background of severe tendinosis, small focal  moderate grade intrasubstance tear of the supraspinatus anterior  fibers at the footprint with possible bursal sided communication  channel extension posteriorly along the " posterior fibers of the  supraspinatus adjacent to the footprint. Associated subcortical  cystlike change. Infraspinatus tendinosis.      On a background of tendinosis, 1.9 cm mediolateral dimension sentinel  cyst formation along the subscapularis myotendinous junction with  associated tiny delamination tear along the subscapularis middle  fibers, possibly communicating to bursal side (image 13 through 21  sagittal sequence/series 8). Teres minor tendon is intact.     Bursa: Small-to-moderate subacromial/subdeltoid bursal fluid with mild  synovial proliferation, consistent with bursitis.     Musculature: Muscle bulk of rotator cuff is preserved.  Deltoid muscle  bulk is also preserved.      Acromioclavicular joint  There are severe degenerative changes of the acromioclavicular joint  with mass effect on the underlying supraspinatus myotendinous  junction. Acromion is type 2 in sagittal morphology.  Coracoacromial  ligament is not thickened.     OSSEOUS STRUCTURES  No fracture, marrow contusion or marrow infiltration. Cystic change of  greater tuberosity. Hypointensity in the humeral head, likely bone  island.     LONG BICIPITAL TENDON  The long head of the biceps tendon is normally situated within the  bicipital groove. Severe intra-articular segment tendinosis,  especially bicipital pulley segment. No complete or partial biceps  tendon tear is present.     GLENOHUMERAL JOINT  Joint fluid: Physiologic amount of joint fluid is  present.     Cartilage and subarticular bone:  No focal hyaline cartilage defects  are noted. No Hill-Sachs, reverse Hill-Sachs, or bony Bankart lesions  are seen.     Labrum: Limited assessment on this study with relative lack of joint  distention shows signal heterogeneity of the posterosuperior labrum,  may be related to magic angle artifact or alternatively degenerative  tearing. Otherwise, grossly in tact     ANCILLARY FINDINGS:                                                                       Impression:  1. Partial rotator cuff tendon tearing without high grade or full  thickness tear:   a.  On a background of severe tendinosis, small focal moderate grade  intrasubstance tear of the supraspinatus anterior fibers at the  footprint with possible bursal sided communication channel extension  posteriorly along the posterior fibers of the supraspinatus adjacent  to the footprint.    b. Tiny delamination tear along the subscapularis middle fibers with  sentinel cyst along myotendinous junction.   c. No muscle atrophy.  2. Severe acromioclavicular joint degenerative change.  3. Severe long head of biceps tendinosis intra-articular segment.  4. Signal heterogeneity of the posterosuperior labrum, may be related  to magic angle artifact or alternatively degenerative tearing.     PATRICK RUIZ        Large Joint Injection/Arthocentesis: R subacromial bursa    Date/Time: 12/18/2023 5:54 PM    Performed by: Efra Meeks DO  Authorized by: Efra Meeks DO    Indications:  Pain  Needle Size:  25 G  Guidance: landmark guided    Approach:  Posterolateral  Location:  Shoulder      Site:  R subacromial bursa  Medications:  40 mg triamcinolone 40 MG/ML; 2 mL lidocaine 1 %  Outcome:  Tolerated well, no immediate complications  Procedure discussed: discussed risks, benefits, and alternatives    Consent Given by:  Patient  Timeout: timeout called immediately prior to procedure    Prep: patient was prepped and draped in usual sterile fashion          Review of prior external note(s) from - primary care, PT  Review of the result(s) of each unique test - imaging  Independent interpretation of a test performed by another physician/other qualified health care professional (not separately reported) - imaging  Ordering of each unique test             Again, thank you for allowing me to participate in the care of your patient.        Sincerely,        Efra Meeks DO

## 2023-12-18 NOTE — PROGRESS NOTES
"ASSESSMENT & PLAN    kSy was seen today for follow up.    Diagnoses and all orders for this visit:    Injury of right knee, initial encounter  -     XR Knee Standing AP [place] Bilat Lat Right; Future  -     MR Knee Right w/o Contrast; Future    Right shoulder injury, subsequent encounter  -     Large Joint Injection/Arthocentesis: R subacromial bursa    Primary osteoarthritis of right knee    Traumatic incomplete tear of right rotator cuff, subsequent encounter  -     Large Joint Injection/Arthocentesis: R subacromial bursa      Right knee with degenerative change on x-ray. Favor symptoms coming from OA, but he is interested in additional imaging given the painful pop, sense of instability.    See below.    Questions answered. Discussed signs and symptoms that may indicate more serious issues; the patient was instructed to seek appropriate care if noted. Sky indicates understanding of these issues and agrees with the plan.    See Patient Instructions  Patient Instructions   Reviewed the right shoulder MRI, demonstrating rotator cuff changes, including areas of at least partial thickness tearing.  For this, we discussed continuing physical therapy, also addition of subacromial steroid injection, done today.  If ongoing issues despite therapy and injection, consider referral on to discuss further with orthopedic surgery.    We also discussed the right knee.  X-rays from today demonstrate underlying degenerative change, primarily in the medial compartment, or some \"arthritis.\"  For this, we discussed continuing with PT, proceeding with  brace, trial of injection, also potential for additional imaging with MRI given signs of an injury.  Following discussion, can obtain MRI next.    Advanced imaging is done by appointment. Please call [facility] ([facility]/[place]) [phone] , [facility] ([facility]/[place]) [phone] , or [facility] " "([place]/[facility]) [phone]  to schedule your MRI.     Some insurance companies may require a prior authorization to be completed which can delay the time until you are able to schedule your appointment.       If you are active on "[employer] you may have access to your test results before your provider is able to review the study and advise on next steps.      Please make a follow up appointment in the clinic no sooner than 2 days following your MRI by calling [phone].  Alternatively, if interested in phone or "[employer] message with results and potential next steps, contact clinic after study has been completed.        If you have any further questions for your physician or physician s care team you can contact them thru "[employer] or by calling [phone].      Efra Meeks, [facility] [place]    SUBJECTIVE- Interim History December 18, 2023    Chief Complaint   Patient presents with    Right Shoulder - Follow Up       Amadou Bach is a 57 year old male who is seen in f/u up for Right shoulder injury, subsequent encounter. Since last visit on 11/1/2023 patient has had increase in pain in the right shoulder. Notes that the pain limits his reaching ability. Reports pain is a constant dull throbbing pain.    The patient is seen by themselves.  The patient is Right handed    Orthopedic/Surgical history: YES - Date: left shoulder 2021 (had MRI, did PT, and did steroid injection)  Social History/Occupation: sales, golf      Right knee:  Reports that he had a pop in the knee 2 days ago while going up stairs. Notes buckling in the knee and went down.  Is able to ambulate with a limp.  No previous orthopedic or surgical history.  Seen Dr. Alanis for the right knee and has completed 3 PT visits for the knee.          REVIEW OF SYSTEMS:  Review of Systems    OBJECTIVE:  Ht 1.67 m (5' 5.75\")   Wt 86.6 kg (191 lb)   BMI 31.06 kg/m             Right " knee:  Medial joint line tenderness present, some pain with AROM medially  + effusion      RADIOLOGY:  Final results and radiologist's interpretation, available in the [facility] health record.  Images were reviewed with the patient in the office today.  My personal interpretation of the performed imaging: right knee with medial compartment narrowing.  MRI shoulder with at least partial thickness cuff tearing, tendinopathy.      EXAM: XR KNEE STANDING AP [place] BILAT LAT RIGHT  LOCATION: [facility]  DATE: 12/18/2023     INDICATION: Knee pain. Suspect meniscus tear.  COMPARISON: None available.                                                                      IMPRESSION:   RIGHT KNEE: Tricompartmental degenerative arthrosis of the right knee with hypertrophic changes and moderate narrowing of the medial compartment. No acute fracture. Small joint effusion.     LEFT KNEE: Mild tricompartmental degenerative arthrosis of the left knee with hypertrophic change.      =======================      EXAM: MR right shoulder without  contrast 12/1/2023 2:11 PM     TECHNIQUE: Multiplanar, multisequence imaging of the right shoulder  were obtained without administration of intravenous or intra-articular  gadolinium contrast using routine protocol.     History: evaluate rotator cuff; Right shoulder injury, initial  encounter     Comparison: 11/1/2023     Findings:     ROTATOR CUFF and ASSOCIATED STRUCTURES  Rotator cuff: On a background of severe tendinosis, small focal  moderate grade intrasubstance tear of the supraspinatus anterior  fibers at the footprint with possible bursal sided communication  channel extension posteriorly along the posterior fibers of the  supraspinatus adjacent to the footprint. Associated subcortical  cystlike change. Infraspinatus tendinosis.      On a background of tendinosis, 1.9 cm mediolateral dimension sentinel  cyst formation along the subscapularis myotendinous  junction with  associated tiny delamination tear along the subscapularis middle  fibers, possibly communicating to bursal side (image 13 through 21  sagittal sequence/series 8). Teres minor tendon is intact.     Bursa: Small-to-moderate subacromial/subdeltoid bursal fluid with mild  synovial proliferation, consistent with bursitis.     Musculature: Muscle bulk of rotator cuff is preserved.  Deltoid muscle  bulk is also preserved.      Acromioclavicular joint  There are severe degenerative changes of the acromioclavicular joint  with mass effect on the underlying supraspinatus myotendinous  junction. Acromion is type 2 in sagittal morphology.  Coracoacromial  ligament is not thickened.     OSSEOUS STRUCTURES  No fracture, marrow contusion or marrow infiltration. Cystic change of  greater tuberosity. Hypointensity in the humeral head, likely bone  island.     LONG BICIPITAL TENDON  The long head of the biceps tendon is normally situated within the  bicipital groove. Severe intra-articular segment tendinosis,  especially bicipital pulley segment. No complete or partial biceps  tendon tear is present.     GLENOHUMERAL JOINT  Joint fluid: Physiologic amount of joint fluid is  present.     Cartilage and subarticular bone:  No focal hyaline cartilage defects  are noted. No Hill-Sachs, reverse Hill-Sachs, or bony Bankart lesions  are seen.     Labrum: Limited assessment on this study with relative lack of joint  distention shows signal heterogeneity of the posterosuperior labrum,  may be related to magic angle artifact or alternatively degenerative  tearing. Otherwise, grossly in tact     ANCILLARY FINDINGS:                                                                      Impression:  1. Partial rotator cuff tendon tearing without high grade or full  thickness tear:   a.  On a background of severe tendinosis, small focal moderate grade  intrasubstance tear of the supraspinatus anterior fibers at the  footprint with  possible bursal sided communication channel extension  posteriorly along the posterior fibers of the supraspinatus adjacent  to the footprint.    b. Tiny delamination tear along the subscapularis middle fibers with  sentinel cyst along myotendinous junction.   c. No muscle atrophy.  2. Severe acromioclavicular joint degenerative change.  3. Severe long head of biceps tendinosis intra-articular segment.  4. Signal heterogeneity of the posterosuperior labrum, may be related  to magic angle artifact or alternatively degenerative tearing.     PATRICK RUIZ        Large Joint Injection/Arthocentesis: R subacromial bursa    Date/Time: 12/18/2023 5:54 PM    Performed by: Efra Meeks DO  Authorized by: Efra Meeks DO    Indications:  Pain  Needle Size:  25 G  Guidance: landmark guided    Approach:  Posterolateral  Location:  Shoulder      Site:  R subacromial bursa  Medications:  40 mg triamcinolone 40 MG/ML; 2 mL lidocaine 1 %  Outcome:  Tolerated well, no immediate complications  Procedure discussed: discussed risks, benefits, and alternatives    Consent Given by:  Patient  Timeout: timeout called immediately prior to procedure    Prep: patient was prepped and draped in usual sterile fashion          Review of prior external note(s) from - primary care, PT  Review of the result(s) of each unique test - imaging  Independent interpretation of a test performed by another physician/other qualified health care professional (not separately reported) - imaging  Ordering of each unique test

## 2023-12-18 NOTE — PATIENT INSTRUCTIONS
"Reviewed the right shoulder MRI, demonstrating rotator cuff changes, including areas of at least partial thickness tearing.  For this, we discussed continuing physical therapy, also addition of subacromial steroid injection, done today.  If ongoing issues despite therapy and injection, consider referral on to discuss further with orthopedic surgery.    We also discussed the right knee.  X-rays from today demonstrate underlying degenerative change, primarily in the medial compartment, or some \"arthritis.\"  For this, we discussed continuing with PT, proceeding with  brace, trial of injection, also potential for additional imaging with MRI given signs of an injury.  Following discussion, can obtain MRI next.    Advanced imaging is done by appointment. Please call [facility] ([facility]) [phone] , [facility] ([facility]/[place]) [phone] , or [facility] ([facility]) [phone]  to schedule your MRI.     Some insurance companies may require a prior authorization to be completed which can delay the time until you are able to schedule your appointment.       If you are active on [employer], you may have access to your test results before your provider is able to review the study and advise on next steps.      Please make a follow up appointment in the clinic no sooner than 2 days following your MRI by calling [phone].  Alternatively, if interested in phone or [employer] message with results and potential next steps, contact clinic after study has been completed.        If you have any further questions for your physician or physician s care team you can contact them thru [employer] or by calling [phone].    "

## 2023-12-21 ENCOUNTER — THERAPY VISIT (OUTPATIENT)
Dept: PHYSICAL THERAPY | Facility: CLINIC | Age: 58
End: 2023-12-21
Payer: COMMERCIAL

## 2023-12-21 ENCOUNTER — TELEPHONE (OUTPATIENT)
Dept: PHYSICAL THERAPY | Facility: CLINIC | Age: 58
End: 2023-12-21

## 2023-12-21 DIAGNOSIS — M25.512 CHRONIC LEFT SHOULDER PAIN: Primary | ICD-10-CM

## 2023-12-21 DIAGNOSIS — S46.011D TRAUMATIC INCOMPLETE TEAR OF RIGHT ROTATOR CUFF, SUBSEQUENT ENCOUNTER: ICD-10-CM

## 2023-12-21 DIAGNOSIS — G89.29 CHRONIC LEFT SHOULDER PAIN: Primary | ICD-10-CM

## 2023-12-21 PROCEDURE — 97161 PT EVAL LOW COMPLEX 20 MIN: CPT | Mod: GP | Performed by: PHYSICAL THERAPIST

## 2023-12-21 PROCEDURE — 97110 THERAPEUTIC EXERCISES: CPT | Mod: GP | Performed by: PHYSICAL THERAPIST

## 2023-12-21 RX ORDER — LIDOCAINE HYDROCHLORIDE 10 MG/ML
2 INJECTION, SOLUTION INFILTRATION; PERINEURAL
Status: SHIPPED | OUTPATIENT
Start: 2023-12-18

## 2023-12-21 RX ORDER — TRIAMCINOLONE ACETONIDE 40 MG/ML
40 INJECTION, SUSPENSION INTRA-ARTICULAR; INTRAMUSCULAR
Status: SHIPPED | OUTPATIENT
Start: 2023-12-18

## 2023-12-22 NOTE — PROGRESS NOTES
"PHYSICAL THERAPY EVALUATION  Type of Visit: Evaluation    See electronic medical record for Abuse and Falls Screening details.    Subjective       Presenting condition or subjective complaint: right shoulder  Date of onset: 12/21/23 (referred to PT)    Relevant medical history: Cancer; Diabetes; High blood pressure; Neck injury; Overweight; Sleep disorder like apnea   Dates & types of surgery:      Pt reports R shoulder pain that has been getting worse.  Prior to injection earlier this week, felt like he had to give his R arm help to be lifted.  Has noted definite improvement since injection.  Doesn't feel like it needs the extra assistance but can still \"crunch\" a bit.    Prior diagnostic imaging/testing results: MRI; X-ray     Prior therapy history for the same diagnosis, illness or injury: No      Prior Level of Function  Transfers: Independent  Ambulation: Independent  ADL: Independent      Living Environment  Social support: With a significant other or spouse   Type of home: House; Basement   Stairs to enter the home: Yes 5 Is there a railing: Yes   Ramp: No   Stairs inside the home: Yes 20 Is there a railing: Yes   Help at home: Self Cares (home health aide/personal care attendant, family, etc); Home management tasks (cooking, cleaning); Home and Yard maintenance tasks  Equipment owned:       Employment: Yes sales  Hobbies/Interests: snowmobiling, golf, 4-wheeling,    Patient goals for therapy: normal activities       Objective   SHOULDER EVALUATION  INTEGUMENTARY (edema, incisions): WNL  POSTURE: Forward head and rounded shoulders.  ROM: Cervical AROM without symptom provocation.  AROM R shoulder flexion 173, abduction 163, ER 82, IR T12.  Pt noted no discomfort with these motions.  AROM L shoulder as documented 12/14/2023.  STRENGTH: MMT L shoulder 5/5 all directions.  R shoulder 4/5 flexion, abduction, ER, IR; discomfort only on IR.  PALPATION: No tenderness to palpation.    Assessment & Plan   CLINICAL " IMPRESSIONS  Medical Diagnosis: Traumatic incomplete tear of right rotator cuff, subsequent encounter    Treatment Diagnosis: R shoulder pain   Impression/Assessment: Patient is a 58 year old male with right shoulder complaints.  The following significant findings have been identified: Pain, Decreased ROM/flexibility, Decreased strength, and Decreased activity tolerance. These impairments interfere with their ability to perform household chores as compared to previous level of function.     Clinical Decision Making (Complexity):  Clinical Presentation: Stable/Uncomplicated  Clinical Presentation Rationale: based on medical and personal factors listed in PT evaluation  Clinical Decision Making (Complexity): Low complexity    PLAN OF CARE  Treatment Interventions:  Interventions: Manual Therapy, Neuromuscular Re-education, Therapeutic Activity, Therapeutic Exercise    Long Term Goals     PT Goal 1  Goal Identifier: lifting  Goal Description: Pt to lift 25 pounds including assistance from R  Rationale: to maximize safety and independence within the home  Goal Progress: Pt not lifting with R  Target Date: 03/21/24      Frequency of Treatment: twice per month  Duration of Treatment: three months    Education Assessment:   Learner/Method: Patient    Risks and benefits of evaluation/treatment have been explained.   Patient/Family/caregiver agrees with Plan of Care.     Evaluation Time:     PT Eval, Low Complexity Minutes (60075): 25    Signing Clinician: Anand Bernardo PT

## 2023-12-27 ENCOUNTER — HOSPITAL ENCOUNTER (OUTPATIENT)
Dept: MRI IMAGING | Facility: HOSPITAL | Age: 58
Discharge: HOME | End: 2023-12-27
Attending: PEDIATRICS | Admitting: PEDIATRICS
Payer: COMMERCIAL

## 2023-12-27 DIAGNOSIS — S89.91XA INJURY OF RIGHT KNEE, INITIAL ENCOUNTER: ICD-10-CM

## 2023-12-27 PROCEDURE — 73721 MRI JNT OF LWR EXTRE W/O DYE: CPT | Mod: RT

## 2024-01-02 ENCOUNTER — TELEPHONE (OUTPATIENT)
Dept: ORTHOPEDICS | Facility: CLINIC | Age: 59
End: 2024-01-02
Payer: COMMERCIAL

## 2024-01-02 NOTE — TELEPHONE ENCOUNTER
MRI results below:  Jose Castillo, GUZMAN, ATC    EXAM: MR KNEE RIGHT W/O CONTRAST  LOCATION: [facility]  DATE: 12/27/2023     INDICATION: Evaluate knee pain, in the setting of known arthrosis.  COMPARISON: None.  TECHNIQUE: Unenhanced.     FINDINGS:     MEDIAL COMPARTMENT:   -Meniscus: Blunting of the posterior horn of the medial meniscus close to the root attachment where there is likely degenerative maceration and fraying, but no linear tearing is identified. This is seen on series 6, image 10. Partial peripheral extrusion   of the medial meniscal body.  -Cartilage: Grade 3 cartilage loss both sides of the compartment. Osteophytic spurring medial joint line.     LATERAL COMPARTMENT:  -Meniscus: Normal.   -Cartilage: Normal.     PATELLOFEMORAL COMPARTMENT:   -Alignment: Patella midline. No subluxation or tilting.   -Cartilage: Full-thickness cartilage loss along significant portion of the medial retropatellar facet. Less advanced cartilage loss laterally. Osteophytic spurring along the peripheral margins of the patellofemoral compartment.     CRUCIATE LIGAMENTS:   -ACL: Normal.  -PCL: Grade 1 sprain of the PCL without tearing.     COLLATERAL LIGAMENTS:   -Medial collateral ligament: Superficial and deep fibers are normal.  -Lateral collateral ligament: Normal.     POSTEROMEDIAL CORNER:  -Distal semimembranosus tendon is normal.   -Pes anserine tendons are normal. Posteromedial corner complex ligaments are intact.     POSTEROLATERAL CORNER:   -Popliteal tendon is intact. No tendinopathy.  -Biceps femoris tendon and posterolateral corner complex ligaments are intact.     EXTENSOR MECHANISM:   -Quadriceps tendon: Moderate severity tendinopathy.  -Patellar tendon: Mild patellar tendinopathy.  -Patellofemoral ligaments and retinacula: Intact.     JOINT:   -Tiny effusion.     BONES:  -No fracture or concerning marrow replacing lesion.     SOFT TISSUES:   -Moderate-sized popliteal cyst.                                                                        IMPRESSION:  1.  Blunting of the posterior horn of the medial meniscus close to the root attachment where there is likely some degenerative maceration and fraying but no linear tearing or detachment. There is peripheral extrusion of the medial meniscal body.  2.  Grade 3 cartilage loss both sides of the medial compartment.  3.  Moderate severity quadriceps tendinopathy.  4.  Mild patellar tendinopathy without tearing.  5.  Grade 1 sprain of the PCL without tearing.  6.  Grade 3 cartilage loss both sides of the medial compartment with osteophytic spurring medial joint line.  7.  Full-thickness cartilage loss along the majority of the medial retropatellar facet with additional osteophytic spurring.  8.  Tiny effusion.  9.  Moderate-sized popliteal cyst.

## 2024-01-03 NOTE — TELEPHONE ENCOUNTER
Please notify of MRI results. May be able to send via [employer] attached to this message.  MRI shows knee OA. This includes areas of cartilage loss in the medial and patellofemoral (kneecap) compartments. There is associated meniscus tearing, which is generally considered part of the OA. Additionally with quad and patellar tendinopathy without tearing. Also with popliteal cyst, or swelling at the back of the knee).  Options for knee include  brace trial through PT, trial of steroid injection for pain relief.  If injection, can schedule appointment. If other questions/concerns, also can schedule appointment (phone, video, in person) to review.  Thanks.  Efra Meeks DO, CAQ

## 2024-01-03 NOTE — TELEPHONE ENCOUNTER
My Chart message sent in detail to the patient regarding his MRI and next steps.  He will need to reply with his desire before proceeding with orders and contact information.    Nirmala Cameron ATC, [facility]

## 2024-01-22 DIAGNOSIS — N52.9 ERECTILE DYSFUNCTION, UNSPECIFIED ERECTILE DYSFUNCTION TYPE: ICD-10-CM

## 2024-01-22 RX ORDER — SILDENAFIL 100 MG/1
TABLET, FILM COATED ORAL
Qty: 12 TABLET | Refills: 1 | Status: SHIPPED | OUTPATIENT
Start: 2024-01-22 | End: 2024-05-21

## 2024-01-23 ENCOUNTER — MYC MEDICAL ADVICE (OUTPATIENT)
Dept: FAMILY MEDICINE | Facility: CLINIC | Age: 59
End: 2024-01-23
Payer: COMMERCIAL

## 2024-01-23 DIAGNOSIS — E11.9 TYPE 2 DIABETES MELLITUS WITHOUT COMPLICATION, WITH LONG-TERM CURRENT USE OF INSULIN (H): ICD-10-CM

## 2024-01-23 DIAGNOSIS — Z79.4 TYPE 2 DIABETES MELLITUS WITHOUT COMPLICATION, WITH LONG-TERM CURRENT USE OF INSULIN (H): ICD-10-CM

## 2024-01-24 RX ORDER — INSULIN GLARGINE 100 [IU]/ML
INJECTION, SOLUTION SUBCUTANEOUS
Qty: 75 ML | Refills: 1 | Status: SHIPPED | OUTPATIENT
Start: 2024-01-24

## 2024-02-06 ENCOUNTER — PATIENT OUTREACH (OUTPATIENT)
Dept: GASTROENTEROLOGY | Facility: CLINIC | Age: 59
End: 2024-02-06
Payer: COMMERCIAL

## 2024-02-22 NOTE — PROGRESS NOTES
Pt last seen in PT 12/21/2023.  No follow up was scheduled beyond that and he has not responded to [employer] message sent to check on status.  Consider note dated 12/21 to serve as final summary.

## 2024-02-22 NOTE — PROGRESS NOTES
Pt last seen in PT on this episode 12/14/2023.  He was seen on another episode 12/21.  No further PT scheduled and he has not responded to [employer] message sent to check on status.  Consider note dated 12/14 to serve as final summary.

## 2024-02-23 DIAGNOSIS — E11.9 TYPE 2 DIABETES MELLITUS WITHOUT COMPLICATION, WITHOUT LONG-TERM CURRENT USE OF INSULIN (H): ICD-10-CM

## 2024-02-26 ENCOUNTER — TELEPHONE (OUTPATIENT)
Dept: DERMATOLOGY | Facility: CLINIC | Age: 59
End: 2024-02-26

## 2024-02-26 ENCOUNTER — OFFICE VISIT (OUTPATIENT)
Dept: DERMATOLOGY | Facility: CLINIC | Age: 59
End: 2024-02-26
Attending: INTERNAL MEDICINE
Payer: COMMERCIAL

## 2024-02-26 DIAGNOSIS — L82.1 SEBORRHEIC KERATOSIS: ICD-10-CM

## 2024-02-26 DIAGNOSIS — B35.1 ONYCHOMYCOSIS: ICD-10-CM

## 2024-02-26 DIAGNOSIS — L21.9 DERMATITIS, SEBORRHEIC: Primary | ICD-10-CM

## 2024-02-26 DIAGNOSIS — Z85.820 HISTORY OF MELANOMA: ICD-10-CM

## 2024-02-26 DIAGNOSIS — D18.01 ANGIOMA OF SKIN: ICD-10-CM

## 2024-02-26 DIAGNOSIS — L81.4 LENTIGO: ICD-10-CM

## 2024-02-26 DIAGNOSIS — D22.9 NEVUS: ICD-10-CM

## 2024-02-26 PROCEDURE — 99204 OFFICE O/P NEW MOD 45 MIN: CPT | Performed by: PHYSICIAN ASSISTANT

## 2024-02-26 RX ORDER — CICLOPIROX 80 MG/ML
SOLUTION TOPICAL
Qty: 6.6 ML | Refills: 5 | Status: SHIPPED | OUTPATIENT
Start: 2024-02-26

## 2024-02-26 RX ORDER — KETOCONAZOLE 20 MG/ML
SHAMPOO TOPICAL
Qty: 120 ML | Refills: 11 | Status: SHIPPED | OUTPATIENT
Start: 2024-02-26

## 2024-02-26 ASSESSMENT — PAIN SCALES - GENERAL: PAINLEVEL: NO PAIN (0)

## 2024-02-26 NOTE — PROGRESS NOTES
HPI:   Chief complaints: Amadou Bach is a pleasant 58 year old male who presents for Full skin cancer screening to rule out skin cancer   Last Skin Exam: 2016 1st Baseline: no  Personal HX of Skin Cancer: yes melanoma on the left superior anterior shoulder 1/2015 with subsequent mms   Personal HX of Malignant Melanoma: yes   Family HX of Skin Cancer / Malignant Melanoma: no  Personal HX of Atypical Moles:   no  Risk factors: history of melanoma; history of sun exposure and burns  New / Changing lesions:Yes brown spot on the right cheek  Social History:   On review of systems, there are no further skin complaints, patient is feeling otherwise well.   ROS of the following were done and are negative: Constitutional, Eyes, Ears, Nose,   Mouth, Throat, Cardiovascular, Respiratory, GI, Genitourinary, Musculoskeletal,   Psychiatric, Endocrine, Allergic/Immunologic.    PHYSICAL EXAM:   There were no vitals taken for this visit.  Skin exam performed as follows: Type 2 skin. Mood appropriate  Alert and Oriented X 3. Well developed, well nourished in no distress.  General appearance: Normal  Head including face: Normal  Eyes: conjunctiva and lids: Normal  Mouth: Lips, teeth, gums: Normal  Neck: Normal  Chest-breast/axillae: Normal  Back: Normal  Extremities: digits/nails (clubbing): Normal  Eccrine and Apocrine glands: Normal  Right upper extremity: Normal  Left upper extremity: Normal  Right lower extremity: Normal  Left lower extremity: Normal  Skin: Scalp and body hair: See below    Pt deferred exam of breasts, groin, buttocks: No    Other physical findings:  1. Multiple pigmented macules on extremities and trunk  2. Multiple pigmented macules on face, trunk and extremities  3. Multiple vascular papules on trunk, arms and legs  4. Multiple scattered keratotic plaques  5. Flaking and erythema on the chest  6. Thickened toenails with subungual debris       Except as noted above, no other signs of skin cancer or  melanoma.     ASSESSMENT/PLAN:   Benign Full skin cancer screening today. . Patient with history of melanoma in 2015  Advised on monthly self exams and 1 year  Patient Education: Appropriate brochures given.    Multiple benign appearing melanocytic nevi on arms, legs and trunk. Discussed ABCDEs of melanoma and sunscreen.   Multiple lentigos on arms, legs and trunk. Advised benign, no treatment needed.  Multiple scattered angiomas. Advised benign, no treatment needed.   Seborrheic keratosis on arms, legs and trunk. Advised benign, no treatment needed.  Seborrheic dermatitis on the chest; he will get intermittently on the scalp as well.   --Start ketoconazole shampoo daily as needed  Onychomycosis - discussed oral terbinafine he declines.   --Start penlac daily; apply x 7 days then remove with isopropyl alcohol and repeat.           Follow-up: yearly/PRN sooner    1.) Patient was asked about new and changing moles. YES  2.) Patient received a complete physical skin examination: YES  3.) Patient was counseled to perform a monthly self skin examination: YES  Scribed By: Edwige Armstrong MS, PA-C

## 2024-02-26 NOTE — TELEPHONE ENCOUNTER
Retail Pharmacy Prior Authorization Team   Phone: [phone]    PRIOR AUTHORIZATION DENIED    Medication: CICLOPIROX 8 % EX SOLN  Insurance Company: [employer] - Phone [phone] Fax [phone]  Denial Date: 2/26/2024  Denial Reason(s): MUST HAVE A FUNGAL DIAGNOSTIC TEST TO CONFIRM DX      Appeal Information: IF PROVIDER WOULD LIKE TO APPEAL THIS DECISION PLEASE PROVIDE THE PA TEAM WITH A LETTER OF MEDICAL NECESSITY      Patient Notified: No

## 2024-02-26 NOTE — LETTER
2/26/2024         RE: Amadou Bach  [address] [facility] [address]        Dear Colleague,    Thank you for referring your patient, Amadou Bach, to the [facility]. Please see a copy of my visit note below.    HPI:   Chief complaints: Amadou Bach is a pleasant 58 year old male who presents for Full skin cancer screening to rule out skin cancer   Last Skin Exam: 2016 1st Baseline: no  Personal HX of Skin Cancer: yes melanoma on the left superior anterior shoulder 1/2015 with subsequent mms   Personal HX of Malignant Melanoma: yes   Family HX of Skin Cancer / Malignant Melanoma: no  Personal HX of Atypical Moles:   no  Risk factors: history of melanoma; history of sun exposure and burns  New / Changing lesions:Yes brown spot on the right cheek  Social History:   On review of systems, there are no further skin complaints, patient is feeling otherwise well.   ROS of the following were done and are negative: Constitutional, Eyes, Ears, Nose,   Mouth, Throat, Cardiovascular, Respiratory, GI, Genitourinary, Musculoskeletal,   Psychiatric, Endocrine, Allergic/Immunologic.    PHYSICAL EXAM:   There were no vitals taken for this visit.  Skin exam performed as follows: Type 2 skin. Mood appropriate  Alert and Oriented X 3. Well developed, well nourished in no distress.  General appearance: Normal  Head including face: Normal  Eyes: conjunctiva and lids: Normal  Mouth: Lips, teeth, gums: Normal  Neck: Normal  Chest-breast/axillae: Normal  Back: Normal  Extremities: digits/nails (clubbing): Normal  Eccrine and Apocrine glands: Normal  Right upper extremity: Normal  Left upper extremity: Normal  Right lower extremity: Normal  Left lower extremity: Normal  Skin: Scalp and body hair: See below    Pt deferred exam of breasts, groin, buttocks: No    Other physical findings:  1. Multiple pigmented macules on extremities and trunk  2. Multiple pigmented macules on face, trunk and  extremities  3. Multiple vascular papules on trunk, arms and legs  4. Multiple scattered keratotic plaques  5. Flaking and erythema on the chest  6. Thickened toenails with subungual debris       Except as noted above, no other signs of skin cancer or melanoma.     ASSESSMENT/PLAN:   Benign Full skin cancer screening today. . Patient with history of melanoma in 2015  Advised on monthly self exams and 1 year  Patient Education: Appropriate brochures given.    Multiple benign appearing melanocytic nevi on arms, legs and trunk. Discussed ABCDEs of melanoma and sunscreen.   Multiple lentigos on arms, legs and trunk. Advised benign, no treatment needed.  Multiple scattered angiomas. Advised benign, no treatment needed.   Seborrheic keratosis on arms, legs and trunk. Advised benign, no treatment needed.  Seborrheic dermatitis on the chest; he will get intermittently on the scalp as well.   --Start ketoconazole shampoo daily as needed  Onychomycosis - discussed oral terbinafine he declines.   --Start penlac daily; apply x 7 days then remove with isopropyl alcohol and repeat.           Follow-up: yearly/PRN sooner    1.) Patient was asked about new and changing moles. YES  2.) Patient received a complete physical skin examination: YES  3.) Patient was counseled to perform a monthly self skin examination: YES  Scribed By: Edwige Armstrong, MS, PAMercyC      Again, thank you for allowing me to participate in the care of your patient.        Sincerely,        Edwige Armstrong PA-C   Male

## 2024-02-27 NOTE — TELEPHONE ENCOUNTER
Patient Contact    Attempt # 1    Was call answered?  Yes    Patient notified. Patient verbalized understanding. Patient has used [employer] before.      Jessica Montana MA  [facility]   [phone]

## 2024-02-28 DIAGNOSIS — E11.9 TYPE 2 DIABETES MELLITUS WITHOUT COMPLICATION, WITHOUT LONG-TERM CURRENT USE OF INSULIN (H): Primary | ICD-10-CM

## 2024-02-28 RX ORDER — BLOOD-GLUCOSE SENSOR
1 EACH MISCELLANEOUS
Qty: 6 EACH | Refills: 5 | Status: SHIPPED | OUTPATIENT
Start: 2024-02-28

## 2024-02-28 RX ORDER — KETOROLAC TROMETHAMINE 30 MG/ML
1 INJECTION, SOLUTION INTRAMUSCULAR; INTRAVENOUS ONCE
Qty: 1 EACH | Refills: 0 | Status: SHIPPED | OUTPATIENT
Start: 2024-02-28 | End: 2024-02-28

## 2024-02-28 NOTE — TELEPHONE ENCOUNTER
Shelby is calling and requesting the nikos 3 continuous Monitor.  He stated that this will be covered by insurance.    Cued up, and sent to PCP.    Emely COTTRELLN RN  Triage Nurse  [facility]

## 2024-03-31 ENCOUNTER — HEALTH MAINTENANCE LETTER (OUTPATIENT)
Age: 59
End: 2024-03-31

## 2024-05-21 DIAGNOSIS — N52.9 ERECTILE DYSFUNCTION, UNSPECIFIED ERECTILE DYSFUNCTION TYPE: ICD-10-CM

## 2024-05-21 RX ORDER — SILDENAFIL 100 MG/1
TABLET, FILM COATED ORAL
Qty: 12 TABLET | Refills: 1 | Status: SHIPPED | OUTPATIENT
Start: 2024-05-21 | End: 2024-08-05

## 2024-08-05 DIAGNOSIS — N52.9 ERECTILE DYSFUNCTION, UNSPECIFIED ERECTILE DYSFUNCTION TYPE: ICD-10-CM

## 2024-08-05 RX ORDER — SILDENAFIL 100 MG/1
TABLET, FILM COATED ORAL
Qty: 12 TABLET | Refills: 2 | Status: SHIPPED | OUTPATIENT
Start: 2024-08-05

## 2024-08-07 ENCOUNTER — PATIENT OUTREACH (OUTPATIENT)
Dept: GASTROENTEROLOGY | Facility: CLINIC | Age: 59
End: 2024-08-07
Payer: COMMERCIAL

## 2024-08-07 DIAGNOSIS — Z12.11 SPECIAL SCREENING FOR MALIGNANT NEOPLASMS, COLON: Primary | ICD-10-CM

## 2024-08-07 NOTE — PROGRESS NOTES
"Patient has a history of polyps and surveillance colonoscopy is overdue. Patient meets criteria for CRC high risk standing order protocol.    CRC Screening Colonoscopy Referral Review    Patient meets the inclusion criteria for screening colonoscopy standing order.    Ordering/Referring Provider:  Joseph Alanis MD    BMI: Estimated body mass index is 31.06 kg/m  as calculated from the following:    Height as of 12/18/23: 1.67 m (5' 5.75\").    Weight as of 12/18/23: 86.6 kg (191 lb).     Sedation:  Does patient have any of the following conditions affecting sedation?  No medical conditions affecting sedation.    Previous Scopes:  Any previous recommendations or follow up needs based on previous scope?  na / No recommendations.    Medical Concerns to Postpone Order:  Does patient have any of the following medical concerns that should postpone/delay colonoscopy referral?  No medical conditions affecting colonoscopy referral.    Final Referral Details:  Based on patient's medical history patient is appropriate for referral order with moderate sedation. If patient's BMI > 50 do not schedule in ASC.  "

## 2024-10-05 DIAGNOSIS — K21.00 GASTROESOPHAGEAL REFLUX DISEASE WITH ESOPHAGITIS WITHOUT HEMORRHAGE: ICD-10-CM

## 2024-10-07 RX ORDER — FAMOTIDINE 20 MG/1
20 TABLET, FILM COATED ORAL 2 TIMES DAILY PRN
Qty: 180 TABLET | Refills: 0 | Status: SHIPPED | OUTPATIENT
Start: 2024-10-07

## 2024-10-16 DIAGNOSIS — E11.9 TYPE 2 DIABETES MELLITUS WITHOUT COMPLICATION, WITHOUT LONG-TERM CURRENT USE OF INSULIN (H): ICD-10-CM

## 2024-10-17 RX ORDER — HYDROCHLOROTHIAZIDE 12.5 MG/1
CAPSULE ORAL
Qty: 2 EACH | Refills: 0 | Status: SHIPPED | OUTPATIENT
Start: 2024-10-17 | End: 2024-11-13

## 2024-10-24 ENCOUNTER — HOSPITAL ENCOUNTER (OUTPATIENT)
Facility: AMBULATORY SURGERY CENTER | Age: 59
End: 2024-10-24
Attending: COLON & RECTAL SURGERY
Payer: COMMERCIAL

## 2024-10-24 ENCOUNTER — TELEPHONE (OUTPATIENT)
Dept: GASTROENTEROLOGY | Facility: CLINIC | Age: 59
End: 2024-10-24
Payer: COMMERCIAL

## 2024-10-24 DIAGNOSIS — Z12.11 SPECIAL SCREENING FOR MALIGNANT NEOPLASMS, COLON: Primary | ICD-10-CM

## 2024-10-24 NOTE — TELEPHONE ENCOUNTER
"Endoscopy Scheduling Screen    Have you had any respiratory illness or flu-like symptoms in the last 10 days?  No    What is your communication preference for Instructions and/or Bowel Prep?   MyChart    What insurance is in the chart?  Other:  [employer]    Ordering/Referring Provider:     CHERELLE OCHOA      (If ordering provider performs procedure, schedule with ordering provider unless otherwise instructed. )    BMI: Estimated body mass index is 31.06 kg/m  as calculated from the following:    Height as of 12/18/23: 1.67 m (5' 5.75\").    Weight as of 12/18/23: 86.6 kg (191 lb).     Sedation Ordered  moderate sedation.   If patient BMI > 50 do not schedule in ASC.    If patient BMI > 45 do not schedule at [facility].    Are you taking methadone or Suboxone?  NO, No RN review required.    Have you been diagnosed and are being treated for severe PTSD or severe anxiety?  NO, No RN review required.    Are you taking any prescription medications for pain 3 or more times per week?   NO, No RN review required.    Do you have a history of malignant hyperthermia?  No    (Females) Are you currently pregnant?        Have you been diagnosed or told you have pulmonary hypertension?   No    Do you have an LVAD?  No    Have you been told you have moderate to severe sleep apnea?  No.    Have you been told you have COPD, asthma, or any other lung disease?  No    Do you have any heart conditions?  No     Have you ever had or are you waiting for an organ transplant?  No. Continue scheduling, no site restrictions.    Have you had a stroke or transient ischemic attack (TIA aka \"mini stroke\" in the last 6 months?   No    Have you been diagnosed with or been told you have cirrhosis of the liver?   No.    Are you currently on dialysis?   No    Do you need assistance transferring?   No    BMI: Estimated body mass index is 31.06 kg/m  as calculated from the following:    Height as of 12/18/23: 1.67 m (5' 5.75\").    Weight as of 12/18/23: 86.6 kg " (191 lb).     Is patients BMI > 40 and scheduling location [facility]?  No    Do you take an injectable or oral medication for weight loss or diabetes (excluding insulin)?  Yes, hold time can be up to 7 days. Please consult with you prescribing provider to discuss endoscopy recommendations. (Please schedule at least 7 days out.)    Do you take the medication Naltrexone?  No    Do you take blood thinners?  No       Prep   Are you currently on dialysis or do you have chronic kidney disease?  No    Do you have a diagnosis of diabetes?  Yes (Golytely Prep)    Do you have a diagnosis of cystic fibrosis (CF)?  No    On a regular basis do you go 3 -5 days between bowel movements?  No    BMI > 40?  No    Preferred Pharmacy:    [facility]/pharmacy #[id number] - [place] - [address]  [address]  [place] [address]  Phone: [phone] Fax: [phone]    Final Scheduling Details     Procedure scheduled  Colonoscopy    Surgeon:  DANIELLE     Date of procedure:  11/19     Pre-OP / PAC:   No - Not required for this site.    Location  [facility] - [facility] - Patient preference.    Sedation   Moderate Sedation - Per order.      Patient Reminders:   You will receive a call from a Nurse to review instructions and health history.  This assessment must be completed prior to your procedure.  Failure to complete the Nurse assessment may result in the procedure being cancelled.      On the day of your procedure, please designate an adult(s) who can drive you home stay with you for the next 24 hours. The medicines used in the exam will make you sleepy. You will not be able to drive.      You cannot take public transportation, ride share services, or non-medical taxi service without a responsible caregiver.  Medical transport services are allowed with the requirement that a responsible caregiver will receive you at your destination.  We require that drivers and caregivers are confirmed prior to your procedure.

## 2024-10-27 ENCOUNTER — HEALTH MAINTENANCE LETTER (OUTPATIENT)
Age: 59
End: 2024-10-27

## 2024-10-29 RX ORDER — BISACODYL 5 MG/1
TABLET, DELAYED RELEASE ORAL
Qty: 4 TABLET | Refills: 0 | Status: SHIPPED | OUTPATIENT
Start: 2024-10-29

## 2024-10-29 NOTE — TELEPHONE ENCOUNTER
Extended Golytely Bowel Prep  recommended due to GLP-1 agonist medication noted in chart.  Instructions were sent via [employer]. Bowel prep was sent 10/29/2024 to    [facility]/PHARMACY #[id number] - [place] - [address] [place] [address] AT [facility] [address]

## 2024-10-31 DIAGNOSIS — E11.9 TYPE 2 DIABETES MELLITUS WITHOUT COMPLICATION, WITH LONG-TERM CURRENT USE OF INSULIN (H): ICD-10-CM

## 2024-10-31 DIAGNOSIS — Z79.4 TYPE 2 DIABETES MELLITUS WITHOUT COMPLICATION, WITH LONG-TERM CURRENT USE OF INSULIN (H): ICD-10-CM

## 2024-10-31 RX ORDER — INSULIN GLARGINE 100 [IU]/ML
INJECTION, SOLUTION SUBCUTANEOUS
Qty: 60 ML | Refills: 3 | Status: SHIPPED | OUTPATIENT
Start: 2024-10-31

## 2024-11-04 ENCOUNTER — TELEPHONE (OUTPATIENT)
Dept: GASTROENTEROLOGY | Facility: CLINIC | Age: 59
End: 2024-11-04
Payer: COMMERCIAL

## 2024-11-04 NOTE — TELEPHONE ENCOUNTER
Sent to r/s to hospital setting to endoscopy scheduling.    Upon review of patient chart it is noted the patient's most recent A1C is 12.7 above exclusion criteria at .    Corinne Kliber, RN  Endoscopy Procedure Pre Assessment RN  [phone] option 2

## 2024-11-04 NOTE — TELEPHONE ENCOUNTER
Caller: Sky    Reason for Reschedule/Cancellation   (please be detailed, any staff messages or encounters to note?): Patient A1C is to high for [facility].      Prior to reschedule please review:  Ordering Provider:     Joseph Alanis MD in [facility]     Sedation Determined: moderate  Does patient have any ASC Exclusions, please identify?: n      Notes on Cancelled Procedure:  Procedure: Lower Endoscopy [Colonoscopy]   Date: 11/19/2024  Location: [facility]; [id number] [place] [address] [address], [place] [address]   Surgeon: Fish       Rescheduled: Yes,   Procedure: Lower Endoscopy [Colonoscopy]    Date: 12/19/2024   Location: [facility]; [address] [place] [address] , [place] [address]   Surgeon: Vivek   Sedation Level Scheduled  mac ,  Reason for Sedation Level per location    Instructions updated and sent: y     Does patient need PAC or Pre -Op Rescheduled? : no       Did you cancel or rescheduled an EUS procedure? No.

## 2024-11-08 DIAGNOSIS — E11.9 TYPE 2 DIABETES MELLITUS WITHOUT COMPLICATION, WITHOUT LONG-TERM CURRENT USE OF INSULIN (H): ICD-10-CM

## 2024-11-08 RX ORDER — FLURBIPROFEN SODIUM 0.3 MG/ML
SOLUTION/ DROPS OPHTHALMIC
Qty: 400 EACH | Refills: 0 | Status: SHIPPED | OUTPATIENT
Start: 2024-11-08

## 2024-11-13 DIAGNOSIS — E11.9 TYPE 2 DIABETES MELLITUS WITHOUT COMPLICATION, WITHOUT LONG-TERM CURRENT USE OF INSULIN (H): ICD-10-CM

## 2024-11-13 RX ORDER — HYDROCHLOROTHIAZIDE 12.5 MG/1
CAPSULE ORAL
Qty: 2 EACH | Refills: 0 | Status: SHIPPED | OUTPATIENT
Start: 2024-11-13

## 2024-12-04 DIAGNOSIS — E11.9 TYPE 2 DIABETES MELLITUS WITHOUT COMPLICATION, WITHOUT LONG-TERM CURRENT USE OF INSULIN (H): ICD-10-CM

## 2024-12-05 RX ORDER — HYDROCHLOROTHIAZIDE 12.5 MG/1
CAPSULE ORAL
Qty: 2 EACH | Refills: 0 | Status: SHIPPED | OUTPATIENT
Start: 2024-12-05

## 2024-12-11 ENCOUNTER — ANCILLARY PROCEDURE (OUTPATIENT)
Dept: GENERAL RADIOLOGY | Facility: CLINIC | Age: 59
End: 2024-12-11
Attending: PEDIATRICS
Payer: COMMERCIAL

## 2024-12-11 ENCOUNTER — OFFICE VISIT (OUTPATIENT)
Dept: ORTHOPEDICS | Facility: CLINIC | Age: 59
End: 2024-12-11
Payer: COMMERCIAL

## 2024-12-11 VITALS — BODY MASS INDEX: 30.7 KG/M2 | WEIGHT: 191 LBS | HEIGHT: 66 IN

## 2024-12-11 DIAGNOSIS — G89.29 CHRONIC LEFT SHOULDER PAIN: Primary | ICD-10-CM

## 2024-12-11 DIAGNOSIS — S46.011D TRAUMATIC INCOMPLETE TEAR OF RIGHT ROTATOR CUFF, SUBSEQUENT ENCOUNTER: ICD-10-CM

## 2024-12-11 DIAGNOSIS — G89.29 CHRONIC LEFT SHOULDER PAIN: ICD-10-CM

## 2024-12-11 DIAGNOSIS — M25.512 CHRONIC LEFT SHOULDER PAIN: ICD-10-CM

## 2024-12-11 DIAGNOSIS — M25.512 CHRONIC LEFT SHOULDER PAIN: Primary | ICD-10-CM

## 2024-12-11 DIAGNOSIS — M75.102 ROTATOR CUFF SYNDROME OF LEFT SHOULDER: ICD-10-CM

## 2024-12-11 PROCEDURE — 99214 OFFICE O/P EST MOD 30 MIN: CPT | Performed by: PEDIATRICS

## 2024-12-11 PROCEDURE — 73030 X-RAY EXAM OF SHOULDER: CPT | Mod: TC | Performed by: RADIOLOGY

## 2024-12-11 NOTE — LETTER
12/11/2024      Amadou Bach  [address] [facility] [address]      Dear Colleague,    Thank you for referring your patient, Amadou Bach, to the [facility] [place]. Please see a copy of my visit note below.    ASSESSMENT & PLAN    Sky was seen today for follow up and follow up.    Diagnoses and all orders for this visit:    Chronic left shoulder pain  -     XR Shoulder Left G/E 3 Views; Future    Traumatic incomplete tear of right rotator cuff, subsequent encounter    Rotator cuff syndrome of left shoulder      Currently on oral steroid for other condition, is tapering. Will finish that, and then if ongoing symptoms, we discussed bilateral subacromial steroid injection.  See below.  Questions answered. Discussed signs and symptoms that may indicate more serious issues; the patient was instructed to seek appropriate care if noted. Sky indicates understanding of these issues and agrees with the plan.      See Patient Instructions  Patient Instructions   Reviewed previous right shoulder imaging, with rotator cuff tearing.  Left shoulder x-ray today shows some AC joint arthritis. Suspect left shoulder issue more related to rotator cuff, similar to right.  Options include additional imaging left shoulder with MRI, physical therapy / home exercise program, and steroid injection (repeat subacromial for the right shoulder, and left shoulder subacromial steroid injection).  Following discussion, plan for injections to each shoulder, subacromial steroid. However, with being on oral steroid (prednisone) currently, defer injections for now, until off the oral steroid for at least 4-5 days. Anticipate will do bilateral subacromial steroid injection at next visit.  Keep up with home exercises from previous PT, and contact clinic if interested in return to physical therapy.    If you have any further questions for your physician or physician s care team you can contact them thru  "[employer] or by calling [phone].      Efra [facility] [place]    SUBJECTIVE- Interim History December 11, 2024    Chief Complaint   Patient presents with     Right Shoulder - Follow Up     Left Shoulder - Follow Up       Amadou Bach is a 58 year old male who is seen in f/u up for Data Unavailable. Since last visit on 12/18/23 patient has had a slow increase in pain over the last few months, noting that this seems to be back to \"where it was\". Is worse with physical activity and reaching overhead. Notes a catching and clunking with abduction and flexion..    Right subacromial steroid injection done at last OV provided relief for 3-4 months    Left shoulder:  Onset: 6 months, no fall or injury  Location: Lateral arm, upper arm and into tip of the shoulder  Worsened by: Reaching, flexion, abduction  Better with: PT, Cortisone injection  Treatments tried: CBD creams, ibuprofen, physical therapy (notes that he had good benefit with this as well as the injection)  Associated symptoms:  no distal numbness or tingling; denies swelling or warmth      Orthopedic/Surgical history: NO    The patient is seen by themselves.  The patient is Right handed    Social History/Occupation: Sales, golf     **  Above information per rooming staff.  Additional history:  Currently on prednisone for bilateral UE rash. Is tapering, thinks will finish early next week. Notes rash is improving with medication.    Previously saw ortho surgery 2021 for left shoulder impingement. Note reviewed, had subacromial steroid injection at that time.      REVIEW OF SYSTEMS:  Review of Systems    OBJECTIVE:  Ht 1.676 m (5' 6\")   Wt 86.6 kg (191 lb)   BMI 30.83 kg/m         Bilateral Shoulder exam    ROM:      forward flexion pain approaching shoulder level, above shoulder level; able to get to ~120 bilaterally        abduction grossly symmetric, pain approaching shoulder level, > 90       internal " rotation some pain bilaterally with belly press, grossly symmetric motion       external rotation grossly symmetric, mild pain    Strength:      abduction        internal rotation        external rotation   Grossly symmetric, some pain with ER, abduction    Impingement testing:       Boyle        empty can  pain        RADIOLOGY:  Final results and radiologist's interpretation, available in the [facility] health record.  Images were reviewed with the patient in the office today.  My personal interpretation of the performed imaging: left shoulder AC arthrosis. Faint calcification adjacent to greater tuberosity may represent calcific tendinitis. No acute bony abnormality noted.        XR Shoulder Left G/E 3 Views    Narrative    XR SHOULDER LEFT G/E 3 VIEWS   12/11/2024 8:47 AM     HISTORY: Diffuse shoulder pain, NKI; Chronic left shoulder pain.    COMPARISON: None.      Impression    IMPRESSION: Moderate osteoarthrosis of the AC joint. Small subacromial  enthesophyte. The glenohumeral joint appears normal. Tiny amount of  calcification in the region of the infraspinatus tendon insertion  consistent with calcific tendinosis. Otherwise negative. There is no  evidence of fracture. No subluxation or dislocation.    GAUDENCIO JESSICA MD         SYSTEM ID:  [id number]           =================      Previous right shoulder imaging:    EXAM: MR right shoulder without  contrast 12/1/2023 2:11 PM     TECHNIQUE: Multiplanar, multisequence imaging of the right shoulder  were obtained without administration of intravenous or intra-articular  gadolinium contrast using routine protocol.     History: evaluate rotator cuff; Right shoulder injury, initial  encounter     Comparison: 11/1/2023     Findings:     ROTATOR CUFF and ASSOCIATED STRUCTURES  Rotator cuff: On a background of severe tendinosis, small focal  moderate grade intrasubstance tear of the supraspinatus anterior  fibers at the footprint with possible bursal sided  communication  channel extension posteriorly along the posterior fibers of the  supraspinatus adjacent to the footprint. Associated subcortical  cystlike change. Infraspinatus tendinosis.      On a background of tendinosis, 1.9 cm mediolateral dimension sentinel  cyst formation along the subscapularis myotendinous junction with  associated tiny delamination tear along the subscapularis middle  fibers, possibly communicating to bursal side (image 13 through 21  sagittal sequence/series 8). Teres minor tendon is intact.     Bursa: Small-to-moderate subacromial/subdeltoid bursal fluid with mild  synovial proliferation, consistent with bursitis.     Musculature: Muscle bulk of rotator cuff is preserved.  Deltoid muscle  bulk is also preserved.      Acromioclavicular joint  There are severe degenerative changes of the acromioclavicular joint  with mass effect on the underlying supraspinatus myotendinous  junction. Acromion is type 2 in sagittal morphology.  Coracoacromial  ligament is not thickened.     OSSEOUS STRUCTURES  No fracture, marrow contusion or marrow infiltration. Cystic change of  greater tuberosity. Hypointensity in the humeral head, likely bone  island.     LONG BICIPITAL TENDON  The long head of the biceps tendon is normally situated within the  bicipital groove. Severe intra-articular segment tendinosis,  especially bicipital pulley segment. No complete or partial biceps  tendon tear is present.     GLENOHUMERAL JOINT  Joint fluid: Physiologic amount of joint fluid is  present.     Cartilage and subarticular bone:  No focal hyaline cartilage defects  are noted. No Hill-Sachs, reverse Hill-Sachs, or bony Bankart lesions  are seen.     Labrum: Limited assessment on this study with relative lack of joint  distention shows signal heterogeneity of the posterosuperior labrum,  may be related to magic angle artifact or alternatively degenerative  tearing. Otherwise, grossly in tact     ANCILLARY FINDINGS:                                                                       Impression:  1. Partial rotator cuff tendon tearing without high grade or full  thickness tear:   a.  On a background of severe tendinosis, small focal moderate grade  intrasubstance tear of the supraspinatus anterior fibers at the  footprint with possible bursal sided communication channel extension  posteriorly along the posterior fibers of the supraspinatus adjacent  to the footprint.    b. Tiny delamination tear along the subscapularis middle fibers with  sentinel cyst along myotendinous junction.   c. No muscle atrophy.  2. Severe acromioclavicular joint degenerative change.  3. Severe long head of biceps tendinosis intra-articular segment.  4. Signal heterogeneity of the posterosuperior labrum, may be related  to magic angle artifact or alternatively degenerative tearing.     PATRICK RUIZ                   Again, thank you for allowing me to participate in the care of your patient.        Sincerely,        Efra Meeks DO

## 2024-12-11 NOTE — PATIENT INSTRUCTIONS
Reviewed previous right shoulder imaging, with rotator cuff tearing.  Left shoulder x-ray today shows some AC joint arthritis. Suspect left shoulder issue more related to rotator cuff, similar to right.  Options include additional imaging left shoulder with MRI, physical therapy / home exercise program, and steroid injection (repeat subacromial for the right shoulder, and left shoulder subacromial steroid injection).  Following discussion, plan for injections to each shoulder, subacromial steroid. However, with being on oral steroid (prednisone) currently, defer injections for now, until off the oral steroid for at least 4-5 days. Anticipate will do bilateral subacromial steroid injection at next visit.  Keep up with home exercises from previous PT, and contact clinic if interested in return to physical therapy.    If you have any further questions for your physician or physician s care team you can contact them thru [employer] or by calling [phone].

## 2024-12-11 NOTE — PROGRESS NOTES
ASSESSMENT & PLAN    Sky was seen today for follow up and follow up.    Diagnoses and all orders for this visit:    Chronic left shoulder pain  -     XR Shoulder Left G/E 3 Views; Future    Traumatic incomplete tear of right rotator cuff, subsequent encounter    Rotator cuff syndrome of left shoulder      Currently on oral steroid for other condition, is tapering. Will finish that, and then if ongoing symptoms, we discussed bilateral subacromial steroid injection.  See below.  Questions answered. Discussed signs and symptoms that may indicate more serious issues; the patient was instructed to seek appropriate care if noted. Sky indicates understanding of these issues and agrees with the plan.      See Patient Instructions  Patient Instructions   Reviewed previous right shoulder imaging, with rotator cuff tearing.  Left shoulder x-ray today shows some AC joint arthritis. Suspect left shoulder issue more related to rotator cuff, similar to right.  Options include additional imaging left shoulder with MRI, physical therapy / home exercise program, and steroid injection (repeat subacromial for the right shoulder, and left shoulder subacromial steroid injection).  Following discussion, plan for injections to each shoulder, subacromial steroid. However, with being on oral steroid (prednisone) currently, defer injections for now, until off the oral steroid for at least 4-5 days. Anticipate will do bilateral subacromial steroid injection at next visit.  Keep up with home exercises from previous PT, and contact clinic if interested in return to physical therapy.    If you have any further questions for your physician or physician s care team you can contact them thru [employer] or by calling [phone].      Efra Meeks DO  [facility]    SUBJECTIVE- Interim History December 11, 2024    Chief Complaint   Patient presents with    Right Shoulder - Follow Up    Left Shoulder -  "Follow Up       Amadou Bach is a 58 year old male who is seen in f/u up for Data Unavailable. Since last visit on 12/18/23 patient has had a slow increase in pain over the last few months, noting that this seems to be back to \"where it was\". Is worse with physical activity and reaching overhead. Notes a catching and clunking with abduction and flexion..    Right subacromial steroid injection done at last OV provided relief for 3-4 months    Left shoulder:  Onset: 6 months, no fall or injury  Location: Lateral arm, upper arm and into tip of the shoulder  Worsened by: Reaching, flexion, abduction  Better with: PT, Cortisone injection  Treatments tried: CBD creams, ibuprofen, physical therapy (notes that he had good benefit with this as well as the injection)  Associated symptoms:  no distal numbness or tingling; denies swelling or warmth      Orthopedic/Surgical history: NO    The patient is seen by themselves.  The patient is Right handed    Social History/Occupation: Sales, golf     **  Above information per rooming staff.  Additional history:  Currently on prednisone for bilateral UE rash. Is tapering, thinks will finish early next week. Notes rash is improving with medication.    Previously saw ortho surgery 2021 for left shoulder impingement. Note reviewed, had subacromial steroid injection at that time.      REVIEW OF SYSTEMS:  Review of Systems    OBJECTIVE:  Ht 1.676 m (5' 6\")   Wt 86.6 kg (191 lb)   BMI 30.83 kg/m         Bilateral Shoulder exam    ROM:      forward flexion pain approaching shoulder level, above shoulder level; able to get to ~120 bilaterally        abduction grossly symmetric, pain approaching shoulder level, > 90       internal rotation some pain bilaterally with belly press, grossly symmetric motion       external rotation grossly symmetric, mild pain    Strength:      abduction        internal rotation        external rotation   Grossly symmetric, some pain with ER, " abduction    Impingement testing:       Boyle        empty can  pain        RADIOLOGY:  Final results and radiologist's interpretation, available in the [facility] health record.  Images were reviewed with the patient in the office today.  My personal interpretation of the performed imaging: left shoulder AC arthrosis. Faint calcification adjacent to greater tuberosity may represent calcific tendinitis. No acute bony abnormality noted.        XR Shoulder Left G/E 3 Views    Narrative    XR SHOULDER LEFT G/E 3 VIEWS   12/11/2024 8:47 AM     HISTORY: Diffuse shoulder pain, NKI; Chronic left shoulder pain.    COMPARISON: None.      Impression    IMPRESSION: Moderate osteoarthrosis of the AC joint. Small subacromial  enthesophyte. The glenohumeral joint appears normal. Tiny amount of  calcification in the region of the infraspinatus tendon insertion  consistent with calcific tendinosis. Otherwise negative. There is no  evidence of fracture. No subluxation or dislocation.    GAUDENCIO JESSICA MD         SYSTEM ID:  [id number]           =================      Previous right shoulder imaging:    EXAM: MR right shoulder without  contrast 12/1/2023 2:11 PM     TECHNIQUE: Multiplanar, multisequence imaging of the right shoulder  were obtained without administration of intravenous or intra-articular  gadolinium contrast using routine protocol.     History: evaluate rotator cuff; Right shoulder injury, initial  encounter     Comparison: 11/1/2023     Findings:     ROTATOR CUFF and ASSOCIATED STRUCTURES  Rotator cuff: On a background of severe tendinosis, small focal  moderate grade intrasubstance tear of the supraspinatus anterior  fibers at the footprint with possible bursal sided communication  channel extension posteriorly along the posterior fibers of the  supraspinatus adjacent to the footprint. Associated subcortical  cystlike change. Infraspinatus tendinosis.      On a background of tendinosis, 1.9 cm mediolateral dimension  sentinel  cyst formation along the subscapularis myotendinous junction with  associated tiny delamination tear along the subscapularis middle  fibers, possibly communicating to bursal side (image 13 through 21  sagittal sequence/series 8). Teres minor tendon is intact.     Bursa: Small-to-moderate subacromial/subdeltoid bursal fluid with mild  synovial proliferation, consistent with bursitis.     Musculature: Muscle bulk of rotator cuff is preserved.  Deltoid muscle  bulk is also preserved.      Acromioclavicular joint  There are severe degenerative changes of the acromioclavicular joint  with mass effect on the underlying supraspinatus myotendinous  junction. Acromion is type 2 in sagittal morphology.  Coracoacromial  ligament is not thickened.     OSSEOUS STRUCTURES  No fracture, marrow contusion or marrow infiltration. Cystic change of  greater tuberosity. Hypointensity in the humeral head, likely bone  island.     LONG BICIPITAL TENDON  The long head of the biceps tendon is normally situated within the  bicipital groove. Severe intra-articular segment tendinosis,  especially bicipital pulley segment. No complete or partial biceps  tendon tear is present.     GLENOHUMERAL JOINT  Joint fluid: Physiologic amount of joint fluid is  present.     Cartilage and subarticular bone:  No focal hyaline cartilage defects  are noted. No Hill-Sachs, reverse Hill-Sachs, or bony Bankart lesions  are seen.     Labrum: Limited assessment on this study with relative lack of joint  distention shows signal heterogeneity of the posterosuperior labrum,  may be related to magic angle artifact or alternatively degenerative  tearing. Otherwise, grossly in tact     ANCILLARY FINDINGS:                                                                      Impression:  1. Partial rotator cuff tendon tearing without high grade or full  thickness tear:   a.  On a background of severe tendinosis, small focal moderate grade  intrasubstance tear of  the supraspinatus anterior fibers at the  footprint with possible bursal sided communication channel extension  posteriorly along the posterior fibers of the supraspinatus adjacent  to the footprint.    b. Tiny delamination tear along the subscapularis middle fibers with  sentinel cyst along myotendinous junction.   c. No muscle atrophy.  2. Severe acromioclavicular joint degenerative change.  3. Severe long head of biceps tendinosis intra-articular segment.  4. Signal heterogeneity of the posterosuperior labrum, may be related  to magic angle artifact or alternatively degenerative tearing.     PATRICK RUIZ

## 2024-12-12 ENCOUNTER — TELEPHONE (OUTPATIENT)
Dept: GASTROENTEROLOGY | Facility: CLINIC | Age: 59
End: 2024-12-12
Payer: COMMERCIAL

## 2024-12-12 NOTE — TELEPHONE ENCOUNTER
Caller: Amadou Bach   Reason for Reschedule/Cancellation (please be detailed, any staff messages or encounters to note?):     Per pt -- change date       Prior to reschedule please review:  Ordering Provider:    Joseph Alanis MD i     Sedation Determined: mod   Does patient have any ASC Exclusions, please identify?: n       Notes on Cancelled Procedure:  Procedure:Lower Endoscopy [Colonoscopy]   Date: 12/19/2024  Location:[facility] [place]; [address] , [place] [address]  Surgeon: Vivek         Rescheduled: yes   Procedure: Lower Endoscopy [Colonoscopy]  Date: 01/30/2025   Location: [facility]; [address] , [place] [address]  Surgeon: Vivek   Sedation Level Scheduled  MAC          Reason for Sedation Level per site   Prep/Instructions updated and sent: mychart     Does patient need PAC or Pre -Op Rescheduled? : n       Send In - basket message to Panc - Carlitos Pool if EUS procedure is canceled or rescheduled: [ N/A, YES or NO] n

## 2024-12-19 NOTE — PROGRESS NOTES
ASSESSMENT & PLAN    Sky was seen today for follow up and follow up.    Diagnoses and all orders for this visit:    Traumatic incomplete tear of right rotator cuff, subsequent encounter  -     Large Joint Injection/Arthocentesis: bilateral subacromial bursa    Chronic left shoulder pain  -     Large Joint Injection/Arthocentesis: bilateral subacromial bursa    Rotator cuff syndrome of left shoulder  -     Large Joint Injection/Arthocentesis: bilateral subacromial bursa      See Patient Instructions  Patient Instructions   Bilateral subacromial steroid injections done today, deferred from previous visit.  Follow-up is open-ended.  Contact clinic any other questions or concerns.    If you have any further questions for your physician or physician s care team you can contact them thru [employer] or by calling [phone].        Injection Discharge Instructions    You may shower, however avoid swimming, tub baths or hot tubs for 24 hours following your procedure  You may have a mild to moderate increase in pain for several days following the injection.  It may take up to 14 days for the steroid medication to start working although you may feel the effect as early as a few days after the procedure.  You may use ice packs for 10-15 minutes, 3 to 4 times a day at the injection site for comfort  You may use anti-inflammatory medications (such as Ibuprofen or Aleve or Advil) if you are able to take safely, or acetaminophen (Tylenol) for pain control if necessary  If you were fasting, you may resume your normal diet and medications after the procedure  If you have diabetes, check your blood sugar more frequently than usual as your blood sugar may be higher than normal for 10-14 days following a steroid injection. Contact your doctor who manages your diabetes if your blood sugar is higher than usual    If you experience any of the following, call the clinic @ [phone]  - Fever over 100 degrees F  - Swelling, bleeding,  redness, drainage, warmth at the injection site  - New or worsening pain      Efra Meeks DO  [facility]    SUBJECTIVE- Interim History December 21, 2024    No chief complaint on file.      Amadou Bach is a 58 year old male who is seen in f/u up for a traumatic tear of the right rotator cuff tear and left shoulder pain. Since last visit on 12/11/2024, patient has completed his oral prednisone regiment and has been done for the past 5 days. Plan for bilateral subacromial corticosteroid injections today.        REVIEW OF SYSTEMS:  Review of Systems    OBJECTIVE:  BP (!) 157/78   Pulse 103   Wt 86.6 kg (191 lb)   BMI 30.83 kg/m         RADIOLOGY:  None new. See previous notes.      Large Joint Injection/Arthocentesis: bilateral subacromial bursa    Date/Time: 12/21/2024 8:13 AM    Performed by: Efra Meeks DO  Authorized by: Efra Meeks DO    Indications:  Pain  Needle Size:  25 G  Guidance: landmark guided    Approach:  Posterolateral  Location:  Shoulder  Laterality:  Bilateral      Site:  Bilateral subacromial bursa  Medications (Right):  40 mg triamcinolone 40 MG/ML; 2 mL lidocaine 1 %  Medications (Left):  40 mg triamcinolone 40 MG/ML; 2 mL lidocaine 1 %  Outcome:  Tolerated well, no immediate complications  Procedure discussed: discussed risks, benefits, and alternatives    Consent Given by:  Patient

## 2024-12-21 ENCOUNTER — OFFICE VISIT (OUTPATIENT)
Dept: ORTHOPEDICS | Facility: CLINIC | Age: 59
End: 2024-12-21
Payer: COMMERCIAL

## 2024-12-21 VITALS
HEART RATE: 103 BPM | WEIGHT: 191 LBS | DIASTOLIC BLOOD PRESSURE: 78 MMHG | SYSTOLIC BLOOD PRESSURE: 157 MMHG | BODY MASS INDEX: 30.83 KG/M2

## 2024-12-21 DIAGNOSIS — S46.011D TRAUMATIC INCOMPLETE TEAR OF RIGHT ROTATOR CUFF, SUBSEQUENT ENCOUNTER: Primary | ICD-10-CM

## 2024-12-21 DIAGNOSIS — M75.102 ROTATOR CUFF SYNDROME OF LEFT SHOULDER: ICD-10-CM

## 2024-12-21 DIAGNOSIS — G89.29 CHRONIC LEFT SHOULDER PAIN: ICD-10-CM

## 2024-12-21 DIAGNOSIS — M25.512 CHRONIC LEFT SHOULDER PAIN: ICD-10-CM

## 2024-12-21 PROCEDURE — 20610 DRAIN/INJ JOINT/BURSA W/O US: CPT | Mod: 50 | Performed by: PEDIATRICS

## 2024-12-21 RX ORDER — LIDOCAINE HYDROCHLORIDE 10 MG/ML
2 INJECTION, SOLUTION INFILTRATION; PERINEURAL
Status: COMPLETED | OUTPATIENT
Start: 2024-12-21 | End: 2024-12-21

## 2024-12-21 RX ORDER — TRIAMCINOLONE ACETONIDE 40 MG/ML
40 INJECTION, SUSPENSION INTRA-ARTICULAR; INTRAMUSCULAR
Status: COMPLETED | OUTPATIENT
Start: 2024-12-21 | End: 2024-12-21

## 2024-12-21 RX ADMIN — LIDOCAINE HYDROCHLORIDE 2 ML: 10 INJECTION, SOLUTION INFILTRATION; PERINEURAL at 08:13

## 2024-12-21 RX ADMIN — TRIAMCINOLONE ACETONIDE 40 MG: 40 INJECTION, SUSPENSION INTRA-ARTICULAR; INTRAMUSCULAR at 08:13

## 2024-12-21 NOTE — PATIENT INSTRUCTIONS
Bilateral subacromial steroid injections done today, deferred from previous visit.  Follow-up is open-ended.  Contact clinic any other questions or concerns.    If you have any further questions for your physician or physician s care team you can contact them thru [employer] or by calling [phone].        Injection Discharge Instructions    You may shower, however avoid swimming, tub baths or hot tubs for 24 hours following your procedure  You may have a mild to moderate increase in pain for several days following the injection.  It may take up to 14 days for the steroid medication to start working although you may feel the effect as early as a few days after the procedure.  You may use ice packs for 10-15 minutes, 3 to 4 times a day at the injection site for comfort  You may use anti-inflammatory medications (such as Ibuprofen or Aleve or Advil) if you are able to take safely, or acetaminophen (Tylenol) for pain control if necessary  If you were fasting, you may resume your normal diet and medications after the procedure  If you have diabetes, check your blood sugar more frequently than usual as your blood sugar may be higher than normal for 10-14 days following a steroid injection. Contact your doctor who manages your diabetes if your blood sugar is higher than usual    If you experience any of the following, call the clinic @ [phone]  - Fever over 100 degrees F  - Swelling, bleeding, redness, drainage, warmth at the injection site  - New or worsening pain

## 2024-12-21 NOTE — LETTER
12/21/2024      Amadou Bach  [address] [address] [address]      Dear Colleague,    Thank you for referring your patient, Amadou Bach, to the [facility]. Please see a copy of my visit note below.    ASSESSMENT & PLAN    Sky was seen today for follow up and follow up.    Diagnoses and all orders for this visit:    Traumatic incomplete tear of right rotator cuff, subsequent encounter  -     Large Joint Injection/Arthocentesis: bilateral subacromial bursa    Chronic left shoulder pain  -     Large Joint Injection/Arthocentesis: bilateral subacromial bursa    Rotator cuff syndrome of left shoulder  -     Large Joint Injection/Arthocentesis: bilateral subacromial bursa      See Patient Instructions  Patient Instructions   Bilateral subacromial steroid injections done today, deferred from previous visit.  Follow-up is open-ended.  Contact clinic any other questions or concerns.    If you have any further questions for your physician or physician s care team you can contact them thru [email or web address] or by calling [phone].        Injection Discharge Instructions    You may shower, however avoid swimming, tub baths or hot tubs for 24 hours following your procedure  You may have a mild to moderate increase in pain for several days following the injection.  It may take up to 14 days for the steroid medication to start working although you may feel the effect as early as a few days after the procedure.  You may use ice packs for 10-15 minutes, 3 to 4 times a day at the injection site for comfort  You may use anti-inflammatory medications (such as Ibuprofen or Aleve or Advil) if you are able to take safely, or acetaminophen (Tylenol) for pain control if necessary  If you were fasting, you may resume your normal diet and medications after the procedure  If you have diabetes, check your blood sugar more frequently than usual as your blood sugar may be higher than normal for  10-14 days following a steroid injection. Contact your doctor who manages your diabetes if your blood sugar is higher than usual    If you experience any of the following, call the clinic @ [phone]  - Fever over 100 degrees F  - Swelling, bleeding, redness, drainage, warmth at the injection site  - New or worsening pain      DO AYLA Beyer [facility]    SUBJECTIVE- Interim History December 21, 2024    No chief complaint on file.      Amadou Bach is a 58 year old male who is seen in f/u up for a traumatic tear of the right rotator cuff tear and left shoulder pain. Since last visit on 12/11/2024, patient has completed his oral prednisone regiment and has been done for the past 5 days. Plan for bilateral subacromial corticosteroid injections today.        REVIEW OF SYSTEMS:  Review of Systems    OBJECTIVE:  BP (!) 157/78   Pulse 103   Wt 86.6 kg (191 lb)   BMI 30.83 kg/m         RADIOLOGY:  None new. See previous notes.      Large Joint Injection/Arthocentesis: bilateral subacromial bursa    Date/Time: 12/21/2024 8:13 AM    Performed by: Efar Meeks DO  Authorized by: Efra Meeks DO    Indications:  Pain  Needle Size:  25 G  Guidance: landmark guided    Approach:  Posterolateral  Location:  Shoulder  Laterality:  Bilateral      Site:  Bilateral subacromial bursa  Medications (Right):  40 mg triamcinolone 40 MG/ML; 2 mL lidocaine 1 %  Medications (Left):  40 mg triamcinolone 40 MG/ML; 2 mL lidocaine 1 %  Outcome:  Tolerated well, no immediate complications  Procedure discussed: discussed risks, benefits, and alternatives    Consent Given by:  Patient          Again, thank you for allowing me to participate in the care of your patient.        Sincerely,        Efra Meeks DO

## 2024-12-26 DIAGNOSIS — N52.9 ERECTILE DYSFUNCTION, UNSPECIFIED ERECTILE DYSFUNCTION TYPE: ICD-10-CM

## 2024-12-26 RX ORDER — SILDENAFIL 100 MG/1
TABLET, FILM COATED ORAL
Qty: 12 TABLET | Refills: 0 | Status: SHIPPED | OUTPATIENT
Start: 2024-12-26

## 2025-01-06 ENCOUNTER — OFFICE VISIT (OUTPATIENT)
Dept: FAMILY MEDICINE | Facility: CLINIC | Age: 60
End: 2025-01-06
Payer: COMMERCIAL

## 2025-01-06 VITALS
WEIGHT: 172 LBS | OXYGEN SATURATION: 100 % | HEART RATE: 92 BPM | SYSTOLIC BLOOD PRESSURE: 138 MMHG | TEMPERATURE: 97.1 F | BODY MASS INDEX: 28.66 KG/M2 | RESPIRATION RATE: 18 BRPM | DIASTOLIC BLOOD PRESSURE: 82 MMHG | HEIGHT: 65 IN

## 2025-01-06 DIAGNOSIS — I10 HYPERTENSION, UNSPECIFIED TYPE: ICD-10-CM

## 2025-01-06 DIAGNOSIS — E11.43 DIABETIC AUTONOMIC NEUROPATHY ASSOCIATED WITH TYPE 2 DIABETES MELLITUS (H): ICD-10-CM

## 2025-01-06 DIAGNOSIS — N52.9 ERECTILE DYSFUNCTION, UNSPECIFIED ERECTILE DYSFUNCTION TYPE: ICD-10-CM

## 2025-01-06 DIAGNOSIS — I10 HTN, GOAL BELOW 140/90: ICD-10-CM

## 2025-01-06 DIAGNOSIS — Z00.00 ROUTINE GENERAL MEDICAL EXAMINATION AT A HEALTH CARE FACILITY: ICD-10-CM

## 2025-01-06 DIAGNOSIS — E78.5 HYPERLIPIDEMIA LDL GOAL <100: ICD-10-CM

## 2025-01-06 DIAGNOSIS — E11.9 TYPE 2 DIABETES MELLITUS WITHOUT COMPLICATION, WITHOUT LONG-TERM CURRENT USE OF INSULIN (H): Primary | ICD-10-CM

## 2025-01-06 DIAGNOSIS — N18.1 CHRONIC KIDNEY DISEASE, STAGE 1: ICD-10-CM

## 2025-01-06 LAB
CHOLEST SERPL-MCNC: 288 MG/DL
CREAT UR-MCNC: 112 MG/DL
EST. AVERAGE GLUCOSE BLD GHB EST-MCNC: 220 MG/DL
FASTING STATUS PATIENT QL REPORTED: NO
HBA1C MFR BLD: 9.3 % (ref 0–5.6)
HDLC SERPL-MCNC: 73 MG/DL
LDLC SERPL CALC-MCNC: 172 MG/DL
MICROALBUMIN UR-MCNC: 13.8 MG/L
MICROALBUMIN/CREAT UR: 12.32 MG/G CR (ref 0–17)
NONHDLC SERPL-MCNC: 215 MG/DL
TRIGL SERPL-MCNC: 216 MG/DL

## 2025-01-06 PROCEDURE — 83036 HEMOGLOBIN GLYCOSYLATED A1C: CPT | Performed by: INTERNAL MEDICINE

## 2025-01-06 PROCEDURE — 99396 PREV VISIT EST AGE 40-64: CPT | Mod: 25 | Performed by: INTERNAL MEDICINE

## 2025-01-06 PROCEDURE — 82043 UR ALBUMIN QUANTITATIVE: CPT | Performed by: INTERNAL MEDICINE

## 2025-01-06 PROCEDURE — 90471 IMMUNIZATION ADMIN: CPT | Performed by: INTERNAL MEDICINE

## 2025-01-06 PROCEDURE — 80053 COMPREHEN METABOLIC PANEL: CPT | Performed by: INTERNAL MEDICINE

## 2025-01-06 PROCEDURE — 90715 TDAP VACCINE 7 YRS/> IM: CPT | Performed by: INTERNAL MEDICINE

## 2025-01-06 PROCEDURE — 80061 LIPID PANEL: CPT | Performed by: INTERNAL MEDICINE

## 2025-01-06 PROCEDURE — 82570 ASSAY OF URINE CREATININE: CPT | Performed by: INTERNAL MEDICINE

## 2025-01-06 PROCEDURE — 36415 COLL VENOUS BLD VENIPUNCTURE: CPT | Performed by: INTERNAL MEDICINE

## 2025-01-06 RX ORDER — GABAPENTIN 300 MG/1
900 CAPSULE ORAL AT BEDTIME
Qty: 270 CAPSULE | Refills: 4 | Status: SHIPPED | OUTPATIENT
Start: 2025-01-06

## 2025-01-06 RX ORDER — HYDROCHLOROTHIAZIDE 25 MG/1
25 TABLET ORAL DAILY
Qty: 90 TABLET | Refills: 4 | Status: SHIPPED | OUTPATIENT
Start: 2025-01-06

## 2025-01-06 RX ORDER — ROSUVASTATIN CALCIUM 40 MG/1
40 TABLET, COATED ORAL DAILY
Qty: 90 TABLET | Refills: 3 | Status: SHIPPED | OUTPATIENT
Start: 2025-01-06

## 2025-01-06 RX ORDER — HYDROCHLOROTHIAZIDE 12.5 MG/1
CAPSULE ORAL
Qty: 6 EACH | Refills: 3 | Status: SHIPPED | OUTPATIENT
Start: 2025-01-06

## 2025-01-06 RX ORDER — SILDENAFIL 100 MG/1
TABLET, FILM COATED ORAL
Qty: 12 TABLET | Refills: 0 | Status: SHIPPED | OUTPATIENT
Start: 2025-01-06

## 2025-01-06 RX ORDER — KETOROLAC TROMETHAMINE 30 MG/ML
1 INJECTION, SOLUTION INTRAMUSCULAR; INTRAVENOUS ONCE
Qty: 1 EACH | Refills: 0 | Status: SHIPPED | OUTPATIENT
Start: 2025-01-06 | End: 2025-01-06

## 2025-01-06 RX ORDER — LOSARTAN POTASSIUM 100 MG/1
100 TABLET ORAL DAILY
Qty: 90 TABLET | Refills: 3 | Status: SHIPPED | OUTPATIENT
Start: 2025-01-06

## 2025-01-06 SDOH — HEALTH STABILITY: PHYSICAL HEALTH: ON AVERAGE, HOW MANY DAYS PER WEEK DO YOU ENGAGE IN MODERATE TO STRENUOUS EXERCISE (LIKE A BRISK WALK)?: 2 DAYS

## 2025-01-06 SDOH — HEALTH STABILITY: PHYSICAL HEALTH: ON AVERAGE, HOW MANY MINUTES DO YOU ENGAGE IN EXERCISE AT THIS LEVEL?: 30 MIN

## 2025-01-06 ASSESSMENT — SOCIAL DETERMINANTS OF HEALTH (SDOH): HOW OFTEN DO YOU GET TOGETHER WITH FRIENDS OR RELATIVES?: TWICE A WEEK

## 2025-01-06 ASSESSMENT — PAIN SCALES - GENERAL: PAINLEVEL_OUTOF10: MILD PAIN (3)

## 2025-01-06 NOTE — PATIENT INSTRUCTIONS
Patient Education   Preventive Care Advice   This is general advice given by our system to help you stay healthy. However, your care team may have specific advice just for you. Please talk to your care team about your preventive care needs.  Nutrition  Eat 5 or more servings of fruits and vegetables each day.  Try wheat bread, brown rice and whole grain pasta (instead of white bread, rice, and pasta).  Get enough calcium and vitamin D. Check the label on foods and aim for 100% of the RDA (recommended daily allowance).  Lifestyle  Exercise at least 150 minutes each week  (30 minutes a day, 5 days a week).  Do muscle strengthening activities 2 days a week. These help control your weight and prevent disease.  No smoking.  Wear sunscreen to prevent skin cancer.  Have a dental exam and cleaning every 6 months.  Yearly exams  See your health care team every year to talk about:  Any changes in your health.  Any medicines your care team has prescribed.  Preventive care, family planning, and ways to prevent chronic diseases.  Shots (vaccines)   HPV shots (up to age 26), if you've never had them before.  Hepatitis B shots (up to age 59), if you've never had them before.  COVID-19 shot: Get this shot when it's due.  Flu shot: Get a flu shot every year.  Tetanus shot: Get a tetanus shot every 10 years.  Pneumococcal, hepatitis A, and RSV shots: Ask your care team if you need these based on your risk.  Shingles shot (for age 50 and up)  General health tests  Diabetes screening:  Starting at age 35, Get screened for diabetes at least every 3 years.  If you are younger than age 35, ask your care team if you should be screened for diabetes.  Cholesterol test: At age 39, start having a cholesterol test every 5 years, or more often if advised.  Bone density scan (DEXA): At age 50, ask your care team if you should have this scan for osteoporosis (brittle bones).  Hepatitis C: Get tested at least once in your life.  STIs (sexually  transmitted infections)  Before age 24: Ask your care team if you should be screened for STIs.  After age 24: Get screened for STIs if you're at risk. You are at risk for STIs (including HIV) if:  You are sexually active with more than one person.  You don't use condoms every time.  You or a partner was diagnosed with a sexually transmitted infection.  If you are at risk for HIV, ask about PrEP medicine to prevent HIV.  Get tested for HIV at least once in your life, whether you are at risk for HIV or not.  Cancer screening tests  Cervical cancer screening: If you have a cervix, begin getting regular cervical cancer screening tests starting at age 21.  Breast cancer scan (mammogram): If you've ever had breasts, begin having regular mammograms starting at age 40. This is a scan to check for breast cancer.  Colon cancer screening: It is important to start screening for colon cancer at age 45.  Have a colonoscopy test every 10 years (or more often if you're at risk) Or, ask your provider about stool tests like a FIT test every year or Cologuard test every 3 years.  To learn more about your testing options, visit:   .  For help making a decision, visit:   https://bit.ly/fo00081.  Prostate cancer screening test: If you have a prostate, ask your care team if a prostate cancer screening test (PSA) at age 55 is right for you.  Lung cancer screening: If you are a current or former smoker ages 50 to 80, ask your care team if ongoing lung cancer screenings are right for you.  For informational purposes only. Not to replace the advice of your health care provider. Copyright   2023 [employer]. All rights reserved. Clinically reviewed by the [facility] Transitions Program. [employer] 653126 - REV 01/24.

## 2025-01-06 NOTE — NURSING NOTE
Prior to immunization administration, verified patients identity using patient s name and date of birth. Please see Immunization Activity for additional information.     Screening Questionnaire for Adult Immunization    Are you sick today?   No   Do you have allergies to medications, food, a vaccine component or latex?   No   Have you ever had a serious reaction after receiving a vaccination?   No   Do you have a long-term health problem with heart, lung, kidney, or metabolic disease (e.g., diabetes), asthma, a blood disorder, no spleen, complement component deficiency, a cochlear implant, or a spinal fluid leak?  Are you on long-term aspirin therapy?   No   Do you have cancer, leukemia, HIV/AIDS, or any other immune system problem?   No   Do you have a parent, brother, or sister with an immune system problem?   No   In the past 3 months, have you taken medications that affect  your immune system, such as prednisone, other steroids, or anticancer drugs; drugs for the treatment of rheumatoid arthritis, Crohn s disease, or psoriasis; or have you had radiation treatments?   No   Have you had a seizure, or a brain or other nervous system problem?   No   During the past year, have you received a transfusion of blood or blood    products, or been given immune (gamma) globulin or antiviral drug?   No   For women: Are you pregnant or is there a chance you could become       pregnant during the next month?   No   Have you received any vaccinations in the past 4 weeks?   No     Immunization questionnaire answers were all negative.      Patient instructed to remain in clinic for 15 minutes afterwards, and to report any adverse reactions.     Screening performed by Vidhi Andre CMA on 1/6/2025 at 7:57 AM.

## 2025-01-06 NOTE — PROGRESS NOTES
"Preventive Care Visit  [facility] DIMA Alanis MD, Internal Medicine - Pediatrics  Jan 6, 2025      Assessment & Plan   Problem List Items Addressed This Visit       Chronic kidney disease, stage 1    Relevant Orders    Albumin Random Urine Quantitative with Creat Ratio    Erectile dysfunction    Relevant Medications    sildenafil (VIAGRA) 100 MG tablet    Type 2 diabetes mellitus without complication, without long-term current use of insulin (H) - Primary    Relevant Medications    Continuous Glucose  (FREESTYLE ALEXUS 3 READER) SARA    Continuous Glucose Sensor (FREESTYLE ALEXUS 3 PLUS SENSOR) MISC    Other Relevant Orders    HEMOGLOBIN A1C (Completed)    Lipid panel reflex to direct LDL Non-fasting    Comprehensive metabolic panel (BMP + Alb, Alk Phos, ALT, AST, Total. Bili, TP)     Other Visit Diagnoses       Routine general medical examination at a health care facility        Diabetic autonomic neuropathy associated with type 2 diabetes mellitus (H)        Relevant Medications    gabapentin (NEURONTIN) 300 MG capsule    Hypertension, unspecified type        Relevant Medications    hydrochlorothiazide (HYDRODIURIL) 25 MG tablet    losartan (COZAAR) 100 MG tablet    HTN, goal below 140/90        Relevant Medications    losartan (COZAAR) 100 MG tablet    Hyperlipidemia LDL goal <100        Relevant Medications    rosuvastatin (CRESTOR) 40 MG tablet         BP     138/82  1/6/2025    Lab Results   Component Value Date     08/07/2023     11/15/2021     01/12/2021     Lab Results   Component Value Date    A1C 9.3 01/06/2025    A1C 8.2 01/18/2021     Lab Results   Component Value Date     08/07/2023     06/16/2022    LDL 84 01/18/2021     Lab Results   Component Value Date    MICROL 12.9 08/07/2023    MICROL 30 06/16/2022    MICROL 19 01/18/2021     No results found for: \"MICROALBUMIN\"'             BMI  Estimated body mass index is 28.62 kg/m  as " "calculated from the following:    Height as of this encounter: 1.651 m (5' 5\").    Weight as of this encounter: 78 kg (172 lb).   Weight management plan: Discussed healthy diet and exercise guidelines    Counseling  Appropriate preventive services were addressed with this patient via screening, questionnaire, or discussion as appropriate for fall prevention, nutrition, physical activity, Tobacco-use cessation, social engagement, weight loss and cognition.  Checklist reviewing preventive services available has been given to the patient.  Reviewed patient's diet, addressing concerns and/or questions.   He is at risk for lack of exercise and has been provided with information to increase physical activity for the benefit of his well-being.     Work on weight loss  Regular exercise      Candie Swanson is a 59 year old, presenting for the following:  Physical        1/6/2025     7:12 AM   Additional Questions   Roomed by Radha Ruiz plus       HPI    Working regularly and   Sober 2.5 years at this time   Blood sugars   Prednisone for rash and steroids   Steroid injections into the shoulders within the last month   Does service and working on equipment   Eye exam on Thursday - diabetic retinopathy   Colonoscopy on the 30 th   Annual dermatology check -    Dental check up       Health Care Directive  Patient has a Health Care Directive on file  Advance care planning document is on file and is current.      1/6/2025   General Health   How would you rate your overall physical health? (!) FAIR   Feel stress (tense, anxious, or unable to sleep) Only a little   (!) STRESS CONCERN      1/6/2025   Nutrition   Three or more servings of calcium each day? Yes   Diet: Diabetic   How many servings of fruit and vegetables per day? (!) 2-3   How many sweetened beverages each day? 0-1         1/6/2025   Exercise   Days per week of moderate/strenous exercise 2 days   Average minutes spent exercising at this level 30 min   (!) " "EXERCISE CONCERN      1/6/2025   Social Factors   Frequency of gathering with friends or relatives Twice a week   Worry food won't last until get money to buy more No   Food not last or not have enough money for food? No   Do you have housing? (Housing is defined as stable permanent housing and does not include staying ouside in a car, in a tent, in an abandoned building, in an overnight shelter, or couch-surfing.) Yes   Are you worried about losing your housing? No   Lack of transportation? No   Unable to get utilities (heat,electricity)? No         1/6/2025   Fall Risk   Fallen 2 or more times in the past year? No   Trouble with walking or balance? No          1/6/2025   Dental   Dentist two times every year? Yes         1/6/2025   TB Screening   Were you born outside of the [place]? No         Today's PHQ-2 Score:       1/6/2025     7:02 AM   PHQ-2 ( 1999 [employer])   Q1: Little interest or pleasure in doing things 0   Q2: Feeling down, depressed or hopeless 0   PHQ-2 Score 0    Q1: Little interest or pleasure in doing things Not at all   Q2: Feeling down, depressed or hopeless Not at all   PHQ-2 Score 0       Patient-reported           1/6/2025   Substance Use   Alcohol more than 3/day or more than 7/wk Not Applicable   Do you use any other substances recreationally? No     Social History     Tobacco Use    Smoking status: Never     Passive exposure: Never    Smokeless tobacco: Never   Vaping Use    Vaping status: Never Used   Substance Use Topics    Alcohol use: Yes     Comment: occ    Drug use: No           1/6/2025   STI Screening   New sexual partner(s) since last STI/HIV test? No   Last PSA: No results found for: \"PSA\"  ASCVD Risk   The 10-year ASCVD risk score (Gabriella MORTON, et al., 2019) is: 20.1%    Values used to calculate the score:      Age: 59 years      Sex: Male      Is Non- : No      Diabetic: Yes      Tobacco smoker: No      Systolic Blood Pressure: 138 mmHg      Is BP " treated: Yes      HDL Cholesterol: 64 mg/dL      Total Cholesterol: 212 mg/dL           Reviewed and updated as needed this visit by Provider                    Lab work is in process  Labs reviewed in EPIC  BP Readings from Last 3 Encounters:   01/06/25 138/82   12/21/24 (!) 157/78   11/01/23 122/76    Wt Readings from Last 3 Encounters:   01/06/25 78 kg (172 lb)   12/21/24 86.6 kg (191 lb)   12/11/24 86.6 kg (191 lb)                  Patient Active Problem List   Diagnosis    Erectile dysfunction    Type 2 diabetes mellitus without complication, without long-term current use of insulin (H)    Obesity, Class I, BMI 30-34.9    Generalized anxiety disorder    ACP (advance care planning)    History of adenomatous polyp of colon    Essential hypertension with goal blood pressure less than 140/90    Malignant melanoma of skin of trunk (H)    History of melanoma    AK (actinic keratosis)    Adjustment disorder with mixed disturbance of emotions and conduct    Chronic kidney disease, stage 1    Tear of medial meniscus of right knee, current, unspecified tear type, initial encounter    Chronic left shoulder pain    Traumatic incomplete tear of right rotator cuff, subsequent encounter     Past Surgical History:   Procedure Laterality Date    BIOPSY OF SKIN LESION      COLONOSCOPY  3/11/2014    Procedure: COMBINED COLONOSCOPY, SINGLE BIOPSY/POLYPECTOMY BY BIOPSY;;  Surgeon: Stephen Amin MD;  Location: [facility] OR    COLONOSCOPY WITH CO2 INSUFFLATION N/A 8/19/2014    Procedure: COLONOSCOPY WITH CO2 INSUFFLATION;  Surgeon: Stephen Amin MD;  Location: [facility] OR    COLONOSCOPY WITH CO2 INSUFFLATION N/A 10/11/2017    Procedure: COLONOSCOPY WITH CO2 INSUFFLATION;  COLON/ REPEAT COLONOSCOPY;  Surgeon: Jeremy Mcqueen DO;  Location: [facility] OR    HC TOOTH EXTRACTION W/FORCEP      wisdom teeth x4    WISDOM TOOTH EXTRACTION         Social History     Tobacco Use    Smoking status: Never     Passive exposure: Never    Smokeless  tobacco: Never   Substance Use Topics    Alcohol use: Yes     Comment: occ     Family History   Problem Relation Age of Onset    Diabetes Mother     Hypertension Mother     Diabetes Father     Cancer Father         lung    Cancer Other         aunt - skin cancer    Skin Cancer Other     Melanoma No family hx of     Lung Cancer Mother     Lung Cancer Father     Coronary Artery Disease Father     Cerebrovascular Disease Father          Current Outpatient Medications   Medication Sig Dispense Refill    ASPIRIN LOW DOSE 81 MG EC tablet TAKE 1 TABLET BY MOUTH EVERY DAY 90 tablet 4    bisacodyl (DULCOLAX) 5 MG EC tablet Two days prior to exam take two (2) tablets at 4pm. One day prior to exam take two (2) tablets at 4pm 4 tablet 0    ciclopirox (PENLAC) 8 % external solution Apply to adjacent skin and affected nails daily.  Remove with alcohol every 7 days, then repeat. 6.6 mL 5    Coenzyme Q10 (CO Q 10 PO) 2 per day      Continuous Blood Gluc Sensor (FREESTYLE ALEXUS 2 SENSOR) MISC CHANGE EVERY 14 DAYS 6 each 5    Continuous Glucose  (FREESTYLE ALEXUS 3 READER) SARA 1 each once for 1 dose. Use to read blood sugars per 's instructions. 1 each 0    Continuous Glucose Sensor (FREESTYLE ALEXUS 3 PLUS SENSOR) MISC Use 1 sensor every 15 days. Use to read blood sugars per 's instructions. 6 each 3    Continuous Glucose Sensor (FREESTYLE ALEXUS 3 PLUS SENSOR) MISC Change every 15 days. 2 each 0    Cyanocobalamin (VITAMIN B-12 PO) Take  by mouth daily.      famotidine (PEPCID) 20 MG tablet Take 1 tablet (20 mg) by mouth 2 times daily as needed. +++APPOINTMENT NEEDED FOR REFILLS+++ 180 tablet 0    FISH OIL CONCENTRATE 1000 MG OR CAPS 2 tablets daily      gabapentin (NEURONTIN) 300 MG capsule Take 3 capsules (900 mg) by mouth at bedtime. 270 capsule 4    hydrochlorothiazide (HYDRODIURIL) 25 MG tablet Take 1 tablet (25 mg) by mouth daily. 90 tablet 4    insulin aspart (NOVOLOG FLEXPEN) 100 UNIT/ML pen  12-20 Units, subcutaneous TID per scale 60 mL 3    insulin glargine (LANTUS SOLOSTAR) 100 UNIT/ML pen INJECT 50 UNITS SUBCUTANEOUSLY AT BEDTIME 60 mL 3    insulin lispro (HUMALOG KWIKPEN) 100 UNIT/ML (1 unit dial) KWIKPEN Inject 12-20 Units Subcutaneous 3 times daily (before meals) 54 mL 3    insulin pen needle (B-D U/F) 31G X 5 MM miscellaneous USE 4 PEN NEEDLES DAILY OR AS DIRECTED. 400 each 0    ketoconazole (NIZORAL) 2 % external shampoo Use daily as needed 120 mL 11    liraglutide (VICTOZA PEN) 18 MG/3ML solution INJECT 1.8 MG UNDER THE SKIN ONCE DAILY 27 mL 9    losartan (COZAAR) 100 MG tablet Take 1 tablet (100 mg) by mouth daily. 90 tablet 3    metFORMIN (GLUCOPHAGE XR) 500 MG 24 hr tablet TAKE 2 TABLETS BY MOUTH TWICE A  tablet 3    MULTIPLE VITAMINS OR 1000mg daily      polyethylene glycol (GOLYTELY) 236 g suspension Two days before procedure at 5PM fill first container with water. Mix and drink an 8 oz glass every 15 minutes until HALF of the container is gone. Place the remainder in the refrigerator. One day before procedure at 5PM drink second half of bowel prep. Drink an 8 oz glass every 15 minutes until it is gone. Day of procedure 6 hours before arrival time fill the 2nd container with water. Mix and drink an 8 oz glass every 15 minutes until HALF of the container is gone. Discard the remaining solution. 8000 mL 0    rosuvastatin (CRESTOR) 40 MG tablet Take 1 tablet (40 mg) by mouth daily. 90 tablet 3    sildenafil (VIAGRA) 100 MG tablet TAKE 1 TABLET BY MOUTH 30 MINS BEFORE INTERCOURSE 12 tablet 0    thiamine (B-1) 100 MG tablet TAKE 1 TABLET BY MOUTH EVERY DAY 90 tablet 1    VITAMIN D 1000 UNIT OR CAPS Take 1 capsule by mouth daily        Allergies   Allergen Reactions    Cats      Cat dander    Duloxetine Hcl     Molds & Smuts     Ms Contin [Morphine]     Ceftazidime Rash     Was also on vanco    Vancomycin Rash     Was also on ceftazidime         Review of Systems  Constitutional, HEENT,  "cardiovascular, pulmonary, gi and gu systems are negative, except as otherwise noted.     Objective    Exam  /82 (BP Location: Left arm, Patient Position: Sitting, Cuff Size: Adult Regular)   Pulse 92   Temp 97.1  F (36.2  C) (Temporal)   Resp 18   Ht 1.651 m (5' 5\")   Wt 78 kg (172 lb)   SpO2 100%   BMI 28.62 kg/m     Estimated body mass index is 28.62 kg/m  as calculated from the following:    Height as of this encounter: 1.651 m (5' 5\").    Weight as of this encounter: 78 kg (172 lb).    Physical Exam  GENERAL: alert and no distress  EYES: Eyes grossly normal to inspection, PERRL and conjunctivae and sclerae normal  HENT: ear canals and TM's normal, nose and mouth without ulcers or lesions  NECK: no adenopathy, no asymmetry, masses, or scars  RESP: lungs clear to auscultation - no rales, rhonchi or wheezes  CV: regular rate and rhythm, normal S1 S2, no S3 or S4, no murmur, click or rub, no peripheral edema  ABDOMEN: soft, nontender, no hepatosplenomegaly, no masses and bowel sounds normal  MS: no gross musculoskeletal defects noted, no edema  SKIN: cracked and open skin on tips of fingers    NEURO: peripheral neuropathy fingers and toes     PSYCH: mentation appears normal, affect normal/bright  LYMPH: no cervical, supraclavicular, axillary, or inguinal adenopathy        Signed Electronically by: Joseph Alanis MD    "

## 2025-01-07 LAB
ALBUMIN SERPL BCG-MCNC: 4.2 G/DL (ref 3.5–5.2)
ALP SERPL-CCNC: 87 U/L (ref 40–150)
ALT SERPL W P-5'-P-CCNC: 61 U/L (ref 0–70)
ANION GAP SERPL CALCULATED.3IONS-SCNC: 15 MMOL/L (ref 7–15)
AST SERPL W P-5'-P-CCNC: 21 U/L (ref 0–45)
BILIRUB SERPL-MCNC: 0.3 MG/DL
BUN SERPL-MCNC: 20.9 MG/DL (ref 8–23)
CALCIUM SERPL-MCNC: 10.7 MG/DL (ref 8.8–10.4)
CHLORIDE SERPL-SCNC: 95 MMOL/L (ref 98–107)
CREAT SERPL-MCNC: 0.82 MG/DL (ref 0.67–1.17)
EGFRCR SERPLBLD CKD-EPI 2021: >90 ML/MIN/1.73M2
GLUCOSE SERPL-MCNC: 236 MG/DL (ref 70–99)
HCO3 SERPL-SCNC: 26 MMOL/L (ref 22–29)
POTASSIUM SERPL-SCNC: 4.7 MMOL/L (ref 3.4–5.3)
PROT SERPL-MCNC: 6.8 G/DL (ref 6.4–8.3)
SODIUM SERPL-SCNC: 136 MMOL/L (ref 135–145)

## 2025-01-08 DIAGNOSIS — K21.00 GASTROESOPHAGEAL REFLUX DISEASE WITH ESOPHAGITIS WITHOUT HEMORRHAGE: ICD-10-CM

## 2025-01-08 RX ORDER — FAMOTIDINE 20 MG/1
20 TABLET, FILM COATED ORAL 2 TIMES DAILY PRN
Qty: 180 TABLET | Refills: 2 | Status: SHIPPED | OUTPATIENT
Start: 2025-01-08

## 2025-02-11 ENCOUNTER — MYC REFILL (OUTPATIENT)
Dept: FAMILY MEDICINE | Facility: CLINIC | Age: 60
End: 2025-02-11
Payer: COMMERCIAL

## 2025-02-11 DIAGNOSIS — E66.09 NON MORBID OBESITY DUE TO EXCESS CALORIES: ICD-10-CM

## 2025-02-11 DIAGNOSIS — Z79.4 TYPE 2 DIABETES MELLITUS WITHOUT COMPLICATION, WITH LONG-TERM CURRENT USE OF INSULIN (H): ICD-10-CM

## 2025-02-11 DIAGNOSIS — N52.9 ERECTILE DYSFUNCTION, UNSPECIFIED ERECTILE DYSFUNCTION TYPE: ICD-10-CM

## 2025-02-11 DIAGNOSIS — E11.9 TYPE 2 DIABETES MELLITUS WITHOUT COMPLICATION, WITH LONG-TERM CURRENT USE OF INSULIN (H): ICD-10-CM

## 2025-02-11 DIAGNOSIS — E11.9 TYPE 2 DIABETES MELLITUS WITHOUT COMPLICATION, WITHOUT LONG-TERM CURRENT USE OF INSULIN (H): ICD-10-CM

## 2025-02-11 RX ORDER — ACYCLOVIR 800 MG/1
TABLET ORAL
Qty: 6 EACH | Refills: 1 | Status: SHIPPED | OUTPATIENT
Start: 2025-02-11

## 2025-02-11 RX ORDER — LIRAGLUTIDE 6 MG/ML
INJECTION SUBCUTANEOUS
Qty: 27 ML | Refills: 9 | Status: SHIPPED | OUTPATIENT
Start: 2025-02-11

## 2025-02-11 RX ORDER — HYDROCHLOROTHIAZIDE 12.5 MG/1
CAPSULE ORAL
Qty: 2 EACH | Refills: 0 | Status: CANCELLED | OUTPATIENT
Start: 2025-02-11

## 2025-02-11 RX ORDER — SILDENAFIL 100 MG/1
TABLET, FILM COATED ORAL
Qty: 12 TABLET | Refills: 0 | Status: SHIPPED | OUTPATIENT
Start: 2025-02-11

## 2025-02-24 ENCOUNTER — TELEPHONE (OUTPATIENT)
Dept: DERMATOLOGY | Facility: CLINIC | Age: 60
End: 2025-02-24

## 2025-02-24 ENCOUNTER — OFFICE VISIT (OUTPATIENT)
Dept: DERMATOLOGY | Facility: CLINIC | Age: 60
End: 2025-02-24
Payer: COMMERCIAL

## 2025-02-24 DIAGNOSIS — L21.9 DERMATITIS, SEBORRHEIC: ICD-10-CM

## 2025-02-24 DIAGNOSIS — L81.4 LENTIGO: ICD-10-CM

## 2025-02-24 DIAGNOSIS — D22.9 NEVUS: Primary | ICD-10-CM

## 2025-02-24 DIAGNOSIS — Z85.820 HISTORY OF MELANOMA: ICD-10-CM

## 2025-02-24 DIAGNOSIS — D18.01 ANGIOMA OF SKIN: ICD-10-CM

## 2025-02-24 DIAGNOSIS — B35.1 ONYCHOMYCOSIS: ICD-10-CM

## 2025-02-24 DIAGNOSIS — L57.0 ACTINIC KERATOSIS: ICD-10-CM

## 2025-02-24 DIAGNOSIS — L82.1 SEBORRHEIC KERATOSIS: ICD-10-CM

## 2025-02-24 PROCEDURE — 99213 OFFICE O/P EST LOW 20 MIN: CPT | Mod: 25 | Performed by: PHYSICIAN ASSISTANT

## 2025-02-24 PROCEDURE — 17003 DESTRUCT PREMALG LES 2-14: CPT | Performed by: PHYSICIAN ASSISTANT

## 2025-02-24 PROCEDURE — 17000 DESTRUCT PREMALG LESION: CPT | Performed by: PHYSICIAN ASSISTANT

## 2025-02-24 RX ORDER — KETOCONAZOLE 20 MG/ML
SHAMPOO, SUSPENSION TOPICAL
Qty: 120 ML | Refills: 11 | Status: SHIPPED | OUTPATIENT
Start: 2025-02-24

## 2025-02-24 RX ORDER — CICLOPIROX 80 MG/ML
SOLUTION TOPICAL
Qty: 6.6 ML | Refills: 5 | Status: SHIPPED | OUTPATIENT
Start: 2025-02-24

## 2025-02-24 NOTE — PROGRESS NOTES
HPI:   Chief complaints: Amadou Bach is a pleasant 59 year old male who presents for Full skin cancer screening to rule out skin cancer   Last Skin Exam: 1 year ago   1st Baseline: no  Personal HX of Skin Cancer: yes melanoma on the left superior anterior shoulder 1/2015 with subsequent mms   Personal HX of Malignant Melanoma: yes   Family HX of Skin Cancer / Malignant Melanoma: no  Personal HX of Atypical Moles:   no  Risk factors: history of melanoma; history of sun exposure and burns  New / Changing lesions: yes spot on the right forehead  Social History:   On review of systems, there are no further skin complaints, patient is feeling otherwise well.   ROS of the following were done and are negative: Constitutional, Eyes, Ears, Nose,   Mouth, Throat, Cardiovascular, Respiratory, GI, Genitourinary, Musculoskeletal,   Psychiatric, Endocrine, Allergic/Immunologic.    PHYSICAL EXAM:   There were no vitals taken for this visit.  Skin exam performed as follows: Type 2 skin. Mood appropriate  Alert and Oriented X 3. Well developed, well nourished in no distress.  General appearance: Normal  Head including face: Normal  Eyes: conjunctiva and lids: Normal  Mouth: Lips, teeth, gums: Normal  Neck: Normal  Chest-breast/axillae: Normal  Back: Normal  Extremities: digits/nails (clubbing): Normal  Eccrine and Apocrine glands: Normal  Right upper extremity: Normal  Left upper extremity: Normal  Right lower extremity: Normal  Left lower extremity: Normal  Skin: Scalp and body hair: See below    Pt deferred exam of breasts, groin, buttocks: No    Other physical findings:  1. Multiple pigmented macules on extremities and trunk  2. Multiple pigmented macules on face, trunk and extremities  3. Multiple vascular papules on trunk, arms and legs  4. Multiple scattered keratotic plaques  5. Flaking and erythema on the chest  6. Thickened toenails with subungual debris  7. Pink gritty papule on the right forehead x 1, right cheek x  1       Except as noted above, no other signs of skin cancer or melanoma.     ASSESSMENT/PLAN:   Benign Full skin cancer screening today. . Patient with history of melanoma in 2015  Advised on monthly self exams and 1 year  Patient Education: Appropriate brochures given.    Multiple benign appearing melanocytic nevi on arms, legs and trunk. Discussed ABCDEs of melanoma and sunscreen.   Multiple lentigos on arms, legs and trunk. Advised benign, no treatment needed.  Multiple scattered angiomas. Advised benign, no treatment needed.   Seborrheic keratosis on arms, legs and trunk. Advised benign, no treatment needed.  Seborrheic dermatitis on the chest; he will get intermittently on the scalp as well.   --Continue ketoconazole shampoo daily as needed  Onychomycosis - discussed oral terbinafine he declines.   --Continue penlac daily; apply x 7 days then remove with isopropyl alcohol and repeat.   Actinic keratosis on the right forehead x 1, right cheek x 1. As precancerous, cryosurgery performed. Advised on blistering and post-op care. Advised if not resolved in 1-2 months to return for evaluation            Follow-up: yearly/PRN sooner    1.) Patient was asked about new and changing moles. YES  2.) Patient received a complete physical skin examination: YES  3.) Patient was counseled to perform a monthly self skin examination: YES  Scribed By: Edwige Armstrong MS, PABRANDON

## 2025-02-24 NOTE — LETTER
2/24/2025      Amadou Bach  [address] [address] [facility] [address]      Dear Colleague,    Thank you for referring your patient, Amadou Bach, to the [facility]. Please see a copy of my visit note below.    HPI:   Chief complaints: Amadou Bach is a pleasant 59 year old male who presents for Full skin cancer screening to rule out skin cancer   Last Skin Exam: 1 year ago   1st Baseline: no  Personal HX of Skin Cancer: yes melanoma on the left superior anterior shoulder 1/2015 with subsequent mms   Personal HX of Malignant Melanoma: yes   Family HX of Skin Cancer / Malignant Melanoma: no  Personal HX of Atypical Moles:   no  Risk factors: history of melanoma; history of sun exposure and burns  New / Changing lesions: yes spot on the right forehead  Social History:   On review of systems, there are no further skin complaints, patient is feeling otherwise well.   ROS of the following were done and are negative: Constitutional, Eyes, Ears, Nose,   Mouth, Throat, Cardiovascular, Respiratory, GI, Genitourinary, Musculoskeletal,   Psychiatric, Endocrine, Allergic/Immunologic.    PHYSICAL EXAM:   There were no vitals taken for this visit.  Skin exam performed as follows: Type 2 skin. Mood appropriate  Alert and Oriented X 3. Well developed, well nourished in no distress.  General appearance: Normal  Head including face: Normal  Eyes: conjunctiva and lids: Normal  Mouth: Lips, teeth, gums: Normal  Neck: Normal  Chest-breast/axillae: Normal  Back: Normal  Extremities: digits/nails (clubbing): Normal  Eccrine and Apocrine glands: Normal  Right upper extremity: Normal  Left upper extremity: Normal  Right lower extremity: Normal  Left lower extremity: Normal  Skin: Scalp and body hair: See below    Pt deferred exam of breasts, groin, buttocks: No    Other physical findings:  1. Multiple pigmented macules on extremities and trunk  2. Multiple pigmented macules on face, trunk and  extremities  3. Multiple vascular papules on trunk, arms and legs  4. Multiple scattered keratotic plaques  5. Flaking and erythema on the chest  6. Thickened toenails with subungual debris  7. Pink gritty papule on the right forehead x 1, right cheek x 1       Except as noted above, no other signs of skin cancer or melanoma.     ASSESSMENT/PLAN:   Benign Full skin cancer screening today. . Patient with history of melanoma in 2015  Advised on monthly self exams and 1 year  Patient Education: Appropriate brochures given.    Multiple benign appearing melanocytic nevi on arms, legs and trunk. Discussed ABCDEs of melanoma and sunscreen.   Multiple lentigos on arms, legs and trunk. Advised benign, no treatment needed.  Multiple scattered angiomas. Advised benign, no treatment needed.   Seborrheic keratosis on arms, legs and trunk. Advised benign, no treatment needed.  Seborrheic dermatitis on the chest; he will get intermittently on the scalp as well.   --Continue ketoconazole shampoo daily as needed  Onychomycosis - discussed oral terbinafine he declines.   --Continue penlac daily; apply x 7 days then remove with isopropyl alcohol and repeat.   Actinic keratosis on the right forehead x 1, right cheek x 1. As precancerous, cryosurgery performed. Advised on blistering and post-op care. Advised if not resolved in 1-2 months to return for evaluation            Follow-up: yearly/PRN sooner    1.) Patient was asked about new and changing moles. YES  2.) Patient received a complete physical skin examination: YES  3.) Patient was counseled to perform a monthly self skin examination: YES  Scribed By: Edwige Armstrong, MS, PA-C      Again, thank you for allowing me to participate in the care of your patient.        Sincerely,        Edwige Armstrong PA-C    Electronically signed

## 2025-02-25 NOTE — TELEPHONE ENCOUNTER
PRIOR AUTHORIZATION DENIED    Medication: CICLOPIROX 8 % EX SOLN  Insurance Company: [employer] - Phone [phone] Fax [phone]  Denial Date: 2/25/2025  Denial Reason(s): Needs to try/fail oral antifungals      Appeal Information:   Patient Notified: No

## 2025-02-25 NOTE — TELEPHONE ENCOUNTER
Please let patient know. He can try the oral med (terbinafine) but we discussed risks and blood monitoring with this yesterday. He can pay out of pocket for the topical if he would like

## 2025-02-25 NOTE — TELEPHONE ENCOUNTER
Patient Contact    Attempt # 1    Was call answered?  No      Left message on answering machine for patient to call back.    Thank you,    Radha HERNANDEZRN BSN  [facility]- [phone]

## 2025-04-11 DIAGNOSIS — N52.9 ERECTILE DYSFUNCTION, UNSPECIFIED ERECTILE DYSFUNCTION TYPE: ICD-10-CM

## 2025-04-11 NOTE — TELEPHONE ENCOUNTER
Controlled Substance Refill Request for oxyCODONE (ROXICODONE) 5 MG tablet  Problem List Complete:    No    checked in past 3 months?  No, route to RN         Negative

## 2025-04-12 RX ORDER — SILDENAFIL 100 MG/1
TABLET, FILM COATED ORAL
Qty: 12 TABLET | Refills: 1 | Status: SHIPPED | OUTPATIENT
Start: 2025-04-12

## 2025-04-13 ENCOUNTER — HEALTH MAINTENANCE LETTER (OUTPATIENT)
Age: 60
End: 2025-04-13

## 2025-05-31 DIAGNOSIS — E11.9 TYPE 2 DIABETES MELLITUS WITHOUT COMPLICATION, WITHOUT LONG-TERM CURRENT USE OF INSULIN (H): ICD-10-CM

## 2025-06-01 RX ORDER — PEN NEEDLE, DIABETIC 31 GX5/16"
NEEDLE, DISPOSABLE MISCELLANEOUS
Qty: 400 EACH | Refills: 1 | Status: SHIPPED | OUTPATIENT
Start: 2025-06-01

## 2025-06-06 DIAGNOSIS — N52.9 ERECTILE DYSFUNCTION, UNSPECIFIED ERECTILE DYSFUNCTION TYPE: ICD-10-CM

## 2025-06-08 RX ORDER — SILDENAFIL 100 MG/1
TABLET, FILM COATED ORAL
Qty: 12 TABLET | Refills: 1 | Status: SHIPPED | OUTPATIENT
Start: 2025-06-08

## 2025-07-23 DIAGNOSIS — Z79.4 TYPE 2 DIABETES MELLITUS WITHOUT COMPLICATION, WITH LONG-TERM CURRENT USE OF INSULIN (H): ICD-10-CM

## 2025-07-23 DIAGNOSIS — E11.9 TYPE 2 DIABETES MELLITUS WITHOUT COMPLICATION, WITH LONG-TERM CURRENT USE OF INSULIN (H): ICD-10-CM

## 2025-07-23 RX ORDER — HYDROCHLOROTHIAZIDE 12.5 MG/1
CAPSULE ORAL
Qty: 6 EACH | Refills: 1 | Status: SHIPPED | OUTPATIENT
Start: 2025-07-23

## 2025-07-27 ENCOUNTER — HEALTH MAINTENANCE LETTER (OUTPATIENT)
Age: 60
End: 2025-07-27

## 2025-08-06 ENCOUNTER — PATIENT OUTREACH (OUTPATIENT)
Dept: GASTROENTEROLOGY | Facility: CLINIC | Age: 60
End: 2025-08-06
Payer: COMMERCIAL

## 2025-08-06 DIAGNOSIS — Z12.11 SPECIAL SCREENING FOR MALIGNANT NEOPLASMS, COLON: Primary | ICD-10-CM

## 2025-08-19 ENCOUNTER — TELEPHONE (OUTPATIENT)
Dept: GASTROENTEROLOGY | Facility: CLINIC | Age: 60
End: 2025-08-19
Payer: COMMERCIAL

## 2025-08-19 DIAGNOSIS — Z12.11 SPECIAL SCREENING FOR MALIGNANT NEOPLASMS, COLON: Primary | ICD-10-CM

## 2025-08-19 RX ORDER — BISACODYL 5 MG/1
TABLET, DELAYED RELEASE ORAL
Qty: 4 TABLET | Refills: 0 | Status: SHIPPED | OUTPATIENT
Start: 2025-08-19

## 2025-09-03 ENCOUNTER — HOSPITAL ENCOUNTER (OUTPATIENT)
Facility: AMBULATORY SURGERY CENTER | Age: 60
Discharge: HOME | End: 2025-09-03
Payer: COMMERCIAL

## 2025-09-03 ENCOUNTER — TELEPHONE (OUTPATIENT)
Dept: GASTROENTEROLOGY | Facility: CLINIC | Age: 60
End: 2025-09-03
Payer: COMMERCIAL

## 2025-09-03 ENCOUNTER — SURGERY (OUTPATIENT)
Age: 60
End: 2025-09-03
Payer: COMMERCIAL

## 2025-09-03 ENCOUNTER — RESULTS FOLLOW-UP (OUTPATIENT)
Dept: GASTROENTEROLOGY | Facility: CLINIC | Age: 60
End: 2025-09-03
Payer: COMMERCIAL

## 2025-09-03 VITALS
HEART RATE: 91 BPM | TEMPERATURE: 97.6 F | DIASTOLIC BLOOD PRESSURE: 99 MMHG | OXYGEN SATURATION: 98 % | SYSTOLIC BLOOD PRESSURE: 149 MMHG | RESPIRATION RATE: 16 BRPM

## 2025-09-03 DIAGNOSIS — Z12.11 ENCOUNTER FOR SCREENING COLONOSCOPY: Primary | ICD-10-CM

## 2025-09-03 LAB — COLONOSCOPY: NORMAL

## 2025-09-03 PROCEDURE — 99152 MOD SED SAME PHYS/QHP 5/>YRS: CPT | Mod: 59

## 2025-09-03 PROCEDURE — G8918 PT W/O PREOP ORDER IV AB PRO: HCPCS

## 2025-09-03 PROCEDURE — G8907 PT DOC NO EVENTS ON DISCHARG: HCPCS

## 2025-09-03 PROCEDURE — G0105 COLORECTAL SCRN; HI RISK IND: HCPCS | Mod: 53

## 2025-09-03 PROCEDURE — 45378 DIAGNOSTIC COLONOSCOPY: CPT | Mod: 74

## 2025-09-03 RX ORDER — NALOXONE HYDROCHLORIDE 0.4 MG/ML
0.4 INJECTION, SOLUTION INTRAMUSCULAR; INTRAVENOUS; SUBCUTANEOUS
Status: DISCONTINUED | OUTPATIENT
Start: 2025-09-03 | End: 2025-09-04 | Stop reason: HOSPADM

## 2025-09-03 RX ORDER — FENTANYL CITRATE 50 UG/ML
INJECTION, SOLUTION INTRAMUSCULAR; INTRAVENOUS PRN
Status: DISCONTINUED | OUTPATIENT
Start: 2025-09-03 | End: 2025-09-03 | Stop reason: HOSPADM

## 2025-09-03 RX ORDER — LIDOCAINE 40 MG/G
CREAM TOPICAL
Status: DISCONTINUED | OUTPATIENT
Start: 2025-09-03 | End: 2025-09-04 | Stop reason: HOSPADM

## 2025-09-03 RX ORDER — FLUMAZENIL 0.1 MG/ML
0.2 INJECTION, SOLUTION INTRAVENOUS
Status: ACTIVE | OUTPATIENT
Start: 2025-09-03 | End: 2025-09-03

## 2025-09-03 RX ORDER — NALOXONE HYDROCHLORIDE 0.4 MG/ML
0.2 INJECTION, SOLUTION INTRAMUSCULAR; INTRAVENOUS; SUBCUTANEOUS
Status: DISCONTINUED | OUTPATIENT
Start: 2025-09-03 | End: 2025-09-04 | Stop reason: HOSPADM

## 2025-09-03 RX ORDER — PROCHLORPERAZINE MALEATE 10 MG
10 TABLET ORAL EVERY 6 HOURS PRN
Status: DISCONTINUED | OUTPATIENT
Start: 2025-09-03 | End: 2025-09-04 | Stop reason: HOSPADM

## 2025-09-03 RX ORDER — ONDANSETRON 2 MG/ML
4 INJECTION INTRAMUSCULAR; INTRAVENOUS EVERY 6 HOURS PRN
Status: DISCONTINUED | OUTPATIENT
Start: 2025-09-03 | End: 2025-09-04 | Stop reason: HOSPADM

## 2025-09-03 RX ORDER — ONDANSETRON 2 MG/ML
4 INJECTION INTRAMUSCULAR; INTRAVENOUS
Status: DISCONTINUED | OUTPATIENT
Start: 2025-09-03 | End: 2025-09-04 | Stop reason: HOSPADM

## 2025-09-03 RX ORDER — ONDANSETRON 4 MG/1
4 TABLET, ORALLY DISINTEGRATING ORAL EVERY 6 HOURS PRN
Status: DISCONTINUED | OUTPATIENT
Start: 2025-09-03 | End: 2025-09-04 | Stop reason: HOSPADM

## 2025-09-03 RX ADMIN — FENTANYL CITRATE 100 MCG: 50 INJECTION, SOLUTION INTRAMUSCULAR; INTRAVENOUS at 10:20

## (undated) DEVICE — SOL WATER IRRIG 1000ML BOTTLE 07139-09

## (undated) DEVICE — PAD CHUX UNDERPAD 23X24" 7136

## (undated) DEVICE — KIT ENDO FIRST STEP DISINFECTANT 200ML W/POUCH EP-4

## (undated) RX ORDER — LIDOCAINE HYDROCHLORIDE 10 MG/ML
INJECTION, SOLUTION EPIDURAL; INFILTRATION; INTRACAUDAL; PERINEURAL
Status: DISPENSED
Start: 2017-10-11

## (undated) RX ORDER — REGADENOSON 0.08 MG/ML
INJECTION, SOLUTION INTRAVENOUS
Status: DISPENSED
Start: 2019-07-29

## (undated) RX ORDER — FENTANYL CITRATE 50 UG/ML
INJECTION, SOLUTION INTRAMUSCULAR; INTRAVENOUS
Status: DISPENSED
Start: 2017-10-11

## (undated) RX ORDER — FENTANYL CITRATE 50 UG/ML
INJECTION, SOLUTION INTRAMUSCULAR; INTRAVENOUS
Status: DISPENSED
Start: 2025-09-03